# Patient Record
Sex: MALE | Race: ASIAN | NOT HISPANIC OR LATINO
[De-identification: names, ages, dates, MRNs, and addresses within clinical notes are randomized per-mention and may not be internally consistent; named-entity substitution may affect disease eponyms.]

---

## 2017-01-24 ENCOUNTER — APPOINTMENT (OUTPATIENT)
Dept: ORTHOPEDIC SURGERY | Facility: HOSPITAL | Age: 57
End: 2017-01-24

## 2018-10-04 ENCOUNTER — APPOINTMENT (OUTPATIENT)
Dept: ORTHOPEDIC SURGERY | Facility: CLINIC | Age: 58
End: 2018-10-04

## 2019-02-15 ENCOUNTER — APPOINTMENT (OUTPATIENT)
Dept: ORTHOPEDIC SURGERY | Facility: CLINIC | Age: 59
End: 2019-02-15
Payer: COMMERCIAL

## 2019-02-15 VITALS
BODY MASS INDEX: 26.34 KG/M2 | SYSTOLIC BLOOD PRESSURE: 155 MMHG | HEIGHT: 70 IN | WEIGHT: 184 LBS | HEART RATE: 78 BPM | DIASTOLIC BLOOD PRESSURE: 96 MMHG

## 2019-02-15 DIAGNOSIS — R26.81 UNSTEADINESS ON FEET: ICD-10-CM

## 2019-02-15 DIAGNOSIS — M25.561 PAIN IN RIGHT KNEE: ICD-10-CM

## 2019-02-15 DIAGNOSIS — M47.817 SPONDYLOSIS W/OUT MYELOPATHY OR RADICULOPATHY, LUMBOSACRAL REGION: ICD-10-CM

## 2019-02-15 DIAGNOSIS — M25.562 PAIN IN RIGHT KNEE: ICD-10-CM

## 2019-02-15 DIAGNOSIS — M47.812 SPONDYLOSIS W/OUT MYELOPATHY OR RADICULOPATHY, CERVICAL REGION: ICD-10-CM

## 2019-02-15 PROCEDURE — 99214 OFFICE O/P EST MOD 30 MIN: CPT

## 2019-02-15 PROCEDURE — 73562 X-RAY EXAM OF KNEE 3: CPT | Mod: 50

## 2019-02-15 PROCEDURE — 72040 X-RAY EXAM NECK SPINE 2-3 VW: CPT

## 2019-02-15 PROCEDURE — 72100 X-RAY EXAM L-S SPINE 2/3 VWS: CPT

## 2019-02-15 PROCEDURE — 72170 X-RAY EXAM OF PELVIS: CPT

## 2019-02-15 NOTE — CONSULT LETTER
[Dear  ___] : Dear  [unfilled], [Consult Letter:] : I had the pleasure of evaluating your patient, [unfilled]. [Please see my note below.] : Please see my note below. [Consult Closing:] : Thank you very much for allowing me to participate in the care of this patient.  If you have any questions, please do not hesitate to contact me. [Sincerely,] : Sincerely, [FreeTextEntry2] : JOHN PAUL KIRBY\par  [FreeTextEntry3] : Diego Griffith MD\par \par ______________________________________________\par [facility]: Hip/Knee Arthroplasty\par [address], [address], [place] [address]\par (t) [phone]\par (f) [phone]\par

## 2019-02-15 NOTE — PHYSICAL EXAM
[Knee] : patellar 2+ and symmetric bilaterally [Ankle] : ankle 2+ and symmetric bilaterally [DP] : dorsalis pedis 2+ and symmetric bilaterally [PT] : posterior tibial 2+ and symmetric bilaterally [de-identified] : On general examination the patient is adequately groomed and nourished. The vital parameters are as recorded. \par There is no lymphedema or diffuse swelling, no varicose veins, no skin warmth/erythema/scars/swelling, no ulcers and no palpable lymph nodes or masses in both lower extremities. Bilateral pedal pulses are well palpable.\par Upper Extremity:\par Both right and left upper extremities are unremarkable in terms of skin rash, lesions, pigmentation, redness, tenderness, swelling, joint instability, abnormal deformity or crepitus. The overall range of motion, sensation, motor tone and strength testing are normal.\par \par The patient ambulates with an unstable gait. There is generalized weakness of bilateral lower extremities. \par Knee Exam: \par The gait and station is normal\par Both knees have a valgus alignment of 5 degrees with no flexion contracture or deformity. Both knees demonstrate no scars and the skin has no warmth, erythema, swelling or tenderness. \par Both knees have a normal range of motion of 0 degrees to 130 degrees flexion. There is mild medial joint line tenderness, right more painful than left. \par Georgi's test is negative. Shade test is negative. \par Lachman's test, Anterior/Posterior Drawer test and Pivot Shift Tests are negative. There is no mediolateral laxity and no anteroposterior instability. \par Patella compression test is negative and patellofemoral tracking is normal with no lateral subluxation, apprehension or instability. \par Both knees quadriceps and hamstrings power is normal.\par \par Neurology:\par The patient is alert and oriented in person, place and time. The mood is calm and affect is normal.\par Testing for coordination including Rhomberg's Test and Finger-Nose Test, sensation, motor tone and power are all abnormal. There is generalized weakness bilateral LE, and the patient ambulates with an unstable gait. DTR intact. Vascular intact B/l\par Spine Exam:\par There is mild curvature of the spine with loss of normal lumbar lordosis. The skin is devoid of erythema, scars, pigmentation or rashes. There is mild lumbar spasm and tenderness especially at L4-L5 or L5-S1. \par The range of motion of the lumbar spine is limited by pain and spasm. Forward flexion is 80% normal, extension catch positive, lateral flexion and rotation 80% normal. There is no lumbar spine imbalance or instability detected.\par Bilateral passive SLR is right 40 degrees, left 40 degrees in supine and sitting positions. Lasegue's Test is negative.\par \par  [de-identified] : The following radiographs were ordered and read by me during this patients visit. I reviewed each radiograph in detail with the patient and discussed the findings as highlighted below. \par AP, lateral and skyline views of the bilateral knees are within normal limits. \par AP view of the pelvis are within normal limits\par AP and lateral views of the lumbar spine reveal degenerative end plate changes, DJD most prominent L5-S1. \par AP and lateral views of the cervical spine reveal degenerative changes C3-4 and C4-5. \par

## 2019-02-15 NOTE — DISCUSSION/SUMMARY
[de-identified] : Patient with bilateral knee pain, with significant gait instability. \par At this time, the patient has been advised his symptoms are not likely stemming from pathology involving the knees. The knees are stable and are not the cause of his symptoms. The patient has been recommended further workup with his current neurologist. Dr. Jackson. I have also left a message for the primary care physician to recommended close follow up regarding neurological follow up. He has also been provided with another physician name for second opinion if necessary regarding his neurological conditions.\par Concerning his knees, The patient was informed of the findings and recommended conservative management in the form of a home exercise program, activity modifications, stationary bicycling, swimming and weight loss program. A trial of Glucosamine and Chondroiten Sulphate was recommended. He has been recommended to take Tylenol for pain if needed. \par Follow-up appointment was recommended in 3-6 months or as needed. \par \par \par \par

## 2019-02-15 NOTE — HISTORY OF PRESENT ILLNESS
[Worsening] : worsening [Intermit.] : ~He/She~ states the symptoms seem to be intermittent [de-identified] : Mr. TASH FARIA is a 58 year old male presenting with bilateral knee pain, right worse than left for many years, now worsening over the last week. He notes a recent fall last week, and states it was due to his knees buckling. He states he fell to the ground, and was not able to get back up without someone helping him. He has been using a cane for ambulation since this fall. He states he was evaluated by a neurologist, who found some instability and generalized muscle weakness, and was sent for multiple imaging studies. He has been advised by the neurologist that he may have had a TIA in the past. He has not been back to the neurologist since the imaging studies. \par He continues to have knee pain at this time, and has been treated for many years conservatively. He notes the pain is localized to the medial aspect of the knees, right more painful.  The pain is waking him at night, due to direct pressure on the knees when he is laying on his side.   He localizes the pain to the medial anterior aspect of the bilateral knees. he describes the pain as sharp, worsened by direct pressure, and walking. The pain is intermittent. \par He has been treated conservatively with physical therapy, a recent cortisone injection 1/22/2019 to the right knee with minimal relief. He returns for knee evaluation. \par He admits to a recent A1C of 6.5, DM controlled.

## 2019-03-04 ENCOUNTER — INPATIENT (INPATIENT)
Facility: HOSPITAL | Age: 59
LOS: 5 days | Discharge: ANOTHER HOSPITAL | End: 2019-03-10
Attending: NEUROLOGICAL SURGERY | Admitting: NEUROLOGICAL SURGERY
Payer: COMMERCIAL

## 2019-03-04 VITALS
HEART RATE: 84 BPM | RESPIRATION RATE: 18 BRPM | TEMPERATURE: 98 F | DIASTOLIC BLOOD PRESSURE: 77 MMHG | SYSTOLIC BLOOD PRESSURE: 145 MMHG | OXYGEN SATURATION: 97 %

## 2019-03-04 DIAGNOSIS — E78.5 HYPERLIPIDEMIA, UNSPECIFIED: ICD-10-CM

## 2019-03-04 DIAGNOSIS — I10 ESSENTIAL (PRIMARY) HYPERTENSION: ICD-10-CM

## 2019-03-04 DIAGNOSIS — G95.0 SYRINGOMYELIA AND SYRINGOBULBIA: ICD-10-CM

## 2019-03-04 DIAGNOSIS — E11.9 TYPE 2 DIABETES MELLITUS WITHOUT COMPLICATIONS: ICD-10-CM

## 2019-03-04 LAB
ALBUMIN SERPL ELPH-MCNC: 4.3 G/DL — SIGNIFICANT CHANGE UP (ref 3.3–5)
ALP SERPL-CCNC: 71 U/L — SIGNIFICANT CHANGE UP (ref 40–120)
ALT FLD-CCNC: 26 U/L — SIGNIFICANT CHANGE UP (ref 4–41)
ANION GAP SERPL CALC-SCNC: 13 MMO/L — SIGNIFICANT CHANGE UP (ref 7–14)
AST SERPL-CCNC: 24 U/L — SIGNIFICANT CHANGE UP (ref 4–40)
BASOPHILS # BLD AUTO: 0.05 K/UL — SIGNIFICANT CHANGE UP (ref 0–0.2)
BASOPHILS NFR BLD AUTO: 0.5 % — SIGNIFICANT CHANGE UP (ref 0–2)
BILIRUB SERPL-MCNC: 0.4 MG/DL — SIGNIFICANT CHANGE UP (ref 0.2–1.2)
BUN SERPL-MCNC: 12 MG/DL — SIGNIFICANT CHANGE UP (ref 7–23)
CALCIUM SERPL-MCNC: 9.7 MG/DL — SIGNIFICANT CHANGE UP (ref 8.4–10.5)
CHLORIDE SERPL-SCNC: 99 MMOL/L — SIGNIFICANT CHANGE UP (ref 98–107)
CO2 SERPL-SCNC: 25 MMOL/L — SIGNIFICANT CHANGE UP (ref 22–31)
CREAT SERPL-MCNC: 0.8 MG/DL — SIGNIFICANT CHANGE UP (ref 0.5–1.3)
EOSINOPHIL # BLD AUTO: 0.16 K/UL — SIGNIFICANT CHANGE UP (ref 0–0.5)
EOSINOPHIL NFR BLD AUTO: 1.7 % — SIGNIFICANT CHANGE UP (ref 0–6)
GLUCOSE SERPL-MCNC: 163 MG/DL — HIGH (ref 70–99)
HCT VFR BLD CALC: 40.8 % — SIGNIFICANT CHANGE UP (ref 39–50)
HGB BLD-MCNC: 12.6 G/DL — LOW (ref 13–17)
IMM GRANULOCYTES NFR BLD AUTO: 0.3 % — SIGNIFICANT CHANGE UP (ref 0–1.5)
LYMPHOCYTES # BLD AUTO: 3.28 K/UL — SIGNIFICANT CHANGE UP (ref 1–3.3)
LYMPHOCYTES # BLD AUTO: 35.5 % — SIGNIFICANT CHANGE UP (ref 13–44)
MCHC RBC-ENTMCNC: 26.4 PG — LOW (ref 27–34)
MCHC RBC-ENTMCNC: 30.9 % — LOW (ref 32–36)
MCV RBC AUTO: 85.4 FL — SIGNIFICANT CHANGE UP (ref 80–100)
MONOCYTES # BLD AUTO: 0.83 K/UL — SIGNIFICANT CHANGE UP (ref 0–0.9)
MONOCYTES NFR BLD AUTO: 9 % — SIGNIFICANT CHANGE UP (ref 2–14)
NEUTROPHILS # BLD AUTO: 4.9 K/UL — SIGNIFICANT CHANGE UP (ref 1.8–7.4)
NEUTROPHILS NFR BLD AUTO: 53 % — SIGNIFICANT CHANGE UP (ref 43–77)
NRBC # FLD: 0 K/UL — LOW (ref 25–125)
PLATELET # BLD AUTO: 283 K/UL — SIGNIFICANT CHANGE UP (ref 150–400)
PMV BLD: 10 FL — SIGNIFICANT CHANGE UP (ref 7–13)
POTASSIUM SERPL-MCNC: 3.8 MMOL/L — SIGNIFICANT CHANGE UP (ref 3.5–5.3)
POTASSIUM SERPL-SCNC: 3.8 MMOL/L — SIGNIFICANT CHANGE UP (ref 3.5–5.3)
PROT SERPL-MCNC: 7.7 G/DL — SIGNIFICANT CHANGE UP (ref 6–8.3)
RBC # BLD: 4.78 M/UL — SIGNIFICANT CHANGE UP (ref 4.2–5.8)
RBC # FLD: 15.3 % — HIGH (ref 10.3–14.5)
SODIUM SERPL-SCNC: 137 MMOL/L — SIGNIFICANT CHANGE UP (ref 135–145)
WBC # BLD: 9.25 K/UL — SIGNIFICANT CHANGE UP (ref 3.8–10.5)
WBC # FLD AUTO: 9.25 K/UL — SIGNIFICANT CHANGE UP (ref 3.8–10.5)

## 2019-03-04 PROCEDURE — 93970 EXTREMITY STUDY: CPT | Mod: 26

## 2019-03-04 PROCEDURE — 71045 X-RAY EXAM CHEST 1 VIEW: CPT | Mod: 26

## 2019-03-04 RX ORDER — DOCUSATE SODIUM 100 MG
100 CAPSULE ORAL THREE TIMES A DAY
Qty: 0 | Refills: 0 | Status: DISCONTINUED | OUTPATIENT
Start: 2019-03-04 | End: 2019-03-10

## 2019-03-04 RX ORDER — DEXTROSE 50 % IN WATER 50 %
15 SYRINGE (ML) INTRAVENOUS ONCE
Qty: 0 | Refills: 0 | Status: DISCONTINUED | OUTPATIENT
Start: 2019-03-04 | End: 2019-03-10

## 2019-03-04 RX ORDER — SODIUM CHLORIDE 9 MG/ML
3 INJECTION INTRAMUSCULAR; INTRAVENOUS; SUBCUTANEOUS EVERY 8 HOURS
Qty: 0 | Refills: 0 | Status: DISCONTINUED | OUTPATIENT
Start: 2019-03-04 | End: 2019-03-10

## 2019-03-04 RX ORDER — DEXTROSE 50 % IN WATER 50 %
25 SYRINGE (ML) INTRAVENOUS ONCE
Qty: 0 | Refills: 0 | Status: DISCONTINUED | OUTPATIENT
Start: 2019-03-04 | End: 2019-03-10

## 2019-03-04 RX ORDER — SENNA PLUS 8.6 MG/1
2 TABLET ORAL AT BEDTIME
Qty: 0 | Refills: 0 | Status: DISCONTINUED | OUTPATIENT
Start: 2019-03-04 | End: 2019-03-10

## 2019-03-04 RX ORDER — INSULIN LISPRO 100/ML
VIAL (ML) SUBCUTANEOUS AT BEDTIME
Qty: 0 | Refills: 0 | Status: DISCONTINUED | OUTPATIENT
Start: 2019-03-04 | End: 2019-03-10

## 2019-03-04 RX ORDER — DEXTROSE 50 % IN WATER 50 %
12.5 SYRINGE (ML) INTRAVENOUS ONCE
Qty: 0 | Refills: 0 | Status: DISCONTINUED | OUTPATIENT
Start: 2019-03-04 | End: 2019-03-10

## 2019-03-04 RX ORDER — SODIUM CHLORIDE 9 MG/ML
1000 INJECTION, SOLUTION INTRAVENOUS
Qty: 0 | Refills: 0 | Status: DISCONTINUED | OUTPATIENT
Start: 2019-03-04 | End: 2019-03-10

## 2019-03-04 RX ORDER — INSULIN LISPRO 100/ML
VIAL (ML) SUBCUTANEOUS
Qty: 0 | Refills: 0 | Status: DISCONTINUED | OUTPATIENT
Start: 2019-03-04 | End: 2019-03-10

## 2019-03-04 RX ORDER — ACETAMINOPHEN 500 MG
650 TABLET ORAL EVERY 6 HOURS
Qty: 0 | Refills: 0 | Status: DISCONTINUED | OUTPATIENT
Start: 2019-03-04 | End: 2019-03-10

## 2019-03-04 RX ORDER — GLUCAGON INJECTION, SOLUTION 0.5 MG/.1ML
1 INJECTION, SOLUTION SUBCUTANEOUS ONCE
Qty: 0 | Refills: 0 | Status: DISCONTINUED | OUTPATIENT
Start: 2019-03-04 | End: 2019-03-10

## 2019-03-04 RX ADMIN — SENNA PLUS 2 TABLET(S): 8.6 TABLET ORAL at 23:46

## 2019-03-04 RX ADMIN — Medication 100 MILLIGRAM(S): at 23:46

## 2019-03-04 RX ADMIN — SODIUM CHLORIDE 3 MILLILITER(S): 9 INJECTION INTRAMUSCULAR; INTRAVENOUS; SUBCUTANEOUS at 23:46

## 2019-03-04 NOTE — H&P ADULT - NSHPPHYSICALEXAM_GEN_ALL_CORE
WDWN male in NAD  Vital Signs Last 24 Hrs  T(C): 36.8 (04 Mar 2019 19:38), Max: 36.8 (04 Mar 2019 16:44)  T(F): 98.3 (04 Mar 2019 19:38), Max: 98.3 (04 Mar 2019 16:44)  HR: 71 (04 Mar 2019 19:38) (71 - 84)  BP: 163/104 (04 Mar 2019 19:38) (145/77 - 163/104)  BP(mean): --  RR: 18 (04 Mar 2019 19:38) (18 - 18)  SpO2: 100% (04 Mar 2019 19:38) (97% - 100%)    AAO X 3  PERRLA, EOMI  CN 2-12 grossly intact  BROWN, bilateral UE and Right LE 5/5  Left LE PF/DF 5/5, Knee flexion 4-/5, knee extension, Hip flexion/extension 3/5  Sensation patchy areas of decreased sensation distal to mid thigh bilaterally    VTE score: 2

## 2019-03-04 NOTE — ED PROVIDER NOTE - PHYSICAL EXAMINATION
PHYSICAL EXAM:  GENERAL: NAD, well-groomed, well-developed  HEAD:  Atraumatic, Normocephalic  EYES: EOMI, PERRLA, conjunctiva and sclera clear  ENMT: No tonsillar erythema, exudates, or enlargement; Moist mucous membranes  NECK: Supple, No JVD  HEART: Regular rate and rhythm; No murmurs, rubs, or gallops  RESPIRATORY: CTA B/L, No W/R/R  ABDOMEN: Soft, Nontender, Nondistended  NEURO: A&Ox3, 5/5 strength BUE, 3+/5 strength LLE, 4/5 strength RLE, no saddle anesthesias   EXTREMITIES:  2+ Peripheral Pulses, no edema, sensory diminished to L dorsal foot  SKIN: warm, dry, normal color, no rash or abnormal lesions PHYSICAL EXAM:  GENERAL: NAD, well-groomed, well-developed  HEAD:  Atraumatic, Normocephalic  EYES: EOMI, PERRLA, conjunctiva and sclera clear  ENMT: No tonsillar erythema, exudates, or enlargement; Moist mucous membranes  NECK: Supple, No JVD  HEART: Regular rate and rhythm; No murmurs, rubs, or gallops  RESPIRATORY: CTA B/L, No W/R/R, no resp distress  ABDOMEN: Soft, Nontender, Nondistended  NEURO: A&Ox3, 5/5 strength BUE, 3+/5 strength LLE, 4/5 strength RLE, no saddle anesthesias, normal mentation, speech, no truncal weakness  EXTREMITIES:  2+ Peripheral Pulses, no edema, sensory diminished to L dorsal foot  SKIN: warm, dry, normal color, no rash or abnormal lesions

## 2019-03-04 NOTE — ED ADULT TRIAGE NOTE - CHIEF COMPLAINT QUOTE
PT C/O pain and weakness to bilateral lower extremities x 1 month worsening x 1week. PT states has been unable to ambulate: had outpatient MRI on 2/26/19 that showed possible mass on Lumbar region of spine. as well as cord compression PT denies any bowel incontinence, does endorse urinary incontinence x 1 day. PMH: DM, HTN, HLD.

## 2019-03-04 NOTE — H&P ADULT - NSHPREVIEWOFSYSTEMS_GEN_ALL_CORE
14 point ROS conducted  General: difficulty ambulating unassisted   Neuro: decreased sensation in both legs, left leg weakness   Musculoskeletal: Low back pain

## 2019-03-04 NOTE — H&P ADULT - PROBLEM SELECTOR PLAN 1
Admit   MRI of neural axis with and without contrast with CISS sequence  Preop for correction  VTE prophylaxis

## 2019-03-04 NOTE — H&P ADULT - HISTORY OF PRESENT ILLNESS
59 YO male developed lower back pain following a fall approximately 1 month ago. 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 Patient c/o lower back pain, decreased sensation in both legs, left leg weakness and difficulty ambulating unassisted due to weakness. Denied bowel or bladder dysfunction

## 2019-03-04 NOTE — ED ADULT NURSE NOTE - OBJECTIVE STATEMENT
58M c/o pain and weakness to bilateral lower extremities x 1 month worsening x 1week. Admits to two falls in the past month. Pt states has been unable to ambulate: had outpatient MRI on 2/26/19 that showed possible mass on Lumbar region of spine. as well as cord compression. Pt also received MRI today. Pt told to be admitted to neuro surgery.  PT denies any bowel incontinence, but does endorse urinary incontinence x 1 day. PMH: DM, HTN, HLD.  weakness  +b/l lower extremity weakness, decreased sensation to lateral aspect of lower extremities.

## 2019-03-04 NOTE — ED PROVIDER NOTE - ATTENDING CONTRIBUTION TO CARE
Landen: 59 yo male with a h/o DM, HTN, and HLD sent in for admission to neurosurgery for a new diagnosis of spinal mass. Pt endorses approx 1 month of progressively worsening LE weakness. No h/o trauma. + associated left foot paresthesias. No fevers or chills. Pt denies any current pain. + urinary incontinence. No saddle anesthesia. Exam: GENERAL: well appearing, NAD, HEENT: MMM, PERRLA, CARDIO: +S1/S2, no murmurs, rubs or gallops, LUNGS: CTA B/L, no wheezing, rales or rhonchi, ABD: soft, nontender, BSx4 quadrants, no guarding or rigidity. EXT: No LE edema or calf TTP, 2+ distal pulses x 4 extremities. NEURO: AxOx3, CNII-XII intact, no dysmetria or disdiadocholinesia. Negative Romberg's. SKIN: no rashes or lesions,well perfused Landen: 57 yo male with a h/o DM, HTN, and HLD sent in for admission to neurosurgery for a new diagnosis of spinal mass. Pt endorses approx 1 month of progressively worsening LE weakness. No h/o trauma. + associated left foot paresthesias. No fevers or chills. Pt denies any current pain. + urinary incontinence. No saddle anesthesia. Exam: GENERAL: well appearing, NAD, HEENT: MMM, PERRLA, CARDIO: +S1/S2, no murmurs, rubs or gallops, LUNGS: CTA B/L, no wheezing, rales or rhonchi, ABD: soft, nontender, BSx4 quadrants, no guarding or rigidity. MSK: no midline TTP EXT: S1 weak b/l worse on left, 3+/5 strength in LLE, 4/5 on right, no saddle anesthesia, + sensory deficits on left lateral foot. NEURO: AxOx3, SKIN: no rashes or lesions, well perfused A/P- 57 yo male with new spinal mass with neuro deficits. will obtain labs, and admit to neurosurgery.

## 2019-03-04 NOTE — ED PROVIDER NOTE - PROGRESS NOTE DETAILS
[place] PGY1- discussion with nsg, admit to severo, no need for decadron as no evidence for cord edema

## 2019-03-04 NOTE — ED ADULT NURSE NOTE - NSIMPLEMENTINTERV_GEN_ALL_ED
Implemented All Fall Risk Interventions:  [place] to call system. Call bell, personal items and telephone within reach. Instruct patient to call for assistance. Room bathroom lighting operational. Non-slip footwear when patient is off stretcher. Physically safe environment: no spills, clutter or unnecessary equipment. Stretcher in lowest position, wheels locked, appropriate side rails in place. Provide visual cue, wrist band, yellow gown, etc. Monitor gait and stability. Monitor for mental status changes and reorient to person, place, and time. Review medications for side effects contributing to fall risk. Reinforce activity limits and safety measures with patient and family.

## 2019-03-05 LAB
APTT BLD: 28.5 SEC — SIGNIFICANT CHANGE UP (ref 27.5–36.3)
BLD GP AB SCN SERPL QL: NEGATIVE — SIGNIFICANT CHANGE UP
GLUCOSE BLDC GLUCOMTR-MCNC: 120 MG/DL — HIGH (ref 70–99)
GLUCOSE BLDC GLUCOMTR-MCNC: 131 MG/DL — HIGH (ref 70–99)
GLUCOSE BLDC GLUCOMTR-MCNC: 144 MG/DL — HIGH (ref 70–99)
GLUCOSE BLDC GLUCOMTR-MCNC: 147 MG/DL — HIGH (ref 70–99)
INR BLD: 1.17 — SIGNIFICANT CHANGE UP (ref 0.88–1.17)
PROTHROM AB SERPL-ACNC: 13 SEC — SIGNIFICANT CHANGE UP (ref 9.8–13.1)
RH IG SCN BLD-IMP: NEGATIVE — SIGNIFICANT CHANGE UP

## 2019-03-05 PROCEDURE — 72141 MRI NECK SPINE W/O DYE: CPT | Mod: 26

## 2019-03-05 PROCEDURE — 70551 MRI BRAIN STEM W/O DYE: CPT | Mod: 26

## 2019-03-05 PROCEDURE — 93010 ELECTROCARDIOGRAM REPORT: CPT

## 2019-03-05 PROCEDURE — 72146 MRI CHEST SPINE W/O DYE: CPT | Mod: 26

## 2019-03-05 PROCEDURE — 72148 MRI LUMBAR SPINE W/O DYE: CPT | Mod: 26

## 2019-03-05 RX ORDER — DIAZEPAM 5 MG
5 TABLET ORAL ONCE
Qty: 0 | Refills: 0 | Status: DISCONTINUED | OUTPATIENT
Start: 2019-03-05 | End: 2019-03-06

## 2019-03-05 RX ORDER — LOSARTAN POTASSIUM 100 MG/1
100 TABLET, FILM COATED ORAL DAILY
Qty: 0 | Refills: 0 | Status: DISCONTINUED | OUTPATIENT
Start: 2019-03-05 | End: 2019-03-10

## 2019-03-05 RX ORDER — AMLODIPINE BESYLATE 2.5 MG/1
5 TABLET ORAL DAILY
Qty: 0 | Refills: 0 | Status: DISCONTINUED | OUTPATIENT
Start: 2019-03-05 | End: 2019-03-10

## 2019-03-05 RX ORDER — AMLODIPINE BESYLATE 2.5 MG/1
5 TABLET ORAL DAILY
Qty: 0 | Refills: 0 | Status: DISCONTINUED | OUTPATIENT
Start: 2019-03-05 | End: 2019-03-05

## 2019-03-05 RX ORDER — ATORVASTATIN CALCIUM 80 MG/1
20 TABLET, FILM COATED ORAL AT BEDTIME
Qty: 0 | Refills: 0 | Status: DISCONTINUED | OUTPATIENT
Start: 2019-03-05 | End: 2019-03-10

## 2019-03-05 RX ORDER — OXYCODONE HYDROCHLORIDE 5 MG/1
10 TABLET ORAL EVERY 6 HOURS
Qty: 0 | Refills: 0 | Status: DISCONTINUED | OUTPATIENT
Start: 2019-03-05 | End: 2019-03-10

## 2019-03-05 RX ORDER — LANOLIN ALCOHOL/MO/W.PET/CERES
3 CREAM (GRAM) TOPICAL AT BEDTIME
Qty: 0 | Refills: 0 | Status: DISCONTINUED | OUTPATIENT
Start: 2019-03-05 | End: 2019-03-10

## 2019-03-05 RX ORDER — GABAPENTIN 400 MG/1
600 CAPSULE ORAL THREE TIMES A DAY
Qty: 0 | Refills: 0 | Status: DISCONTINUED | OUTPATIENT
Start: 2019-03-05 | End: 2019-03-10

## 2019-03-05 RX ORDER — LOSARTAN POTASSIUM 100 MG/1
100 TABLET, FILM COATED ORAL DAILY
Qty: 0 | Refills: 0 | Status: DISCONTINUED | OUTPATIENT
Start: 2019-03-05 | End: 2019-03-05

## 2019-03-05 RX ADMIN — OXYCODONE HYDROCHLORIDE 10 MILLIGRAM(S): 5 TABLET ORAL at 12:40

## 2019-03-05 RX ADMIN — AMLODIPINE BESYLATE 5 MILLIGRAM(S): 2.5 TABLET ORAL at 11:24

## 2019-03-05 RX ADMIN — Medication 650 MILLIGRAM(S): at 20:12

## 2019-03-05 RX ADMIN — ATORVASTATIN CALCIUM 20 MILLIGRAM(S): 80 TABLET, FILM COATED ORAL at 22:23

## 2019-03-05 RX ADMIN — SODIUM CHLORIDE 3 MILLILITER(S): 9 INJECTION INTRAMUSCULAR; INTRAVENOUS; SUBCUTANEOUS at 07:47

## 2019-03-05 RX ADMIN — Medication 650 MILLIGRAM(S): at 04:18

## 2019-03-05 RX ADMIN — OXYCODONE HYDROCHLORIDE 10 MILLIGRAM(S): 5 TABLET ORAL at 11:24

## 2019-03-05 RX ADMIN — Medication 100 MILLIGRAM(S): at 22:23

## 2019-03-05 RX ADMIN — Medication 100 MILLIGRAM(S): at 07:47

## 2019-03-05 RX ADMIN — Medication 650 MILLIGRAM(S): at 04:48

## 2019-03-05 RX ADMIN — Medication 650 MILLIGRAM(S): at 20:40

## 2019-03-05 RX ADMIN — GABAPENTIN 600 MILLIGRAM(S): 400 CAPSULE ORAL at 22:23

## 2019-03-05 RX ADMIN — Medication 3 MILLIGRAM(S): at 22:25

## 2019-03-05 RX ADMIN — Medication 100 MILLIGRAM(S): at 13:04

## 2019-03-05 RX ADMIN — SODIUM CHLORIDE 3 MILLILITER(S): 9 INJECTION INTRAMUSCULAR; INTRAVENOUS; SUBCUTANEOUS at 13:04

## 2019-03-05 RX ADMIN — LOSARTAN POTASSIUM 100 MILLIGRAM(S): 100 TABLET, FILM COATED ORAL at 22:23

## 2019-03-05 RX ADMIN — SODIUM CHLORIDE 3 MILLILITER(S): 9 INJECTION INTRAMUSCULAR; INTRAVENOUS; SUBCUTANEOUS at 22:27

## 2019-03-05 RX ADMIN — GABAPENTIN 600 MILLIGRAM(S): 400 CAPSULE ORAL at 13:06

## 2019-03-05 RX ADMIN — SENNA PLUS 2 TABLET(S): 8.6 TABLET ORAL at 22:23

## 2019-03-05 NOTE — CONSULT NOTE ADULT - ASSESSMENT
HTN  add losartan    DM  Monitor finger stick. Insulin coverage. Diabetic education and Diabetic diet. Consider nutrition consultation.    HLD  on statin    PREOP  Based on current ACC/AHA guidelines, patient history and physical exam, the patient is considered to have low risk  please obtain EKG

## 2019-03-05 NOTE — CONSULT NOTE ADULT - SUBJECTIVE AND OBJECTIVE BOX
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       PAST MEDICAL & SURGICAL HISTORY:  HLD (hyperlipidemia)  Essential hypertension  DM (diabetes mellitus)          MEDICATIONS:  amLODIPine   Tablet 5 milliGRAM(s) Oral daily        acetaminophen   Tablet .. 650 milliGRAM(s) Oral every 6 hours PRN  diazepam    Tablet 5 milliGRAM(s) Oral once  gabapentin 600 milliGRAM(s) Oral three times a day  oxyCODONE    IR 10 milliGRAM(s) Oral every 6 hours PRN    docusate sodium 100 milliGRAM(s) Oral three times a day  senna 2 Tablet(s) Oral at bedtime    atorvastatin 20 milliGRAM(s) Oral at bedtime  dextrose 40% Gel 15 Gram(s) Oral once PRN  dextrose 50% Injectable 12.5 Gram(s) IV Push once  dextrose 50% Injectable 25 Gram(s) IV Push once  dextrose 50% Injectable 25 Gram(s) IV Push once  glucagon  Injectable 1 milliGRAM(s) IntraMuscular once PRN  insulin lispro (HumaLOG) corrective regimen sliding scale   SubCutaneous three times a day before meals  insulin lispro (HumaLOG) corrective regimen sliding scale   SubCutaneous at bedtime    dextrose 5%. 1000 milliLiter(s) IV Continuous <Continuous>  sodium chloride 0.9% lock flush 3 milliLiter(s) IV Push every 8 hours      FAMILY HISTORY:      Non-contributory    SOCIAL HISTORY:    No tobacco, drugs or etoh    Allergies    No Known Allergies    Intolerances    	    REVIEW OF SYSTEMS:  as above  The rest of the 14 points ROS reviewed and except above they are unremarkable.        PHYSICAL EXAM:  T(C): 36.7 (03-05-19 @ 13:07), Max: 36.8 (03-04-19 @ 19:38)  HR: 88 (03-05-19 @ 14:25) (64 - 93)  BP: 155/88 (03-05-19 @ 14:25) (142/86 - 173/102)  RR: 17 (03-05-19 @ 13:07) (17 - 18)  SpO2: 100% (03-05-19 @ 13:07) (99% - 100%)  Wt(kg): --  I&O's Summary    05 Mar 2019 07:01  -  05 Mar 2019 16:51  --------------------------------------------------------  IN: 0 mL / OUT: 500 mL / NET: -500 mL        JVP: Normal  Neck: supple  Lung: clear   CV: S1 S2 , Murmur:  Abd: soft  Ext: No edema  neuro: Awake / alert  Psych: flat affect  Skin: normal      LABS/DATA:    TELEMETRY: 	    ECG:  	   	  CARDIAC MARKERS:                                      12.6   9.25  )-----------( 283      ( 04 Mar 2019 19:54 )             40.8     03-04    137  |  99  |  12  ----------------------------<  163<H>  3.8   |  25  |  0.80    Ca    9.7      04 Mar 2019 19:54    TPro  7.7  /  Alb  4.3  /  TBili  0.4  /  DBili  x   /  AST  24  /  ALT  26  /  AlkPhos  71  03-04    proBNP:   Lipid Profile:   HgA1c:   TSH:

## 2019-03-06 LAB
GLUCOSE BLDC GLUCOMTR-MCNC: 118 MG/DL — HIGH (ref 70–99)
GLUCOSE BLDC GLUCOMTR-MCNC: 143 MG/DL — HIGH (ref 70–99)
GLUCOSE BLDC GLUCOMTR-MCNC: 145 MG/DL — HIGH (ref 70–99)
GLUCOSE BLDC GLUCOMTR-MCNC: 146 MG/DL — HIGH (ref 70–99)

## 2019-03-06 PROCEDURE — 71260 CT THORAX DX C+: CPT | Mod: 26

## 2019-03-06 PROCEDURE — 74177 CT ABD & PELVIS W/CONTRAST: CPT | Mod: 26

## 2019-03-06 RX ADMIN — Medication 100 MILLIGRAM(S): at 22:21

## 2019-03-06 RX ADMIN — Medication 650 MILLIGRAM(S): at 22:21

## 2019-03-06 RX ADMIN — GABAPENTIN 600 MILLIGRAM(S): 400 CAPSULE ORAL at 05:10

## 2019-03-06 RX ADMIN — Medication 5 MILLIGRAM(S): at 15:07

## 2019-03-06 RX ADMIN — Medication 3 MILLIGRAM(S): at 22:20

## 2019-03-06 RX ADMIN — SENNA PLUS 2 TABLET(S): 8.6 TABLET ORAL at 22:21

## 2019-03-06 RX ADMIN — ATORVASTATIN CALCIUM 20 MILLIGRAM(S): 80 TABLET, FILM COATED ORAL at 22:20

## 2019-03-06 RX ADMIN — Medication 100 MILLIGRAM(S): at 05:09

## 2019-03-06 RX ADMIN — OXYCODONE HYDROCHLORIDE 10 MILLIGRAM(S): 5 TABLET ORAL at 05:40

## 2019-03-06 RX ADMIN — OXYCODONE HYDROCHLORIDE 10 MILLIGRAM(S): 5 TABLET ORAL at 05:09

## 2019-03-06 RX ADMIN — SODIUM CHLORIDE 3 MILLILITER(S): 9 INJECTION INTRAMUSCULAR; INTRAVENOUS; SUBCUTANEOUS at 22:22

## 2019-03-06 RX ADMIN — GABAPENTIN 600 MILLIGRAM(S): 400 CAPSULE ORAL at 22:20

## 2019-03-06 RX ADMIN — GABAPENTIN 600 MILLIGRAM(S): 400 CAPSULE ORAL at 13:17

## 2019-03-06 RX ADMIN — Medication 100 MILLIGRAM(S): at 13:17

## 2019-03-06 RX ADMIN — Medication 650 MILLIGRAM(S): at 22:51

## 2019-03-06 RX ADMIN — SODIUM CHLORIDE 3 MILLILITER(S): 9 INJECTION INTRAMUSCULAR; INTRAVENOUS; SUBCUTANEOUS at 13:17

## 2019-03-06 RX ADMIN — LOSARTAN POTASSIUM 100 MILLIGRAM(S): 100 TABLET, FILM COATED ORAL at 06:52

## 2019-03-06 RX ADMIN — AMLODIPINE BESYLATE 5 MILLIGRAM(S): 2.5 TABLET ORAL at 05:10

## 2019-03-06 RX ADMIN — SODIUM CHLORIDE 3 MILLILITER(S): 9 INJECTION INTRAMUSCULAR; INTRAVENOUS; SUBCUTANEOUS at 05:37

## 2019-03-07 ENCOUNTER — RESULT REVIEW (OUTPATIENT)
Age: 59
End: 2019-03-07

## 2019-03-07 LAB
ANION GAP SERPL CALC-SCNC: 11 MMO/L — SIGNIFICANT CHANGE UP (ref 7–14)
APPEARANCE UR: CLEAR — SIGNIFICANT CHANGE UP
APTT BLD: 29.4 SEC — SIGNIFICANT CHANGE UP (ref 27.5–36.3)
BILIRUB UR-MCNC: NEGATIVE — SIGNIFICANT CHANGE UP
BLD GP AB SCN SERPL QL: NEGATIVE — SIGNIFICANT CHANGE UP
BLOOD UR QL VISUAL: NEGATIVE — SIGNIFICANT CHANGE UP
BUN SERPL-MCNC: 17 MG/DL — SIGNIFICANT CHANGE UP (ref 7–23)
CALCIUM SERPL-MCNC: 9.7 MG/DL — SIGNIFICANT CHANGE UP (ref 8.4–10.5)
CHLORIDE SERPL-SCNC: 104 MMOL/L — SIGNIFICANT CHANGE UP (ref 98–107)
CLARITY CSF: CLEAR — SIGNIFICANT CHANGE UP
CO2 SERPL-SCNC: 26 MMOL/L — SIGNIFICANT CHANGE UP (ref 22–31)
COLOR CSF: COLORLESS — SIGNIFICANT CHANGE UP
COLOR SPEC: SIGNIFICANT CHANGE UP
CREAT SERPL-MCNC: 0.86 MG/DL — SIGNIFICANT CHANGE UP (ref 0.5–1.3)
CSF PCR RESULT: SIGNIFICANT CHANGE UP
GLUCOSE BLDC GLUCOMTR-MCNC: 131 MG/DL — HIGH (ref 70–99)
GLUCOSE BLDC GLUCOMTR-MCNC: 138 MG/DL — HIGH (ref 70–99)
GLUCOSE BLDC GLUCOMTR-MCNC: 155 MG/DL — HIGH (ref 70–99)
GLUCOSE CSF-MCNC: 88 MG/DL — HIGH (ref 40–70)
GLUCOSE SERPL-MCNC: 151 MG/DL — HIGH (ref 70–99)
GLUCOSE UR-MCNC: NEGATIVE — SIGNIFICANT CHANGE UP
GRAM STN CSF: SIGNIFICANT CHANGE UP
HCT VFR BLD CALC: 43.6 % — SIGNIFICANT CHANGE UP (ref 39–50)
HGB BLD-MCNC: 13.7 G/DL — SIGNIFICANT CHANGE UP (ref 13–17)
INR BLD: 1.18 — HIGH (ref 0.88–1.17)
KETONES UR-MCNC: NEGATIVE — SIGNIFICANT CHANGE UP
LEUKOCYTE ESTERASE UR-ACNC: NEGATIVE — SIGNIFICANT CHANGE UP
LYMPHOCYTES # CSF: 71 % — SIGNIFICANT CHANGE UP
MCHC RBC-ENTMCNC: 26.8 PG — LOW (ref 27–34)
MCHC RBC-ENTMCNC: 31.4 % — LOW (ref 32–36)
MCV RBC AUTO: 85.3 FL — SIGNIFICANT CHANGE UP (ref 80–100)
MONOCYTES # CSF: 28 % — SIGNIFICANT CHANGE UP
NEUTS SEG NFR CSF MANUAL: 1 % — SIGNIFICANT CHANGE UP
NITRITE UR-MCNC: NEGATIVE — SIGNIFICANT CHANGE UP
NRBC # FLD: 0 K/UL — LOW (ref 25–125)
NRBC NFR CSF: 6 CELL/UL — HIGH (ref 0–5)
PH UR: 6 — SIGNIFICANT CHANGE UP (ref 5–8)
PLATELET # BLD AUTO: 268 K/UL — SIGNIFICANT CHANGE UP (ref 150–400)
PMV BLD: 10.2 FL — SIGNIFICANT CHANGE UP (ref 7–13)
POTASSIUM SERPL-MCNC: 4.6 MMOL/L — SIGNIFICANT CHANGE UP (ref 3.5–5.3)
POTASSIUM SERPL-SCNC: 4.6 MMOL/L — SIGNIFICANT CHANGE UP (ref 3.5–5.3)
PROT CSF-MCNC: 51.9 MG/DL — HIGH (ref 15–40)
PROT UR-MCNC: NEGATIVE — SIGNIFICANT CHANGE UP
PROTHROM AB SERPL-ACNC: 13.2 SEC — HIGH (ref 9.8–13.1)
RBC # BLD: 5.11 M/UL — SIGNIFICANT CHANGE UP (ref 4.2–5.8)
RBC # CSF: 38 CELL/UL — HIGH (ref 0–0)
RBC # FLD: 15.7 % — HIGH (ref 10.3–14.5)
RBC CASTS # UR COMP ASSIST: SIGNIFICANT CHANGE UP (ref 0–?)
RH IG SCN BLD-IMP: NEGATIVE — SIGNIFICANT CHANGE UP
SODIUM SERPL-SCNC: 141 MMOL/L — SIGNIFICANT CHANGE UP (ref 135–145)
SP GR SPEC: 1.02 — SIGNIFICANT CHANGE UP (ref 1–1.04)
SPECIMEN SOURCE: SIGNIFICANT CHANGE UP
TOTAL CELLS COUNTED, SPINAL FLUID: 100 CELLS — SIGNIFICANT CHANGE UP
UROBILINOGEN FLD QL: NORMAL — SIGNIFICANT CHANGE UP
WBC # BLD: 8.26 K/UL — SIGNIFICANT CHANGE UP (ref 3.8–10.5)
WBC # FLD AUTO: 8.26 K/UL — SIGNIFICANT CHANGE UP (ref 3.8–10.5)
WBC UR QL: SIGNIFICANT CHANGE UP (ref 0–?)
XANTHOCHROMIA: SIGNIFICANT CHANGE UP

## 2019-03-07 PROCEDURE — 72147 MRI CHEST SPINE W/DYE: CPT | Mod: 26

## 2019-03-07 PROCEDURE — 72149 MRI LUMBAR SPINE W/DYE: CPT | Mod: 26

## 2019-03-07 PROCEDURE — 88108 CYTOPATH CONCENTRATE TECH: CPT | Mod: 26

## 2019-03-07 PROCEDURE — 62270 DX LMBR SPI PNXR: CPT

## 2019-03-07 PROCEDURE — 77003 FLUOROGUIDE FOR SPINE INJECT: CPT | Mod: 26,GC

## 2019-03-07 RX ORDER — SODIUM CHLORIDE 9 MG/ML
1000 INJECTION INTRAMUSCULAR; INTRAVENOUS; SUBCUTANEOUS
Qty: 0 | Refills: 0 | Status: DISCONTINUED | OUTPATIENT
Start: 2019-03-07 | End: 2019-03-07

## 2019-03-07 RX ORDER — POLYETHYLENE GLYCOL 3350 17 G/17G
17 POWDER, FOR SOLUTION ORAL ONCE
Qty: 0 | Refills: 0 | Status: COMPLETED | OUTPATIENT
Start: 2019-03-07 | End: 2019-03-07

## 2019-03-07 RX ADMIN — Medication 100 MILLIGRAM(S): at 05:14

## 2019-03-07 RX ADMIN — OXYCODONE HYDROCHLORIDE 10 MILLIGRAM(S): 5 TABLET ORAL at 05:44

## 2019-03-07 RX ADMIN — GABAPENTIN 600 MILLIGRAM(S): 400 CAPSULE ORAL at 05:14

## 2019-03-07 RX ADMIN — AMLODIPINE BESYLATE 5 MILLIGRAM(S): 2.5 TABLET ORAL at 05:14

## 2019-03-07 RX ADMIN — SODIUM CHLORIDE 3 MILLILITER(S): 9 INJECTION INTRAMUSCULAR; INTRAVENOUS; SUBCUTANEOUS at 12:57

## 2019-03-07 RX ADMIN — Medication 1: at 17:23

## 2019-03-07 RX ADMIN — SODIUM CHLORIDE 3 MILLILITER(S): 9 INJECTION INTRAMUSCULAR; INTRAVENOUS; SUBCUTANEOUS at 22:11

## 2019-03-07 RX ADMIN — OXYCODONE HYDROCHLORIDE 10 MILLIGRAM(S): 5 TABLET ORAL at 05:14

## 2019-03-07 RX ADMIN — POLYETHYLENE GLYCOL 3350 17 GRAM(S): 17 POWDER, FOR SOLUTION ORAL at 12:37

## 2019-03-07 RX ADMIN — Medication 100 MILLIGRAM(S): at 17:23

## 2019-03-07 RX ADMIN — GABAPENTIN 600 MILLIGRAM(S): 400 CAPSULE ORAL at 22:11

## 2019-03-07 RX ADMIN — Medication 100 MILLIGRAM(S): at 22:11

## 2019-03-07 RX ADMIN — ATORVASTATIN CALCIUM 20 MILLIGRAM(S): 80 TABLET, FILM COATED ORAL at 22:11

## 2019-03-07 RX ADMIN — Medication 650 MILLIGRAM(S): at 19:44

## 2019-03-07 RX ADMIN — Medication 650 MILLIGRAM(S): at 20:01

## 2019-03-07 RX ADMIN — LOSARTAN POTASSIUM 100 MILLIGRAM(S): 100 TABLET, FILM COATED ORAL at 05:14

## 2019-03-07 RX ADMIN — GABAPENTIN 600 MILLIGRAM(S): 400 CAPSULE ORAL at 17:22

## 2019-03-07 RX ADMIN — SENNA PLUS 2 TABLET(S): 8.6 TABLET ORAL at 22:11

## 2019-03-07 RX ADMIN — Medication 0: at 09:53

## 2019-03-07 RX ADMIN — SODIUM CHLORIDE 3 MILLILITER(S): 9 INJECTION INTRAMUSCULAR; INTRAVENOUS; SUBCUTANEOUS at 05:15

## 2019-03-07 NOTE — CHART NOTE - NSCHARTNOTEFT_GEN_A_CORE
Interventional Radiology  Pre-Procedure Note    This is a 58y Male scheduled for lumbar puncture with neuro-radiology    HPI:  57 YO male developed lower back pain following a fall approximately 1 month ago. 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 Patient c/o lower back pain, decreased sensation in both legs, left leg weakness and difficulty ambulating unassisted due to weakness. Denied bowel or bladder dysfunction (04 Mar 2019 20:59)    PAST MEDICAL & SURGICAL HISTORY:  HLD (hyperlipidemia)  Essential hypertension  DM (diabetes mellitus)    Allergies: No Known Allergies    Current Medications: acetaminophen   Tablet .. 650 milliGRAM(s) Oral every 6 hours PRN  amLODIPine   Tablet 5 milliGRAM(s) Oral daily  atorvastatin 20 milliGRAM(s) Oral at bedtime  dextrose 40% Gel 15 Gram(s) Oral once PRN  dextrose 5%. 1000 milliLiter(s) IV Continuous <Continuous>  dextrose 50% Injectable 12.5 Gram(s) IV Push once  dextrose 50% Injectable 25 Gram(s) IV Push once  dextrose 50% Injectable 25 Gram(s) IV Push once  docusate sodium 100 milliGRAM(s) Oral three times a day  gabapentin 600 milliGRAM(s) Oral three times a day  glucagon  Injectable 1 milliGRAM(s) IntraMuscular once PRN  insulin lispro (HumaLOG) corrective regimen sliding scale   SubCutaneous three times a day before meals  insulin lispro (HumaLOG) corrective regimen sliding scale   SubCutaneous at bedtime  losartan 100 milliGRAM(s) Oral daily  melatonin 3 milliGRAM(s) Oral at bedtime PRN  oxyCODONE    IR 10 milliGRAM(s) Oral every 6 hours PRN  senna 2 Tablet(s) Oral at bedtime  sodium chloride 0.9% lock flush 3 milliLiter(s) IV Push every 8 hours  sodium chloride 0.9%. 1000 milliLiter(s) IV Continuous <Continuous>      Labs:                         13.7   8.26  )-----------( 268      ( 07 Mar 2019 06:45 )             43.6       03-07    141  |  104  |  17  ----------------------------<  151<H>  4.6   |  26  |  0.86    Ca    9.7      07 Mar 2019 06:45            Assessment/Plan:   This is a 58y  Male  presents with LLE extremity weakness  Plan is for Lumbar Puncture  Procedure/ risks/ benefits/ goals/ alternatives were explained. All questions answered. Informed content obtained from patient. Consent placed in chart.

## 2019-03-07 NOTE — PROGRESS NOTE ADULT - ATTENDING COMMENTS
films with contrast appears to be a diffuse lesion. av fistula , sarcoid , lymphoma all raised as possibilities in differential. after review with dr chiang nrad. will get lp and spinal mra. hold off on biopsy for now pending results.

## 2019-03-08 LAB
CRYPTOC AG CSF-ACNC: NEGATIVE — SIGNIFICANT CHANGE UP
CULTURE - ACID FAST SMEAR CONCENTRATED: SIGNIFICANT CHANGE UP
GLUCOSE BLDC GLUCOMTR-MCNC: 139 MG/DL — HIGH (ref 70–99)
GLUCOSE BLDC GLUCOMTR-MCNC: 162 MG/DL — HIGH (ref 70–99)
GLUCOSE BLDC GLUCOMTR-MCNC: 166 MG/DL — HIGH (ref 70–99)
GLUCOSE BLDC GLUCOMTR-MCNC: 173 MG/DL — HIGH (ref 70–99)
NON-GYNECOLOGICAL CYTOLOGY STUDY: SIGNIFICANT CHANGE UP
SPECIMEN SOURCE: SIGNIFICANT CHANGE UP

## 2019-03-08 PROCEDURE — 72159 MR ANGIO SPINE W/O&W/DYE: CPT | Mod: 26

## 2019-03-08 RX ORDER — DIAZEPAM 5 MG
5 TABLET ORAL ONCE
Qty: 0 | Refills: 0 | Status: DISCONTINUED | OUTPATIENT
Start: 2019-03-08 | End: 2019-03-08

## 2019-03-08 RX ADMIN — SENNA PLUS 2 TABLET(S): 8.6 TABLET ORAL at 22:58

## 2019-03-08 RX ADMIN — GABAPENTIN 600 MILLIGRAM(S): 400 CAPSULE ORAL at 22:57

## 2019-03-08 RX ADMIN — OXYCODONE HYDROCHLORIDE 10 MILLIGRAM(S): 5 TABLET ORAL at 17:12

## 2019-03-08 RX ADMIN — AMLODIPINE BESYLATE 5 MILLIGRAM(S): 2.5 TABLET ORAL at 05:25

## 2019-03-08 RX ADMIN — SODIUM CHLORIDE 3 MILLILITER(S): 9 INJECTION INTRAMUSCULAR; INTRAVENOUS; SUBCUTANEOUS at 22:58

## 2019-03-08 RX ADMIN — GABAPENTIN 600 MILLIGRAM(S): 400 CAPSULE ORAL at 05:25

## 2019-03-08 RX ADMIN — Medication 1: at 17:10

## 2019-03-08 RX ADMIN — LOSARTAN POTASSIUM 100 MILLIGRAM(S): 100 TABLET, FILM COATED ORAL at 05:25

## 2019-03-08 RX ADMIN — Medication 1: at 08:14

## 2019-03-08 RX ADMIN — Medication 3 MILLIGRAM(S): at 00:07

## 2019-03-08 RX ADMIN — SODIUM CHLORIDE 3 MILLILITER(S): 9 INJECTION INTRAMUSCULAR; INTRAVENOUS; SUBCUTANEOUS at 05:26

## 2019-03-08 RX ADMIN — Medication 100 MILLIGRAM(S): at 22:57

## 2019-03-08 RX ADMIN — ATORVASTATIN CALCIUM 20 MILLIGRAM(S): 80 TABLET, FILM COATED ORAL at 22:57

## 2019-03-08 RX ADMIN — OXYCODONE HYDROCHLORIDE 10 MILLIGRAM(S): 5 TABLET ORAL at 18:00

## 2019-03-08 RX ADMIN — Medication 5 MILLIGRAM(S): at 18:40

## 2019-03-08 RX ADMIN — SODIUM CHLORIDE 3 MILLILITER(S): 9 INJECTION INTRAMUSCULAR; INTRAVENOUS; SUBCUTANEOUS at 14:09

## 2019-03-08 RX ADMIN — Medication 100 MILLIGRAM(S): at 14:10

## 2019-03-08 RX ADMIN — GABAPENTIN 600 MILLIGRAM(S): 400 CAPSULE ORAL at 14:10

## 2019-03-08 RX ADMIN — Medication 1: at 12:18

## 2019-03-08 RX ADMIN — Medication 100 MILLIGRAM(S): at 05:25

## 2019-03-08 NOTE — PROGRESS NOTE ADULT - ATTENDING COMMENTS
complicated case. csf unrevealing so far, await spinal mra but favor tumor vs inflammatory. if no furtheer info to delineate will proceed with open biopsy on monday. rb and pc of surgery explained and accepted ibnlt inf bleeding anesth pt neuro deficit, failure to improve worsen or diagnose resid rec lesion and death. they are are the biopsy will not treat the lesion.

## 2019-03-09 LAB
GLUCOSE BLDC GLUCOMTR-MCNC: 112 MG/DL — HIGH (ref 70–99)
GLUCOSE BLDC GLUCOMTR-MCNC: 119 MG/DL — HIGH (ref 70–99)
GLUCOSE BLDC GLUCOMTR-MCNC: 128 MG/DL — HIGH (ref 70–99)
GLUCOSE BLDC GLUCOMTR-MCNC: 141 MG/DL — HIGH (ref 70–99)
VDRL CSF-TITR: NEGATIVE — SIGNIFICANT CHANGE UP

## 2019-03-09 RX ADMIN — Medication 650 MILLIGRAM(S): at 23:10

## 2019-03-09 RX ADMIN — SENNA PLUS 2 TABLET(S): 8.6 TABLET ORAL at 21:30

## 2019-03-09 RX ADMIN — Medication 100 MILLIGRAM(S): at 21:30

## 2019-03-09 RX ADMIN — GABAPENTIN 600 MILLIGRAM(S): 400 CAPSULE ORAL at 21:30

## 2019-03-09 RX ADMIN — AMLODIPINE BESYLATE 5 MILLIGRAM(S): 2.5 TABLET ORAL at 05:21

## 2019-03-09 RX ADMIN — Medication 3 MILLIGRAM(S): at 23:24

## 2019-03-09 RX ADMIN — LOSARTAN POTASSIUM 100 MILLIGRAM(S): 100 TABLET, FILM COATED ORAL at 05:21

## 2019-03-09 RX ADMIN — SODIUM CHLORIDE 3 MILLILITER(S): 9 INJECTION INTRAMUSCULAR; INTRAVENOUS; SUBCUTANEOUS at 21:32

## 2019-03-09 RX ADMIN — GABAPENTIN 600 MILLIGRAM(S): 400 CAPSULE ORAL at 05:21

## 2019-03-09 RX ADMIN — Medication 100 MILLIGRAM(S): at 13:46

## 2019-03-09 RX ADMIN — GABAPENTIN 600 MILLIGRAM(S): 400 CAPSULE ORAL at 13:46

## 2019-03-09 RX ADMIN — SODIUM CHLORIDE 3 MILLILITER(S): 9 INJECTION INTRAMUSCULAR; INTRAVENOUS; SUBCUTANEOUS at 05:21

## 2019-03-09 RX ADMIN — Medication 3 MILLIGRAM(S): at 01:14

## 2019-03-09 RX ADMIN — Medication 100 MILLIGRAM(S): at 05:21

## 2019-03-09 RX ADMIN — ATORVASTATIN CALCIUM 20 MILLIGRAM(S): 80 TABLET, FILM COATED ORAL at 21:30

## 2019-03-09 RX ADMIN — Medication 650 MILLIGRAM(S): at 01:44

## 2019-03-09 RX ADMIN — SODIUM CHLORIDE 3 MILLILITER(S): 9 INJECTION INTRAMUSCULAR; INTRAVENOUS; SUBCUTANEOUS at 13:38

## 2019-03-09 RX ADMIN — Medication 650 MILLIGRAM(S): at 01:14

## 2019-03-09 RX ADMIN — Medication 650 MILLIGRAM(S): at 22:39

## 2019-03-10 ENCOUNTER — TRANSCRIPTION ENCOUNTER (OUTPATIENT)
Age: 59
End: 2019-03-10

## 2019-03-10 ENCOUNTER — INPATIENT (INPATIENT)
Facility: HOSPITAL | Age: 59
LOS: 15 days | Discharge: NURSING, REHAB OR LONG-TERM CARE | DRG: 271 | End: 2019-03-26
Attending: NEUROLOGICAL SURGERY | Admitting: NEUROLOGICAL SURGERY
Payer: COMMERCIAL

## 2019-03-10 VITALS
DIASTOLIC BLOOD PRESSURE: 88 MMHG | TEMPERATURE: 98 F | RESPIRATION RATE: 17 BRPM | SYSTOLIC BLOOD PRESSURE: 147 MMHG | OXYGEN SATURATION: 98 % | HEART RATE: 64 BPM

## 2019-03-10 VITALS
HEART RATE: 63 BPM | TEMPERATURE: 99 F | DIASTOLIC BLOOD PRESSURE: 85 MMHG | RESPIRATION RATE: 20 BRPM | OXYGEN SATURATION: 100 % | SYSTOLIC BLOOD PRESSURE: 135 MMHG

## 2019-03-10 DIAGNOSIS — S34.139A UNSPECIFIED INJURY TO SACRAL SPINAL CORD, INITIAL ENCOUNTER: ICD-10-CM

## 2019-03-10 PROBLEM — E11.9 TYPE 2 DIABETES MELLITUS WITHOUT COMPLICATIONS: Chronic | Status: ACTIVE | Noted: 2019-03-04

## 2019-03-10 PROBLEM — I10 ESSENTIAL (PRIMARY) HYPERTENSION: Chronic | Status: ACTIVE | Noted: 2019-03-04

## 2019-03-10 PROBLEM — E78.5 HYPERLIPIDEMIA, UNSPECIFIED: Chronic | Status: ACTIVE | Noted: 2019-03-04

## 2019-03-10 LAB
ALBUMIN SERPL ELPH-MCNC: 4.3 G/DL — SIGNIFICANT CHANGE UP (ref 3.3–5)
ALP SERPL-CCNC: 74 U/L — SIGNIFICANT CHANGE UP (ref 40–120)
ALT FLD-CCNC: 36 U/L — SIGNIFICANT CHANGE UP (ref 10–45)
ANION GAP SERPL CALC-SCNC: 12 MMOL/L — SIGNIFICANT CHANGE UP (ref 5–17)
ANION GAP SERPL CALC-SCNC: 14 MMO/L — SIGNIFICANT CHANGE UP (ref 7–14)
APTT BLD: 29.2 SEC — SIGNIFICANT CHANGE UP (ref 27.5–36.3)
APTT BLD: 30.3 SEC — SIGNIFICANT CHANGE UP (ref 27.5–36.3)
AST SERPL-CCNC: 28 U/L — SIGNIFICANT CHANGE UP (ref 10–40)
BILIRUB SERPL-MCNC: 0.3 MG/DL — SIGNIFICANT CHANGE UP (ref 0.2–1.2)
BLD GP AB SCN SERPL QL: NEGATIVE — SIGNIFICANT CHANGE UP
BLD GP AB SCN SERPL QL: NEGATIVE — SIGNIFICANT CHANGE UP
BUN SERPL-MCNC: 16 MG/DL — SIGNIFICANT CHANGE UP (ref 7–23)
BUN SERPL-MCNC: 17 MG/DL — SIGNIFICANT CHANGE UP (ref 7–23)
CALCIUM SERPL-MCNC: 10 MG/DL — SIGNIFICANT CHANGE UP (ref 8.4–10.5)
CALCIUM SERPL-MCNC: 10 MG/DL — SIGNIFICANT CHANGE UP (ref 8.4–10.5)
CHLORIDE SERPL-SCNC: 100 MMOL/L — SIGNIFICANT CHANGE UP (ref 98–107)
CHLORIDE SERPL-SCNC: 99 MMOL/L — SIGNIFICANT CHANGE UP (ref 96–108)
CHOLEST SERPL-MCNC: 123 MG/DL — SIGNIFICANT CHANGE UP (ref 10–199)
CO2 SERPL-SCNC: 23 MMOL/L — SIGNIFICANT CHANGE UP (ref 22–31)
CO2 SERPL-SCNC: 26 MMOL/L — SIGNIFICANT CHANGE UP (ref 22–31)
CREAT SERPL-MCNC: 0.73 MG/DL — SIGNIFICANT CHANGE UP (ref 0.5–1.3)
CREAT SERPL-MCNC: 0.81 MG/DL — SIGNIFICANT CHANGE UP (ref 0.5–1.3)
GLUCOSE BLDC GLUCOMTR-MCNC: 131 MG/DL — HIGH (ref 70–99)
GLUCOSE BLDC GLUCOMTR-MCNC: 142 MG/DL — HIGH (ref 70–99)
GLUCOSE SERPL-MCNC: 231 MG/DL — HIGH (ref 70–99)
GLUCOSE SERPL-MCNC: 239 MG/DL — HIGH (ref 70–99)
HBA1C BLD-MCNC: 6.9 % — HIGH (ref 4–5.6)
HCT VFR BLD CALC: 39.3 % — SIGNIFICANT CHANGE UP (ref 39–50)
HCT VFR BLD CALC: 40.4 % — SIGNIFICANT CHANGE UP (ref 39–50)
HDLC SERPL-MCNC: 40 MG/DL — SIGNIFICANT CHANGE UP
HGB BLD-MCNC: 12.9 G/DL — LOW (ref 13–17)
HGB BLD-MCNC: 13.3 G/DL — SIGNIFICANT CHANGE UP (ref 13–17)
INR BLD: 1.28 — HIGH (ref 0.88–1.17)
LIPID PNL WITH DIRECT LDL SERPL: 67 MG/DL — SIGNIFICANT CHANGE UP
MCHC RBC-ENTMCNC: 26.7 PG — LOW (ref 27–34)
MCHC RBC-ENTMCNC: 28.1 PG — SIGNIFICANT CHANGE UP (ref 27–34)
MCHC RBC-ENTMCNC: 31.9 % — LOW (ref 32–36)
MCHC RBC-ENTMCNC: 33.8 GM/DL — SIGNIFICANT CHANGE UP (ref 32–36)
MCV RBC AUTO: 83.2 FL — SIGNIFICANT CHANGE UP (ref 80–100)
MCV RBC AUTO: 83.6 FL — SIGNIFICANT CHANGE UP (ref 80–100)
NRBC # FLD: 0 K/UL — LOW (ref 25–125)
PLATELET # BLD AUTO: 224 K/UL — SIGNIFICANT CHANGE UP (ref 150–400)
PLATELET # BLD AUTO: 246 K/UL — SIGNIFICANT CHANGE UP (ref 150–400)
PMV BLD: 10.3 FL — SIGNIFICANT CHANGE UP (ref 7–13)
POTASSIUM SERPL-MCNC: 3.7 MMOL/L — SIGNIFICANT CHANGE UP (ref 3.5–5.3)
POTASSIUM SERPL-MCNC: 3.9 MMOL/L — SIGNIFICANT CHANGE UP (ref 3.5–5.3)
POTASSIUM SERPL-SCNC: 3.7 MMOL/L — SIGNIFICANT CHANGE UP (ref 3.5–5.3)
POTASSIUM SERPL-SCNC: 3.9 MMOL/L — SIGNIFICANT CHANGE UP (ref 3.5–5.3)
PROT SERPL-MCNC: 8 G/DL — SIGNIFICANT CHANGE UP (ref 6–8.3)
PROTHROM AB SERPL-ACNC: 14.3 SEC — HIGH (ref 9.8–13.1)
RBC # BLD: 4.73 M/UL — SIGNIFICANT CHANGE UP (ref 4.2–5.8)
RBC # BLD: 4.83 M/UL — SIGNIFICANT CHANGE UP (ref 4.2–5.8)
RBC # FLD: 14.4 % — SIGNIFICANT CHANGE UP (ref 10.3–14.5)
RBC # FLD: 15.3 % — HIGH (ref 10.3–14.5)
RH IG SCN BLD-IMP: NEGATIVE — SIGNIFICANT CHANGE UP
SODIUM SERPL-SCNC: 137 MMOL/L — SIGNIFICANT CHANGE UP (ref 135–145)
SODIUM SERPL-SCNC: 137 MMOL/L — SIGNIFICANT CHANGE UP (ref 135–145)
TOTAL CHOLESTEROL/HDL RATIO MEASUREMENT: 3.1 RATIO — LOW (ref 3.4–9.6)
TRIGL SERPL-MCNC: 79 MG/DL — SIGNIFICANT CHANGE UP (ref 10–149)
TSH SERPL-MCNC: 1.24 UIU/ML — SIGNIFICANT CHANGE UP (ref 0.27–4.2)
WBC # BLD: 7.75 K/UL — SIGNIFICANT CHANGE UP (ref 3.8–10.5)
WBC # BLD: 8.8 K/UL — SIGNIFICANT CHANGE UP (ref 3.8–10.5)
WBC # FLD AUTO: 7.75 K/UL — SIGNIFICANT CHANGE UP (ref 3.8–10.5)
WBC # FLD AUTO: 8.8 K/UL — SIGNIFICANT CHANGE UP (ref 3.8–10.5)

## 2019-03-10 RX ORDER — INSULIN LISPRO 100/ML
VIAL (ML) SUBCUTANEOUS
Qty: 0 | Refills: 0 | Status: DISCONTINUED | OUTPATIENT
Start: 2019-03-10 | End: 2019-03-10

## 2019-03-10 RX ORDER — GABAPENTIN 400 MG/1
600 CAPSULE ORAL THREE TIMES A DAY
Qty: 0 | Refills: 0 | Status: DISCONTINUED | OUTPATIENT
Start: 2019-03-10 | End: 2019-03-18

## 2019-03-10 RX ORDER — SENNA PLUS 8.6 MG/1
2 TABLET ORAL
Qty: 0 | Refills: 0 | COMMUNITY
Start: 2019-03-10

## 2019-03-10 RX ORDER — DEXTROSE 50 % IN WATER 50 %
12.5 SYRINGE (ML) INTRAVENOUS ONCE
Qty: 0 | Refills: 0 | Status: DISCONTINUED | OUTPATIENT
Start: 2019-03-10 | End: 2019-03-18

## 2019-03-10 RX ORDER — ATORVASTATIN CALCIUM 80 MG/1
20 TABLET, FILM COATED ORAL AT BEDTIME
Qty: 0 | Refills: 0 | Status: DISCONTINUED | OUTPATIENT
Start: 2019-03-10 | End: 2019-03-18

## 2019-03-10 RX ORDER — ACETAMINOPHEN 500 MG
650 TABLET ORAL EVERY 6 HOURS
Qty: 0 | Refills: 0 | Status: DISCONTINUED | OUTPATIENT
Start: 2019-03-10 | End: 2019-03-18

## 2019-03-10 RX ORDER — ROSUVASTATIN CALCIUM 5 MG/1
1 TABLET ORAL
Qty: 0 | Refills: 0 | COMMUNITY

## 2019-03-10 RX ORDER — ACETAMINOPHEN 500 MG
2 TABLET ORAL
Qty: 0 | Refills: 0 | COMMUNITY
Start: 2019-03-10

## 2019-03-10 RX ORDER — SENNA PLUS 8.6 MG/1
2 TABLET ORAL AT BEDTIME
Qty: 0 | Refills: 0 | Status: DISCONTINUED | OUTPATIENT
Start: 2019-03-10 | End: 2019-03-18

## 2019-03-10 RX ORDER — OXYCODONE HYDROCHLORIDE 5 MG/1
1 TABLET ORAL
Qty: 0 | Refills: 0 | COMMUNITY
Start: 2019-03-10

## 2019-03-10 RX ORDER — OXYCODONE HYDROCHLORIDE 5 MG/1
10 TABLET ORAL EVERY 6 HOURS
Qty: 0 | Refills: 0 | Status: DISCONTINUED | OUTPATIENT
Start: 2019-03-10 | End: 2019-03-10

## 2019-03-10 RX ORDER — OXYCODONE HYDROCHLORIDE 5 MG/1
10 TABLET ORAL EVERY 4 HOURS
Qty: 0 | Refills: 0 | Status: DISCONTINUED | OUTPATIENT
Start: 2019-03-10 | End: 2019-03-16

## 2019-03-10 RX ORDER — HEPARIN SODIUM 5000 [USP'U]/ML
5000 INJECTION INTRAVENOUS; SUBCUTANEOUS EVERY 8 HOURS
Qty: 0 | Refills: 0 | Status: DISCONTINUED | OUTPATIENT
Start: 2019-03-10 | End: 2019-03-10

## 2019-03-10 RX ORDER — DEXTROSE 50 % IN WATER 50 %
25 SYRINGE (ML) INTRAVENOUS ONCE
Qty: 0 | Refills: 0 | Status: DISCONTINUED | OUTPATIENT
Start: 2019-03-10 | End: 2019-03-18

## 2019-03-10 RX ORDER — LOSARTAN POTASSIUM 100 MG/1
100 TABLET, FILM COATED ORAL DAILY
Qty: 0 | Refills: 0 | Status: DISCONTINUED | OUTPATIENT
Start: 2019-03-10 | End: 2019-03-18

## 2019-03-10 RX ORDER — GLUCAGON INJECTION, SOLUTION 0.5 MG/.1ML
1 INJECTION, SOLUTION SUBCUTANEOUS ONCE
Qty: 0 | Refills: 0 | Status: DISCONTINUED | OUTPATIENT
Start: 2019-03-10 | End: 2019-03-18

## 2019-03-10 RX ORDER — ATORVASTATIN CALCIUM 80 MG/1
1 TABLET, FILM COATED ORAL
Qty: 0 | Refills: 0 | COMMUNITY
Start: 2019-03-10

## 2019-03-10 RX ORDER — DOCUSATE SODIUM 100 MG
1 CAPSULE ORAL
Qty: 0 | Refills: 0 | COMMUNITY
Start: 2019-03-10

## 2019-03-10 RX ORDER — DOCUSATE SODIUM 100 MG
100 CAPSULE ORAL THREE TIMES A DAY
Qty: 0 | Refills: 0 | Status: DISCONTINUED | OUTPATIENT
Start: 2019-03-10 | End: 2019-03-18

## 2019-03-10 RX ORDER — DEXTROSE MONOHYDRATE, SODIUM CHLORIDE, AND POTASSIUM CHLORIDE 50; .745; 4.5 G/1000ML; G/1000ML; G/1000ML
1000 INJECTION, SOLUTION INTRAVENOUS
Qty: 0 | Refills: 0 | Status: DISCONTINUED | OUTPATIENT
Start: 2019-03-10 | End: 2019-03-11

## 2019-03-10 RX ORDER — DEXTROSE 50 % IN WATER 50 %
15 SYRINGE (ML) INTRAVENOUS ONCE
Qty: 0 | Refills: 0 | Status: DISCONTINUED | OUTPATIENT
Start: 2019-03-10 | End: 2019-03-18

## 2019-03-10 RX ORDER — LANOLIN ALCOHOL/MO/W.PET/CERES
1 CREAM (GRAM) TOPICAL
Qty: 0 | Refills: 0 | COMMUNITY
Start: 2019-03-10

## 2019-03-10 RX ORDER — HEPARIN SODIUM 5000 [USP'U]/ML
5000 INJECTION INTRAVENOUS; SUBCUTANEOUS
Qty: 0 | Refills: 0 | COMMUNITY
Start: 2019-03-10

## 2019-03-10 RX ORDER — AMLODIPINE BESYLATE 2.5 MG/1
5 TABLET ORAL DAILY
Qty: 0 | Refills: 0 | Status: DISCONTINUED | OUTPATIENT
Start: 2019-03-10 | End: 2019-03-18

## 2019-03-10 RX ORDER — INSULIN LISPRO 100/ML
VIAL (ML) SUBCUTANEOUS
Qty: 0 | Refills: 0 | Status: DISCONTINUED | OUTPATIENT
Start: 2019-03-10 | End: 2019-03-18

## 2019-03-10 RX ORDER — OXYCODONE HYDROCHLORIDE 5 MG/1
5 TABLET ORAL EVERY 4 HOURS
Qty: 0 | Refills: 0 | Status: DISCONTINUED | OUTPATIENT
Start: 2019-03-10 | End: 2019-03-16

## 2019-03-10 RX ORDER — SODIUM CHLORIDE 9 MG/ML
1000 INJECTION INTRAMUSCULAR; INTRAVENOUS; SUBCUTANEOUS
Qty: 0 | Refills: 0 | Status: DISCONTINUED | OUTPATIENT
Start: 2019-03-10 | End: 2019-03-10

## 2019-03-10 RX ORDER — SODIUM CHLORIDE 9 MG/ML
1000 INJECTION, SOLUTION INTRAVENOUS
Qty: 0 | Refills: 0 | Status: DISCONTINUED | OUTPATIENT
Start: 2019-03-10 | End: 2019-03-18

## 2019-03-10 RX ORDER — INSULIN LISPRO 100/ML
VIAL (ML) SUBCUTANEOUS AT BEDTIME
Qty: 0 | Refills: 0 | Status: DISCONTINUED | OUTPATIENT
Start: 2019-03-10 | End: 2019-03-18

## 2019-03-10 RX ORDER — LOSARTAN POTASSIUM 100 MG/1
1 TABLET, FILM COATED ORAL
Qty: 0 | Refills: 0 | COMMUNITY
Start: 2019-03-10

## 2019-03-10 RX ORDER — LANOLIN ALCOHOL/MO/W.PET/CERES
3 CREAM (GRAM) TOPICAL AT BEDTIME
Qty: 0 | Refills: 0 | Status: DISCONTINUED | OUTPATIENT
Start: 2019-03-10 | End: 2019-03-18

## 2019-03-10 RX ADMIN — GABAPENTIN 600 MILLIGRAM(S): 400 CAPSULE ORAL at 21:10

## 2019-03-10 RX ADMIN — Medication 100 MILLIGRAM(S): at 05:24

## 2019-03-10 RX ADMIN — ATORVASTATIN CALCIUM 20 MILLIGRAM(S): 80 TABLET, FILM COATED ORAL at 21:10

## 2019-03-10 RX ADMIN — Medication 3 MILLIGRAM(S): at 23:50

## 2019-03-10 RX ADMIN — SODIUM CHLORIDE 3 MILLILITER(S): 9 INJECTION INTRAMUSCULAR; INTRAVENOUS; SUBCUTANEOUS at 05:24

## 2019-03-10 RX ADMIN — AMLODIPINE BESYLATE 5 MILLIGRAM(S): 2.5 TABLET ORAL at 05:24

## 2019-03-10 RX ADMIN — OXYCODONE HYDROCHLORIDE 10 MILLIGRAM(S): 5 TABLET ORAL at 19:39

## 2019-03-10 RX ADMIN — SODIUM CHLORIDE 3 MILLILITER(S): 9 INJECTION INTRAMUSCULAR; INTRAVENOUS; SUBCUTANEOUS at 15:31

## 2019-03-10 RX ADMIN — LOSARTAN POTASSIUM 100 MILLIGRAM(S): 100 TABLET, FILM COATED ORAL at 05:24

## 2019-03-10 RX ADMIN — GABAPENTIN 600 MILLIGRAM(S): 400 CAPSULE ORAL at 05:23

## 2019-03-10 RX ADMIN — Medication 100 MILLIGRAM(S): at 21:10

## 2019-03-10 RX ADMIN — OXYCODONE HYDROCHLORIDE 10 MILLIGRAM(S): 5 TABLET ORAL at 20:09

## 2019-03-10 RX ADMIN — OXYCODONE HYDROCHLORIDE 10 MILLIGRAM(S): 5 TABLET ORAL at 23:58

## 2019-03-10 RX ADMIN — SENNA PLUS 2 TABLET(S): 8.6 TABLET ORAL at 21:10

## 2019-03-10 RX ADMIN — DEXTROSE MONOHYDRATE, SODIUM CHLORIDE, AND POTASSIUM CHLORIDE 75 MILLILITER(S): 50; .745; 4.5 INJECTION, SOLUTION INTRAVENOUS at 23:59

## 2019-03-10 NOTE — DISCHARGE NOTE PROVIDER - CARE PROVIDER_API CALL
Teddy Willis (MD)  Neurological Surgery; Pediatric Neurological Surgery  [address], [address]  [place], [place] [id number]  Phone: [phone]  Fax: [phone]  Follow Up Time:

## 2019-03-10 NOTE — PROGRESS NOTE ADULT - PROVIDER SPECIALTY LIST ADULT
Cardiology
Neurosurgery
Cardiology
Neurosurgery

## 2019-03-10 NOTE — H&P ADULT - NSHPPHYSICALEXAM_GEN_ALL_CORE
AAO X 3  BROWN Left HE/HF 3/5, Left KE/KF 4/5, strength otherwise 5/5  Sensation patchy areas of decreased sensation distal to mid thigh bilaterally

## 2019-03-10 NOTE — DISCHARGE NOTE PROVIDER - NSDCCPCAREPLAN_GEN_ALL_CORE_FT
PRINCIPAL DISCHARGE DIAGNOSIS  Problem: Syringomyelia  Assessment and Plan of Treatment: transfer to [facility] for spinal angiogarm

## 2019-03-10 NOTE — CHART NOTE - NSCHARTNOTEFT_GEN_A_CORE
CAPRINI SCORE [CLOT] Score on Admission for     AGE RELATED RISK FACTORS                                                       MOBILITY RELATED FACTORS  [ x] Age 41-60 years                                            (1 Point)                  [ ] Bed rest                                                        (1 Point)  [ ] Age: 61-74 years                                           (2 Points)                 [ ] Plaster cast                                                   (2 Points)  [ ] Age= 75 years                                              (3 Points)                 [ ] Bed bound for more than 72 hours                 (2 Points)    DISEASE RELATED RISK FACTORS                                               GENDER SPECIFIC FACTORS  [ ] Edema in the lower extremities                       (1 Point)                  [ ] Pregnancy                                                     (1 Point)  [ ] Varicose veins                                               (1 Point)                  [ ] Post-partum < 6 weeks                                   (1 Point)             [ ] BMI > 25 Kg/m2                                            (1 Point)                  [ ] Hormonal therapy  or oral contraception          (1 Point)                 [ ] Sepsis (in the previous month)                        (1 Point)                  [ ] History of pregnancy complications                 (1 point)  [ ] Pneumonia or serious lung disease                                               [ ] Unexplained or recurrent                     (1 Point)           (in the previous month)                               (1 Point)  [ ] Abnormal pulmonary function test                     (1 Point)                 SURGERY RELATED RISK FACTORS (include planned surgeries)  [ ] Acute myocardial infarction                              (1 Point)                 [ ]  Section                                             (1 Point)  [ ] Congestive heart failure (in the previous month)  (1 Point)         [ ] Minor surgery                                                  (1 Point)   [ ] Inflammatory bowel disease                             (1 Point)                 [ ] Arthroscopic surgery                                        (2 Points)  [ ] Central venous access                                      (2 Points)                [ ] General surgery lasting more than 45 minutes   (2 Points)       [x ] Stroke (in the previous month)                          (5 Points)               [ ] Elective arthroplasty                                         (5 Points)            [ ] current or past malignancy                              (2 Points)                                                                                                       HEMATOLOGY RELATED FACTORS                                                 TRAUMA RELATED RISK FACTORS  [ ] Prior episodes of VTE                                     (3 Points)                [ ] Fracture of the hip, pelvis, or leg                       (5 Points)  [ ] Positive family history for VTE                         (3 Points)                 [ ] Acute spinal cord injury (in the previous month)  (5 Points)  [ ] Prothrombin 11800 A                                     (3 Points)                 [ ] Paralysis  (less than 1 month)                             (5 Points)  [ ] Factor V Leiden                                             (3 Points)                  [ ] Multiple Trauma within 1 month                        (5 Points)  [ ] Lupus anticoagulants                                     (3 Points)                                                           [ ] Anticardiolipin antibodies                               (3 Points)                                                       [ ] High homocysteine in the blood                      (3 Points)                                             [ ] Other congenital or acquired thrombophilia      (3 Points)                                                [ ] Heparin induced thrombocytopenia                  (3 Points)                                          Total Score [      6    ]    Risk:  Very low 0   Low 1 to 2   Moderate 3 to 4   High =5       VTE Prophylasix Recommednations:  [ x] mechanical pneumatic compression devices                                      [ ] contraindicated: _____________________  [ x] chemo prophylasix                                                                                   [ ] contraindicated _____________________    **** HIGH LIKELIHOOD DVT PRESENT ON ADMISSION  [ ] (please order LE dopplers within 24 hours of admission)

## 2019-03-10 NOTE — DISCHARGE NOTE PROVIDER - HOSPITAL COURSE
57 YO male developed lower back pain following a fall approximately 1 month ago.  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 Patient was admitted on 3/4/19 and c/o lower back pain, decreased sensation in both legs, mild b/l leg pain, left leg weakness and difficulty ambulating unassisted due to weakness. Denied bowel or bladder dysfunction.  MRI of Thoracic and lumbar spine with dar showed Abnormal enhancement involves the mid-lower thoracic spinal, and also involves the nerve roots of the cauda equina. A spinal cord tumor with drop metastases, lymphoma, an inflammatory process (such as sarcoidosis), or an atypical infection (such as tuberculosis) are in the differential diagnosis. Some prominent vasculature is noted also raising the possibility of a dural AV fistula. Consider correlation with lumbar puncture and spine MRA if clinically warranted.The urinary bladder is distended, partially visualized suggesting neurogenic bladder given the spinal cord and cauda equina involvement.  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 Questionable arterial feeders at the level of L1, as discussed.Further evaluation with a conventional spinal angiography examination is recommended.The possibility of a spinal cord tumor with drop metastases, lymphoma, or inflammatory process such as neurosarcoidosis or infection such as TB cannot be completely excluded. Correlate with results of CSF testing from the prior lumbar puncture procedure already performed on 3/7/2019.  Lumbar puncture was done on 3/7/19 and cytology was sent which came back as increased number of poorly preserved lymphocytes present.  WBC count  of 6, and negative cultures, CT C/A/P was negative for lesions  .  Patient is stable for discharge to [facility] for spinal angiogram.

## 2019-03-10 NOTE — PROGRESS NOTE ADULT - SUBJECTIVE AND OBJECTIVE BOX
NO overnight events      PE: AA&0 x 3, CN 2-12 grossly intact, speach clear, follows commands  Neurological: Diminished sensory in areas with no obvious dermatomal pattern    Motor exam: [  ]          [ x] Upper extremity              Deltoid   Bicep   Tricep     Strength                                               R         5/5          5/5        5/5             5/5                                               L          5/5         5/5         5/5            5/5           [ x] Lower extremeity           HF/HE    KF/KE         EHL          DF/PF                                               R        5/5           5/5          5/5            5/5                                                     L         3/5          4/5           5/5            5/5             Straight Leg Raise:  RLE :                           LLE:  Incision site:  Ambulation: Walking independently [ ] With Cane [ ] With Walker [ ]  Bedbound [ ]                           12.6   9.25  )-----------( 283      ( 04 Mar 2019 19:54 )             40.8     03-04    137  |  99  |  12  ----------------------------<  163<H>  3.8   |  25  |  0.80    Ca    9.7      04 Mar 2019 19:54    TPro  7.7  /  Alb  4.3  /  TBili  0.4  /  DBili  x   /  AST  24  /  ALT  26  /  AlkPhos  71  03-04    PT/INR - ( 05 Mar 2019 02:05 )   PT: 13.0 SEC;   INR: 1.17          PTT - ( 05 Mar 2019 02:05 )  PTT:28.5 SEC    Vital Signs Last 24 Hrs  T(C): 36.8 (06 Mar 2019 01:06), Max: 36.9 (05 Mar 2019 17:50)  T(F): 98.3 (06 Mar 2019 01:06), Max: 98.5 (05 Mar 2019 17:50)  HR: 100 (06 Mar 2019 01:06) (64 - 100)  BP: 139/60 (06 Mar 2019 01:06) (139/60 - 173/97)  BP(mean): --  RR: 18 (06 Mar 2019 01:06) (16 - 18)  SpO2: 97% (06 Mar 2019 01:06) (97% - 100%)  Daily     Daily     Medications: acetaminophen   Tablet .. 650 milliGRAM(s) Oral every 6 hours PRN  amLODIPine   Tablet 5 milliGRAM(s) Oral daily  atorvastatin 20 milliGRAM(s) Oral at bedtime  dextrose 40% Gel 15 Gram(s) Oral once PRN  dextrose 5%. 1000 milliLiter(s) IV Continuous <Continuous>  dextrose 50% Injectable 12.5 Gram(s) IV Push once  dextrose 50% Injectable 25 Gram(s) IV Push once  dextrose 50% Injectable 25 Gram(s) IV Push once  diazepam    Tablet 5 milliGRAM(s) Oral once  docusate sodium 100 milliGRAM(s) Oral three times a day  gabapentin 600 milliGRAM(s) Oral three times a day  glucagon  Injectable 1 milliGRAM(s) IntraMuscular once PRN  insulin lispro (HumaLOG) corrective regimen sliding scale   SubCutaneous three times a day before meals  insulin lispro (HumaLOG) corrective regimen sliding scale   SubCutaneous at bedtime  losartan 100 milliGRAM(s) Oral daily  melatonin 3 milliGRAM(s) Oral at bedtime PRN  oxyCODONE    IR 10 milliGRAM(s) Oral every 6 hours PRN  senna 2 Tablet(s) Oral at bedtime  sodium chloride 0.9% lock flush 3 milliLiter(s) IV Push every 8 hours        Imaging Studies:
No issues overnight  MRA completed  Vital Signs Last 24 Hrs  T(C): 36.8 (09 Mar 2019 21:28), Max: 36.9 (09 Mar 2019 17:34)  T(F): 98.3 (09 Mar 2019 21:28), Max: 98.4 (09 Mar 2019 17:34)  HR: 79 (09 Mar 2019 21:28) (68 - 92)  BP: 138/82 (09 Mar 2019 21:28) (136/78 - 157/93)  BP(mean): --  RR: 20 (09 Mar 2019 21:28) (18 - 20)  SpO2: 100% (09 Mar 2019 21:28) (97% - 100%)    AAO X 3  BROWN Left HE/HF 3/5, Left KE/KF 4/5, strength otherwise 5/5  Sensation patchy areas of decreased sensation distal to mid thigh bilaterally    MEDICATIONS  (STANDING):  amLODIPine   Tablet 5 milliGRAM(s) Oral daily  atorvastatin 20 milliGRAM(s) Oral at bedtime  dextrose 5%. 1000 milliLiter(s) (50 mL/Hr) IV Continuous <Continuous>  dextrose 50% Injectable 12.5 Gram(s) IV Push once  dextrose 50% Injectable 25 Gram(s) IV Push once  dextrose 50% Injectable 25 Gram(s) IV Push once  docusate sodium 100 milliGRAM(s) Oral three times a day  gabapentin 600 milliGRAM(s) Oral three times a day  insulin lispro (HumaLOG) corrective regimen sliding scale   SubCutaneous three times a day before meals  insulin lispro (HumaLOG) corrective regimen sliding scale   SubCutaneous at bedtime  losartan 100 milliGRAM(s) Oral daily  senna 2 Tablet(s) Oral at bedtime  sodium chloride 0.9% lock flush 3 milliLiter(s) IV Push every 8 hours    MEDICATIONS  (PRN):  acetaminophen   Tablet .. 650 milliGRAM(s) Oral every 6 hours PRN Temp greater or equal to 38C (100.4F), Mild Pain (1 - 3)  dextrose 40% Gel 15 Gram(s) Oral once PRN Blood Glucose LESS THAN 70 milliGRAM(s)/deciliter  glucagon  Injectable 1 milliGRAM(s) IntraMuscular once PRN Glucose LESS THAN 70 milligrams/deciliter  melatonin 3 milliGRAM(s) Oral at bedtime PRN Sleep  oxyCODONE    IR 10 milliGRAM(s) Oral every 6 hours PRN Moderate Pain (4 - 6)
No issues overnight  MRA completed  Vital Signs Last 24 Hrs  T(C): 36.9 (09 Mar 2019 00:38), Max: 37 (08 Mar 2019 02:23)  T(F): 98.4 (09 Mar 2019 00:38), Max: 98.6 (08 Mar 2019 02:23)  HR: 75 (09 Mar 2019 00:38) (65 - 89)  BP: 146/85 (09 Mar 2019 00:38) (117/72 - 151/78)  BP(mean): --  RR: 18 (09 Mar 2019 00:38) (18 - 19)  SpO2: 100% (09 Mar 2019 00:38) (99% - 100%)    AAO X 3  BROWN Left HE/HF 3/5, Left KE/KF 4/5, strength otherwise 5/5  Sensation patchy areas of decreased sensation distal to mid thigh bilaterally    MEDICATIONS  (STANDING):  amLODIPine   Tablet 5 milliGRAM(s) Oral daily  atorvastatin 20 milliGRAM(s) Oral at bedtime  dextrose 5%. 1000 milliLiter(s) (50 mL/Hr) IV Continuous <Continuous>  dextrose 50% Injectable 12.5 Gram(s) IV Push once  dextrose 50% Injectable 25 Gram(s) IV Push once  dextrose 50% Injectable 25 Gram(s) IV Push once  docusate sodium 100 milliGRAM(s) Oral three times a day  gabapentin 600 milliGRAM(s) Oral three times a day  insulin lispro (HumaLOG) corrective regimen sliding scale   SubCutaneous three times a day before meals  insulin lispro (HumaLOG) corrective regimen sliding scale   SubCutaneous at bedtime  losartan 100 milliGRAM(s) Oral daily  senna 2 Tablet(s) Oral at bedtime  sodium chloride 0.9% lock flush 3 milliLiter(s) IV Push every 8 hours    MEDICATIONS  (PRN):  acetaminophen   Tablet .. 650 milliGRAM(s) Oral every 6 hours PRN Temp greater or equal to 38C (100.4F), Mild Pain (1 - 3)  dextrose 40% Gel 15 Gram(s) Oral once PRN Blood Glucose LESS THAN 70 milliGRAM(s)/deciliter  glucagon  Injectable 1 milliGRAM(s) IntraMuscular once PRN Glucose LESS THAN 70 milligrams/deciliter  melatonin 3 milliGRAM(s) Oral at bedtime PRN Sleep  oxyCODONE    IR 10 milliGRAM(s) Oral every 6 hours PRN Moderate Pain (4 - 6)
No issues overnight  Vital Signs Last 24 Hrs  T(C): 36.2 (07 Mar 2019 01:27), Max: 36.9 (06 Mar 2019 17:39)  T(F): 97.2 (07 Mar 2019 01:27), Max: 98.4 (06 Mar 2019 17:39)  HR: 98 (07 Mar 2019 01:27) (68 - 98)  BP: 137/69 (07 Mar 2019 01:27) (114/72 - 153/87)  BP(mean): --  RR: 16 (07 Mar 2019 01:27) (16 - 18)  SpO2: 100% (07 Mar 2019 01:27) (97% - 100%)    AAO X 3  BROWN Left HE/HF 3/5, Left KE/KF 4/5, strength otherwise 5/5  Sensation patchy areas of decreased sensation distal to mid thigh bilaterally    MEDICATIONS  (STANDING):  amLODIPine   Tablet 5 milliGRAM(s) Oral daily  atorvastatin 20 milliGRAM(s) Oral at bedtime  dextrose 5%. 1000 milliLiter(s) (50 mL/Hr) IV Continuous <Continuous>  dextrose 50% Injectable 12.5 Gram(s) IV Push once  dextrose 50% Injectable 25 Gram(s) IV Push once  dextrose 50% Injectable 25 Gram(s) IV Push once  docusate sodium 100 milliGRAM(s) Oral three times a day  gabapentin 600 milliGRAM(s) Oral three times a day  insulin lispro (HumaLOG) corrective regimen sliding scale   SubCutaneous three times a day before meals  insulin lispro (HumaLOG) corrective regimen sliding scale   SubCutaneous at bedtime  losartan 100 milliGRAM(s) Oral daily  senna 2 Tablet(s) Oral at bedtime  sodium chloride 0.9% lock flush 3 milliLiter(s) IV Push every 8 hours    MEDICATIONS  (PRN):  acetaminophen   Tablet .. 650 milliGRAM(s) Oral every 6 hours PRN Temp greater or equal to 38C (100.4F), Mild Pain (1 - 3)  dextrose 40% Gel 15 Gram(s) Oral once PRN Blood Glucose LESS THAN 70 milliGRAM(s)/deciliter  glucagon  Injectable 1 milliGRAM(s) IntraMuscular once PRN Glucose LESS THAN 70 milligrams/deciliter  melatonin 3 milliGRAM(s) Oral at bedtime PRN Sleep  oxyCODONE    IR 10 milliGRAM(s) Oral every 6 hours PRN Moderate Pain (4 - 6)    < from: CT Abdomen and Pelvis w/ IV Cont (03.06.19 @ 14:52) >  CT of the Chest, Abdomen and Pelvis was performed with intravenous   contrast.   Intravenous contrast: 90 ml Omnipaque 350. 10 ml discarded.  Oral contrast: None.  Sagittal and coronal reformats were performed.    FINDINGS:    CHEST:     LUNGS AND LARGE AIRWAYS: Patent central airways. No pulmonary nodules.  PLEURA: No pleural effusion.  VESSELS: Within normal limits.  HEART: Heart size is normal. No pericardial effusion.  MEDIASTINUM AND ANDRIY: No lymphadenopathy.  CHEST WALLAND LOWER NECK: Within normal limits.    ABDOMEN AND PELVIS:    LIVER: Within normal limits.  BILE DUCTS: Normal caliber.  GALLBLADDER: Within normal limits.  SPLEEN: Within normal limits.  PANCREAS: Within normal limits.  ADRENALS: Within normal limits.  KIDNEYS/URETERS: Within normal limits.    BLADDER: Within normal limits.  REPRODUCTIVE ORGANS: Prostate within normal limits.    BOWEL: No bowel obstruction. Appendix is not visualized.  PERITONEUM: No ascites.  VESSELS:  Within normal limits.  RETROPERITONEUM: No lymphadenopathy.    ABDOMINAL WALL: Within normal limits.  BONES: Within normal limits.    IMPRESSION:   No evidence of suspicious mass or lymphadenopathy in the chest, abdomen,   or pelvis.    < end of copied text >    < from: MR Cervical Spine No Cont (03.05.19 @ 10:27) >  CERVICAL SPINE:  VERTEBRAL BODIES:  Normal.    ALIGNMENT:  No subluxations.    INTERVERTEBRAL DISCS:  At C4-C5 level there is a small central disc   protrusion without significant cord impingement.  At the C56 level there is a disc bulge/ridge more prominent toward the   right without significant spinal cord impingement. There is right   foraminal narrowing.    NEURAL FORAMINA:  Otherwise patent    SPINAL CORD: Normal.    SPINAL CANAL:  No other intradural or extradural defects are seen.    MISCELLANEOUS:  None.      THORACIC SPINE:  VERTEBRAL BODIES:  Normal.    ALIGNMENT:  No subluxations.    INTERVERTEBRAL DISCS:  Normal.    NEURAL FORAMINA:  Normal.    SPINAL CORD: There is a fusiform area of increased T2 signal extending   from the T5-T6 interspace to the conus with associated cord expansion.   The lesion measures 5 mm and axial diameter at the T8 level.    SPINAL CANAL:  No other intradural or extradural defects are seen.    MISCELLANEOUS:  None.      LUMBAR SPINE:  VERTEBRAL BODIES:  Normal.    ALIGNMENT:  No subluxations.    INTERVERTEBRAL DISCS:  At the L4-L5 interspace there is a disc bulge more   prominent toward the right with right foraminal narrowing.  At the L5-S1 level there is a mild disc bulge    NEURAL FORAMINA:  Otherwise patent.    CONUS MEDULLARIS: See above. Terminates at the upper L2 level.    SPINAL CANAL:  No other intradural or extradural defects are seen.    MISCELLANEOUS:  None.      IMPRESSION:     Expansile T2 hyperintense focus within the thoracic spinal cord extending   into the conus. While this may represent a syrinx, contrast-enhanced MR   of the thoracic and lumbar spine is advised for further evaluation and to   exclude the possibility of a neoplasm. Comparison with prior studies is   also advised.    Mild cervical spondylosis with small C4-C5 central disc protrusion and   right C5-C6 foraminal narrowing.    Mild lumbar spondylosis at the L4-L5 and L5-S1 levels.    < end of copied text >
Subjective: Patient seen and examined. No new events except as noted.     SUBJECTIVE/ROS:  .n o      MEDICATIONS:  MEDICATIONS  (STANDING):  amLODIPine   Tablet 5 milliGRAM(s) Oral daily  atorvastatin 20 milliGRAM(s) Oral at bedtime  dextrose 5%. 1000 milliLiter(s) (50 mL/Hr) IV Continuous <Continuous>  dextrose 50% Injectable 12.5 Gram(s) IV Push once  dextrose 50% Injectable 25 Gram(s) IV Push once  dextrose 50% Injectable 25 Gram(s) IV Push once  docusate sodium 100 milliGRAM(s) Oral three times a day  gabapentin 600 milliGRAM(s) Oral three times a day  insulin lispro (HumaLOG) corrective regimen sliding scale   SubCutaneous three times a day before meals  insulin lispro (HumaLOG) corrective regimen sliding scale   SubCutaneous at bedtime  losartan 100 milliGRAM(s) Oral daily  senna 2 Tablet(s) Oral at bedtime  sodium chloride 0.9% lock flush 3 milliLiter(s) IV Push every 8 hours      PHYSICAL EXAM:  T(C): 36.4 (03-08-19 @ 14:08), Max: 37 (03-08-19 @ 02:23)  HR: 86 (03-08-19 @ 14:08) (65 - 86)  BP: 150/91 (03-08-19 @ 14:08) (117/72 - 151/78)  RR: 18 (03-08-19 @ 11:07) (18 - 19)  SpO2: 100% (03-08-19 @ 14:08) (98% - 100%)  Wt(kg): --  I&O's Summary    07 Mar 2019 07:01  -  08 Mar 2019 07:00  --------------------------------------------------------  IN: 0 mL / OUT: 1100 mL / NET: -1100 mL            JVP: Normal  Neck: supple  Lung: clear   CV: S1 S2 , Murmur:  Abd: soft  Ext: No edema  neuro: Awake / alert  Psych: flat affect  Skin: normal``    LABS/DATA:    CARDIAC MARKERS:                                13.7   8.26  )-----------( 268      ( 07 Mar 2019 06:45 )             43.6     03-07    141  |  104  |  17  ----------------------------<  151<H>  4.6   |  26  |  0.86    Ca    9.7      07 Mar 2019 06:45      proBNP:   Lipid Profile:   HgA1c:   TSH:     TELE:  EKG:
Subjective: Patient seen and examined. No new events except as noted.     SUBJECTIVE/ROS:  No chest pain, dyspnea, palpitation, or dizziness.       MEDICATIONS:  MEDICATIONS  (STANDING):  amLODIPine   Tablet 5 milliGRAM(s) Oral daily  atorvastatin 20 milliGRAM(s) Oral at bedtime  dextrose 5%. 1000 milliLiter(s) (50 mL/Hr) IV Continuous <Continuous>  dextrose 50% Injectable 12.5 Gram(s) IV Push once  dextrose 50% Injectable 25 Gram(s) IV Push once  dextrose 50% Injectable 25 Gram(s) IV Push once  docusate sodium 100 milliGRAM(s) Oral three times a day  gabapentin 600 milliGRAM(s) Oral three times a day  heparin  Injectable 5000 Unit(s) SubCutaneous every 8 hours  insulin lispro (HumaLOG) corrective regimen sliding scale   SubCutaneous three times a day before meals  insulin lispro (HumaLOG) corrective regimen sliding scale   SubCutaneous at bedtime  losartan 100 milliGRAM(s) Oral daily  senna 2 Tablet(s) Oral at bedtime  sodium chloride 0.9% lock flush 3 milliLiter(s) IV Push every 8 hours  sodium chloride 0.9%. 1000 milliLiter(s) (60 mL/Hr) IV Continuous <Continuous>      PHYSICAL EXAM:  T(C): 36.9 (03-10-19 @ 17:10), Max: 37 (03-10-19 @ 16:31)  HR: 64 (03-10-19 @ 17:10) (63 - 93)  BP: 147/88 (03-10-19 @ 17:10) (135/79 - 152/92)  RR: 17 (03-10-19 @ 17:10) (17 - 20)  SpO2: 98% (03-10-19 @ 17:10) (97% - 100%)  Wt(kg): --  I&O's Summary    09 Mar 2019 06:01  -  10 Mar 2019 07:00  --------------------------------------------------------  IN: 0 mL / OUT: 1775 mL / NET: -1775 mL    10 Mar 2019 07:01  -  10 Mar 2019 18:53  --------------------------------------------------------  IN: 0 mL / OUT: 300 mL / NET: -300 mL            JVP: Normal  Neck: supple  Lung: clear   CV: S1 S2 , Murmur:  Abd: soft  Ext: No edema  neuro: Awake / alert  Psych: flat affect  Skin: normal``    LABS/DATA:    CARDIAC MARKERS:                                12.9   7.75  )-----------( 246      ( 10 Mar 2019 09:35 )             40.4     03-10    137  |  100  |  16  ----------------------------<  231<H>  3.7   |  23  |  0.73    Ca    10.0      10 Mar 2019 09:35      proBNP:   Lipid Profile:   HgA1c:   TSH:     TELE:  EKG:
Subjective: Patient seen and examined. No new events except as noted.     SUBJECTIVE/ROS:  No chest pain, dyspnea, palpitation, or dizziness.       MEDICATIONS:  MEDICATIONS  (STANDING):  amLODIPine   Tablet 5 milliGRAM(s) Oral daily  atorvastatin 20 milliGRAM(s) Oral at bedtime  dextrose 5%. 1000 milliLiter(s) (50 mL/Hr) IV Continuous <Continuous>  dextrose 50% Injectable 12.5 Gram(s) IV Push once  dextrose 50% Injectable 25 Gram(s) IV Push once  dextrose 50% Injectable 25 Gram(s) IV Push once  docusate sodium 100 milliGRAM(s) Oral three times a day  gabapentin 600 milliGRAM(s) Oral three times a day  insulin lispro (HumaLOG) corrective regimen sliding scale   SubCutaneous three times a day before meals  insulin lispro (HumaLOG) corrective regimen sliding scale   SubCutaneous at bedtime  losartan 100 milliGRAM(s) Oral daily  senna 2 Tablet(s) Oral at bedtime  sodium chloride 0.9% lock flush 3 milliLiter(s) IV Push every 8 hours      PHYSICAL EXAM:  T(C): 36.7 (03-06-19 @ 13:01), Max: 36.9 (03-05-19 @ 17:50)  HR: 75 (03-06-19 @ 13:01) (68 - 100)  BP: 114/72 (03-06-19 @ 13:01) (114/72 - 153/97)  RR: 17 (03-06-19 @ 13:01) (16 - 18)  SpO2: 100% (03-06-19 @ 13:01) (97% - 100%)  Wt(kg): --  I&O's Summary    05 Mar 2019 07:01  -  06 Mar 2019 07:00  --------------------------------------------------------  IN: 0 mL / OUT: 1900 mL / NET: -1900 mL            JVP: Normal  Neck: supple  Lung: clear   CV: S1 S2 , Murmur:  Abd: soft  Ext: No edema  neuro: Awake / alert  Psych: flat affect  Skin: normal``    LABS/DATA:    CARDIAC MARKERS:                                12.6   9.25  )-----------( 283      ( 04 Mar 2019 19:54 )             40.8     03-04    137  |  99  |  12  ----------------------------<  163<H>  3.8   |  25  |  0.80    Ca    9.7      04 Mar 2019 19:54    TPro  7.7  /  Alb  4.3  /  TBili  0.4  /  DBili  x   /  AST  24  /  ALT  26  /  AlkPhos  71  03-04    proBNP:   Lipid Profile:   HgA1c:   TSH:     TELE:  EKG:
Subjective: Patient seen and examined. No new events except as noted.     SUBJECTIVE/ROS:  pt is at IR    MEDICATIONS:  MEDICATIONS  (STANDING):  amLODIPine   Tablet 5 milliGRAM(s) Oral daily  atorvastatin 20 milliGRAM(s) Oral at bedtime  dextrose 5%. 1000 milliLiter(s) (50 mL/Hr) IV Continuous <Continuous>  dextrose 50% Injectable 12.5 Gram(s) IV Push once  dextrose 50% Injectable 25 Gram(s) IV Push once  dextrose 50% Injectable 25 Gram(s) IV Push once  docusate sodium 100 milliGRAM(s) Oral three times a day  gabapentin 600 milliGRAM(s) Oral three times a day  insulin lispro (HumaLOG) corrective regimen sliding scale   SubCutaneous three times a day before meals  insulin lispro (HumaLOG) corrective regimen sliding scale   SubCutaneous at bedtime  losartan 100 milliGRAM(s) Oral daily  senna 2 Tablet(s) Oral at bedtime  sodium chloride 0.9% lock flush 3 milliLiter(s) IV Push every 8 hours  sodium chloride 0.9%. 1000 milliLiter(s) (75 mL/Hr) IV Continuous <Continuous>      PHYSICAL EXAM:  T(C): 37.1 (03-07-19 @ 10:29), Max: 37.1 (03-07-19 @ 10:29)  HR: 71 (03-07-19 @ 10:29) (68 - 98)  BP: 127/75 (03-07-19 @ 10:29) (117/66 - 137/69)  RR: 17 (03-07-19 @ 10:29) (16 - 18)  SpO2: 100% (03-07-19 @ 10:29) (98% - 100%)  Wt(kg): --  I&O's Summary    06 Mar 2019 07:01  -  07 Mar 2019 07:00  --------------------------------------------------------  IN: 0 mL / OUT: 0 mL / NET: 0 mL    07 Mar 2019 07:01  -  07 Mar 2019 16:18  --------------------------------------------------------  IN: 0 mL / OUT: 400 mL / NET: -400 mL              LABS/DATA:    CARDIAC MARKERS:                                13.7   8.26  )-----------( 268      ( 07 Mar 2019 06:45 )             43.6     03-07    141  |  104  |  17  ----------------------------<  151<H>  4.6   |  26  |  0.86    Ca    9.7      07 Mar 2019 06:45      proBNP:   Lipid Profile:   HgA1c:   TSH:     TELE:  EKG:
Subjective: Patient seen and examined. No new events except as noted.     SUBJECTIVE/ROS:    No chest pain, dyspnea, palpitation, or dizziness.     MEDICATIONS:  MEDICATIONS  (STANDING):  amLODIPine   Tablet 5 milliGRAM(s) Oral daily  atorvastatin 20 milliGRAM(s) Oral at bedtime  dextrose 5%. 1000 milliLiter(s) (50 mL/Hr) IV Continuous <Continuous>  dextrose 50% Injectable 12.5 Gram(s) IV Push once  dextrose 50% Injectable 25 Gram(s) IV Push once  dextrose 50% Injectable 25 Gram(s) IV Push once  docusate sodium 100 milliGRAM(s) Oral three times a day  gabapentin 600 milliGRAM(s) Oral three times a day  insulin lispro (HumaLOG) corrective regimen sliding scale   SubCutaneous three times a day before meals  insulin lispro (HumaLOG) corrective regimen sliding scale   SubCutaneous at bedtime  losartan 100 milliGRAM(s) Oral daily  senna 2 Tablet(s) Oral at bedtime  sodium chloride 0.9% lock flush 3 milliLiter(s) IV Push every 8 hours      PHYSICAL EXAM:  T(C): 36.9 (03-09-19 @ 17:34), Max: 36.9 (03-09-19 @ 00:38)  HR: 92 (03-09-19 @ 17:34) (68 - 92)  BP: 157/93 (03-09-19 @ 17:34) (136/78 - 157/93)  RR: 20 (03-09-19 @ 17:34) (18 - 20)  SpO2: 100% (03-09-19 @ 17:34) (97% - 100%)  Wt(kg): --  I&O's Summary    08 Mar 2019 07:01  -  09 Mar 2019 07:00  --------------------------------------------------------  IN: 0 mL / OUT: 2025 mL / NET: -2025 mL    09 Mar 2019 06:01  -  09 Mar 2019 18:19  --------------------------------------------------------  IN: 0 mL / OUT: 1250 mL / NET: -1250 mL            JVP: Normal  Neck: supple  Lung: clear   CV: S1 S2 , Murmur:  Abd: soft  Ext: No edema  neuro: Awake / alert  Psych: flat affect  Skin: normal``    LABS/DATA:    CARDIAC MARKERS:                  proBNP:   Lipid Profile:   HgA1c:   TSH:     TELE:  EKG:
No issues overnight  Vital Signs Last 24 Hrs  T(C): 36.4 (07 Mar 2019 21:18), Max: 37.1 (07 Mar 2019 10:29)  T(F): 97.5 (07 Mar 2019 21:18), Max: 98.8 (07 Mar 2019 10:29)  HR: 73 (07 Mar 2019 21:18) (68 - 98)  BP: 147/76 (07 Mar 2019 21:18) (127/75 - 147/76)  BP(mean): --  RR: 19 (07 Mar 2019 21:18) (16 - 19)  SpO2: 100% (07 Mar 2019 21:18) (98% - 100%)    AAO X 3  BROWN Left HE/HF 3/5, Left KE/KF 4/5, strength otherwise 5/5  Sensation patchy areas of decreased sensation distal to mid thigh bilaterally    MEDICATIONS  (STANDING):  amLODIPine   Tablet 5 milliGRAM(s) Oral daily  atorvastatin 20 milliGRAM(s) Oral at bedtime  dextrose 5%. 1000 milliLiter(s) (50 mL/Hr) IV Continuous <Continuous>  dextrose 50% Injectable 12.5 Gram(s) IV Push once  dextrose 50% Injectable 25 Gram(s) IV Push once  dextrose 50% Injectable 25 Gram(s) IV Push once  docusate sodium 100 milliGRAM(s) Oral three times a day  gabapentin 600 milliGRAM(s) Oral three times a day  insulin lispro (HumaLOG) corrective regimen sliding scale   SubCutaneous three times a day before meals  insulin lispro (HumaLOG) corrective regimen sliding scale   SubCutaneous at bedtime  losartan 100 milliGRAM(s) Oral daily  senna 2 Tablet(s) Oral at bedtime  sodium chloride 0.9% lock flush 3 milliLiter(s) IV Push every 8 hours    MEDICATIONS  (PRN):  acetaminophen   Tablet .. 650 milliGRAM(s) Oral every 6 hours PRN Temp greater or equal to 38C (100.4F), Mild Pain (1 - 3)  dextrose 40% Gel 15 Gram(s) Oral once PRN Blood Glucose LESS THAN 70 milliGRAM(s)/deciliter  glucagon  Injectable 1 milliGRAM(s) IntraMuscular once PRN Glucose LESS THAN 70 milligrams/deciliter  melatonin 3 milliGRAM(s) Oral at bedtime PRN Sleep  oxyCODONE    IR 10 milliGRAM(s) Oral every 6 hours PRN Moderate Pain (4 - 6)    Culture - CSF with Gram Stain . (03.07.19 @ 18:29)    Gram Stain Spinal Fluid:   NOS^No Organisms Seen  WBC^White Blood Cells  QNTY CELLS IN GRAM STAIN: NO CELLS SEEN    Specimen Source: CEREBRAL SPINAL FLUID    Protein, CSF: 51.9 mg/dL (03.07.19 @ 16:15)    Glucose, CSF: 88 mg/dL (03.07.19 @ 16:15)    Cerebrospinal Fluid Cell Count-1 (03.07.19 @ 16:15)    Total Nucleated Cell Count, CSF: 6 cell/uL    RBC Count - Spinal Fluid: 38: Red Cell count correlates with the number and proportion of  cells on cytospin preparation. cell/uL    CSF Clarity: CLEAR    CSF Color: COLORLESS    Xanthochromia: ABSENT    < from: MR Thoracic Spine w/ IV Cont (03.07.19 @ 14:07) >  Description: A postcontrast MRI of the thoracic and lumbar spine was   performed.    Comparison is made to the noncontrast spine MRI from 03/05/2019.    Sagittal and axial T1 postcontrast weighted series were performed.    7.5 cc intravenous Gadavist gadolinium contrast administered, 0 cc   contrast was discarded.    Edema and swelling is again noted involving the mid-lower thoracic spinal   cord, extending to the conus. Thickening of the nerve roots of the cauda   equina is present.    Patchy and nodular enhancement is noted involving the mid-lower thoracic   spinal cord. Abnormal enhancement also involves the thickened nerve roots   of the cauda equina. Linear enhancing vessels are noted on the surface of   the cord.    No abnormal enhancement is noted involving the vertebral bodies.    The urinary bladder is distended, partially visualized suggesting   neurogenic bladder given the spinal cord and cauda equina involvement.    IMPRESSION:    Abnormal enhancement involves the mid-lower thoracic spinal, and also   involves the nerve roots of the cauda equina. A spinal cord tumor with   drop metastases, lymphoma, an inflammatory process (such as sarcoidosis),   or an atypical infection (such as tuberculosis) are in the differential   diagnosis. Some prominent vasculature is noted also raising the   possibility of a dural AV fistula. Consider correlation with lumbar   puncture and spine MRA if clinically warranted.    The urinary bladder is distended, partially visualized suggesting   neurogenic bladder given the spinal cord and cauda equina involvement.    < end of copied text >

## 2019-03-10 NOTE — PROGRESS NOTE ADULT - REASON FOR ADMISSION
Thoracic syringohydromyelia

## 2019-03-10 NOTE — PROGRESS NOTE ADULT - PROBLEM SELECTOR PLAN 1
Follow up MRA  OR on hold pending further imaging
Follow up MRA  Preop likely for Monday
MRA showing possible dAVF  Tentative plan for angiogram next week
Preop for tomorrow  Follow up contrast MRI  preop labs in AM

## 2019-03-10 NOTE — H&P ADULT - ASSESSMENT
58M here with progressive LE weakness / numbness found to have possible spinal AVM  - cards clearance documented at [facility]  - preop fo angio in AM  - npo aftermidnight

## 2019-03-10 NOTE — PROGRESS NOTE ADULT - ASSESSMENT
HTN  labile  cont current meds    DM  Monitor finger stick. Insulin coverage. Diabetic education and Diabetic diet. Consider nutrition consultation.    HLD  on statin    PREOP  Based on current ACC/AHA guidelines, patient history and physical exam, the patient is considered to have low risk  EKG is normal  no objection to proceed to OR as planned
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
58 year old Male found to have expansile thoracolumbar lesion obtained after patient developed urinary incontinence and Weakness in b/l LE (L greater than right)      1. Follow up report of CT chest abdomen and pelvis  2. Patient to complete contrast portion of thoracic and lumbar spine (Valium ordered as premedication)  3. Neuro checks q 4 hours  4. Preop for OR friday
59 YO male with thoracic syringohydromyelia
HTN  add losartan    DM  Monitor finger stick. Insulin coverage. Diabetic education and Diabetic diet. Consider nutrition consultation.    HLD  on statin    PREOP  Based on current ACC/AHA guidelines, patient history and physical exam, the patient is considered to have low risk  EKG is normal  no objection to proceed to OR as planned
HTN  add losartan    DM  Monitor finger stick. Insulin coverage. Diabetic education and Diabetic diet. Consider nutrition consultation.    HLD  on statin    PREOP  Based on current ACC/AHA guidelines, patient history and physical exam, the patient is considered to have low risk  EKG is normal  no objection to proceed to OR as planned
HTN  labile  cont current meds    DM  Monitor finger stick. Insulin coverage. Diabetic education and Diabetic diet. Consider nutrition consultation.    HLD  on statin    PREOP  Based on current ACC/AHA guidelines, patient history and physical exam, the patient is considered to have low risk  EKG is normal  no objection to proceed to OR as planned
HTN  labile  cont current meds    DM  Monitor finger stick. Insulin coverage. Diabetic education and Diabetic diet. Consider nutrition consultation.    HLD  on statin    PREOP  Based on current ACC/AHA guidelines, patient history and physical exam, the patient is considered to have low risk  EKG is normal  no objection to proceed to OR as planned
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

## 2019-03-10 NOTE — DISCHARGE NOTE NURSING/CASE MANAGEMENT/SOCIAL WORK - NSDCDPATPORTLINK_GEN_ALL_CORE
You can access the [facility] Patient Portal, offered by [facility], by registering with the following website: [email or web address]

## 2019-03-10 NOTE — H&P ADULT - HISTORY OF PRESENT ILLNESS
59 YO male developed lower back pain following a fall approximately 1 month ago. 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 Patient c/o lower back pain, decreased sensation in both legs, left leg weakness and difficulty ambulating unassisted due to weakness. Denied bowel or bladder dysfunction    Tx to [facility] for spinal Angio

## 2019-03-11 ENCOUNTER — APPOINTMENT (OUTPATIENT)
Dept: NEUROSURGERY | Facility: HOSPITAL | Age: 59
End: 2019-03-11
Payer: COMMERCIAL

## 2019-03-11 LAB
ANION GAP SERPL CALC-SCNC: 19 MMOL/L — HIGH (ref 5–17)
BUN SERPL-MCNC: 11 MG/DL — SIGNIFICANT CHANGE UP (ref 7–23)
CALCIUM SERPL-MCNC: 10 MG/DL — SIGNIFICANT CHANGE UP (ref 8.4–10.5)
CHLORIDE SERPL-SCNC: 102 MMOL/L — SIGNIFICANT CHANGE UP (ref 96–108)
CO2 SERPL-SCNC: 19 MMOL/L — LOW (ref 22–31)
CREAT SERPL-MCNC: 0.65 MG/DL — SIGNIFICANT CHANGE UP (ref 0.5–1.3)
GLUCOSE BLDC GLUCOMTR-MCNC: 117 MG/DL — HIGH (ref 70–99)
GLUCOSE SERPL-MCNC: 157 MG/DL — HIGH (ref 70–99)
HBA1C BLD-MCNC: 6.9 % — HIGH (ref 4–5.6)
HCT VFR BLD CALC: 38.6 % — LOW (ref 39–50)
HCV AB S/CO SERPL IA: 0.13 S/CO — SIGNIFICANT CHANGE UP (ref 0–0.79)
HCV AB SERPL-IMP: SIGNIFICANT CHANGE UP
HGB BLD-MCNC: 13.1 G/DL — SIGNIFICANT CHANGE UP (ref 13–17)
MCHC RBC-ENTMCNC: 28.1 PG — SIGNIFICANT CHANGE UP (ref 27–34)
MCHC RBC-ENTMCNC: 34 GM/DL — SIGNIFICANT CHANGE UP (ref 32–36)
MCV RBC AUTO: 82.6 FL — SIGNIFICANT CHANGE UP (ref 80–100)
PLATELET # BLD AUTO: 215 K/UL — SIGNIFICANT CHANGE UP (ref 150–400)
POTASSIUM SERPL-MCNC: 3.9 MMOL/L — SIGNIFICANT CHANGE UP (ref 3.5–5.3)
POTASSIUM SERPL-SCNC: 3.9 MMOL/L — SIGNIFICANT CHANGE UP (ref 3.5–5.3)
RBC # BLD: 4.67 M/UL — SIGNIFICANT CHANGE UP (ref 4.2–5.8)
RBC # FLD: 14.6 % — HIGH (ref 10.3–14.5)
SODIUM SERPL-SCNC: 140 MMOL/L — SIGNIFICANT CHANGE UP (ref 135–145)
WBC # BLD: 10.5 K/UL — SIGNIFICANT CHANGE UP (ref 3.8–10.5)
WBC # FLD AUTO: 10.5 K/UL — SIGNIFICANT CHANGE UP (ref 3.8–10.5)

## 2019-03-11 PROCEDURE — 75705 ARTERY X-RAYS SPINE: CPT | Mod: 26,59

## 2019-03-11 PROCEDURE — 93970 EXTREMITY STUDY: CPT | Mod: 26

## 2019-03-11 PROCEDURE — 36245 INS CATH ABD/L-EXT ART 1ST: CPT | Mod: 59,LT

## 2019-03-11 PROCEDURE — 99233 SBSQ HOSP IP/OBS HIGH 50: CPT

## 2019-03-11 PROCEDURE — 36215 PLACE CATHETER IN ARTERY: CPT

## 2019-03-11 PROCEDURE — 71275 CT ANGIOGRAPHY CHEST: CPT | Mod: 26

## 2019-03-11 RX ORDER — DEXTROSE MONOHYDRATE, SODIUM CHLORIDE, AND POTASSIUM CHLORIDE 50; .745; 4.5 G/1000ML; G/1000ML; G/1000ML
1000 INJECTION, SOLUTION INTRAVENOUS
Qty: 0 | Refills: 0 | Status: DISCONTINUED | OUTPATIENT
Start: 2019-03-11 | End: 2019-03-12

## 2019-03-11 RX ORDER — SODIUM CHLORIDE 9 MG/ML
1000 INJECTION INTRAMUSCULAR; INTRAVENOUS; SUBCUTANEOUS
Qty: 0 | Refills: 0 | Status: DISCONTINUED | OUTPATIENT
Start: 2019-03-11 | End: 2019-03-11

## 2019-03-11 RX ADMIN — GABAPENTIN 600 MILLIGRAM(S): 400 CAPSULE ORAL at 21:10

## 2019-03-11 RX ADMIN — DEXTROSE MONOHYDRATE, SODIUM CHLORIDE, AND POTASSIUM CHLORIDE 120 MILLILITER(S): 50; .745; 4.5 INJECTION, SOLUTION INTRAVENOUS at 19:00

## 2019-03-11 RX ADMIN — AMLODIPINE BESYLATE 5 MILLIGRAM(S): 2.5 TABLET ORAL at 05:47

## 2019-03-11 RX ADMIN — Medication 100 MILLIGRAM(S): at 21:05

## 2019-03-11 RX ADMIN — LOSARTAN POTASSIUM 100 MILLIGRAM(S): 100 TABLET, FILM COATED ORAL at 05:46

## 2019-03-11 RX ADMIN — SENNA PLUS 2 TABLET(S): 8.6 TABLET ORAL at 21:05

## 2019-03-11 RX ADMIN — GABAPENTIN 600 MILLIGRAM(S): 400 CAPSULE ORAL at 05:46

## 2019-03-11 RX ADMIN — GABAPENTIN 600 MILLIGRAM(S): 400 CAPSULE ORAL at 14:09

## 2019-03-11 RX ADMIN — OXYCODONE HYDROCHLORIDE 10 MILLIGRAM(S): 5 TABLET ORAL at 00:28

## 2019-03-11 RX ADMIN — Medication 100 MILLIGRAM(S): at 05:46

## 2019-03-11 RX ADMIN — ATORVASTATIN CALCIUM 20 MILLIGRAM(S): 80 TABLET, FILM COATED ORAL at 21:05

## 2019-03-11 NOTE — PROGRESS NOTE ADULT - ASSESSMENT
HPI:  57 YO male developed lower back pain following a fall approximately 1 month ago. 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 Patient c/o lower back pain, decreased sensation in both legs, left leg weakness and difficulty ambulating unassisted due to weakness. Denied bowel or bladder dysfunction    Tx to [facility] for spinal Angio (10 Mar 2019 17:35)    PAST MEDICAL & SURGICAL HISTORY:  HLD (hyperlipidemia)  Essential hypertension  DM (diabetes mellitus)        PLAN:  Neuro: neuro checks q 4, vitals q  continue pain medication with tylenol and oxy ir, continue gabapentin for neuropathy  spinal angiogram today  Respiratory: incentive spirometry  CV: keep sbp 100-140 history of hypertension continue losartan and norvasc , history of hyperlipidemia continue lipitor   Endocrine: euyglycemia  Heme/Onc:   stable           DVT ppx: chemoprophlaxis on hold possible avf or avm of the spine, scds, high risk for dvt dopplers pending  Renal: ns +k@ 73 while npo  ID: afebrile  GI:  tolerating diet   PT/OT: post procedure  Will discuss with Dr. Kory Mackey # [id number]

## 2019-03-11 NOTE — CHART NOTE - NSCHARTNOTEFT_GEN_A_CORE
Interventional Neuro- Radiology   Procedure Note PA-C    Procedure: Selective Spinal Angiography   Pre- Procedure Diagnosis: spinal AVF   Post- Procedure Diagnosis:    : Dr Burton Horn  Fellow:     Dr Karlie Hogue  Resident:  Physician Assistant: Shayla Mahoney PA-C    Nurse:                   Kvng Schmidt RN  Radiologic Tech:   Molina Osborne LRT  Anesthesiologist:   Dr Kendrick Chang   Sheath:                  5 Djiboutian short sheath     I/Os: EBL less than 10cc  IV fluids:     cc  Urine output   Contrast Omnipaque 240      cc             Vitals: BP         HR      Spo2       Preliminary Report:  Using a 5 [place]ib[place]ian short sheath to the right groin under general anesthesia via left vertebral artery,  left internal carotid artery, left external carotid artery, right vertebral artery, right common carotid artery, right external carotid artery a selective cerebral angiography was performed and  demonstrated                                     Official note to follow  Patient tolerated procedure well, hemodynamically stable, no change in neurological status compared to baseline. Results discussed with neurosurgery, patient and patient's family. Right groin sheath was removed, manual compression held to hemostasis for 21 minutes, no active bleeding, no hematoma, quick clot and safeguard balloon dressing applied at Interventional Neuro- Radiology   Procedure Note PA-C    Procedure: Selective Spinal Angiography   Pre- Procedure Diagnosis: spinal AVF   Post- Procedure Diagnosis: right L2 lesion     : Dr Burton Horn  Fellow:     Dr Karlie Hogue  Resident: Dr Karin Barron   Physician Assistant: Shayla Mahoney PA-C    Nurse:                   Kvng Schmidt RN  Radiologic Tech:   Molina Osborne LRT       Uvaldo tee LRT  Anesthesiologist:   Dr Kendrick Chang   Sheath:                  5 Korean short sheath     I/Os: EBL less than 10cc  IV fluids:     cc  Urine output   Contrast Omnipaque 240      cc             Vitals: BP  109/65       HR 71     Spo2  100%    Preliminary Report:  Using a 5 Korean short sheath to the right groin under general anesthesia via T10-La selective spinal angiography was performed and  demonstrated a right L2 lesion.                                    Official note to follow  Patient tolerated procedure well, hemodynamically stable, no change in neurological status compared to baseline. Results discussed with neurosurgery, patient and patient's family. Right groin sheath was removed, manual compression held to hemostasis for 21 minutes, no active bleeding, no hematoma, quick clot and safeguard balloon dressing applied at Interventional Neuro- Radiology   Procedure Note PA-C    Procedure: Selective Spinal Angiography   Pre- Procedure Diagnosis: spinal AVF   Post- Procedure Diagnosis: right L2 lesion     : Dr Burton Horn  Fellow:     Dr Karlie Hogue  Resident: Dr Karin Barron   Physician Assistant: Shayla Mahoney PA-C    Nurse:                   Kvng Schmidt RN  Radiologic Tech:   Molina Osborne LRT       Uvaldo Ramirez LRT  Anesthesiologist:   Dr Kendrick Chang   Sheath:                  5 Botswanan short sheath     I/Os: EBL less than 10cc  IV fluids:     cc  Urine output   Contrast Omnipaque 240      cc             Vitals: BP  109/65       HR 71     Spo2  100%    Preliminary Report:  Using a 5 Botswanan short sheath to the right groin under general anesthesia via T8-La selective spinal angiography was performed and  demonstrated a right L2 lesion.                                    Official note to follow  Patient tolerated procedure well, hemodynamically stable, no change in neurological status compared to baseline. Results discussed with neurosurgery, patient and patient's family. Right groin sheath was removed, manual compression held to hemostasis for 21 minutes, no active bleeding, no hematoma, quick clot and safeguard balloon dressing applied at Interventional Neuro- Radiology   Procedure Note PA-C    Procedure: Selective Spinal Angiography   Pre- Procedure Diagnosis: spinal AVF   Post- Procedure Diagnosis: right L2 lesion     : Dr Burton Horn  Fellow:     Dr Karlie Hogue  Resident: Dr Karin Barron   Physician Assistant: Shayla Mahoney PA-C    Nurse:                   Kvng Schmidt RN  Radiologic Tech:   Molina Osborne LRT       Uvaldo Ramirez LRT  Anesthesiologist:   Dr Kendrick Chang   Sheath:                  5 Burmese short sheath   ACT                         145    I/Os: EBL less than 10cc  IV fluids:     cc  Urine output    Contrast Omnipaque 240      cc             Vitals: BP  109/65       HR 71     Spo2  100%    Preliminary Report:  Using a 5 Burmese short sheath to the right groin under general anesthesia via T4-L selective spinal angiography was performed and demonstrated a right L2 lesion.                                    Official note to follow  Patient tolerated procedure well, hemodynamically stable, no change in neurological status compared to baseline. Results discussed with neurosurgery, patient and patient's family. Right groin sheath was removed, manual compression held to hemostasis for 21 minutes, no active bleeding, no hematoma, quick clot and safeguard balloon dressing applied at Interventional Neuro- Radiology   Procedure Note PA-C    Procedure: Selective Spinal Angiography   Pre- Procedure Diagnosis: spinal AVF   Post- Procedure Diagnosis: right L2 lesion     : Dr Burton Horn  Fellow:     Dr Karlie Hogue  Resident: Dr Karin Barron   Physician Assistant: Shayla Mahoney PA-C    Nurse:                   Kvng Schmidt RN  Radiologic Tech:   Molina Osborne LRT       Uvaldo Ramirez LRT  Anesthesiologist:   Dr Kendrick Chang   Sheath:                 5 Citizen of [place] short sheath   ACT                      145    I/Os: EBL less than 10cc  IV fluids:     cc  Urine output    Contrast Omnipaque 240      cc             Vitals: BP  109/65       HR 71     Spo2  100%    Preliminary Report:  Using a 5 Citizen of [place] short sheath to the right groin under general anesthesia via T4-L selective spinal angiography was performed and demonstrated a right L2 lesion.                                    Official note to follow  Patient tolerated procedure well, hemodynamically stable, no change in neurological status compared to baseline. Results discussed with neurosurgery, patient and patient's family. Right groin sheath was removed, manual compression held to hemostasis for 21 minutes, no active bleeding, no hematoma, quick clot and safeguard balloon dressing applied at Interventional Neuro- Radiology   Procedure Note PA-C    Procedure: Selective Spinal Angiography   Pre- Procedure Diagnosis: spinal AVF   Post- Procedure Diagnosis: right L2 lesion     : Dr Burton Horn  Fellow:     Dr Karlie Hogue  Resident: Dr Karin Barron   Physician Assistant: Shayla Mahoney PA-C    Nurse:                   Kvng Grewal RN  Radiologic Tech:   Molina Osborne LRT       Uvaldo Ramirez LRT  Anesthesiologist:   Dr Kendrick Chang   Sheath:                 5 Trinidadian short sheath   ACT                      145    I/Os: EBL less than 10cc  IV fluids:     cc  Urine output    Contrast Omnipaque 240                Vitals: BP  109/65       HR 71     Spo2  100%    Preliminary Report:  Using a 5 Trinidadian short sheath to the right groin under general anesthesia via T2-Lumbar 4 selective spinal angiography was performed and demonstrated a right L2 lesion.                                    Official note to follow  Patient tolerated procedure well, hemodynamically stable, no change in neurological status compared to baseline. Results discussed with neurosurgery, patient and patient's family. Right groin sheath was removed, manual compression held to hemostasis for 21 minutes, no active bleeding, no hematoma, quick clot and safeguard balloon dressing applied at Interventional Neuro- Radiology   Procedure Note PA-C    Procedure: Selective Spinal Angiography   Pre- Procedure Diagnosis: spinal AVF   Post- Procedure Diagnosis: right L2 lesion     : Dr Burton Horn  Fellow:     Dr Karlie Hogue  Resident: Dr Karin Barron   Physician Assistant: Shayla Mahoney PA-C    Nurse:                   Kvng Grewal RN  Radiologic Tech:   Molina Osborne LRT       Uvaldo Ramirez LRT  Anesthesiologist:   Dr Kendrick Davis   Sheath:                 5 [place] short sheath   ACT                      145    I/Os: EBL less than 10cc  IV fluids:     cc  Urine output    Contrast Omnipaque 240                Vitals: BP  109/65       HR 71     Spo2  100%    Preliminary Report:  Using a 5 [place] short sheath to the right groin under general anesthesia via T2-Lumbar 4 selective spinal angiography was performed and demonstrated a right L2 lesion.                                    Official note to follow  Patient tolerated procedure well, hemodynamically stable, no change in neurological status compared to baseline. Results discussed with neurosurgery, patient and patient's family. Right groin sheath was removed, manual compression held to hemostasis for 21 minutes, no active bleeding, no hematoma, quick clot and safeguard balloon dressing applied at Interventional Neuro- Radiology   Procedure Note PA-C    Procedure: Selective Spinal Angiography   Pre- Procedure Diagnosis: spinal AVF   Post- Procedure Diagnosis: right L2 lesion     : Dr Burton Horn  Fellow:     Dr Karlie Hogue  Resident: Dr Karin Barron   Physician Assistant: Shayla Mahoney PA-C    Nurse:                   Kvng Grewal RN  Radiologic Tech:   Molina Osborne LRT       Uvaldo Ramirez LRT  Anesthesiologist:   Dr Kendrick Davis   Sheath:                 5 Turkish short sheath   ACT                      145    I/Os: EBL less than 10cc  IV fluids:     cc  Urine output    Contrast Omnipaque 240                Vitals: BP  109/65       HR 71     Spo2  100%    Preliminary Report:  Using a 5 Turkish short sheath to the right groin under general anesthesia via T2-Lumbar 4 selective spinal angiography was performed and demonstrated a right L2 lesion.                                    Official note to follow  Patient tolerated procedure well, hemodynamically stable, no change in neurological status compared to baseline. Results discussed with neurosurgery, patient and patient's family. Right groin sheath was removed, manual compression held to hemostasis for 21 minutes, no active bleeding, no hematoma, quick clot and safeguard balloon dressing applied at Interventional Neuro- Radiology   Procedure Note PA-C    Procedure: Selective Spinal Angiography   Pre- Procedure Diagnosis: spinal AVF   Post- Procedure Diagnosis: left T5 lesion     : Dr Burton Horn  Fellow:     Dr Karlie Hogue  Resident: Dr Karin Barron   Physician Assistant: Shayla Mahoney PA-C    Nurse:                   Kvng Grewal RN  Radiologic Tech:   Molina Osborne LRT       Uvaldo Ramirez LRT  Anesthesiologist:   Dr Kendrick Davis   Sheath:                 5 Lithuanian short sheath   ACT                      145    I/Os: EBL less than 10cc  IV fluids:500cc Urine output 1750cc  Contrast Omnipaque 240 224cc               Vitals: BP  109/65       HR 71     Spo2  100%    Preliminary Report:  Using a 5 Lithuanian short sheath to the right groin under general anesthesia via T2-Lumbar 4 selective spinal angiography was performed and demonstrated a left T5 lesion. Official note to follow  Patient tolerated procedure well, hemodynamically stable, no change in neurological status compared to baseline. Results discussed with neurosurgery, patient and patient's family. Right groin sheath was removed, manual compression held to hemostasis for 21 minutes, no active bleeding, no hematoma, quick clot and safeguard balloon dressing applied at 1745. Interventional Neuro- Radiology   Procedure Note PA-C    Procedure: Selective Spinal Angiography   Pre- Procedure Diagnosis: spinal AVF   Post- Procedure Diagnosis: left T5 lesion     : Dr Burton Horn  Fellow:     Dr Karlie Hogue  Resident: Dr Karin Barron   Physician Assistant: Shayla Mahoney PA-C    Nurse:                   Kvng Grewal RN  Radiologic Tech:   Molina Osborne LRT       Uvaldo Ramirez LRT  Anesthesiologist:   Dr Kendrick Davis   Sheath:                 5 [place] short sheath   ACT                      145    I/Os: EBL less than 10cc  IV fluids:500cc Urine output 1750cc  Contrast Omnipaque 240  575cc               Vitals: BP  109/65       HR 71     Spo2  100%    Preliminary Report:  Using a 5 [place] short sheath to the right groin under general anesthesia via T2-Lumbar 4 selective spinal angiography was performed and demonstrated a left T5 lesion. Official note to follow  Patient tolerated procedure well, hemodynamically stable, no change in neurological status compared to baseline. Results discussed with neurosurgery, patient and patient's family. Right groin sheath was removed, manual compression held to hemostasis for 21 minutes, no active bleeding, no hematoma, quick clot and safeguard balloon dressing applied at 1745.

## 2019-03-11 NOTE — PATIENT PROFILE ADULT - NSASFALLWHENOCCURRED_GEN_A_NUR
1. Have you been to the ER, urgent care clinic since your last visit? Hospitalized since your last visit? No    2. Have you seen or consulted any other health care providers outside of the [facility] since your last visit? Include any pap smears or colon screening.  No
HISTORY OF PRESENT ILLNESS  Omar Romberg is a 78 y.o. male. HPI  C/o right shoulder pain, started 2 months ago after using , no trauma, no falls, no weakness, the pain is worse at night, his wife stated that he constantly working in the garage  Review of Systems   Musculoskeletal: Negative for falls. Skin: Negative for rash. Neurological: Negative for dizziness and focal weakness. Physical Exam   Musculoskeletal:        Right shoulder: He exhibits tenderness (posterior over scapula, ). He exhibits normal range of motion, no bony tenderness, no swelling and no deformity. Vitals reviewed. ASSESSMENT and PLAN  Ariadna Pena was seen today for arm pain. Diagnoses and all orders for this visit:    Right shoulder pain, unspecified chronicity, most likely muscle strain  -     meloxicam (MOBIC) 15 mg tablet; Take 1 Tab by mouth daily. -     tiZANidine (ZANAFLEX) 4 mg tablet; Take 1 Tab by mouth three (3) times daily as needed.  For muscle spasms  Heat, massage, rest  rtc 2 weeks if no better
last six months

## 2019-03-11 NOTE — CHART NOTE - NSCHARTNOTEFT_GEN_A_CORE
Interventional Neuro Radiology  Pre-Procedure Note PA-C    This is a 58 year old right male developed lower back pain following a fall approximately 1 month ago. 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 Patient c/o lower back pain, decreased sensation in both legs, left leg weakness and difficulty ambulating unassisted due to weakness. Denied bowel or bladder dysfunction. Patient was transported     Allergies: No Known Allergies  PMHX: hyperlipidemia, hypertension, diabetes mellitus  PSHX:  Social History:   FAMILY HISTORY:    Current Medications: acetaminophen  Tablet 650 milliGRAM(s) Oral every 6 hours PRN  amLODIPine   Tablet 5 milliGRAM(s) Oral daily  atorvastatin 20 milliGRAM(s) Oral at bedtime  dextrose 40% Gel 15 Gram(s) Oral once PRN  dextrose 5%. 1000 milliLiter(s) IV Continuous <Continuous>  dextrose 50% Injectable 12.5 Gram(s) IV Push once  dextrose 50% Injectable 25 Gram(s) IV Push once  dextrose 50% Injectable 25 Gram(s) IV Push once  docusate sodium 100 milliGRAM(s) Oral three times a day  gabapentin 600 milliGRAM(s) Oral three times a day  glucagon  Injectable 1 milliGRAM(s) IntraMuscular once PRN  insulin lispro (HumaLOG) corrective regimen sliding scale   SubCutaneous three times a day before meals  insulin lispro (HumaLOG) corrective regimen sliding scale   SubCutaneous at bedtime  losartan 100 milliGRAM(s) Oral daily  melatonin 3 milliGRAM(s) Oral at bedtime PRN  oxyCODONE    IR 5 milliGRAM(s) Oral every 4 hours PRN  oxyCODONE    IR 10 milliGRAM(s) Oral every 4 hours PRN  senna 2 Tablet(s) Oral at bedtime  sodium chloride 0.9% with potassium chloride 20 mEq/L 1000 milliLiter(s)       CBC                    13.3   8.8   )-----------( 224      ( 10 Mar 2019 20:59 )             39.3       BMP    137  |  99  |  17  ----------------------------<  239<H>  3.9   |  26  |  0.81    Blood Bank: A negative           Assessment/Plan:   This is a 58 year old right hand dominant male   Procedure, goals, risks, benefits and alternatives were discussed with patient and patient's family. All questions were answered. Risks include but are not limited to stroke, vessel injury, hemorrhage, and or right groin hematoma. Patient demonstrates understanding of all risks involved with this procedure and wishes to continue.  Appropriate content was obtained from patient and consent is in the patient's chart. Interventional Neuro Radiology  Pre-Procedure Note PA-C    This is a 58 year old right male developed lower back pain following a fall approximately 1 month ago. 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 Patient c/o lower back pain, decreased sensation in both legs, left leg weakness and difficulty ambulating unassisted due to weakness. Denied bowel or bladder dysfunction. Patient was transported     Allergies: No Known Allergies  PMHX: hyperlipidemia, hypertension, diabetes mellitus  PSHX:  Social History: non-smoker   FAMILY HISTORY: non-contributory     Current Medications: acetaminophen  Tablet 650 milliGRAM(s) Oral every 6 hours PRN  amLODIPine   Tablet 5 milliGRAM(s) Oral daily  atorvastatin 20 milliGRAM(s) Oral at bedtime  dextrose 40% Gel 15 Gram(s) Oral once PRN  dextrose 5%. 1000 milliLiter(s) IV Continuous   dextrose 50% Injectable 12.5 Gram(s) IV Push once  dextrose 50% Injectable 25 Gram(s) IV Push once  dextrose 50% Injectable 25 Gram(s) IV Push once  docusate sodium 100 milliGRAM(s) Oral three times a day  gabapentin 600 milliGRAM(s) Oral three times a day  glucagon  Injectable 1 milliGRAM(s) IntraMuscular once PRN  insulin lispro (HumaLOG) corrective regimen sliding scale   SubCutaneous three times a day before meals  insulin lispro (HumaLOG) corrective regimen sliding scale   SubCutaneous at bedtime  losartan 100 milliGRAM(s) Oral daily  melatonin 3 milliGRAM(s) Oral at bedtime PRN  oxyCODONE    IR 5 milliGRAM(s) Oral every 4 hours PRN  oxyCODONE    IR 10 milliGRAM(s) Oral every 4 hours PRN  senna 2 Tablet(s) Oral at bedtime  sodium chloride 0.9% with potassium chloride 20 mEq/L 1000 milliLiter(s)     CBC                    13.3   8.8   )-----------( 224      ( 10 Mar 2019 20:59 )             39.3       BMP    137  |  99  |  17  ----------------------------<  239<H>  3.9   |  26  |  0.81    Blood Bank: A negative       Assessment/Plan:   This is a 58 year old right hand dominant male   Procedure, goals, risks, benefits and alternatives were discussed with patient and patient's family. All questions were answered. Risks include but are not limited to stroke, vessel injury, hemorrhage, and or right groin hematoma. Patient demonstrates understanding of all risks involved with this procedure and wishes to continue. Appropriate content was obtained from patient and consent is in the patient's chart. Interventional Neuro Radiology  Pre-Procedure Note PA-C    This is a 58 year old right male developed lower back pain following a fall approximately 1 month ago. 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 Patient complains of lower back pain, decreased sensation in both legs, left leg weakness and difficulty ambulating unassisted due to weakness. Denied bowel or bladder dysfunction. Patient was transported to Neuro IR for a selective spinal angiography.     Upon exam patient is A + O X 4, speech is fluent, recent and remote memory intact, follows commands, moves all extremities, bilateral upper motor 5/5, left hip extension/ flexion 3/5, left knee extension and flexion 4/5, right lower extremity 5/5 dorsalis pedis +1 bilaterally     Allergies: No Known Allergies  PMHX: hyperlipidemia, hypertension, diabetes mellitus  PSHX: unknown   Social History: non-smoker   FAMILY HISTORY: non-contributory     Current Medications: acetaminophen  Tablet 650 milliGRAM(s) Oral every 6 hours PRN  amLODIPine   Tablet 5 milliGRAM(s) Oral daily  atorvastatin 20 milliGRAM(s) Oral at bedtime  dextrose 40% Gel 15 Gram(s) Oral once PRN  dextrose 5%. 1000 milliLiter(s) IV Continuous   dextrose 50% Injectable 12.5 Gram(s) IV Push once  dextrose 50% Injectable 25 Gram(s) IV Push once  dextrose 50% Injectable 25 Gram(s) IV Push once  docusate sodium 100 milliGRAM(s) Oral three times a day  gabapentin 600 milliGRAM(s) Oral three times a day  glucagon  Injectable 1 milliGRAM(s) IntraMuscular once PRN  insulin lispro (HumaLOG) corrective regimen sliding scale   SubCutaneous three times a day before meals  insulin lispro (HumaLOG) corrective regimen sliding scale   SubCutaneous at bedtime  losartan 100 milliGRAM(s) Oral daily  melatonin 3 milliGRAM(s) Oral at bedtime PRN  oxyCODONE    IR 5 milliGRAM(s) Oral every 4 hours PRN  oxyCODONE    IR 10 milliGRAM(s) Oral every 4 hours PRN  senna 2 Tablet(s) Oral at bedtime  sodium chloride 0.9% with potassium chloride 20 mEq/L 1000 milliLiter(s)     CBC                    13.3   8.8   )-----------( 224      ( 10 Mar 2019 20:59 )             39.3       BMP    137  |  99  |  17  ----------------------------<  239<H>  3.9   |  26  |  0.81    Blood Bank: A negative available     Assessment/Plan:   This is a 58 year old right hand dominant male with a possible thoracic lesion who is transported to Neuro IR for a selective spinal angiography.   Procedure, goals, risks, benefits and alternatives were discussed with patient and patient's family. All questions were answered. Risks include but are not limited to stroke, vessel injury, hemorrhage, and or right groin hematoma. Patient demonstrates understanding of all risks involved with this procedure and wishes to continue. Appropriate content was obtained from patient and consent is in the patient's chart. Interventional Neuro Radiology  Pre-Procedure Note PA-C    This is a 58 year old right male developed lower back pain following a fall approximately 1 month ago. 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 Patient complains of lower back pain, decreased sensation in both legs, left leg weakness and difficulty ambulating unassisted due to weakness. Denied bowel or bladder dysfunction. Patient was transported to Neuro IR for a selective spinal angiography.     Upon exam patient is A + O X 4, speech is fluent, recent and remote memory intact, follows commands, moves all extremities, bilateral upper motor 5/5, left hip extension/ flexion 3/5, left knee extension and flexion 4/5, right lower extremity 5/5 dorsalis pedis +1 bilaterally     Allergies: No Known Allergies  PMHX: hyperlipidemia, hypertension, diabetes mellitus  PSHX: deviated septum repair, colonoscopy   Social History: non-smoker   FAMILY HISTORY: non-contributory     Current Medications: acetaminophen  Tablet 650 milliGRAM(s) Oral every 6 hours PRN  amLODIPine   Tablet 5 milliGRAM(s) Oral daily  atorvastatin 20 milliGRAM(s) Oral at bedtime  dextrose 40% Gel 15 Gram(s) Oral once PRN  dextrose 5%. 1000 milliLiter(s) IV Continuous   dextrose 50% Injectable 12.5 Gram(s) IV Push once  dextrose 50% Injectable 25 Gram(s) IV Push once  dextrose 50% Injectable 25 Gram(s) IV Push once  docusate sodium 100 milliGRAM(s) Oral three times a day  gabapentin 600 milliGRAM(s) Oral three times a day  glucagon  Injectable 1 milliGRAM(s) IntraMuscular once PRN  insulin lispro (HumaLOG) corrective regimen sliding scale   SubCutaneous three times a day before meals  insulin lispro (HumaLOG) corrective regimen sliding scale   SubCutaneous at bedtime  losartan 100 milliGRAM(s) Oral daily  melatonin 3 milliGRAM(s) Oral at bedtime PRN  oxyCODONE    IR 5 milliGRAM(s) Oral every 4 hours PRN  oxyCODONE    IR 10 milliGRAM(s) Oral every 4 hours PRN  senna 2 Tablet(s) Oral at bedtime  sodium chloride 0.9% with potassium chloride 20 mEq/L 1000 milliLiter(s)     CBC                    13.3   8.8   )-----------( 224      ( 10 Mar 2019 20:59 )             39.3       BMP    137  |  99  |  17  ----------------------------<  239<H>  3.9   |  26  |  0.81    Blood Bank: A negative available     Assessment/Plan:   This is a 58 year old right hand dominant male with a possible thoracic lesion who is transported to Neuro IR for a selective spinal angiography.   Procedure, goals, risks, benefits and alternatives were discussed with patient and patient's family. All questions were answered. Risks include but are not limited to stroke, vessel injury, hemorrhage, and or right groin hematoma. Patient demonstrates understanding of all risks involved with this procedure and wishes to continue. Appropriate content was obtained from patient and consent is in the patient's chart.

## 2019-03-11 NOTE — PROGRESS NOTE ADULT - SUBJECTIVE AND OBJECTIVE BOX
SUBJECTIVE:     OVERNIGHT EVENTS: admitted to [facility]     Vital Signs Last 24 Hrs  T(C): 36.9 (11 Mar 2019 08:12), Max: 37 (10 Mar 2019 16:31)  T(F): 98.4 (11 Mar 2019 08:12), Max: 98.6 (10 Mar 2019 16:31)  HR: 63 (11 Mar 2019 08:12) (63 - 83)  BP: 161/84 (11 Mar 2019 08:12) (135/85 - 161/89)  BP(mean): --  RR: 18 (11 Mar 2019 08:12) (16 - 20)  SpO2: 98% (11 Mar 2019 08:12) (96% - 100%)    PHYSICAL EXAM:    Constitutional: No Acute Distress     Neurological: AOx3, Following Commands, Moving all Extremities le 4/5 with right stronger than left                                                  Sensation: [x] intact to light touch  [] decreased:     Pulmonary: Clear to Auscultation, No rales, No rhonchi, No wheezes     Cardiovascular: S1, S2, Regular rate and rhythm     Gastrointestinal: Soft, Non-tender, Non-distended     Extremities: No calf tenderness     LABS:                        13.3   8.8   )-----------( 224      ( 10 Mar 2019 20:59 )             39.3    03-10    137  |  99  |  17  ----------------------------<  239<H>  3.9   |  26  |  0.81    Ca    10.0      10 Mar 2019 20:56    TPro  8.0  /  Alb  4.3  /  TBili  0.3  /  DBili  x   /  AST  28  /  ALT  36  /  AlkPhos  74  03-10  PT/INR - ( 10 Mar 2019 09:35 )   PT: 14.3 SEC;   INR: 1.28          PTT - ( 10 Mar 2019 20:48 )  PTT:29.2 secHemoglobin A1C, Whole Blood: 6.9 % (03-11-19 @ 08:32)  Hemoglobin A1C, Whole Blood: 6.9 % (03-10-19 @ 22:39)      03-10 @ 07:01  -  03-11 @ 07:00  --------------------------------------------------------  IN: 870 mL / OUT: 100 mL / NET: 770 mL      MEDICATIONS:  Anticoagulation:     Antibiotics:    Endo:  atorvastatin 20 milliGRAM(s) Oral at bedtime  dextrose 40% Gel 15 Gram(s) Oral once PRN  dextrose 50% Injectable 12.5 Gram(s) IV Push once  dextrose 50% Injectable 25 Gram(s) IV Push once  dextrose 50% Injectable 25 Gram(s) IV Push once  glucagon  Injectable 1 milliGRAM(s) IntraMuscular once PRN  insulin lispro (HumaLOG) corrective regimen sliding scale   SubCutaneous three times a day before meals  insulin lispro (HumaLOG) corrective regimen sliding scale   SubCutaneous at bedtime    Neuro:  acetaminophen   Tablet .. 650 milliGRAM(s) Oral every 6 hours PRN Temp greater or equal to 38.5C (101.3F), Mild Pain (1 - 3)  gabapentin 600 milliGRAM(s) Oral three times a day  melatonin 3 milliGRAM(s) Oral at bedtime PRN Sleep  oxyCODONE    IR 5 milliGRAM(s) Oral every 4 hours PRN Moderate Pain (4 - 6)  oxyCODONE    IR 10 milliGRAM(s) Oral every 4 hours PRN Severe Pain (7 - 10)    Cardiac:  amLODIPine   Tablet 5 milliGRAM(s) Oral daily  losartan 100 milliGRAM(s) Oral daily    Pulm:    GI/:  docusate sodium 100 milliGRAM(s) Oral three times a day  senna 2 Tablet(s) Oral at bedtime    Other:   dextrose 5%. 1000 milliLiter(s) IV Continuous <Continuous>  sodium chloride 0.9% with potassium chloride 20 mEq/L 1000 milliLiter(s) IV Continuous <Continuous>    DIET: npo

## 2019-03-11 NOTE — PROGRESS NOTE ADULT - SUBJECTIVE AND OBJECTIVE BOX
Patient seen and examined at bedside.    T(C): 36.8 (03-11-19 @ 15:17), Max: 36.9 (03-11-19 @ 08:12)  HR: 57 (03-11-19 @ 15:17) (57 - 83)  BP: 138/86 (03-11-19 @ 15:17) (138/86 - 161/89)  RR: 18 (03-11-19 @ 15:17) (16 - 18)  SpO2: 100% (03-11-19 @ 15:17) (96% - 100%)  Wt(kg): --    Exam:    AOx3, FC, PERRL, EOMI, V1-3 intact, no facial, tongue midline, shrug BL  5/5 throughout except RLE cannot be assessed at this time due to recent catheterization  no drift  SILT  No clonus or babinski

## 2019-03-12 LAB
ANION GAP SERPL CALC-SCNC: 12 MMOL/L — SIGNIFICANT CHANGE UP (ref 5–17)
BUN SERPL-MCNC: 14 MG/DL — SIGNIFICANT CHANGE UP (ref 7–23)
CALCIUM SERPL-MCNC: 9.2 MG/DL — SIGNIFICANT CHANGE UP (ref 8.4–10.5)
CHLORIDE SERPL-SCNC: 104 MMOL/L — SIGNIFICANT CHANGE UP (ref 96–108)
CO2 SERPL-SCNC: 23 MMOL/L — SIGNIFICANT CHANGE UP (ref 22–31)
CREAT SERPL-MCNC: 0.79 MG/DL — SIGNIFICANT CHANGE UP (ref 0.5–1.3)
GLUCOSE BLDC GLUCOMTR-MCNC: 125 MG/DL — HIGH (ref 70–99)
GLUCOSE BLDC GLUCOMTR-MCNC: 144 MG/DL — HIGH (ref 70–99)
GLUCOSE BLDC GLUCOMTR-MCNC: 195 MG/DL — HIGH (ref 70–99)
GLUCOSE BLDC GLUCOMTR-MCNC: 233 MG/DL — HIGH (ref 70–99)
GLUCOSE SERPL-MCNC: 156 MG/DL — HIGH (ref 70–99)
HCT VFR BLD CALC: 35.9 % — LOW (ref 39–50)
HGB BLD-MCNC: 12.3 G/DL — LOW (ref 13–17)
MCHC RBC-ENTMCNC: 28.5 PG — SIGNIFICANT CHANGE UP (ref 27–34)
MCHC RBC-ENTMCNC: 34.3 GM/DL — SIGNIFICANT CHANGE UP (ref 32–36)
MCV RBC AUTO: 83.2 FL — SIGNIFICANT CHANGE UP (ref 80–100)
PLATELET # BLD AUTO: 204 K/UL — SIGNIFICANT CHANGE UP (ref 150–400)
POTASSIUM SERPL-MCNC: 3.9 MMOL/L — SIGNIFICANT CHANGE UP (ref 3.5–5.3)
POTASSIUM SERPL-SCNC: 3.9 MMOL/L — SIGNIFICANT CHANGE UP (ref 3.5–5.3)
RBC # BLD: 4.32 M/UL — SIGNIFICANT CHANGE UP (ref 4.2–5.8)
RBC # FLD: 14.4 % — SIGNIFICANT CHANGE UP (ref 10.3–14.5)
SODIUM SERPL-SCNC: 139 MMOL/L — SIGNIFICANT CHANGE UP (ref 135–145)
SPECIMEN SOURCE: SIGNIFICANT CHANGE UP
WBC # BLD: 9.5 K/UL — SIGNIFICANT CHANGE UP (ref 3.8–10.5)
WBC # FLD AUTO: 9.5 K/UL — SIGNIFICANT CHANGE UP (ref 3.8–10.5)

## 2019-03-12 PROCEDURE — 99232 SBSQ HOSP IP/OBS MODERATE 35: CPT

## 2019-03-12 RX ORDER — DEXTROSE MONOHYDRATE, SODIUM CHLORIDE, AND POTASSIUM CHLORIDE 50; .745; 4.5 G/1000ML; G/1000ML; G/1000ML
1000 INJECTION, SOLUTION INTRAVENOUS
Qty: 0 | Refills: 0 | Status: DISCONTINUED | OUTPATIENT
Start: 2019-03-12 | End: 2019-03-16

## 2019-03-12 RX ORDER — HYDRALAZINE HCL 50 MG
10 TABLET ORAL ONCE
Qty: 0 | Refills: 0 | Status: COMPLETED | OUTPATIENT
Start: 2019-03-12 | End: 2019-03-12

## 2019-03-12 RX ADMIN — Medication 100 MILLIGRAM(S): at 13:05

## 2019-03-12 RX ADMIN — Medication 10 MILLIGRAM(S): at 23:59

## 2019-03-12 RX ADMIN — GABAPENTIN 600 MILLIGRAM(S): 400 CAPSULE ORAL at 13:05

## 2019-03-12 RX ADMIN — OXYCODONE HYDROCHLORIDE 5 MILLIGRAM(S): 5 TABLET ORAL at 22:17

## 2019-03-12 RX ADMIN — LOSARTAN POTASSIUM 100 MILLIGRAM(S): 100 TABLET, FILM COATED ORAL at 05:25

## 2019-03-12 RX ADMIN — OXYCODONE HYDROCHLORIDE 5 MILLIGRAM(S): 5 TABLET ORAL at 21:47

## 2019-03-12 RX ADMIN — AMLODIPINE BESYLATE 5 MILLIGRAM(S): 2.5 TABLET ORAL at 05:25

## 2019-03-12 RX ADMIN — GABAPENTIN 600 MILLIGRAM(S): 400 CAPSULE ORAL at 05:25

## 2019-03-12 RX ADMIN — ATORVASTATIN CALCIUM 20 MILLIGRAM(S): 80 TABLET, FILM COATED ORAL at 21:47

## 2019-03-12 RX ADMIN — Medication 100 MILLIGRAM(S): at 21:47

## 2019-03-12 RX ADMIN — GABAPENTIN 600 MILLIGRAM(S): 400 CAPSULE ORAL at 21:47

## 2019-03-12 RX ADMIN — Medication 1: at 13:05

## 2019-03-12 NOTE — PROGRESS NOTE ADULT - ASSESSMENT
HPI: 59 YO male developed lower back pain following a fall approximately 1 month ago. 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 Patient c/o lower back pain, decreased sensation in both legs, left leg weakness and difficulty ambulating unassisted due to weakness. 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 Horn & Dr. Willis  -Continue Neurontin 600 TID for neuropathy  -Continue Oxy IR PRN for pain control  -Continue Losartan & Norvasc for hx of HTN  -Continue Lipitor for hx of HLD  -Continue colace, senna for bowel regimen  -Continue HISS for history of T2DM  -Encouraged mobilization, OOB to chair   -Encouraged incentive spirometer  -DVT prophylaxis: bilateral venodynes; no chemoprophylaxis for untreated AVF and possible pre op status  -Dispo: TBD  -Will discuss with Dr. Horn    [facility] # [id number]

## 2019-03-12 NOTE — PROGRESS NOTE ADULT - SUBJECTIVE AND OBJECTIVE BOX
SUBJECTIVE: Patient seen and examined with wife at bedside. No new complaints today. Denies chest pain, shortness of breath, nausea/vomiting.     Vital Signs Last 24 Hrs  T(C): 36.4 (03-12-19 @ 12:35), Max: 36.9 (03-11-19 @ 21:00)  T(F): 97.5 (03-12-19 @ 12:35), Max: 98.5 (03-12-19 @ 04:55)  HR: 69 (03-12-19 @ 12:35) (57 - 110)  BP: 116/66 (03-12-19 @ 12:35) (106/66 - 165/88)  BP(mean): 117 (03-11-19 @ 22:00) (100 - 123)  RR: 18 (03-12-19 @ 12:35) (14 - 18)  SpO2: 100% (03-12-19 @ 12:35) (96% - 100%)    PHYSICAL EXAM:    Constitutional: No Acute Distress, sitting up comfortably in chair    Neurological: Awake, alert, oriented to person, place and time, speech clear and fluent, face equal, tongue midline, briskly following commands, no drift, moves all extremities with 5/5 strength, sensation intact to light touch throughout, pupils 4mm and reactive bilaterally, extraocular movements intact, no nystagmus    Incision: Rt groin site C/D/I     Pulmonary: Clear to Auscultation, No rales, No rhonchi, No wheezes     Cardiovascular: S1, S2, Regular rate and rhythm     Gastrointestinal: Softly distended, Non-tender, +bowel sounds x 4    Extremities: No calf tenderness bilaterally, no cyanosis, clubbing or edema          LABS:                          12.3   9.5   )-----------( 204      ( 12 Mar 2019 10:29 )             35.9    03-12    139  |  104  |  14  ----------------------------<  156<H>  3.9   |  23  |  0.79    Ca    9.2      12 Mar 2019 10:29    TPro  8.0  /  Alb  4.3  /  TBili  0.3  /  DBili  x   /  AST  28  /  ALT  36  /  AlkPhos  74  03-10  PTT - ( 10 Mar 2019 20:48 )  PTT:29.2 sec    03-11 @ 07:01  -  03-12 @ 07:00  --------------------------------------------------------  IN: 1920 mL / OUT: 1525 mL / NET: 395 mL    03-12 @ 07:01  - 03-12 @ 13:00  --------------------------------------------------------  IN: 480 mL / OUT: 501 mL / NET: -21 mL        IMAGING:     < from: [facility] Duplex Lower Ext Vein Scan, Indu (03.11.19 @ 10:05) >  EXAM:  DUPLEX SCAN EXT VEINS LOWER BI                            PROCEDURE DATE:  03/11/2019            INTERPRETATION:  CLINICAL INFORMATION: Lower extremity pain and swelling,   rule out DVT    COMPARISON: Prior examination, dated 3/4/2019, showed no DVT.    TECHNIQUE: Duplex sonography of the BILATERAL LOWER extremities with   color and spectral Doppler, with and without compression.      FINDINGS:    There is normal compressibility of the bilateral common femoral, femoral   and popliteal veins. No calf vein thrombosis is detected.    Doppler examination shows normal spontaneous and phasic flow.    IMPRESSION:     No evidence of bilateral lower extremity deep venous thrombosis.    < end of copied text >      MEDICATIONS:  Antibiotics:    Neuro:  acetaminophen   Tablet .. 650 milliGRAM(s) Oral every 6 hours PRN Temp greater or equal to 38.5C (101.3F), Mild Pain (1 - 3)  gabapentin 600 milliGRAM(s) Oral three times a day  melatonin 3 milliGRAM(s) Oral at bedtime PRN Sleep  oxyCODONE    IR 5 milliGRAM(s) Oral every 4 hours PRN Moderate Pain (4 - 6)  oxyCODONE    IR 10 milliGRAM(s) Oral every 4 hours PRN Severe Pain (7 - 10)    Cardiac:  amLODIPine   Tablet 5 milliGRAM(s) Oral daily  losartan 100 milliGRAM(s) Oral daily    Pulm:    GI/:  docusate sodium 100 milliGRAM(s) Oral three times a day  senna 2 Tablet(s) Oral at bedtime    Other:   atorvastatin 20 milliGRAM(s) Oral at bedtime  dextrose 40% Gel 15 Gram(s) Oral once PRN Blood Glucose LESS THAN 70 milliGRAM(s)/deciliter  dextrose 5%. 1000 milliLiter(s) IV Continuous <Continuous>  dextrose 50% Injectable 12.5 Gram(s) IV Push once  dextrose 50% Injectable 25 Gram(s) IV Push once  dextrose 50% Injectable 25 Gram(s) IV Push once  glucagon  Injectable 1 milliGRAM(s) IntraMuscular once PRN Glucose LESS THAN 70 milligrams/deciliter  insulin lispro (HumaLOG) corrective regimen sliding scale   SubCutaneous three times a day before meals  insulin lispro (HumaLOG) corrective regimen sliding scale   SubCutaneous at bedtime  sodium chloride 0.9% with potassium chloride 20 mEq/L 1000 milliLiter(s) IV Continuous <Continuous>        DIET: regular

## 2019-03-13 LAB
ANION GAP SERPL CALC-SCNC: 11 MMOL/L — SIGNIFICANT CHANGE UP (ref 5–17)
BACTERIA CSF CULT: SIGNIFICANT CHANGE UP
BUN SERPL-MCNC: 11 MG/DL — SIGNIFICANT CHANGE UP (ref 7–23)
CALCIUM SERPL-MCNC: 9.9 MG/DL — SIGNIFICANT CHANGE UP (ref 8.4–10.5)
CHLORIDE SERPL-SCNC: 101 MMOL/L — SIGNIFICANT CHANGE UP (ref 96–108)
CO2 SERPL-SCNC: 25 MMOL/L — SIGNIFICANT CHANGE UP (ref 22–31)
CREAT SERPL-MCNC: 0.74 MG/DL — SIGNIFICANT CHANGE UP (ref 0.5–1.3)
GLUCOSE BLDC GLUCOMTR-MCNC: 136 MG/DL — HIGH (ref 70–99)
GLUCOSE BLDC GLUCOMTR-MCNC: 136 MG/DL — HIGH (ref 70–99)
GLUCOSE BLDC GLUCOMTR-MCNC: 137 MG/DL — HIGH (ref 70–99)
GLUCOSE BLDC GLUCOMTR-MCNC: 170 MG/DL — HIGH (ref 70–99)
GLUCOSE SERPL-MCNC: 154 MG/DL — HIGH (ref 70–99)
HCT VFR BLD CALC: 40.3 % — SIGNIFICANT CHANGE UP (ref 39–50)
HGB BLD-MCNC: 13.3 G/DL — SIGNIFICANT CHANGE UP (ref 13–17)
MCHC RBC-ENTMCNC: 27.6 PG — SIGNIFICANT CHANGE UP (ref 27–34)
MCHC RBC-ENTMCNC: 33 GM/DL — SIGNIFICANT CHANGE UP (ref 32–36)
MCV RBC AUTO: 83.8 FL — SIGNIFICANT CHANGE UP (ref 80–100)
PLATELET # BLD AUTO: 220 K/UL — SIGNIFICANT CHANGE UP (ref 150–400)
POTASSIUM SERPL-MCNC: 4.1 MMOL/L — SIGNIFICANT CHANGE UP (ref 3.5–5.3)
POTASSIUM SERPL-SCNC: 4.1 MMOL/L — SIGNIFICANT CHANGE UP (ref 3.5–5.3)
RBC # BLD: 4.82 M/UL — SIGNIFICANT CHANGE UP (ref 4.2–5.8)
RBC # FLD: 14.7 % — HIGH (ref 10.3–14.5)
SODIUM SERPL-SCNC: 137 MMOL/L — SIGNIFICANT CHANGE UP (ref 135–145)
WBC # BLD: 8.7 K/UL — SIGNIFICANT CHANGE UP (ref 3.8–10.5)
WBC # FLD AUTO: 8.7 K/UL — SIGNIFICANT CHANGE UP (ref 3.8–10.5)

## 2019-03-13 PROCEDURE — 99232 SBSQ HOSP IP/OBS MODERATE 35: CPT

## 2019-03-13 RX ORDER — LANOLIN ALCOHOL/MO/W.PET/CERES
5 CREAM (GRAM) TOPICAL AT BEDTIME
Qty: 0 | Refills: 0 | Status: COMPLETED | OUTPATIENT
Start: 2019-03-13 | End: 2019-03-13

## 2019-03-13 RX ADMIN — DEXTROSE MONOHYDRATE, SODIUM CHLORIDE, AND POTASSIUM CHLORIDE 75 MILLILITER(S): 50; .745; 4.5 INJECTION, SOLUTION INTRAVENOUS at 21:31

## 2019-03-13 RX ADMIN — GABAPENTIN 600 MILLIGRAM(S): 400 CAPSULE ORAL at 12:42

## 2019-03-13 RX ADMIN — Medication 5 MILLIGRAM(S): at 21:53

## 2019-03-13 RX ADMIN — Medication 100 MILLIGRAM(S): at 21:31

## 2019-03-13 RX ADMIN — Medication 100 MILLIGRAM(S): at 12:42

## 2019-03-13 RX ADMIN — Medication 100 MILLIGRAM(S): at 05:06

## 2019-03-13 RX ADMIN — AMLODIPINE BESYLATE 5 MILLIGRAM(S): 2.5 TABLET ORAL at 05:06

## 2019-03-13 RX ADMIN — Medication 650 MILLIGRAM(S): at 20:46

## 2019-03-13 RX ADMIN — OXYCODONE HYDROCHLORIDE 5 MILLIGRAM(S): 5 TABLET ORAL at 05:06

## 2019-03-13 RX ADMIN — OXYCODONE HYDROCHLORIDE 5 MILLIGRAM(S): 5 TABLET ORAL at 05:36

## 2019-03-13 RX ADMIN — Medication 650 MILLIGRAM(S): at 20:16

## 2019-03-13 RX ADMIN — SENNA PLUS 2 TABLET(S): 8.6 TABLET ORAL at 21:31

## 2019-03-13 RX ADMIN — GABAPENTIN 600 MILLIGRAM(S): 400 CAPSULE ORAL at 21:31

## 2019-03-13 RX ADMIN — GABAPENTIN 600 MILLIGRAM(S): 400 CAPSULE ORAL at 05:06

## 2019-03-13 RX ADMIN — ATORVASTATIN CALCIUM 20 MILLIGRAM(S): 80 TABLET, FILM COATED ORAL at 21:31

## 2019-03-13 RX ADMIN — LOSARTAN POTASSIUM 100 MILLIGRAM(S): 100 TABLET, FILM COATED ORAL at 05:06

## 2019-03-13 NOTE — PHYSICAL THERAPY INITIAL EVALUATION ADULT - PRECAUTIONS/LIMITATIONS, REHAB EVAL
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  He was sent from [facility] to [facility] and had angio confirming av dural fistula. 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 Pre op for repeat spinal angiogram on Friday with level marker, possible OR next week for treatment of T5 dural AVF with Dr. Horn & Dr. Willis. 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  He was sent from [facility] to [facility] and had angio confirming av dural fistula. 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 Pre op for repeat spinal angiogram on Friday with level marker, possible OR next week for treatment of T5 dural AVF with Dr. Horn & Dr. Willis./fall precautions

## 2019-03-13 NOTE — PROGRESS NOTE ADULT - SUBJECTIVE AND OBJECTIVE BOX
SUBJECTIVE: Patient seen and examined with wife at bedside. No new complaints today. Denies chest pain, shortness of breath, nausea/vomiting.     Vital Signs Last 24 Hrs  T(C): 37.2 (03-13-19 @ 04:29), Max: 37.2 (03-13-19 @ 04:29)  T(F): 98.9 (03-13-19 @ 04:29), Max: 98.9 (03-13-19 @ 04:29)  HR: 95 (03-13-19 @ 04:29) (69 - 95)  BP: 149/91 (03-13-19 @ 04:29) (116/66 - 162/96)  RR: 18 (03-13-19 @ 04:29) (18 - 18)  SpO2: 100% (03-13-19 @ 04:29) (96% - 100%)    PHYSICAL EXAM:    Constitutional: No Acute Distress, sitting up comfortably in chair    Neurological: Awake, alert, oriented to person, place and time, speech clear and fluent, face equal, tongue midline, briskly following commands, no drift, moves all extremities with 5/5 strength, sensation intact to light touch throughout, pupils 4mm and reactive bilaterally, extraocular movements intact, no nystagmus    Incision: Rt groin site C/D/I     Pulmonary: Clear to Auscultation, No rales, No rhonchi, No wheezes     Cardiovascular: S1, S2, Regular rate and rhythm     Gastrointestinal: Softly distended, Non-tender, +bowel sounds x 4    Extremities: No calf tenderness bilaterally, no cyanosis, clubbing or edema          LABS:                                     13.3   8.7   )-----------( 220      ( 13 Mar 2019 07:06 )             40.3   03-13    137  |  101  |  11  ----------------------------<  154<H>  4.1   |  25  |  0.74    Ca    9.9      13 Mar 2019 07:06            IMAGING:     < from: [facility] Duplex Lower Ext Vein Scan, Indu (03.11.19 @ 10:05) >  EXAM:  DUPLEX SCAN EXT VEINS LOWER BI                            PROCEDURE DATE:  03/11/2019            INTERPRETATION:  CLINICAL INFORMATION: Lower extremity pain and swelling,   rule out DVT    COMPARISON: Prior examination, dated 3/4/2019, showed no DVT.    TECHNIQUE: Duplex sonography of the BILATERAL LOWER extremities with   color and spectral Doppler, with and without compression.      FINDINGS:    There is normal compressibility of the bilateral common femoral, femoral   and popliteal veins. No calf vein thrombosis is detected.    Doppler examination shows normal spontaneous and phasic flow.    IMPRESSION:     No evidence of bilateral lower extremity deep venous thrombosis.    < end of copied text >      MEDICATIONS:  Antibiotics:    Neuro:  acetaminophen   Tablet .. 650 milliGRAM(s) Oral every 6 hours PRN Temp greater or equal to 38.5C (101.3F), Mild Pain (1 - 3)  gabapentin 600 milliGRAM(s) Oral three times a day  melatonin 3 milliGRAM(s) Oral at bedtime PRN Sleep  oxyCODONE    IR 5 milliGRAM(s) Oral every 4 hours PRN Moderate Pain (4 - 6)  oxyCODONE    IR 10 milliGRAM(s) Oral every 4 hours PRN Severe Pain (7 - 10)    Cardiac:  amLODIPine   Tablet 5 milliGRAM(s) Oral daily  losartan 100 milliGRAM(s) Oral daily    Pulm:    GI/:  docusate sodium 100 milliGRAM(s) Oral three times a day  senna 2 Tablet(s) Oral at bedtime    Other:   atorvastatin 20 milliGRAM(s) Oral at bedtime  dextrose 40% Gel 15 Gram(s) Oral once PRN Blood Glucose LESS THAN 70 milliGRAM(s)/deciliter  dextrose 5%. 1000 milliLiter(s) IV Continuous <Continuous>  dextrose 50% Injectable 12.5 Gram(s) IV Push once  dextrose 50% Injectable 25 Gram(s) IV Push once  dextrose 50% Injectable 25 Gram(s) IV Push once  glucagon  Injectable 1 milliGRAM(s) IntraMuscular once PRN Glucose LESS THAN 70 milligrams/deciliter  insulin lispro (HumaLOG) corrective regimen sliding scale   SubCutaneous three times a day before meals  insulin lispro (HumaLOG) corrective regimen sliding scale   SubCutaneous at bedtime  sodium chloride 0.9% with potassium chloride 20 mEq/L 1000 milliLiter(s) IV Continuous <Continuous>        DIET: regular SUBJECTIVE: Patient seen and examined with wife at bedside. No new complaints today. Denies chest pain, shortness of breath, nausea/vomiting.     Vital Signs Last 24 Hrs  T(C): 37.2 (03-13-19 @ 04:29), Max: 37.2 (03-13-19 @ 04:29)  T(F): 98.9 (03-13-19 @ 04:29), Max: 98.9 (03-13-19 @ 04:29)  HR: 95 (03-13-19 @ 04:29) (69 - 95)  BP: 149/91 (03-13-19 @ 04:29) (116/66 - 162/96)  RR: 18 (03-13-19 @ 04:29) (18 - 18)  SpO2: 100% (03-13-19 @ 04:29) (96% - 100%)    PHYSICAL EXAM:    Constitutional: No Acute Distress, sitting up comfortably in chair    Neurological: Awake, alert, oriented to person, place and time, speech clear and fluent, face equal, tongue midline, briskly following commands, no drift, moves all extremities with good strength - LLE 4/5, RLE 4+/5, sensation intact to light touch throughout, pupils 4mm and reactive bilaterally, extraocular movements intact, no nystagmus    Incision: Rt groin site C/D/I     Pulmonary: Clear to Auscultation, No rales, No rhonchi, No wheezes     Cardiovascular: S1, S2, Regular rate and rhythm     Gastrointestinal: Softly distended, Non-tender, +bowel sounds x 4    Extremities: No calf tenderness bilaterally, no cyanosis, clubbing or edema          LABS:                                     13.3   8.7   )-----------( 220      ( 13 Mar 2019 07:06 )             40.3   03-13    137  |  101  |  11  ----------------------------<  154<H>  4.1   |  25  |  0.74    Ca    9.9      13 Mar 2019 07:06            IMAGING:     < from: [facility] Duplex Lower Ext Vein Scan, Bilat (03.11.19 @ 10:05) >  EXAM:  DUPLEX SCAN EXT VEINS LOWER BI                            PROCEDURE DATE:  03/11/2019            INTERPRETATION:  CLINICAL INFORMATION: Lower extremity pain and swelling,   rule out DVT    COMPARISON: Prior examination, dated 3/4/2019, showed no DVT.    TECHNIQUE: Duplex sonography of the BILATERAL LOWER extremities with   color and spectral Doppler, with and without compression.      FINDINGS:    There is normal compressibility of the bilateral common femoral, femoral   and popliteal veins. No calf vein thrombosis is detected.    Doppler examination shows normal spontaneous and phasic flow.    IMPRESSION:     No evidence of bilateral lower extremity deep venous thrombosis.    < end of copied text >      MEDICATIONS:  Antibiotics:    Neuro:  acetaminophen   Tablet .. 650 milliGRAM(s) Oral every 6 hours PRN Temp greater or equal to 38.5C (101.3F), Mild Pain (1 - 3)  gabapentin 600 milliGRAM(s) Oral three times a day  melatonin 3 milliGRAM(s) Oral at bedtime PRN Sleep  oxyCODONE    IR 5 milliGRAM(s) Oral every 4 hours PRN Moderate Pain (4 - 6)  oxyCODONE    IR 10 milliGRAM(s) Oral every 4 hours PRN Severe Pain (7 - 10)    Cardiac:  amLODIPine   Tablet 5 milliGRAM(s) Oral daily  losartan 100 milliGRAM(s) Oral daily    Pulm:    GI/:  docusate sodium 100 milliGRAM(s) Oral three times a day  senna 2 Tablet(s) Oral at bedtime    Other:   atorvastatin 20 milliGRAM(s) Oral at bedtime  dextrose 40% Gel 15 Gram(s) Oral once PRN Blood Glucose LESS THAN 70 milliGRAM(s)/deciliter  dextrose 5%. 1000 milliLiter(s) IV Continuous <Continuous>  dextrose 50% Injectable 12.5 Gram(s) IV Push once  dextrose 50% Injectable 25 Gram(s) IV Push once  dextrose 50% Injectable 25 Gram(s) IV Push once  glucagon  Injectable 1 milliGRAM(s) IntraMuscular once PRN Glucose LESS THAN 70 milligrams/deciliter  insulin lispro (HumaLOG) corrective regimen sliding scale   SubCutaneous three times a day before meals  insulin lispro (HumaLOG) corrective regimen sliding scale   SubCutaneous at bedtime  sodium chloride 0.9% with potassium chloride 20 mEq/L 1000 milliLiter(s) IV Continuous <Continuous>        DIET: regular

## 2019-03-13 NOTE — CONSULT NOTE ADULT - SUBJECTIVE AND OBJECTIVE BOX
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  Since sept 2018, he has had a first numnbness of his toes and progressive weakness in both legs.  Most problematic getting up from chair.  In feb 2019, his knee gave out and he fell.  He was having pain as well, at times burning. He ws seen by Dr. Vivas a neurologist, had MRI brain with nonspecific changes.  Told had stroke and placed on plavix.  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  He was sent from [facility] to [facility] and had angio confirming av dural fistula.     pt says weakness no worse in legs, no new sx in arms, no changes in speech, swallow, vision, bowel or bladder control.     Past Medical History:  HLD (hyperlipidemia) (E78.5)  Essential hypertension (I10)  DM (diabetes mellitus) (E11.9)      Past Surgical History:      Social History:  No toxic habits    Family History:  FAMILY HISTORY:      Allergies:  No Known Allergies      ROS:  Constitutional: Patient offers no complaints of fevers or significant weight loss  Ears, Nose, Mouth and Throat: The patient presents with no abnormalities of the head, ears, eyes, nose or throat  Skin: Patient offers no concerns of new rashes or lesions  Respiratory: The patient presents with no abnormalities of the respiratory tract  Cardiovascular: The patient presents with no cardiac abnormalities  Gastrointestinal: The patient presents with no abnormalities of the GI system  Genitourinary: The patient presents with no dysuria, hematuria or frequent urination  Neurological: See HPI  Endocrine: Patient offers no complaints of excessive thirst, urination, or heat/cold intolerance    Advanced care planning reviewed and noted in the chart.    Medications:  acetaminophen   Tablet .. 650 milliGRAM(s) Oral every 6 hours PRN  amLODIPine   Tablet 5 milliGRAM(s) Oral daily  atorvastatin 20 milliGRAM(s) Oral at bedtime  dextrose 40% Gel 15 Gram(s) Oral once PRN  dextrose 5%. 1000 milliLiter(s) IV Continuous <Continuous>  dextrose 50% Injectable 12.5 Gram(s) IV Push once  dextrose 50% Injectable 25 Gram(s) IV Push once  dextrose 50% Injectable 25 Gram(s) IV Push once  docusate sodium 100 milliGRAM(s) Oral three times a day  gabapentin 600 milliGRAM(s) Oral three times a day  glucagon  Injectable 1 milliGRAM(s) IntraMuscular once PRN  insulin lispro (HumaLOG) corrective regimen sliding scale   SubCutaneous three times a day before meals  insulin lispro (HumaLOG) corrective regimen sliding scale   SubCutaneous at bedtime  losartan 100 milliGRAM(s) Oral daily  melatonin 3 milliGRAM(s) Oral at bedtime PRN  oxyCODONE    IR 5 milliGRAM(s) Oral every 4 hours PRN  oxyCODONE    IR 10 milliGRAM(s) Oral every 4 hours PRN  senna 2 Tablet(s) Oral at bedtime  sodium chloride 0.9% with potassium chloride 20 mEq/L 1000 milliLiter(s) IV Continuous <Continuous>      Labs:  CBC Full  -  ( 13 Mar 2019 07:06 )  WBC Count : 8.7 K/uL  Hemoglobin : 13.3 g/dL  Hematocrit : 40.3 %  Platelet Count - Automated : 220 K/uL  Mean Cell Volume : 83.8 fl  Mean Cell Hemoglobin : 27.6 pg  Mean Cell Hemoglobin Concentration : 33.0 gm/dL  Auto Neutrophil # : x  Auto Lymphocyte # : x  Auto Monocyte # : x  Auto Eosinophil # : x  Auto Basophil # : x  Auto Neutrophil % : x  Auto Lymphocyte % : x  Auto Monocyte % : x  Auto Eosinophil % : x  Auto Basophil % : x    03-13    137  |  101  |  11  ----------------------------<  154<H>  4.1   |  25  |  0.74    Ca    9.9      13 Mar 2019 07:06      CAPILLARY BLOOD GLUCOSE      POCT Blood Glucose.: 233 mg/dL (12 Mar 2019 21:17)  POCT Blood Glucose.: 144 mg/dL (12 Mar 2019 17:40)  POCT Blood Glucose.: 195 mg/dL (12 Mar 2019 12:38)  POCT Blood Glucose.: 125 mg/dL (12 Mar 2019 08:39)          Male    Vitals:  Vital Signs Last 24 Hrs  T(C): 36.9 (13 Mar 2019 08:07), Max: 37.2 (13 Mar 2019 04:29)  T(F): 98.5 (13 Mar 2019 08:07), Max: 98.9 (13 Mar 2019 04:29)  HR: 85 (13 Mar 2019 08:07) (69 - 95)  BP: 129/86 (13 Mar 2019 08:07) (116/66 - 162/96)  BP(mean): --  RR: 18 (13 Mar 2019 08:07) (18 - 18)  SpO2: 98% (13 Mar 2019 08:07) (96% - 100%)    NEUROLOGICAL EXAM:    Mental status: Awake, alert, and in no apparent distress. Oriented to person, place and time. Language function is normal. Recent memory, digit span and concentration were normal.     Cranial Nerves: Pupils were equal, round, reactive to light. Extraocular movements were intact. Visual field were full. Fundoscopic exam was deferred. Facial sensation was intact to light touch. There was no facial asymmetry. The palate was upgoing symmetrically and tongue was midline. Hearing acuity was intact to finger rub AU. Shoulder shrug was full bilaterally    Motor exam: Bulk and tone were normal. Strength was 5/5 in arms, 4/5 in legs in knee ext, hip flexion, but dorsi is 5/5.   Fine finger movements were symmetric and normal. There was no pronator drift    Reflexes: 2+ in the bilateral upper extremities. 2+ in the bilateral lower extremities. Toes were downgoing bilaterally.     Sensation: Intact to light touch, temperature, vibration and proprioception.     Coordination: Finger-nose-finger and heel-to-shin was without dysmetria.     Gait: cannot stand with arms folded in front of him.   < from: MR Angio Spinal Canal w/wo IV Cont (03.08.19 @ 21:57) >    EXAM:  MR ANGIO SPINAL CANAL WAW IC        PROCEDURE DATE:  Mar  8 2019         INTERPRETATION:  Clinical information: Evaluate for spinal AV fistula.    Technique: Contrast-enhanced MR angiography of the spine was performed.   7.5 cc's of IV Gadavist was administered without immediate complication   and 0 cc's was discarded.    Comparison: Prior contrast-enhanced MRI examinations of the thoracic and   lumbar spine dated 3/6/2018. Prior noncontrast MRI examinations of the   cervical, thoracic, and lumbar spine from 3/5/2019.    Findings: There is redemonstration of edema signal, swelling, and patchy   enhancement involving the mid lower thoracic spinal cord extending to the   conus medullaris. There is also thickening of the cauda equina nerve   roots and abnormal enhancement. On T2-weighted imaging, intradural extra   medullary serpiginous vessels are again noted within the thecal sac   surrounding the conus and extending craniad into the thoracic canal.    On the MR angiography portion of the examination, there are questionable   segmental radicular arterial feeders at the level of of L1 draining into   radiculomedullary veins. Serpiginous venous vessels abutting the conus   medullaris and thoracic cord are noted extending into the thoracic canal.    The urinary bladder is again noted to be distended on the  localizer   images.    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 Questionable arterial   feeders at the level of L1, as discussed.    Further evaluation with a conventional spinal angiography examination is   recommended.    The possibility of a spinal cord tumor with drop metastases, lymphoma, or   inflammatory process such as neurosarcoidosis or infection such as TB   cannot be completely excluded. Correlate with results of CSF testing from   the prior lumbar puncture procedure already performed on 3/7/2019.                        GENARO LORA M.D., ATTENDING RADIOLOGIST  This document has been electronically signed. Mar  9 2019  1:59PM                  < end of copied text >  < from: MR Lumbar Spine w/ IV Cont (03.07.19 @ 14:08) >    EXAM:  MR SPINE LUMBAR IC      EXAM:  MR SPINE THORACIC IC        PROCEDURE DATE:  Mar  7 2019         INTERPRETATION:  History: Weakness. Abnormal spinal cord on noncontrast   MRI. Left lower extremity weakness.    Description: A postcontrast MRI of the thoracic and lumbar spine was   performed.    Comparison is made to the noncontrast spine MRI from 03/05/2019.    Sagittal and axial T1 postcontrast weighted series were performed.    7.5 cc intravenous Gadavist gadolinium contrast administered, 0 cc   contrast was discarded.    Edema and swelling is again noted involving the mid-lower thoracic spinal   cord, extending to the conus. Thickening of the nerve roots of the cauda   equina is present.    Patchy and nodular enhancement is noted involving the mid-lower thoracic   spinal cord. Abnormal enhancement also involves the thickened nerve roots   of the cauda equina. Linear enhancing vessels are noted on the surface of   the cord.    No abnormal enhancement is noted involving the vertebral bodies.    The urinary bladder is distended, partially visualized suggesting   neurogenic bladder given the spinal cord and cauda equina involvement.    IMPRESSION:    Abnormal enhancement involves the mid-lower thoracic spinal, and also   involves the nerve roots of the cauda equina. A spinal cord tumor with   drop metastases, lymphoma, an inflammatory process (such as sarcoidosis),   or an atypical infection (such as tuberculosis) are in the differential   diagnosis. Some prominent vasculature is noted also raising the   possibility of a dural AV fistula. Consider correlation with lumbar   puncture and spine MRA if clinically warranted.    The urinary bladder is distended, partially visualized suggesting   neurogenic bladder given the spinal cord and cauda equina involvement.                  HALLE LOTT M.D., ATTENDING RADIOLOGIST  This document has been electronically signed. Mar  7 2019  2:37PM                  < end of copied text >

## 2019-03-13 NOTE — PROGRESS NOTE ADULT - ASSESSMENT
HPI: 57 YO male developed lower back pain following a fall approximately 1 month ago. 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 Patient c/o lower back pain, decreased sensation in both legs, left leg weakness and difficulty ambulating unassisted due to weakness. 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 Horn & Dr. Willis  -Continue Neurontin 600 TID for neuropathy  -Continue Oxy IR PRN for pain control  -Continue Losartan & Norvasc for hx of HTN  -Continue Lipitor for hx of HLD  -Continue colace, senna for bowel regimen  -Continue HISS for history of T2DM  -Encouraged mobilization, OOB to chair   -Encouraged incentive spirometer  -DVT prophylaxis: bilateral venodynes; no chemoprophylaxis for untreated AVF and possible pre op status  -Dispo: TBD  -Will discuss with Dr. Horn    [facility] # [id number] HPI: 59 YO male developed lower back pain following a fall approximately 1 month ago. 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 Patient c/o lower back pain, decreased sensation in both legs, left leg weakness and difficulty ambulating unassisted due to weakness. 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 Horn & Dr. Willis  -Maintain IVF hydration with NS+K@75 CC / HR  -Continue Neurontin 600 TID for neuropathy  -Continue Oxy IR PRN for pain control  -Continue Losartan & Norvasc for hx of HTN  -Continue Lipitor for hx of HLD  -Continue colace, senna for bowel regimen  -Continue HISS for history of T2DM  -Encouraged mobilization, OOB to chair   -Encouraged incentive spirometer  -DVT prophylaxis: bilateral venodynes; no chemoprophylaxis for untreated AVF and possible pre op status  -Dispo: TBD  -Will discuss with Dr. Horn    [facility] # [id number]

## 2019-03-14 LAB
ANION GAP SERPL CALC-SCNC: 13 MMOL/L — SIGNIFICANT CHANGE UP (ref 5–17)
APTT BLD: 30.2 SEC — SIGNIFICANT CHANGE UP (ref 27.5–36.3)
BLD GP AB SCN SERPL QL: NEGATIVE — SIGNIFICANT CHANGE UP
BUN SERPL-MCNC: 13 MG/DL — SIGNIFICANT CHANGE UP (ref 7–23)
CALCIUM SERPL-MCNC: 9.9 MG/DL — SIGNIFICANT CHANGE UP (ref 8.4–10.5)
CHLORIDE SERPL-SCNC: 99 MMOL/L — SIGNIFICANT CHANGE UP (ref 96–108)
CO2 SERPL-SCNC: 25 MMOL/L — SIGNIFICANT CHANGE UP (ref 22–31)
CREAT SERPL-MCNC: 0.72 MG/DL — SIGNIFICANT CHANGE UP (ref 0.5–1.3)
GLUCOSE BLDC GLUCOMTR-MCNC: 121 MG/DL — HIGH (ref 70–99)
GLUCOSE BLDC GLUCOMTR-MCNC: 129 MG/DL — HIGH (ref 70–99)
GLUCOSE BLDC GLUCOMTR-MCNC: 133 MG/DL — HIGH (ref 70–99)
GLUCOSE BLDC GLUCOMTR-MCNC: 157 MG/DL — HIGH (ref 70–99)
GLUCOSE SERPL-MCNC: 182 MG/DL — HIGH (ref 70–99)
HCT VFR BLD CALC: 37.7 % — LOW (ref 39–50)
HGB BLD-MCNC: 12 G/DL — LOW (ref 13–17)
INR BLD: 1.25 RATIO — HIGH (ref 0.88–1.16)
MCHC RBC-ENTMCNC: 26.7 PG — LOW (ref 27–34)
MCHC RBC-ENTMCNC: 31.8 GM/DL — LOW (ref 32–36)
MCV RBC AUTO: 84 FL — SIGNIFICANT CHANGE UP (ref 80–100)
PLATELET # BLD AUTO: 248 K/UL — SIGNIFICANT CHANGE UP (ref 150–400)
POTASSIUM SERPL-MCNC: 3.8 MMOL/L — SIGNIFICANT CHANGE UP (ref 3.5–5.3)
POTASSIUM SERPL-SCNC: 3.8 MMOL/L — SIGNIFICANT CHANGE UP (ref 3.5–5.3)
PROTHROM AB SERPL-ACNC: 14.4 SEC — HIGH (ref 10–13.1)
RBC # BLD: 4.49 M/UL — SIGNIFICANT CHANGE UP (ref 4.2–5.8)
RBC # FLD: 15.4 % — HIGH (ref 10.3–14.5)
RH IG SCN BLD-IMP: NEGATIVE — SIGNIFICANT CHANGE UP
SODIUM SERPL-SCNC: 137 MMOL/L — SIGNIFICANT CHANGE UP (ref 135–145)
WBC # BLD: 7.83 K/UL — SIGNIFICANT CHANGE UP (ref 3.8–10.5)
WBC # FLD AUTO: 7.83 K/UL — SIGNIFICANT CHANGE UP (ref 3.8–10.5)

## 2019-03-14 PROCEDURE — 99232 SBSQ HOSP IP/OBS MODERATE 35: CPT

## 2019-03-14 PROCEDURE — 93306 TTE W/DOPPLER COMPLETE: CPT | Mod: 26

## 2019-03-14 RX ADMIN — AMLODIPINE BESYLATE 5 MILLIGRAM(S): 2.5 TABLET ORAL at 05:19

## 2019-03-14 RX ADMIN — GABAPENTIN 600 MILLIGRAM(S): 400 CAPSULE ORAL at 21:11

## 2019-03-14 RX ADMIN — GABAPENTIN 600 MILLIGRAM(S): 400 CAPSULE ORAL at 13:26

## 2019-03-14 RX ADMIN — Medication 650 MILLIGRAM(S): at 21:10

## 2019-03-14 RX ADMIN — SENNA PLUS 2 TABLET(S): 8.6 TABLET ORAL at 21:10

## 2019-03-14 RX ADMIN — Medication 100 MILLIGRAM(S): at 05:19

## 2019-03-14 RX ADMIN — ATORVASTATIN CALCIUM 20 MILLIGRAM(S): 80 TABLET, FILM COATED ORAL at 21:11

## 2019-03-14 RX ADMIN — OXYCODONE HYDROCHLORIDE 5 MILLIGRAM(S): 5 TABLET ORAL at 03:22

## 2019-03-14 RX ADMIN — LOSARTAN POTASSIUM 100 MILLIGRAM(S): 100 TABLET, FILM COATED ORAL at 05:19

## 2019-03-14 RX ADMIN — Medication 100 MILLIGRAM(S): at 13:26

## 2019-03-14 RX ADMIN — Medication 1: at 17:40

## 2019-03-14 RX ADMIN — Medication 100 MILLIGRAM(S): at 21:11

## 2019-03-14 RX ADMIN — Medication 650 MILLIGRAM(S): at 21:40

## 2019-03-14 RX ADMIN — OXYCODONE HYDROCHLORIDE 5 MILLIGRAM(S): 5 TABLET ORAL at 02:52

## 2019-03-14 RX ADMIN — GABAPENTIN 600 MILLIGRAM(S): 400 CAPSULE ORAL at 05:19

## 2019-03-14 NOTE — PROGRESS NOTE ADULT - SUBJECTIVE AND OBJECTIVE BOX
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  Since sept 2018, he has had a first numnbness of his toes and progressive weakness in both legs.  Most problematic getting up from chair.  In feb 2019, his knee gave out and he fell.  He was having pain as well, at times burning. He ws seen by Dr. Vivas a neurologist, had MRI brain with nonspecific changes.  Told had stroke and placed on plavix.  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  He was sent from [facility] to [facility] and had angio confirming av dural fistula.     pt says weakness no worse in legs, no new sx in arms, no changes in speech, swallow, vision, bowel or bladder control.     no new issues    Past Medical History:  HLD (hyperlipidemia) (E78.5)  Essential hypertension (I10)  DM (diabetes mellitus) (E11.9)      Past Surgical History:      Social History:  No toxic habits    Family History:  FAMILY HISTORY:      Allergies:  No Known Allergies      ROS:  Constitutional: Patient offers no complaints of fevers or significant weight loss  Ears, Nose, Mouth and Throat: The patient presents with no abnormalities of the head, ears, eyes, nose or throat  Skin: Patient offers no concerns of new rashes or lesions  Respiratory: The patient presents with no abnormalities of the respiratory tract  Cardiovascular: The patient presents with no cardiac abnormalities  Gastrointestinal: The patient presents with no abnormalities of the GI system  Genitourinary: The patient presents with no dysuria, hematuria or frequent urination  Neurological: See HPI  Endocrine: Patient offers no complaints of excessive thirst, urination, or heat/cold intolerance    Advanced care planning reviewed and noted in the chart.    Medications:  acetaminophen   Tablet .. 650 milliGRAM(s) Oral every 6 hours PRN  amLODIPine   Tablet 5 milliGRAM(s) Oral daily  atorvastatin 20 milliGRAM(s) Oral at bedtime  dextrose 40% Gel 15 Gram(s) Oral once PRN  dextrose 5%. 1000 milliLiter(s) IV Continuous <Continuous>  dextrose 50% Injectable 12.5 Gram(s) IV Push once  dextrose 50% Injectable 25 Gram(s) IV Push once  dextrose 50% Injectable 25 Gram(s) IV Push once  docusate sodium 100 milliGRAM(s) Oral three times a day  gabapentin 600 milliGRAM(s) Oral three times a day  glucagon  Injectable 1 milliGRAM(s) IntraMuscular once PRN  insulin lispro (HumaLOG) corrective regimen sliding scale   SubCutaneous three times a day before meals  insulin lispro (HumaLOG) corrective regimen sliding scale   SubCutaneous at bedtime  losartan 100 milliGRAM(s) Oral daily  melatonin 3 milliGRAM(s) Oral at bedtime PRN  oxyCODONE    IR 5 milliGRAM(s) Oral every 4 hours PRN  oxyCODONE    IR 10 milliGRAM(s) Oral every 4 hours PRN  senna 2 Tablet(s) Oral at bedtime  sodium chloride 0.9% with potassium chloride 20 mEq/L 1000 milliLiter(s) IV Continuous <Continuous>      Labs:  CBC Full  -  ( 13 Mar 2019 07:06 )  WBC Count : 8.7 K/uL  Hemoglobin : 13.3 g/dL  Hematocrit : 40.3 %  Platelet Count - Automated : 220 K/uL  Mean Cell Volume : 83.8 fl  Mean Cell Hemoglobin : 27.6 pg  Mean Cell Hemoglobin Concentration : 33.0 gm/dL  Auto Neutrophil # : x  Auto Lymphocyte # : x  Auto Monocyte # : x  Auto Eosinophil # : x  Auto Basophil # : x  Auto Neutrophil % : x  Auto Lymphocyte % : x  Auto Monocyte % : x  Auto Eosinophil % : x  Auto Basophil % : x    03-13    137  |  101  |  11  ----------------------------<  154<H>  4.1   |  25  |  0.74    Ca    9.9      13 Mar 2019 07:06      CAPILLARY BLOOD GLUCOSE      POCT Blood Glucose.: 133 mg/dL (14 Mar 2019 08:26)  POCT Blood Glucose.: 170 mg/dL (13 Mar 2019 21:24)  POCT Blood Glucose.: 136 mg/dL (13 Mar 2019 17:16)  POCT Blood Glucose.: 137 mg/dL (13 Mar 2019 12:27)              Vitals:  Vital Signs Last 24 Hrs  T(C): 36.9 (14 Mar 2019 08:06), Max: 36.9 (14 Mar 2019 04:40)  T(F): 98.4 (14 Mar 2019 08:06), Max: 98.5 (14 Mar 2019 04:40)  HR: 68 (14 Mar 2019 08:06) (68 - 91)  BP: 152/73 (14 Mar 2019 08:06) (115/68 - 152/73)  BP(mean): --  RR: 18 (14 Mar 2019 08:06) (18 - 18)  SpO2: 99% (14 Mar 2019 08:06) (97% - 99%)    NEUROLOGICAL EXAM:    Mental status: Awake, alert, and in no apparent distress. Oriented to person, place and time. Language function is normal. Recent memory, digit span and concentration were normal.     Cranial Nerves: Pupils were equal, round, reactive to light. Extraocular movements were intact. Visual field were full. Fundoscopic exam was deferred. Facial sensation was intact to light touch. There was no facial asymmetry. The palate was upgoing symmetrically and tongue was midline. Hearing acuity was intact to finger rub AU. Shoulder shrug was full bilaterally    Motor exam: Bulk and tone were normal. Strength was 5/5 in arms, 4/5 in legs in knee ext, hip flexion on left right is 4+, but dorsi is 5/5.   Fine finger movements were symmetric and normal. There was no pronator drift    Reflexes: 2+ in the bilateral upper extremities. 2+ in the bilateral lower extremities. Toes were downgoing bilaterally.     Sensation: Intact to light touch, temperature, vibration and proprioception.     Coordination: Finger-nose-finger and heel-to-shin was without dysmetria.     Gait: cannot stand with arms folded in front of him.     < from: MR Angio Spinal Canal w/wo IV Cont (03.08.19 @ 21:57) >    EXAM:  MR ANGIO SPINAL CANAL WAW IC        PROCEDURE DATE:  Mar  8 2019         INTERPRETATION:  Clinical information: Evaluate for spinal AV fistula.    Technique: Contrast-enhanced MR angiography of the spine was performed.   7.5 cc's of IV Gadavist was administered without immediate complication   and 0 cc's was discarded.    Comparison: Prior contrast-enhanced MRI examinations of the thoracic and   lumbar spine dated 3/6/2018. Prior noncontrast MRI examinations of the   cervical, thoracic, and lumbar spine from 3/5/2019.    Findings: There is redemonstration of edema signal, swelling, and patchy   enhancement involving the mid lower thoracic spinal cord extending to the   conus medullaris. There is also thickening of the cauda equina nerve   roots and abnormal enhancement. On T2-weighted imaging, intradural extra   medullary serpiginous vessels are again noted within the thecal sac   surrounding the conus and extending craniad into the thoracic canal.    On the MR angiography portion of the examination, there are questionable   segmental radicular arterial feeders at the level of of L1 draining into   radiculomedullary veins. Serpiginous venous vessels abutting the conus   medullaris and thoracic cord are noted extending into the thoracic canal.    The urinary bladder is again noted to be distended on the  localizer   images.    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 Questionable arterial   feeders at the level of L1, as discussed.    Further evaluation with a conventional spinal angiography examination is   recommended.    The possibility of a spinal cord tumor with drop metastases, lymphoma, or   inflammatory process such as neurosarcoidosis or infection such as TB   cannot be completely excluded. Correlate with results of CSF testing from   the prior lumbar puncture procedure already performed on 3/7/2019.                        GENARO LOAR M.D., ATTENDING RADIOLOGIST  This document has been electronically signed. Mar  9 2019  1:59PM                  < end of copied text >  < from: MR Lumbar Spine w/ IV Cont (03.07.19 @ 14:08) >    EXAM:  MR SPINE LUMBAR IC      EXAM:  MR SPINE THORACIC IC        PROCEDURE DATE:  Mar  7 2019         INTERPRETATION:  History: Weakness. Abnormal spinal cord on noncontrast   MRI. Left lower extremity weakness.    Description: A postcontrast MRI of the thoracic and lumbar spine was   performed.    Comparison is made to the noncontrast spine MRI from 03/05/2019.    Sagittal and axial T1 postcontrast weighted series were performed.    7.5 cc intravenous Gadavist gadolinium contrast administered, 0 cc   contrast was discarded.    Edema and swelling is again noted involving the mid-lower thoracic spinal   cord, extending to the conus. Thickening of the nerve roots of the cauda   equina is present.    Patchy and nodular enhancement is noted involving the mid-lower thoracic   spinal cord. Abnormal enhancement also involves the thickened nerve roots   of the cauda equina. Linear enhancing vessels are noted on the surface of   the cord.    No abnormal enhancement is noted involving the vertebral bodies.    The urinary bladder is distended, partially visualized suggesting   neurogenic bladder given the spinal cord and cauda equina involvement.    IMPRESSION:    Abnormal enhancement involves the mid-lower thoracic spinal, and also   involves the nerve roots of the cauda equina. A spinal cord tumor with   drop metastases, lymphoma, an inflammatory process (such as sarcoidosis),   or an atypical infection (such as tuberculosis) are in the differential   diagnosis. Some prominent vasculature is noted also raising the   possibility of a dural AV fistula. Consider correlation with lumbar   puncture and spine MRA if clinically warranted.    The urinary bladder is distended, partially visualized suggesting   neurogenic bladder given the spinal cord and cauda equina involvement.                  HALLE LOTT M.D., ATTENDING RADIOLOGIST  This document has been electronically signed. Mar  7 2019  2:37PM                  < end of copied text >

## 2019-03-14 NOTE — PROGRESS NOTE ADULT - SUBJECTIVE AND OBJECTIVE BOX
SUBJECTIVE: Patient seen and examined with wife at bedside. No new complaints today. Denies chest pain, shortness of breath, nausea/vomiting.     Vital Signs Last 24 Hrs  T(C): 36.9 (03-14-19 @ 08:06), Max: 36.9 (03-14-19 @ 04:40)  T(F): 98.4 (03-14-19 @ 08:06), Max: 98.5 (03-14-19 @ 04:40)  HR: 68 (03-14-19 @ 08:06) (68 - 91)  BP: 152/73 (03-14-19 @ 08:06) (115/68 - 152/73)  RR: 18 (03-14-19 @ 08:06) (18 - 18)  SpO2: 99% (03-14-19 @ 08:06) (97% - 99%)    PHYSICAL EXAM:    Constitutional: No Acute Distress, sitting up comfortably in chair    Neurological: Awake, alert, oriented to person, place and time, speech clear and fluent, face equal, tongue midline, briskly following commands, no drift, moves all extremities with 5/5 strength, sensation intact to light touch throughout, pupils 4mm and reactive bilaterally, extraocular movements intact, no nystagmus    Incision: Rt groin site C/D/I     Pulmonary: Clear to Auscultation, No rales, No rhonchi, No wheezes     Cardiovascular: S1, S2, Regular rate and rhythm     Gastrointestinal: Softly distended, Non-tender, +bowel sounds x 4    Extremities: No calf tenderness bilaterally, no cyanosis, clubbing or edema          LABS:                                     13.3   8.7   )-----------( 220      ( 13 Mar 2019 07:06 )             40.3   03-13    137  |  101  |  11  ----------------------------<  154<H>  4.1   |  25  |  0.74    Ca    9.9      13 Mar 2019 07:06            IMAGING:     < from: VA Duplex Lower Ext Vein Scan, Indu (03.11.19 @ 10:05) >  EXAM:  DUPLEX SCAN EXT VEINS LOWER BI                            PROCEDURE DATE:  03/11/2019            INTERPRETATION:  CLINICAL INFORMATION: Lower extremity pain and swelling,   rule out DVT    COMPARISON: Prior examination, dated 3/4/2019, showed no DVT.    TECHNIQUE: Duplex sonography of the BILATERAL LOWER extremities with   color and spectral Doppler, with and without compression.      FINDINGS:    There is normal compressibility of the bilateral common femoral, femoral   and popliteal veins. No calf vein thrombosis is detected.    Doppler examination shows normal spontaneous and phasic flow.    IMPRESSION:     No evidence of bilateral lower extremity deep venous thrombosis.    < end of copied text >      MEDICATIONS  (STANDING):  amLODIPine   Tablet 5 milliGRAM(s) Oral daily  atorvastatin 20 milliGRAM(s) Oral at bedtime  dextrose 5%. 1000 milliLiter(s) (50 mL/Hr) IV Continuous <Continuous>  dextrose 50% Injectable 12.5 Gram(s) IV Push once  dextrose 50% Injectable 25 Gram(s) IV Push once  dextrose 50% Injectable 25 Gram(s) IV Push once  docusate sodium 100 milliGRAM(s) Oral three times a day  gabapentin 600 milliGRAM(s) Oral three times a day  insulin lispro (HumaLOG) corrective regimen sliding scale   SubCutaneous three times a day before meals  insulin lispro (HumaLOG) corrective regimen sliding scale   SubCutaneous at bedtime  losartan 100 milliGRAM(s) Oral daily  senna 2 Tablet(s) Oral at bedtime  sodium chloride 0.9% with potassium chloride 20 mEq/L 1000 milliLiter(s) (75 mL/Hr) IV Continuous <Continuous>    MEDICATIONS  (PRN):  acetaminophen   Tablet .. 650 milliGRAM(s) Oral every 6 hours PRN Temp greater or equal to 38.5C (101.3F), Mild Pain (1 - 3)  dextrose 40% Gel 15 Gram(s) Oral once PRN Blood Glucose LESS THAN 70 milliGRAM(s)/deciliter  glucagon  Injectable 1 milliGRAM(s) IntraMuscular once PRN Glucose LESS THAN 70 milligrams/deciliter  melatonin 3 milliGRAM(s) Oral at bedtime PRN Sleep  oxyCODONE    IR 5 milliGRAM(s) Oral every 4 hours PRN Moderate Pain (4 - 6)  oxyCODONE    IR 10 milliGRAM(s) Oral every 4 hours PRN Severe Pain (7 - 10)          DIET: regular SUBJECTIVE: Patient seen and examined with wife at bedside. No new complaints today. Denies chest pain, shortness of breath, nausea/vomiting.     Vital Signs Last 24 Hrs  T(C): 36.9 (03-14-19 @ 08:06), Max: 36.9 (03-14-19 @ 04:40)  T(F): 98.4 (03-14-19 @ 08:06), Max: 98.5 (03-14-19 @ 04:40)  HR: 68 (03-14-19 @ 08:06) (68 - 91)  BP: 152/73 (03-14-19 @ 08:06) (115/68 - 152/73)  RR: 18 (03-14-19 @ 08:06) (18 - 18)  SpO2: 99% (03-14-19 @ 08:06) (97% - 99%)    PHYSICAL EXAM:    Constitutional: No Acute Distress, sitting up comfortably in chair    Neurological: Awake, alert, oriented to person, place and time, speech clear and fluent, face equal, tongue midline, briskly following commands, no drift, moves all extremities with good strength - LLE 4/5, RLE 4+/5, sensation intact to light touch throughout, pupils 4mm and reactive bilaterally, extraocular movements intact, no nystagmus    Incision: Rt groin site C/D/I     Pulmonary: Clear to Auscultation, No rales, No rhonchi, No wheezes     Cardiovascular: S1, S2, Regular rate and rhythm     Gastrointestinal: Softly distended, Non-tender, +bowel sounds x 4    Extremities: No calf tenderness bilaterally, no cyanosis, clubbing or edema          LABS:                                     13.3   8.7   )-----------( 220      ( 13 Mar 2019 07:06 )             40.3   03-13    137  |  101  |  11  ----------------------------<  154<H>  4.1   |  25  |  0.74    Ca    9.9      13 Mar 2019 07:06            IMAGING:     < from: VA Duplex Lower Ext Vein Scan, Bilat (03.11.19 @ 10:05) >  EXAM:  DUPLEX SCAN EXT VEINS LOWER BI                            PROCEDURE DATE:  03/11/2019            INTERPRETATION:  CLINICAL INFORMATION: Lower extremity pain and swelling,   rule out DVT    COMPARISON: Prior examination, dated 3/4/2019, showed no DVT.    TECHNIQUE: Duplex sonography of the BILATERAL LOWER extremities with   color and spectral Doppler, with and without compression.      FINDINGS:    There is normal compressibility of the bilateral common femoral, femoral   and popliteal veins. No calf vein thrombosis is detected.    Doppler examination shows normal spontaneous and phasic flow.    IMPRESSION:     No evidence of bilateral lower extremity deep venous thrombosis.    < end of copied text >      MEDICATIONS  (STANDING):  amLODIPine   Tablet 5 milliGRAM(s) Oral daily  atorvastatin 20 milliGRAM(s) Oral at bedtime  dextrose 5%. 1000 milliLiter(s) (50 mL/Hr) IV Continuous <Continuous>  dextrose 50% Injectable 12.5 Gram(s) IV Push once  dextrose 50% Injectable 25 Gram(s) IV Push once  dextrose 50% Injectable 25 Gram(s) IV Push once  docusate sodium 100 milliGRAM(s) Oral three times a day  gabapentin 600 milliGRAM(s) Oral three times a day  insulin lispro (HumaLOG) corrective regimen sliding scale   SubCutaneous three times a day before meals  insulin lispro (HumaLOG) corrective regimen sliding scale   SubCutaneous at bedtime  losartan 100 milliGRAM(s) Oral daily  senna 2 Tablet(s) Oral at bedtime  sodium chloride 0.9% with potassium chloride 20 mEq/L 1000 milliLiter(s) (75 mL/Hr) IV Continuous <Continuous>    MEDICATIONS  (PRN):  acetaminophen   Tablet .. 650 milliGRAM(s) Oral every 6 hours PRN Temp greater or equal to 38.5C (101.3F), Mild Pain (1 - 3)  dextrose 40% Gel 15 Gram(s) Oral once PRN Blood Glucose LESS THAN 70 milliGRAM(s)/deciliter  glucagon  Injectable 1 milliGRAM(s) IntraMuscular once PRN Glucose LESS THAN 70 milligrams/deciliter  melatonin 3 milliGRAM(s) Oral at bedtime PRN Sleep  oxyCODONE    IR 5 milliGRAM(s) Oral every 4 hours PRN Moderate Pain (4 - 6)  oxyCODONE    IR 10 milliGRAM(s) Oral every 4 hours PRN Severe Pain (7 - 10)          DIET: regular

## 2019-03-14 NOTE — PROGRESS NOTE ADULT - ASSESSMENT
HPI: 57 YO male developed lower back pain following a fall approximately 1 month ago. 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 Patient c/o lower back pain, decreased sensation in both legs, left leg weakness and difficulty ambulating unassisted due to weakness. 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 Horn & Dr. Willis  -Maintain IVF hydration with NS+K@75 CC / HR  -Continue Neurontin 600 TID for neuropathy  -Continue Oxy IR PRN for pain control  -Continue Losartan & Norvasc for hx of HTN  -Continue Lipitor for hx of HLD  -Continue colace, senna for bowel regimen  -Continue HISS for history of T2DM  -Encouraged mobilization, OOB to chair   -Encouraged incentive spirometer  -DVT prophylaxis: bilateral venodynes; no chemoprophylaxis for untreated AVF and possible pre op status  -Dispo: TBD  -Will discuss with Dr. Horn    [facility] # [id number]

## 2019-03-14 NOTE — PROGRESS NOTE ADULT - ASSESSMENT
This is a 58y year old Male with the below past medical history who presents with the chief complaint of leg weakness.  Since sept 2018, he has had a first numnbness of his toes and progressive weakness in both legs.  Most problematic getting up from chair.  In feb 2019, his knee gave out and he fell.  He was having pain as well, at times burning. He ws seen by Dr. Vivas a neurologist, had MRI brain with nonspecific changes.  Told had stroke and placed on plavix.  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  He was sent from [facility] to [facility] and had angio confirming av dural fistula.     pt says weakness no worse in legs, no new sx in arms, no changes in speech, swallow, vision, bowel or bladder control.     Extensive workup noted above    plan is to treat for dural avf with dr sauceda and charissa elkinstler  is to have next staged angiogram tommorrow with presumed surgery next week    d/w NS PA, needs medicine consult, with PA permission, have called dr base to see    reviewed with pt in detail

## 2019-03-15 ENCOUNTER — APPOINTMENT (OUTPATIENT)
Dept: NEUROSURGERY | Facility: HOSPITAL | Age: 59
End: 2019-03-15
Payer: COMMERCIAL

## 2019-03-15 LAB
GLUCOSE BLDC GLUCOMTR-MCNC: 113 MG/DL — HIGH (ref 70–99)
GLUCOSE BLDC GLUCOMTR-MCNC: 124 MG/DL — HIGH (ref 70–99)
GLUCOSE BLDC GLUCOMTR-MCNC: 83 MG/DL — SIGNIFICANT CHANGE UP (ref 70–99)
GLUCOSE BLDC GLUCOMTR-MCNC: 91 MG/DL — SIGNIFICANT CHANGE UP (ref 70–99)

## 2019-03-15 PROCEDURE — 36215 PLACE CATHETER IN ARTERY: CPT | Mod: 59

## 2019-03-15 PROCEDURE — 75894 X-RAYS TRANSCATH THERAPY: CPT | Mod: 26

## 2019-03-15 PROCEDURE — 61624 TCAT PERM OCCLS/EMBOLJ CNS: CPT

## 2019-03-15 PROCEDURE — 75898 FOLLOW-UP ANGIOGRAPHY: CPT | Mod: 26

## 2019-03-15 PROCEDURE — 75705 ARTERY X-RAYS SPINE: CPT | Mod: 26,59

## 2019-03-15 RX ORDER — ACETAMINOPHEN 500 MG
1000 TABLET ORAL ONCE
Qty: 0 | Refills: 0 | Status: COMPLETED | OUTPATIENT
Start: 2019-03-15 | End: 2019-03-15

## 2019-03-15 RX ADMIN — GABAPENTIN 600 MILLIGRAM(S): 400 CAPSULE ORAL at 15:09

## 2019-03-15 RX ADMIN — AMLODIPINE BESYLATE 5 MILLIGRAM(S): 2.5 TABLET ORAL at 05:40

## 2019-03-15 RX ADMIN — GABAPENTIN 600 MILLIGRAM(S): 400 CAPSULE ORAL at 22:22

## 2019-03-15 RX ADMIN — GABAPENTIN 600 MILLIGRAM(S): 400 CAPSULE ORAL at 06:21

## 2019-03-15 RX ADMIN — LOSARTAN POTASSIUM 100 MILLIGRAM(S): 100 TABLET, FILM COATED ORAL at 05:40

## 2019-03-15 RX ADMIN — ATORVASTATIN CALCIUM 20 MILLIGRAM(S): 80 TABLET, FILM COATED ORAL at 22:21

## 2019-03-15 RX ADMIN — Medication 1000 MILLIGRAM(S): at 15:30

## 2019-03-15 RX ADMIN — Medication 650 MILLIGRAM(S): at 05:40

## 2019-03-15 RX ADMIN — Medication 650 MILLIGRAM(S): at 06:00

## 2019-03-15 RX ADMIN — OXYCODONE HYDROCHLORIDE 5 MILLIGRAM(S): 5 TABLET ORAL at 22:50

## 2019-03-15 RX ADMIN — Medication 400 MILLIGRAM(S): at 15:09

## 2019-03-15 RX ADMIN — OXYCODONE HYDROCHLORIDE 5 MILLIGRAM(S): 5 TABLET ORAL at 22:20

## 2019-03-15 RX ADMIN — Medication 100 MILLIGRAM(S): at 05:40

## 2019-03-15 NOTE — PROGRESS NOTE ADULT - ASSESSMENT
HPI: 59 YO male developed lower back pain following a fall approximately 1 month ago. 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 Patient c/o lower back pain, decreased sensation in both legs, left leg weakness and difficulty ambulating unassisted due to weakness. Denied bowel or bladder dysfunction. 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 Con't NPO  - Possible OR next week for treatment of T5 dural AVF with Dr. Horn & Dr. Willis  - Con't tylenol, oxy IR 5/10 for pain control   - Con't Neurontin 600 TID for neuropathy  - Con't Losartan & Norvasc for  HTN  - Con't Lipitor for hx of HLD  - Con't GI ppx colace, senna for bowel regimen  - Con't HISS for history of T2DM  - Con't DVT prophylaxis:  venodynes; no chemoprophylaxis angio today  - Dispo: TBD      Will discuss with Dr. Horn  [id number]

## 2019-03-15 NOTE — PROGRESS NOTE ADULT - SUBJECTIVE AND OBJECTIVE BOX
Subjective: Patient seen and examined. No new events except as noted.     SUBJECTIVE/ROS:  No chest pain, dyspnea, palpitation, or dizziness.       MEDICATIONS:  MEDICATIONS  (STANDING):  amLODIPine   Tablet 5 milliGRAM(s) Oral daily  atorvastatin 20 milliGRAM(s) Oral at bedtime  dextrose 5%. 1000 milliLiter(s) (50 mL/Hr) IV Continuous <Continuous>  dextrose 50% Injectable 12.5 Gram(s) IV Push once  dextrose 50% Injectable 25 Gram(s) IV Push once  dextrose 50% Injectable 25 Gram(s) IV Push once  docusate sodium 100 milliGRAM(s) Oral three times a day  gabapentin 600 milliGRAM(s) Oral three times a day  insulin lispro (HumaLOG) corrective regimen sliding scale   SubCutaneous three times a day before meals  insulin lispro (HumaLOG) corrective regimen sliding scale   SubCutaneous at bedtime  losartan 100 milliGRAM(s) Oral daily  senna 2 Tablet(s) Oral at bedtime  sodium chloride 0.9% with potassium chloride 20 mEq/L 1000 milliLiter(s) (75 mL/Hr) IV Continuous <Continuous>      PHYSICAL EXAM:  T(C): 36.7 (03-15-19 @ 16:09), Max: 37.2 (03-14-19 @ 20:14)  HR: 71 (03-15-19 @ 16:09) (69 - 98)  BP: 132/80 (03-15-19 @ 16:09) (118/80 - 163/90)  RR: 18 (03-15-19 @ 16:09) (18 - 19)  SpO2: 94% (03-15-19 @ 16:09) (94% - 100%)  Wt(kg): --  I&O's Summary    14 Mar 2019 07:01  -  15 Mar 2019 07:00  --------------------------------------------------------  IN: 1872 mL / OUT: 1650 mL / NET: 222 mL    15 Mar 2019 07:01  -  15 Mar 2019 16:11  --------------------------------------------------------  IN: 885 mL / OUT: 400 mL / NET: 485 mL            JVP: Normal  Neck: supple  Lung: clear   CV: S1 S2 , Murmur:  Abd: soft  Ext: No edema  neuro: Awake / alert  Psych: flat affect  Skin: normal``    LABS/DATA:    CARDIAC MARKERS:                                12.0   7.83  )-----------( 248      ( 14 Mar 2019 14:49 )             37.7     03-14    137  |  99  |  13  ----------------------------<  182<H>  3.8   |  25  |  0.72    Ca    9.9      14 Mar 2019 10:48      proBNP:   Lipid Profile:   HgA1c:   TSH:     TELE:  EKG:

## 2019-03-15 NOTE — PROGRESS NOTE ADULT - ASSESSMENT
This is a 58y year old Male with the below past medical history who presents with the chief complaint of leg weakness.  Since sept 2018, he has had a first numnbness of his toes and progressive weakness in both legs.  Most problematic getting up from chair.  In feb 2019, his knee gave out and he fell.  He was having pain as well, at times burning. He ws seen by Dr. Vivas a neurologist, had MRI brain with nonspecific changes.  Told had stroke and placed on plavix.  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  He was sent from [facility] to [facility] and had angio confirming av dural fistula.     pt says weakness no worse in legs, no new sx in arms, no changes in speech, swallow, vision, bowel or bladder control.     Extensive workup noted above    plan is to treat for dural avf with dr sauceda and severo mittler  is to have next staged angiogram today with presumed surgery next week    appreciate cards consult    reviewed with pt in detail

## 2019-03-15 NOTE — PROGRESS NOTE ADULT - SUBJECTIVE AND OBJECTIVE BOX
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  Since sept 2018, he has had a first numnbness of his toes and progressive weakness in both legs.  Most problematic getting up from chair.  In feb 2019, his knee gave out and he fell.  He was having pain as well, at times burning. He ws seen by Dr. Vivas a neurologist, had MRI brain with nonspecific changes.  Told had stroke and placed on plavix.  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  He was sent from [facility] to [facility] and had angio confirming av dural fistula.     pt says weakness no worse in legs, no new sx in arms, no changes in speech, swallow, vision, bowel or bladder control.     for angio today    no new issues    Past Medical History:  HLD (hyperlipidemia) (E78.5)  Essential hypertension (I10)  DM (diabetes mellitus) (E11.9)      Past Surgical History:      Social History:  No toxic habits    Family History:  FAMILY HISTORY:      Allergies:  No Known Allergies      ROS:  Constitutional: Patient offers no complaints of fevers or significant weight loss  Ears, Nose, Mouth and Throat: The patient presents with no abnormalities of the head, ears, eyes, nose or throat  Skin: Patient offers no concerns of new rashes or lesions  Respiratory: The patient presents with no abnormalities of the respiratory tract  Cardiovascular: The patient presents with no cardiac abnormalities  Gastrointestinal: The patient presents with no abnormalities of the GI system  Genitourinary: The patient presents with no dysuria, hematuria or frequent urination  Neurological: See HPI  Endocrine: Patient offers no complaints of excessive thirst, urination, or heat/cold intolerance    Advanced care planning reviewed and noted in the chart.      Medications:  acetaminophen   Tablet .. 650 milliGRAM(s) Oral every 6 hours PRN  amLODIPine   Tablet 5 milliGRAM(s) Oral daily  atorvastatin 20 milliGRAM(s) Oral at bedtime  dextrose 40% Gel 15 Gram(s) Oral once PRN  dextrose 5%. 1000 milliLiter(s) IV Continuous <Continuous>  dextrose 50% Injectable 12.5 Gram(s) IV Push once  dextrose 50% Injectable 25 Gram(s) IV Push once  dextrose 50% Injectable 25 Gram(s) IV Push once  docusate sodium 100 milliGRAM(s) Oral three times a day  gabapentin 600 milliGRAM(s) Oral three times a day  glucagon  Injectable 1 milliGRAM(s) IntraMuscular once PRN  insulin lispro (HumaLOG) corrective regimen sliding scale   SubCutaneous three times a day before meals  insulin lispro (HumaLOG) corrective regimen sliding scale   SubCutaneous at bedtime  losartan 100 milliGRAM(s) Oral daily  melatonin 3 milliGRAM(s) Oral at bedtime PRN  oxyCODONE    IR 5 milliGRAM(s) Oral every 4 hours PRN  oxyCODONE    IR 10 milliGRAM(s) Oral every 4 hours PRN  senna 2 Tablet(s) Oral at bedtime  sodium chloride 0.9% with potassium chloride 20 mEq/L 1000 milliLiter(s) IV Continuous <Continuous>      Labs:  CBC Full  -  ( 14 Mar 2019 14:49 )  WBC Count : 7.83 K/uL  Hemoglobin : 12.0 g/dL  Hematocrit : 37.7 %  Platelet Count - Automated : 248 K/uL  Mean Cell Volume : 84.0 fl  Mean Cell Hemoglobin : 26.7 pg  Mean Cell Hemoglobin Concentration : 31.8 gm/dL  Auto Neutrophil # : x  Auto Lymphocyte # : x  Auto Monocyte # : x  Auto Eosinophil # : x  Auto Basophil # : x  Auto Neutrophil % : x  Auto Lymphocyte % : x  Auto Monocyte % : x  Auto Eosinophil % : x  Auto Basophil % : x    03-14    137  |  99  |  13  ----------------------------<  182<H>  3.8   |  25  |  0.72    Ca    9.9      14 Mar 2019 10:48      CAPILLARY BLOOD GLUCOSE      POCT Blood Glucose.: 124 mg/dL (15 Mar 2019 08:55)  POCT Blood Glucose.: 121 mg/dL (14 Mar 2019 22:01)  POCT Blood Glucose.: 157 mg/dL (14 Mar 2019 17:09)  POCT Blood Glucose.: 129 mg/dL (14 Mar 2019 12:27)      PT/INR - ( 14 Mar 2019 15:06 )   PT: 14.4 sec;   INR: 1.25 ratio         PTT - ( 14 Mar 2019 15:06 )  PTT:30.2 sec        Vitals:  Vital Signs Last 24 Hrs  T(C): 36.9 (15 Mar 2019 08:30), Max: 37.2 (14 Mar 2019 20:14)  T(F): 98.5 (15 Mar 2019 08:30), Max: 98.9 (14 Mar 2019 20:14)  HR: 80 (15 Mar 2019 08:30) (69 - 98)  BP: 163/81 (15 Mar 2019 08:30) (117/71 - 163/90)  BP(mean): --  RR: 189 (15 Mar 2019 08:30) (18 - 189)  SpO2: 98% (15 Mar 2019 04:41) (98% - 100%)    NEUROLOGICAL EXAM:    Mental status: Awake, alert, and in no apparent distress. Oriented to person, place and time. Language function is normal. Recent memory, digit span and concentration were normal.     Cranial Nerves: Pupils were equal, round, reactive to light. Extraocular movements were intact. Visual field were full. Fundoscopic exam was deferred. Facial sensation was intact to light touch. There was no facial asymmetry. The palate was upgoing symmetrically and tongue was midline. Hearing acuity was intact to finger rub AU. Shoulder shrug was full bilaterally    Motor exam: Bulk and tone were normal. Strength was 5/5 in arms, 4/5 in legs in knee ext, hip flexion on left right is 4+, but dorsi is 5-/5.   Fine finger movements were symmetric and normal. There was no pronator drift    Reflexes: 2+ in the bilateral upper extremities. 2+ in the bilateral lower extremities. Toes were downgoing bilaterally.     Sensation: Intact to light touch, temperature, vibration and proprioception.     Coordination: Finger-nose-finger and heel-to-shin was without dysmetria.     Gait: cannot stand with arms folded in front of him.     < from: MR Angio Spinal Canal w/wo IV Cont (03.08.19 @ 21:57) >    EXAM:  MR ANGIO SPINAL CANAL WA IC        PROCEDURE DATE:  Mar  8 2019         INTERPRETATION:  Clinical information: Evaluate for spinal AV fistula.    Technique: Contrast-enhanced MR angiography of the spine was performed.   7.5 cc's of IV Gadavist was administered without immediate complication   and 0 cc's was discarded.    Comparison: Prior contrast-enhanced MRI examinations of the thoracic and   lumbar spine dated 3/6/2018. Prior noncontrast MRI examinations of the   cervical, thoracic, and lumbar spine from 3/5/2019.    Findings: There is redemonstration of edema signal, swelling, and patchy   enhancement involving the mid lower thoracic spinal cord extending to the   conus medullaris. There is also thickening of the cauda equina nerve   roots and abnormal enhancement. On T2-weighted imaging, intradural extra   medullary serpiginous vessels are again noted within the thecal sac   surrounding the conus and extending craniad into the thoracic canal.    On the MR angiography portion of the examination, there are questionable   segmental radicular arterial feeders at the level of of L1 draining into   radiculomedullary veins. Serpiginous venous vessels abutting the conus   medullaris and thoracic cord are noted extending into the thoracic canal.    The urinary bladder is again noted to be distended on the  localizer   images.    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 Questionable arterial   feeders at the level of L1, as discussed.    Further evaluation with a conventional spinal angiography examination is   recommended.    The possibility of a spinal cord tumor with drop metastases, lymphoma, or   inflammatory process such as neurosarcoidosis or infection such as TB   cannot be completely excluded. Correlate with results of CSF testing from   the prior lumbar puncture procedure already performed on 3/7/2019.                        GENARO LORA M.D., ATTENDING RADIOLOGIST  This document has been electronically signed. Mar  9 2019  1:59PM                  < end of copied text >  < from: MR Lumbar Spine w/ IV Cont (03.07.19 @ 14:08) >    EXAM:  MR SPINE LUMBAR IC      EXAM:  MR SPINE THORACIC IC        PROCEDURE DATE:  Mar  7 2019         INTERPRETATION:  History: Weakness. Abnormal spinal cord on noncontrast   MRI. Left lower extremity weakness.    Description: A postcontrast MRI of the thoracic and lumbar spine was   performed.    Comparison is made to the noncontrast spine MRI from 03/05/2019.    Sagittal and axial T1 postcontrast weighted series were performed.    7.5 cc intravenous Gadavist gadolinium contrast administered, 0 cc   contrast was discarded.    Edema and swelling is again noted involving the mid-lower thoracic spinal   cord, extending to the conus. Thickening of the nerve roots of the cauda   equina is present.    Patchy and nodular enhancement is noted involving the mid-lower thoracic   spinal cord. Abnormal enhancement also involves the thickened nerve roots   of the cauda equina. Linear enhancing vessels are noted on the surface of   the cord.    No abnormal enhancement is noted involving the vertebral bodies.    The urinary bladder is distended, partially visualized suggesting   neurogenic bladder given the spinal cord and cauda equina involvement.    IMPRESSION:    Abnormal enhancement involves the mid-lower thoracic spinal, and also   involves the nerve roots of the cauda equina. A spinal cord tumor with   drop metastases, lymphoma, an inflammatory process (such as sarcoidosis),   or an atypical infection (such as tuberculosis) are in the differential   diagnosis. Some prominent vasculature is noted also raising the   possibility of a dural AV fistula. Consider correlation with lumbar   puncture and spine MRA if clinically warranted.    The urinary bladder is distended, partially visualized suggesting   neurogenic bladder given the spinal cord and cauda equina involvement.                  HALLE LOTT M.D., ATTENDING RADIOLOGIST  This document has been electronically signed. Mar  7 2019  2:37PM                  < end of copied text >

## 2019-03-15 NOTE — CHART NOTE - NSCHARTNOTEFT_GEN_A_CORE
Interventional Neuro Radiology  Pre-Procedure Note    This is a 57yo male who developed lower back pain following a fall approximately 1 month ago. 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 Patient s/p diagnostic spinal angiogram which revealed T5 dural arteriovenous fistula. Patient [id number]  to neuro IR for selective spinal angiography and possible embolization or level maker placement.     Neurological: Awake, alert, oriented to person, place and time, speech clear and fluent, face equal, tongue midline, briskly following commands, no drift, PERRL. EOMI. strength  b/l upper extremity 5/5. b/l lower extremity 4/5 sensation intact to light touch throughout, pupils 4mm and reactive bilaterally, extraocular movements intact, no nystagmus    PAST MEDICAL & SURGICAL HISTORY:  HLD (hyperlipidemia)  Essential hypertension  DM (diabetes mellitus)    Allergies:   No Known Allergies    Current Medications:   acetaminophen   Tablet .. 650 milliGRAM(s) Oral every 6 hours PRN  amLODIPine   Tablet 5 milliGRAM(s) Oral daily  atorvastatin 20 milliGRAM(s) Oral at bedtime  dextrose 40% Gel 15 Gram(s) Oral once PRN  dextrose 5%. 1000 milliLiter(s) IV Continuous <Continuous>  dextrose 50% Injectable 12.5 Gram(s) IV Push once  dextrose 50% Injectable 25 Gram(s) IV Push once  dextrose 50% Injectable 25 Gram(s) IV Push once  docusate sodium 100 milliGRAM(s) Oral three times a day  gabapentin 600 milliGRAM(s) Oral three times a day  glucagon  Injectable 1 milliGRAM(s) IntraMuscular once PRN  insulin lispro (HumaLOG) corrective regimen sliding scale   SubCutaneous three times a day before meals  insulin lispro (HumaLOG) corrective regimen sliding scale   SubCutaneous at bedtime  losartan 100 milliGRAM(s) Oral daily  melatonin 3 milliGRAM(s) Oral at bedtime PRN  oxyCODONE    IR 5 milliGRAM(s) Oral every 4 hours PRN  oxyCODONE    IR 10 milliGRAM(s) Oral every 4 hours PRN  senna 2 Tablet(s) Oral at bedtime  sodium chloride 0.9% with potassium chloride 20 mEq/L 1000 milliLiter(s) IV Continuous <Continuous>    Labs:                         12.0   7.83  )-----------( 248      ( 14 Mar 2019 14:49 )             37.7       03-14    137  |  99  |  13  ----------------------------<  182<H>  3.8   |  25  |  0.72    Ca    9.9      14 Mar 2019 10:48    TPro  8.0  /  Alb  4.3  /  TBili  0.3  /  DBili  x   /  AST  28  /  ALT  36  /  AlkPhos  74  03-10      Blood Bank: 03-14-19  A  --  Negative      Assessment/Plan:   This is a 57yo male  presents with right T5 dural AV fistula. Patient presents to neuro-IR for selective spinal angiography and possible embolization or possible placement of level maker. Procedure/ risks/ benefits/ goals/ alternatives were explained. Risks include but are not limited to stroke/ vessel injury/ hemorrhage/ groin hematoma. All questions answered. Informed content obtained from patient. Consent placed in chart.     Lynsey Kruse PA-C  [phone] Interventional Neuro Radiology  Pre-Procedure Note    This is a 59yo male who developed lower back pain following a fall approximately 1 month ago. 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 Patient s/p diagnostic spinal angiogram which revealed T5 dural arteriovenous fistula. Patient [id number]  to neuro IR for selective spinal angiography and possible embolization or level maker placement.     Neurological: Awake, alert, oriented to person, place and time, speech clear and fluent, face equal, tongue midline, briskly following commands, no drift, PERRL. EOMI. strength  b/l upper extremity 5/5. b/l lower extremity 4/5 sensation intact to light touch throughout, pupils 4mm and reactive bilaterally, extraocular movements intact, no nystagmus    PAST MEDICAL & SURGICAL HISTORY:  HLD (hyperlipidemia)  Essential hypertension  DM (diabetes mellitus)    Allergies:   No Known Allergies    Current Medications:   acetaminophen   Tablet .. 650 milliGRAM(s) Oral every 6 hours PRN  amLODIPine   Tablet 5 milliGRAM(s) Oral daily  atorvastatin 20 milliGRAM(s) Oral at bedtime  dextrose 40% Gel 15 Gram(s) Oral once PRN  dextrose 5%. 1000 milliLiter(s) IV Continuous <Continuous>  dextrose 50% Injectable 12.5 Gram(s) IV Push once  dextrose 50% Injectable 25 Gram(s) IV Push once  dextrose 50% Injectable 25 Gram(s) IV Push once  docusate sodium 100 milliGRAM(s) Oral three times a day  gabapentin 600 milliGRAM(s) Oral three times a day  glucagon  Injectable 1 milliGRAM(s) IntraMuscular once PRN  insulin lispro (HumaLOG) corrective regimen sliding scale   SubCutaneous three times a day before meals  insulin lispro (HumaLOG) corrective regimen sliding scale   SubCutaneous at bedtime  losartan 100 milliGRAM(s) Oral daily  melatonin 3 milliGRAM(s) Oral at bedtime PRN  oxyCODONE    IR 5 milliGRAM(s) Oral every 4 hours PRN  oxyCODONE    IR 10 milliGRAM(s) Oral every 4 hours PRN  senna 2 Tablet(s) Oral at bedtime  sodium chloride 0.9% with potassium chloride 20 mEq/L 1000 milliLiter(s) IV Continuous <Continuous>    Labs:                         12.0   7.83  )-----------( 248      ( 14 Mar 2019 14:49 )             37.7       03-14    137  |  99  |  13  ----------------------------<  182<H>  3.8   |  25  |  0.72    Ca    9.9      14 Mar 2019 10:48    TPro  8.0  /  Alb  4.3  /  TBili  0.3  /  DBili  x   /  AST  28  /  ALT  36  /  AlkPhos  74  03-10      Blood Bank: 03-14-19  A  --  Negative      Assessment/Plan:   This is a 59yo male  presents with left T5 dural AV fistula. Patient presents to neuro-IR for selective spinal angiography and possible embolization or possible placement of level maker. Procedure/ risks/ benefits/ goals/ alternatives were explained. Risks include but are not limited to stroke/ vessel injury/ hemorrhage/ groin hematoma. All questions answered. Informed content obtained from patient. Consent placed in chart.     Lynsey Kruse PA-C  [phone]

## 2019-03-15 NOTE — PROGRESS NOTE ADULT - ASSESSMENT
HTN  labile  cont current meds    DM  Monitor finger stick. Insulin coverage. Diabetic education and Diabetic diet. Consider nutrition consultation.    HLD  on statin    PREOP  Based on current ACC/AHA guidelines, patient history and physical exam, the patient is considered to have low risk  EKG is normal  no objection to proceed to angio and subsequent surgery

## 2019-03-15 NOTE — CHART NOTE - NSCHARTNOTEFT_GEN_A_CORE
Interventional Neuro- Radiology   Procedure Note      Procedure: Selective Cerebral Angiography   Pre- Procedure Diagnosis: Left T5 AVF  Post- Procedure Diagnosis:    : Dr. Kory MD  Fellow: Dr. Mykel MD  Resident: Dr. Anderson MD  Physician Assistant: Lynsey Kruse PA-C    RN: Kanika    Anesthesia: Dr. Valerio MD  (general anesthesia)    Sheath: 5 Fr short    I/Os:  Fluids:  Malik:  Contrast:  Estimated Blood Loss: <10cc    Preliminary Report:  Under general anesthesia, using a 5Fr short sheath to the right groin examination of left T5 via selective spinal angiography demonstrates . ( Official note to follow).    Patient tolerated procedure well, vital signs stable, hemodynamically stable, no change in neurological status compared to baseline. Results discussed with neurosurgery/ patient and their family. Groin sheath d/c'ed, manual compression held to hemostasis, no active bleeding, no hematoma,  quick clot and safeguard balloon dressing applied at _____h. Patient transferred to PACU for further care/ monitoring.     Lynsey Kruse PA-C  [phone] Interventional Neuro- Radiology   Procedure Note      Procedure: Selective Cerebral Angiography and coil level maker  Pre- Procedure Diagnosis: Left T5 AVF  Post- Procedure Diagnosis: Left T5 AVF s/p     : Dr. Kory MD  Fellow: Dr. Mykel MD  Resident: Dr. Anderson MD  Physician Assistant: Lynsey Kruse PA-C    RN: Kanika    Anesthesia: Dr. Valerio MD  (general anesthesia)    Sheath: 5 Fr short    I/Os:  Fluids: 800cc   Malik:  175cc   Contrast: 200cc   Estimated Blood Loss: <10cc    Preliminary Report:  Under general anesthesia, using a 5Fr short sheath to the right groin examination of bilateral T4, left T5, b/l T6, b/l T7, left T8 via selective spinal angiography demonstrates spinal AVM, s/p  of left T5. ( Official note to follow).    Patient tolerated procedure well, vital signs stable, hemodynamically stable, no change in neurological status compared to baseline. Results discussed with neurosurgery/ patient and their family. Groin sheath d/c'ed, manual compression held to hemostasis, no active bleeding, no hematoma,  quick clot and safeguard balloon dressing applied at 20:30h. Patient transferred to PACU for further care/ monitoring.     Lynsey Kruse PA-C  [phone]

## 2019-03-15 NOTE — CONSULT NOTE ADULT - SUBJECTIVE AND OBJECTIVE BOX
57 YO male w/  lower back pain following a fall approximately 1 month ago.   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   Patient c/o lower back pain, decreased sensation in both legs, left leg weakness and difficulty ambulating unassisted due to weakness.   Denied bowel or bladder dysfunction. Transfer to [facility] from [facility] for w/u and potential sx      INTERVAL HPI/OVERNIGHT EVENTS:    Medications:MEDICATIONS  (STANDING):  amLODIPine   Tablet 5 milliGRAM(s) Oral daily  atorvastatin 20 milliGRAM(s) Oral at bedtime  dextrose 5%. 1000 milliLiter(s) (50 mL/Hr) IV Continuous <Continuous>  dextrose 50% Injectable 12.5 Gram(s) IV Push once  dextrose 50% Injectable 25 Gram(s) IV Push once  dextrose 50% Injectable 25 Gram(s) IV Push once  docusate sodium 100 milliGRAM(s) Oral three times a day  gabapentin 600 milliGRAM(s) Oral three times a day  insulin lispro (HumaLOG) corrective regimen sliding scale   SubCutaneous three times a day before meals  insulin lispro (HumaLOG) corrective regimen sliding scale   SubCutaneous at bedtime  losartan 100 milliGRAM(s) Oral daily  senna 2 Tablet(s) Oral at bedtime  sodium chloride 0.9% with potassium chloride 20 mEq/L 1000 milliLiter(s) (75 mL/Hr) IV Continuous <Continuous>    MEDICATIONS  (PRN):  acetaminophen   Tablet .. 650 milliGRAM(s) Oral every 6 hours PRN Temp greater or equal to 38.5C (101.3F), Mild Pain (1 - 3)  dextrose 40% Gel 15 Gram(s) Oral once PRN Blood Glucose LESS THAN 70 milliGRAM(s)/deciliter  glucagon  Injectable 1 milliGRAM(s) IntraMuscular once PRN Glucose LESS THAN 70 milligrams/deciliter  melatonin 3 milliGRAM(s) Oral at bedtime PRN Sleep  oxyCODONE    IR 5 milliGRAM(s) Oral every 4 hours PRN Moderate Pain (4 - 6)  oxyCODONE    IR 10 milliGRAM(s) Oral every 4 hours PRN Severe Pain (7 - 10)      Allergies: Allergies    No Known Allergies    Intolerances          FAMILY HISTORY:        PAST MEDICAL & SURGICAL HISTORY:  HLD (hyperlipidemia)  Essential hypertension  DM (diabetes mellitus)      REVIEW OF SYSTEMS:  CONSTITUTIONAL: No fever, weight loss, or fatigue  EYES: No eye pain, visual disturbances, or discharge  ENMT:  No difficulty hearing, tinnitus, vertigo; No sinus or throat pain  NECK: No pain or stiffness  BREASTS: No pain, masses, or nipple discharge  RESPIRATORY: No cough, wheezing, chills or hemoptysis; No shortness of breath  CARDIOVASCULAR: No chest pain, palpitations, dizziness, or leg swelling  GASTROINTESTINAL: No abdominal or epigastric pain. No nausea, vomiting, or hematemesis; No diarrhea or constipation. No melena or hematochezia.  GENITOURINARY: No dysuria, frequency, hematuria, or incontinence  NEUROLOGICAL: No headaches, memory loss, loss of strength, numbness, or tremors  SKIN: No itching, burning, rashes, or lesions   LYMPH NODES: No enlarged glands  ENDOCRINE: No heat or cold intolerance; No hair loss  MUSCULOSKELETAL:as above   PSYCHIATRIC: No depression, anxiety, mood swings, or difficulty sleeping  HEME/LYMPH: No easy bruising, or bleeding gums  ALLERY AND IMMUNOLOGIC: No hives or eczema    T(C): 36.8 (03-15-19 @ 12:26), Max: 37.2 (03-14-19 @ 20:14)  HR: 75 (03-15-19 @ 12:26) (69 - 98)  BP: 152/93 (03-15-19 @ 12:26) (117/71 - 163/90)  RR: 18 (03-15-19 @ 12:26) (18 - 19)  SpO2: 96% (03-15-19 @ 12:26) (96% - 100%)  Wt(kg): --Vital Signs Last 24 Hrs  T(C): 36.8 (15 Mar 2019 12:26), Max: 37.2 (14 Mar 2019 20:14)  T(F): 98.3 (15 Mar 2019 12:26), Max: 98.9 (14 Mar 2019 20:14)  HR: 75 (15 Mar 2019 12:26) (69 - 98)  BP: 152/93 (15 Mar 2019 12:26) (117/71 - 163/90)  BP(mean): --  RR: 18 (15 Mar 2019 12:26) (18 - 19)  SpO2: 96% (15 Mar 2019 12:26) (96% - 100%)  I&O's Summary    14 Mar 2019 07:01  -  15 Mar 2019 07:00  --------------------------------------------------------  IN: 1872 mL / OUT: 1650 mL / NET: 222 mL    15 Mar 2019 07:01  -  15 Mar 2019 12:45  --------------------------------------------------------  IN: 585 mL / OUT: 0 mL / NET: 585 mL        PHYSICAL EXAM:  GENERAL: NAD, well-groomed, well-developed  HEAD:  Atraumatic, Normocephalic  EYES: EOMI, PERRLA, conjunctiva and sclera clear  ENMT: No tonsillar erythema, exudates, or enlargement; Moist mucous membranes, Good dentition, No lesions  NECK: Supple, No JVD, Normal thyroid  NERVOUS SYSTEM:  Alert & Oriented X3, Good concentration; Motor Strength 5/5 B/L upper and lower extremities; DTRs 2+ intact and symmetric  CHEST/LUNG: Clear to percussion bilaterally; No rales, rhonchi, wheezing, or rubs  HEART: Regular rate and rhythm; No murmurs, rubs, or gallops  ABDOMEN: Soft, Nontender, Nondistended; Bowel sounds present  EXTREMITIES:  2+ Peripheral Pulses, No clubbing, cyanosis, or edema  LYMPH: No lymphadenopathy noted  SKIN: No rashes or lesions    Consultant(s) Notes Reviewed:  [x ] YES  [ ] NO  Care Discussed with Consultants/Other Providerscpk [ x] YES  [ ] NO    LABS:                    CBC Full  -  ( 14 Mar 2019 14:49 )  WBC Count : 7.83 K/uL  Hemoglobin : 12.0 g/dL  Hematocrit : 37.7 %  Platelet Count - Automated : 248 K/uL  Mean Cell Volume : 84.0 fl  Mean Cell Hemoglobin : 26.7 pg  Mean Cell Hemoglobin Concentration : 31.8 gm/dL  Auto Neutrophil # : x  Auto Lymphocyte # : x  Auto Monocyte # : x  Auto Eosinophil # : x  Auto Basophil # : x  Auto Neutrophil % : x  Auto Lymphocyte % : x  Auto Monocyte % : x  Auto Eosinophil % : x  Auto Basophil % : x      03-14    137  |  99  |  13  ----------------------------<  182<H>  3.8   |  25  |  0.72    Ca    9.9      14 Mar 2019 10:48            PT/INR - ( 14 Mar 2019 15:06 )   PT: 14.4 sec;   INR: 1.25 ratio         PTT - ( 14 Mar 2019 15:06 )  PTT:30.2 sec  RADIOLOGY & ADDITIONAL TESTS:    Imaging Personally Reviewed:  [ ] YES  [ ] NO

## 2019-03-15 NOTE — PROGRESS NOTE ADULT - SUBJECTIVE AND OBJECTIVE BOX
SUBJECTIVE: Seen and examined at bedside. Pre-op for repeat spinal angiogram today. This am c/o leg pain improved with Tylenol, Denies headache, chest pain, shortness of breath, nausea/vomiting, urinary or bowel incontinence.       Vital Signs Last 24 Hrs  T(C): 36.9 (03-15-19 @ 08:30), Max: 37.2 (03-14-19 @ 20:14)  T(F): 98.5 (03-15-19 @ 08:30), Max: 98.9 (03-14-19 @ 20:14)  HR: 82 (03-15-19 @ 09:43) (69 - 98)  BP: 118/80 (03-15-19 @ 09:43) (117/71 - 163/90)  BP(mean): --  RR: 189 (03-15-19 @ 08:30) (18 - 189)  SpO2: 98% (03-15-19 @ 04:41) (98% - 100%)    PHYSICAL EXAM:    Constitutional: No Acute Distress. Sitting in a chair     Neurological: Awake, alert, oriented to person, place and time, speech clear and fluent, face equal, tongue midline, briskly following commands, no drift, PERRL. EOMI. strength  b/l upper extremity 5/5. b/l lower extremity 4/5 sensation intact to light touch throughout, pupils 4mm and reactive bilaterally, extraocular movements intact, no nystagmus    Incision: Rt groin site C/D/I     Pulmonary: Clear to Auscultation, No rales, No rhonchi, No wheezes     Cardiovascular: S1, S2, Regular rate and rhythm     Gastrointestinal: Softly distended, Non-tender, +bowel sounds x 4    Extremities: No calf tenderness bilaterally, no cyanosis, clubbing or edema      LABS:                          12.0   7.83  )-----------( 248      ( 14 Mar 2019 14:49 )             37.7    03-14    137  |  99  |  13  ----------------------------<  182<H>  3.8   |  25  |  0.72    Ca    9.9      14 Mar 2019 10:48    PT/INR - ( 14 Mar 2019 15:06 )   PT: 14.4 sec;   INR: 1.25 ratio         PTT - ( 14 Mar 2019 15:06 )  PTT:30.2 sec    03-14 @ 07:01  -  03-15 @ 07:00  --------------------------------------------------------  IN: 1872 mL / OUT: 1650 mL / NET: 222 mL    03-15 @ 07:01  -  03-15 @ 10:05  --------------------------------------------------------  IN: 225 mL / OUT: 0 mL / NET: 225 mL        IMAGING:   MR Angio Spinal Canal w/wo IV Cont (03.08.19 @ 21:57) IMPRESSION: 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 Questionable arterial   feeders at the level of L1, as discussed. Further evaluation with a conventional spinal angiography examination is  recommended. The possibility of a spinal cord tumor with drop metastases, lymphoma, or   inflammatory process such as neurosarcoidosis or infection such as TB  cannot be completely excluded. Correlate with results of CSF testing from  the prior lumbar puncture procedure already performed on 3/7/2019.      MEDICATIONS:  Antibiotics:    Neuro:  acetaminophen   Tablet .. 650 milliGRAM(s) Oral every 6 hours PRN Temp greater or equal to 38.5C (101.3F), Mild Pain (1 - 3)  gabapentin 600 milliGRAM(s) Oral three times a day  melatonin 3 milliGRAM(s) Oral at bedtime PRN Sleep  oxyCODONE    IR 5 milliGRAM(s) Oral every 4 hours PRN Moderate Pain (4 - 6)  oxyCODONE    IR 10 milliGRAM(s) Oral every 4 hours PRN Severe Pain (7 - 10)    Cardiac:  amLODIPine   Tablet 5 milliGRAM(s) Oral daily  losartan 100 milliGRAM(s) Oral daily    Pulm:    GI/:  docusate sodium 100 milliGRAM(s) Oral three times a day  senna 2 Tablet(s) Oral at bedtime    Other:   atorvastatin 20 milliGRAM(s) Oral at bedtime  dextrose 40% Gel 15 Gram(s) Oral once PRN Blood Glucose LESS THAN 70 milliGRAM(s)/deciliter  dextrose 5%. 1000 milliLiter(s) IV Continuous <Continuous>  dextrose 50% Injectable 12.5 Gram(s) IV Push once  dextrose 50% Injectable 25 Gram(s) IV Push once  dextrose 50% Injectable 25 Gram(s) IV Push once  glucagon  Injectable 1 milliGRAM(s) IntraMuscular once PRN Glucose LESS THAN 70 milligrams/deciliter  insulin lispro (HumaLOG) corrective regimen sliding scale   SubCutaneous three times a day before meals  insulin lispro (HumaLOG) corrective regimen sliding scale   SubCutaneous at bedtime  sodium chloride 0.9% with potassium chloride 20 mEq/L 1000 milliLiter(s) IV Continuous <Continuous>        DIET: NPO for OR SUBJECTIVE: Seen and examined at bedside. Pre-op for repeat spinal angiogram today. This am c/o leg pain improved with Tylenol. Cardiology following rec's appreciated no objection to proceed to OR as planned   Denies headache, chest pain, shortness of breath, nausea/vomiting, urinary or bowel incontinence.       Vital Signs Last 24 Hrs  T(C): 36.9 (03-15-19 @ 08:30), Max: 37.2 (03-14-19 @ 20:14)  T(F): 98.5 (03-15-19 @ 08:30), Max: 98.9 (03-14-19 @ 20:14)  HR: 82 (03-15-19 @ 09:43) (69 - 98)  BP: 118/80 (03-15-19 @ 09:43) (117/71 - 163/90)  BP(mean): --  RR: 189 (03-15-19 @ 08:30) (18 - 189)  SpO2: 98% (03-15-19 @ 04:41) (98% - 100%)    PHYSICAL EXAM:    Constitutional: No Acute Distress. Sitting in a chair     Neurological: Awake, alert, oriented to person, place and time, speech clear and fluent, face equal, tongue midline, briskly following commands, no drift, PERRL. EOMI. strength  b/l upper extremity 5/5. b/l lower extremity 4/5 sensation intact to light touch throughout, pupils 4mm and reactive bilaterally, extraocular movements intact, no nystagmus    Incision: Rt groin site C/D/I     Pulmonary: Clear to Auscultation, No rales, No rhonchi, No wheezes     Cardiovascular: S1, S2, Regular rate and rhythm     Gastrointestinal: Softly distended, Non-tender, +bowel sounds x 4    Extremities: No calf tenderness bilaterally, no cyanosis, clubbing or edema      LABS:                          12.0   7.83  )-----------( 248      ( 14 Mar 2019 14:49 )             37.7    03-14    137  |  99  |  13  ----------------------------<  182<H>  3.8   |  25  |  0.72    Ca    9.9      14 Mar 2019 10:48    PT/INR - ( 14 Mar 2019 15:06 )   PT: 14.4 sec;   INR: 1.25 ratio         PTT - ( 14 Mar 2019 15:06 )  PTT:30.2 sec    03-14 @ 07:01  -  03-15 @ 07:00  --------------------------------------------------------  IN: 1872 mL / OUT: 1650 mL / NET: 222 mL    03-15 @ 07:01  -  03-15 @ 10:05  --------------------------------------------------------  IN: 225 mL / OUT: 0 mL / NET: 225 mL        IMAGING:     TTE (3/14) Mitral Valve: Mitral annular calcification, otherwise normal mitral valve. Minimal mitral regurgitation. Aortic Valve/Aorta: Aortic valve not well visualized;  probably normal. Peak transaortic valve gradient equals 8 mm Hg. No aortic valve regurgitation seen. Peak left ventricular outflow tract gradient equals 6 mm Hg.  Aortic Root: 3.3 cm. Left Atrium: Normal left atrium.  LA volume index = 18  cc/m2. Left Ventricle: Endocardium not well visualized; grossly normal left ventricular systolic function. Normal left  ventricular internal dimensions and wall thicknesses. Normal diastolic function Right Heart: Normal right atrium. The right ventricle is not well visualized; grossly normal right ventricular  systolic function. Tricuspid valve not well visualized, probably normal. Mild tricuspid regurgitation. Pulmonic  valve not well visualized, probably normal. Pericardium/Pleura: Normal pericardium with no pericardial  effusion. Hemodynamic: Estimated right atrial pressure is 8 mm Hg. Estimated right ventricular systolic pressure equals 37 mm Hg, assuming right atrial pressure equals 8 mm Hg,  consistent with borderline pulmonary hypertension. Color Doppler demonstrates no evidence of a patent foramen ovale.  ------------------------------------------------------------------------  Conclusions:  1. Mitral annular calcification, otherwise normal mitral  valve. Minimal mitral regurgitation.  2. Aortic valve not well visualized; probably normal. No  aortic valve regurgitation seen.  3. Endocardium not well visualized; grossly normal left  ventricular systolic function.  4. The right ventricle is not well visualized; grossly  normal right ventricular systolic function.  5. Normal pericardium with no pericardial effusion.  *** No previous Echo exam.  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 Questionable arterial   feeders at the level of L1, as discussed. Further evaluation with a conventional spinal angiography examination is  recommended. The possibility of a spinal cord tumor with drop metastases, lymphoma, or   inflammatory process such as neurosarcoidosis or infection such as TB  cannot be completely excluded. Correlate with results of CSF testing from  the prior lumbar puncture procedure already performed on 3/7/2019.      MEDICATIONS:  Antibiotics:    Neuro:  acetaminophen   Tablet .. 650 milliGRAM(s) Oral every 6 hours PRN Temp greater or equal to 38.5C (101.3F), Mild Pain (1 - 3)  gabapentin 600 milliGRAM(s) Oral three times a day  melatonin 3 milliGRAM(s) Oral at bedtime PRN Sleep  oxyCODONE    IR 5 milliGRAM(s) Oral every 4 hours PRN Moderate Pain (4 - 6)  oxyCODONE    IR 10 milliGRAM(s) Oral every 4 hours PRN Severe Pain (7 - 10)    Cardiac:  amLODIPine   Tablet 5 milliGRAM(s) Oral daily  losartan 100 milliGRAM(s) Oral daily    Pulm:    GI/:  docusate sodium 100 milliGRAM(s) Oral three times a day  senna 2 Tablet(s) Oral at bedtime    Other:   atorvastatin 20 milliGRAM(s) Oral at bedtime  dextrose 40% Gel 15 Gram(s) Oral once PRN Blood Glucose LESS THAN 70 milliGRAM(s)/deciliter  dextrose 5%. 1000 milliLiter(s) IV Continuous <Continuous>  dextrose 50% Injectable 12.5 Gram(s) IV Push once  dextrose 50% Injectable 25 Gram(s) IV Push once  dextrose 50% Injectable 25 Gram(s) IV Push once  glucagon  Injectable 1 milliGRAM(s) IntraMuscular once PRN Glucose LESS THAN 70 milligrams/deciliter  insulin lispro (HumaLOG) corrective regimen sliding scale   SubCutaneous three times a day before meals  insulin lispro (HumaLOG) corrective regimen sliding scale   SubCutaneous at bedtime  sodium chloride 0.9% with potassium chloride 20 mEq/L 1000 milliLiter(s) IV Continuous <Continuous>        DIET: NPO for OR

## 2019-03-16 LAB
ANION GAP SERPL CALC-SCNC: 13 MMOL/L — SIGNIFICANT CHANGE UP (ref 5–17)
BUN SERPL-MCNC: 16 MG/DL — SIGNIFICANT CHANGE UP (ref 7–23)
CALCIUM SERPL-MCNC: 9 MG/DL — SIGNIFICANT CHANGE UP (ref 8.4–10.5)
CHLORIDE SERPL-SCNC: 105 MMOL/L — SIGNIFICANT CHANGE UP (ref 96–108)
CO2 SERPL-SCNC: 22 MMOL/L — SIGNIFICANT CHANGE UP (ref 22–31)
CREAT SERPL-MCNC: 0.75 MG/DL — SIGNIFICANT CHANGE UP (ref 0.5–1.3)
GLUCOSE BLDC GLUCOMTR-MCNC: 106 MG/DL — HIGH (ref 70–99)
GLUCOSE BLDC GLUCOMTR-MCNC: 117 MG/DL — HIGH (ref 70–99)
GLUCOSE BLDC GLUCOMTR-MCNC: 131 MG/DL — HIGH (ref 70–99)
GLUCOSE BLDC GLUCOMTR-MCNC: 225 MG/DL — HIGH (ref 70–99)
GLUCOSE SERPL-MCNC: 142 MG/DL — HIGH (ref 70–99)
POTASSIUM SERPL-MCNC: 4.2 MMOL/L — SIGNIFICANT CHANGE UP (ref 3.5–5.3)
POTASSIUM SERPL-SCNC: 4.2 MMOL/L — SIGNIFICANT CHANGE UP (ref 3.5–5.3)
SODIUM SERPL-SCNC: 140 MMOL/L — SIGNIFICANT CHANGE UP (ref 135–145)

## 2019-03-16 PROCEDURE — 99232 SBSQ HOSP IP/OBS MODERATE 35: CPT

## 2019-03-16 RX ORDER — OXYCODONE HYDROCHLORIDE 5 MG/1
5 TABLET ORAL EVERY 4 HOURS
Qty: 0 | Refills: 0 | Status: DISCONTINUED | OUTPATIENT
Start: 2019-03-16 | End: 2019-03-16

## 2019-03-16 RX ORDER — OXYCODONE HYDROCHLORIDE 5 MG/1
10 TABLET ORAL EVERY 4 HOURS
Qty: 0 | Refills: 0 | Status: DISCONTINUED | OUTPATIENT
Start: 2019-03-16 | End: 2019-03-18

## 2019-03-16 RX ORDER — OXYCODONE HYDROCHLORIDE 5 MG/1
5 TABLET ORAL EVERY 4 HOURS
Qty: 0 | Refills: 0 | Status: DISCONTINUED | OUTPATIENT
Start: 2019-03-16 | End: 2019-03-18

## 2019-03-16 RX ORDER — ACETAMINOPHEN 500 MG
1000 TABLET ORAL ONCE
Qty: 0 | Refills: 0 | Status: COMPLETED | OUTPATIENT
Start: 2019-03-16 | End: 2019-03-16

## 2019-03-16 RX ADMIN — GABAPENTIN 600 MILLIGRAM(S): 400 CAPSULE ORAL at 13:10

## 2019-03-16 RX ADMIN — DEXTROSE MONOHYDRATE, SODIUM CHLORIDE, AND POTASSIUM CHLORIDE 75 MILLILITER(S): 50; .745; 4.5 INJECTION, SOLUTION INTRAVENOUS at 05:34

## 2019-03-16 RX ADMIN — Medication 650 MILLIGRAM(S): at 21:39

## 2019-03-16 RX ADMIN — Medication 100 MILLIGRAM(S): at 21:39

## 2019-03-16 RX ADMIN — Medication 100 MILLIGRAM(S): at 05:33

## 2019-03-16 RX ADMIN — Medication 650 MILLIGRAM(S): at 22:10

## 2019-03-16 RX ADMIN — LOSARTAN POTASSIUM 100 MILLIGRAM(S): 100 TABLET, FILM COATED ORAL at 05:33

## 2019-03-16 RX ADMIN — Medication 1000 MILLIGRAM(S): at 00:30

## 2019-03-16 RX ADMIN — GABAPENTIN 600 MILLIGRAM(S): 400 CAPSULE ORAL at 21:39

## 2019-03-16 RX ADMIN — SENNA PLUS 2 TABLET(S): 8.6 TABLET ORAL at 21:39

## 2019-03-16 RX ADMIN — ATORVASTATIN CALCIUM 20 MILLIGRAM(S): 80 TABLET, FILM COATED ORAL at 21:39

## 2019-03-16 RX ADMIN — GABAPENTIN 600 MILLIGRAM(S): 400 CAPSULE ORAL at 05:33

## 2019-03-16 RX ADMIN — AMLODIPINE BESYLATE 5 MILLIGRAM(S): 2.5 TABLET ORAL at 05:33

## 2019-03-16 RX ADMIN — Medication 2: at 13:11

## 2019-03-16 RX ADMIN — Medication 100 MILLIGRAM(S): at 13:10

## 2019-03-16 RX ADMIN — Medication 400 MILLIGRAM(S): at 00:15

## 2019-03-16 NOTE — PROGRESS NOTE ADULT - ASSESSMENT
57 YO male w/  lower back pain following a fall approximately 1 month ago.   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   Patient c/o lower back pain, decreased sensation in both legs, left leg weakness and difficulty ambulating unassisted due to weakness.   Denied bowel or bladder dysfunction. Transfer to [facility] from [facility] for w/u and potential sx for t5 dural avf    dm  controlled  cont current regimen  htn stable  echo wnls  dvt proph  cont current meds  medically optimized for sx   pt

## 2019-03-16 NOTE — PROGRESS NOTE ADULT - SUBJECTIVE AND OBJECTIVE BOX
CHIEF COMPLAINT:Patient is a 58y old  Male who presents with a chief complaint of spinal AVM (15 Mar 2019 16:11)    	        PAST MEDICAL & SURGICAL HISTORY:  HLD (hyperlipidemia)  Essential hypertension  DM (diabetes mellitus)          REVIEW OF SYSTEMS:  CONSTITUTIONAL: No fever, weight loss, or fatigue  EYES: No eye pain, visual disturbances, or discharge  NECK: No pain or stiffness  RESPIRATORY: No cough, wheezing, chills or hemoptysis; No Shortness of Breath  CARDIOVASCULAR: No chest pain, palpitations, passing out, dizziness, or leg swelling  GASTROINTESTINAL: No abdominal or epigastric pain. No nausea, vomiting, or hematemesis; No diarrhea or constipation. No melena or hematochezia.  GENITOURINARY: No dysuria, frequency, hematuria, or incontinence  NEUROLOGICAL: No headaches, memory loss, loss of strength, numbness, or tremors  MUSCULOSKELETAL: No joint pain or swelling; No muscle, back, or extremity pain    Medications:  MEDICATIONS  (STANDING):  amLODIPine   Tablet 5 milliGRAM(s) Oral daily  atorvastatin 20 milliGRAM(s) Oral at bedtime  dextrose 5%. 1000 milliLiter(s) (50 mL/Hr) IV Continuous <Continuous>  dextrose 50% Injectable 12.5 Gram(s) IV Push once  dextrose 50% Injectable 25 Gram(s) IV Push once  dextrose 50% Injectable 25 Gram(s) IV Push once  docusate sodium 100 milliGRAM(s) Oral three times a day  gabapentin 600 milliGRAM(s) Oral three times a day  insulin lispro (HumaLOG) corrective regimen sliding scale   SubCutaneous three times a day before meals  insulin lispro (HumaLOG) corrective regimen sliding scale   SubCutaneous at bedtime  losartan 100 milliGRAM(s) Oral daily  senna 2 Tablet(s) Oral at bedtime    MEDICATIONS  (PRN):  acetaminophen   Tablet .. 650 milliGRAM(s) Oral every 6 hours PRN Temp greater or equal to 38.5C (101.3F), Mild Pain (1 - 3)  dextrose 40% Gel 15 Gram(s) Oral once PRN Blood Glucose LESS THAN 70 milliGRAM(s)/deciliter  glucagon  Injectable 1 milliGRAM(s) IntraMuscular once PRN Glucose LESS THAN 70 milligrams/deciliter  melatonin 3 milliGRAM(s) Oral at bedtime PRN Sleep  oxyCODONE    IR 5 milliGRAM(s) Oral every 4 hours PRN Moderate Pain (4 - 6)  oxyCODONE    IR 10 milliGRAM(s) Oral every 4 hours PRN Severe Pain (7 - 10)    	    PHYSICAL EXAM:  T(C): 36.8 (03-16-19 @ 08:11), Max: 37.2 (03-16-19 @ 05:36)  HR: 78 (03-16-19 @ 08:11) (71 - 101)  BP: 120/69 (03-16-19 @ 08:11) (118/85 - 166/97)  RR: 18 (03-16-19 @ 08:11) (14 - 19)  SpO2: 97% (03-16-19 @ 08:11) (94% - 100%)  Wt(kg): --  I&O's Summary    15 Mar 2019 07:01  -  16 Mar 2019 07:00  --------------------------------------------------------  IN: 2020 mL / OUT: 2635 mL / NET: -615 mL    16 Mar 2019 07:01  -  16 Mar 2019 10:26  --------------------------------------------------------  IN: 0 mL / OUT: 200 mL / NET: -200 mL        Appearance: Normal	  HEENT:   Normal oral mucosa, PERRL, EOMI	  Lymphatic: No lymphadenopathy  Cardiovascular: Normal S1 S2, No JVD, No murmurs, No edema  Respiratory: Lungs clear to auscultation	  Psychiatry: A & O x 3, Mood & affect appropriate  Gastrointestinal:  Soft, Non-tender, + BS	  Skin: No rashes, No ecchymoses, No cyanosis	  Neurologic: Non-focal  Extremities: Normal range of motion, No clubbing, cyanosis or edema  Vascular: Peripheral pulses palpable 2+ bilaterally    TELEMETRY: 	    ECG:  	  RADIOLOGY:  OTHER: 	  	  LABS:	 	    CARDIAC MARKERS:                                12.0   7.83  )-----------( 248      ( 14 Mar 2019 14:49 )             37.7     03-16    140  |  105  |  16  ----------------------------<  142<H>  4.2   |  22  |  0.75    Ca    9.0      16 Mar 2019 09:13      proBNP:   Lipid Profile:   HgA1c:   TSH:

## 2019-03-16 NOTE — PROGRESS NOTE ADULT - SUBJECTIVE AND OBJECTIVE BOX
SUBJECTIVE: Patient seen and examined with wife at bedside. No new complaints today. Denies chest pain, shortness of breath, nausea/vomiting.   awaiting TOV    Vital Signs Last 24 Hrs  T(C): 36.7 (16 Mar 2019 14:24), Max: 37.2 (16 Mar 2019 05:36)  T(F): 98 (16 Mar 2019 14:24), Max: 98.9 (16 Mar 2019 05:36)  HR: 72 (16 Mar 2019 14:24) (71 - 101)  BP: 137/85 (16 Mar 2019 14:24) (118/85 - 166/97)  BP(mean): 108 (16 Mar 2019 00:45) (101 - 126)  RR: 18 (16 Mar 2019 14:24) (14 - 19)  SpO2: 95% (16 Mar 2019 14:24) (94% - 100%)    PHYSICAL EXAM:    Constitutional: No Acute Distress, sitting up comfortably in chair    Neurological: Awake, alert, oriented to person, place and time, speech clear and fluent, face equal, tongue midline, briskly following commands, no drift, moves all extremities with good strength - LLE 4/5, RLE 4+/5, sensation intact to light touch throughout, pupils 4mm and reactive bilaterally, extraocular movements intact, no nystagmus    Incision: Rt groin site C/D/I, no hematoma    Pulmonary: Clear to Auscultation, No rales, No rhonchi, No wheezes     Cardiovascular: S1, S2, Regular rate and rhythm     Gastrointestinal: Softly distended, Non-tender, +bowel sounds x 4    Extremities: No calf tenderness bilaterally, no cyanosis, clubbing or edema; +pulses throughout      LABS:         03-16    140  |  105  |  16  ----------------------------<  142<H>  4.2   |  22  |  0.75    Ca    9.0      16 Mar 2019 09:13      IMAGING:     < from: [facility] Duplex Lower Ext Vein ScanIndu (03.11.19 @ 10:05) >  EXAM:  DUPLEX SCAN EXT VEINS LOWER BI                            PROCEDURE DATE:  03/11/2019            INTERPRETATION:  CLINICAL INFORMATION: Lower extremity pain and swelling,   rule out DVT    COMPARISON: Prior examination, dated 3/4/2019, showed no DVT.    TECHNIQUE: Duplex sonography of the BILATERAL LOWER extremities with   color and spectral Doppler, with and without compression.      FINDINGS:    There is normal compressibility of the bilateral common femoral, femoral   and popliteal veins. No calf vein thrombosis is detected.    Doppler examination shows normal spontaneous and phasic flow.    IMPRESSION:     No evidence of bilateral lower extremity deep venous thrombosis.    < end of copied text >      MEDICATIONS  (STANDING):  amLODIPine   Tablet 5 milliGRAM(s) Oral daily  atorvastatin 20 milliGRAM(s) Oral at bedtime  dextrose 5%. 1000 milliLiter(s) (50 mL/Hr) IV Continuous <Continuous>  dextrose 50% Injectable 12.5 Gram(s) IV Push once  dextrose 50% Injectable 25 Gram(s) IV Push once  dextrose 50% Injectable 25 Gram(s) IV Push once  docusate sodium 100 milliGRAM(s) Oral three times a day  gabapentin 600 milliGRAM(s) Oral three times a day  insulin lispro (HumaLOG) corrective regimen sliding scale   SubCutaneous three times a day before meals  insulin lispro (HumaLOG) corrective regimen sliding scale   SubCutaneous at bedtime  losartan 100 milliGRAM(s) Oral daily  senna 2 Tablet(s) Oral at bedtime    MEDICATIONS  (PRN):  acetaminophen   Tablet .. 650 milliGRAM(s) Oral every 6 hours PRN Temp greater or equal to 38.5C (101.3F), Mild Pain (1 - 3)  dextrose 40% Gel 15 Gram(s) Oral once PRN Blood Glucose LESS THAN 70 milliGRAM(s)/deciliter  glucagon  Injectable 1 milliGRAM(s) IntraMuscular once PRN Glucose LESS THAN 70 milligrams/deciliter  melatonin 3 milliGRAM(s) Oral at bedtime PRN Sleep  oxyCODONE    IR 5 milliGRAM(s) Oral every 4 hours PRN Moderate Pain (4 - 6)  oxyCODONE    IR 10 milliGRAM(s) Oral every 4 hours PRN Severe Pain (7 - 10)

## 2019-03-16 NOTE — PROGRESS NOTE ADULT - ASSESSMENT
HTN  labile  cont current meds    DM  Monitor finger stick. Insulin coverage. Diabetic education and Diabetic diet. Consider nutrition consultation.    HLD  on statin    PREOP  Based on current ACC/AHA guidelines, patient history and physical exam, the patient is considered to have low risk  EKG is normal  no objection to proceed to surgery

## 2019-03-16 NOTE — PROGRESS NOTE ADULT - SUBJECTIVE AND OBJECTIVE BOX
Subjective: Patient seen and examined. No new events except as noted.     SUBJECTIVE/ROS:  No chest pain, dyspnea, palpitation, or dizziness.       MEDICATIONS:  MEDICATIONS  (STANDING):  amLODIPine   Tablet 5 milliGRAM(s) Oral daily  atorvastatin 20 milliGRAM(s) Oral at bedtime  dextrose 5%. 1000 milliLiter(s) (50 mL/Hr) IV Continuous <Continuous>  dextrose 50% Injectable 12.5 Gram(s) IV Push once  dextrose 50% Injectable 25 Gram(s) IV Push once  dextrose 50% Injectable 25 Gram(s) IV Push once  docusate sodium 100 milliGRAM(s) Oral three times a day  gabapentin 600 milliGRAM(s) Oral three times a day  insulin lispro (HumaLOG) corrective regimen sliding scale   SubCutaneous three times a day before meals  insulin lispro (HumaLOG) corrective regimen sliding scale   SubCutaneous at bedtime  losartan 100 milliGRAM(s) Oral daily  senna 2 Tablet(s) Oral at bedtime      PHYSICAL EXAM:  T(C): 36.8 (03-16-19 @ 08:11), Max: 37.2 (03-16-19 @ 05:36)  HR: 78 (03-16-19 @ 08:11) (71 - 101)  BP: 120/69 (03-16-19 @ 08:11) (118/85 - 166/97)  RR: 18 (03-16-19 @ 08:11) (14 - 19)  SpO2: 97% (03-16-19 @ 08:11) (94% - 100%)  Wt(kg): --  I&O's Summary    15 Mar 2019 07:01  -  16 Mar 2019 07:00  --------------------------------------------------------  IN: 2020 mL / OUT: 2635 mL / NET: -615 mL    16 Mar 2019 07:01  -  16 Mar 2019 13:27  --------------------------------------------------------  IN: 0 mL / OUT: 300 mL / NET: -300 mL      Height (cm): 177.8 (03-15 @ 18:24)  Weight (kg): 80.7 (03-15 @ 18:24)  BMI (kg/m2): 25.5 (03-15 @ 18:24)  BSA (m2): 1.99 (03-15 @ 18:24)      JVP: Normal  Neck: supple  Lung: clear   CV: S1 S2 , Murmur:  Abd: soft  Ext: No edema  neuro: Awake / alert  Psych: flat affect  Skin: normal``    LABS/DATA:    CARDIAC MARKERS:                                12.0   7.83  )-----------( 248      ( 14 Mar 2019 14:49 )             37.7     03-16    140  |  105  |  16  ----------------------------<  142<H>  4.2   |  22  |  0.75    Ca    9.0      16 Mar 2019 09:13      proBNP:   Lipid Profile:   HgA1c:   TSH:     TELE:  EKG:

## 2019-03-16 NOTE — PROGRESS NOTE ADULT - ASSESSMENT
HPI: 59 YO male developed lower back pain following a fall approximately 1 month ago. 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 Patient c/o lower back pain, decreased sensation in both legs, left leg weakness and difficulty ambulating unassisted due to weakness. 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 Horn & Dr. Willis  -Continue Neurontin 600 TID for neuropathy  -Continue Oxy IR PRN for pain control  -Continue Losartan & Norvasc for hx of HTN  -Continue Lipitor for hx of HLD  -Continue colace, senna for bowel regimen  -Continue HISS for history of T2DM  -Encouraged mobilization, OOB to chair   -Encouraged incentive spirometer  -DVT prophylaxis: bilateral venodynes; no chemoprophylaxis for untreated AVF and possible pre op status  -Dispo: TBD post op with PT eval.  - Dr Kramer following and optimizing patient for OR  preop labs ordered for morning  wife updated at bedside    -Will discuss with Dr. Horn  [facility] # [id number]

## 2019-03-17 ENCOUNTER — TRANSCRIPTION ENCOUNTER (OUTPATIENT)
Age: 59
End: 2019-03-17

## 2019-03-17 LAB
ALBUMIN SERPL ELPH-MCNC: 3.7 G/DL — SIGNIFICANT CHANGE UP (ref 3.3–5)
ALP SERPL-CCNC: 66 U/L — SIGNIFICANT CHANGE UP (ref 40–120)
ALT FLD-CCNC: 20 U/L — SIGNIFICANT CHANGE UP (ref 10–45)
ANION GAP SERPL CALC-SCNC: 13 MMOL/L — SIGNIFICANT CHANGE UP (ref 5–17)
APTT BLD: 29.9 SEC — SIGNIFICANT CHANGE UP (ref 27.5–36.3)
AST SERPL-CCNC: 17 U/L — SIGNIFICANT CHANGE UP (ref 10–40)
BASOPHILS # BLD AUTO: 0.06 K/UL — SIGNIFICANT CHANGE UP (ref 0–0.2)
BASOPHILS NFR BLD AUTO: 1 % — SIGNIFICANT CHANGE UP (ref 0–2)
BILIRUB SERPL-MCNC: 0.4 MG/DL — SIGNIFICANT CHANGE UP (ref 0.2–1.2)
BLD GP AB SCN SERPL QL: NEGATIVE — SIGNIFICANT CHANGE UP
BUN SERPL-MCNC: 12 MG/DL — SIGNIFICANT CHANGE UP (ref 7–23)
CALCIUM SERPL-MCNC: 9.3 MG/DL — SIGNIFICANT CHANGE UP (ref 8.4–10.5)
CHLORIDE SERPL-SCNC: 104 MMOL/L — SIGNIFICANT CHANGE UP (ref 96–108)
CO2 SERPL-SCNC: 22 MMOL/L — SIGNIFICANT CHANGE UP (ref 22–31)
CREAT SERPL-MCNC: 0.7 MG/DL — SIGNIFICANT CHANGE UP (ref 0.5–1.3)
EOSINOPHIL # BLD AUTO: 0.2 K/UL — SIGNIFICANT CHANGE UP (ref 0–0.5)
EOSINOPHIL NFR BLD AUTO: 3.2 % — SIGNIFICANT CHANGE UP (ref 0–6)
GLUCOSE BLDC GLUCOMTR-MCNC: 119 MG/DL — HIGH (ref 70–99)
GLUCOSE BLDC GLUCOMTR-MCNC: 129 MG/DL — HIGH (ref 70–99)
GLUCOSE BLDC GLUCOMTR-MCNC: 140 MG/DL — HIGH (ref 70–99)
GLUCOSE BLDC GLUCOMTR-MCNC: 184 MG/DL — HIGH (ref 70–99)
GLUCOSE SERPL-MCNC: 140 MG/DL — HIGH (ref 70–99)
HCT VFR BLD CALC: 36.4 % — LOW (ref 39–50)
HGB BLD-MCNC: 11.7 G/DL — LOW (ref 13–17)
IMM GRANULOCYTES NFR BLD AUTO: 0.3 % — SIGNIFICANT CHANGE UP (ref 0–1.5)
INR BLD: 1.24 RATIO — HIGH (ref 0.88–1.16)
LYMPHOCYTES # BLD AUTO: 1.38 K/UL — SIGNIFICANT CHANGE UP (ref 1–3.3)
LYMPHOCYTES # BLD AUTO: 22.2 % — SIGNIFICANT CHANGE UP (ref 13–44)
MCHC RBC-ENTMCNC: 26.6 PG — LOW (ref 27–34)
MCHC RBC-ENTMCNC: 32.1 GM/DL — SIGNIFICANT CHANGE UP (ref 32–36)
MCV RBC AUTO: 82.7 FL — SIGNIFICANT CHANGE UP (ref 80–100)
MONOCYTES # BLD AUTO: 0.67 K/UL — SIGNIFICANT CHANGE UP (ref 0–0.9)
MONOCYTES NFR BLD AUTO: 10.8 % — SIGNIFICANT CHANGE UP (ref 2–14)
NEUTROPHILS # BLD AUTO: 3.89 K/UL — SIGNIFICANT CHANGE UP (ref 1.8–7.4)
NEUTROPHILS NFR BLD AUTO: 62.5 % — SIGNIFICANT CHANGE UP (ref 43–77)
PLATELET # BLD AUTO: 241 K/UL — SIGNIFICANT CHANGE UP (ref 150–400)
POTASSIUM SERPL-MCNC: 3.7 MMOL/L — SIGNIFICANT CHANGE UP (ref 3.5–5.3)
POTASSIUM SERPL-SCNC: 3.7 MMOL/L — SIGNIFICANT CHANGE UP (ref 3.5–5.3)
PROT SERPL-MCNC: 6.9 G/DL — SIGNIFICANT CHANGE UP (ref 6–8.3)
PROTHROM AB SERPL-ACNC: 14.3 SEC — HIGH (ref 10–13.1)
RBC # BLD: 4.4 M/UL — SIGNIFICANT CHANGE UP (ref 4.2–5.8)
RBC # FLD: 15.2 % — HIGH (ref 10.3–14.5)
RH IG SCN BLD-IMP: NEGATIVE — SIGNIFICANT CHANGE UP
SODIUM SERPL-SCNC: 139 MMOL/L — SIGNIFICANT CHANGE UP (ref 135–145)
WBC # BLD: 6.22 K/UL — SIGNIFICANT CHANGE UP (ref 3.8–10.5)
WBC # FLD AUTO: 6.22 K/UL — SIGNIFICANT CHANGE UP (ref 3.8–10.5)

## 2019-03-17 PROCEDURE — 99232 SBSQ HOSP IP/OBS MODERATE 35: CPT

## 2019-03-17 RX ORDER — HYDRALAZINE HCL 50 MG
10 TABLET ORAL ONCE
Qty: 0 | Refills: 0 | Status: COMPLETED | OUTPATIENT
Start: 2019-03-17 | End: 2019-03-18

## 2019-03-17 RX ORDER — SODIUM CHLORIDE 9 MG/ML
1000 INJECTION INTRAMUSCULAR; INTRAVENOUS; SUBCUTANEOUS
Qty: 0 | Refills: 0 | Status: DISCONTINUED | OUTPATIENT
Start: 2019-03-17 | End: 2019-03-18

## 2019-03-17 RX ADMIN — GABAPENTIN 600 MILLIGRAM(S): 400 CAPSULE ORAL at 21:58

## 2019-03-17 RX ADMIN — Medication 1: at 13:13

## 2019-03-17 RX ADMIN — SENNA PLUS 2 TABLET(S): 8.6 TABLET ORAL at 21:58

## 2019-03-17 RX ADMIN — Medication 100 MILLIGRAM(S): at 13:13

## 2019-03-17 RX ADMIN — ATORVASTATIN CALCIUM 20 MILLIGRAM(S): 80 TABLET, FILM COATED ORAL at 21:58

## 2019-03-17 RX ADMIN — Medication 650 MILLIGRAM(S): at 21:10

## 2019-03-17 RX ADMIN — Medication 650 MILLIGRAM(S): at 20:40

## 2019-03-17 RX ADMIN — Medication 650 MILLIGRAM(S): at 06:18

## 2019-03-17 RX ADMIN — GABAPENTIN 600 MILLIGRAM(S): 400 CAPSULE ORAL at 13:13

## 2019-03-17 RX ADMIN — Medication 100 MILLIGRAM(S): at 06:18

## 2019-03-17 RX ADMIN — GABAPENTIN 600 MILLIGRAM(S): 400 CAPSULE ORAL at 06:18

## 2019-03-17 RX ADMIN — Medication 3 MILLIGRAM(S): at 00:15

## 2019-03-17 RX ADMIN — Medication 650 MILLIGRAM(S): at 07:20

## 2019-03-17 RX ADMIN — Medication 100 MILLIGRAM(S): at 21:58

## 2019-03-17 RX ADMIN — AMLODIPINE BESYLATE 5 MILLIGRAM(S): 2.5 TABLET ORAL at 06:21

## 2019-03-17 RX ADMIN — LOSARTAN POTASSIUM 100 MILLIGRAM(S): 100 TABLET, FILM COATED ORAL at 06:18

## 2019-03-17 NOTE — PROGRESS NOTE ADULT - ASSESSMENT
HPI: 59 YO male developed lower back pain following a fall approximately 1 month ago. 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 Patient c/o lower back pain, decreased sensation in both legs, left leg weakness and difficulty ambulating unassisted due to weakness. Denied bowel or bladder dysfunction    Tx to [facility] from 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 Horn & Dr. Willis- NPO/IVF @ [facility]  -Continue Neurontin 600 TID for neuropathy  -Continue Oxy IR PRN for pain control  -Continue Losartan & Norvasc for hx of HTN  -Continue Lipitor for hx of HLD  -Continue colace, senna for bowel regimen  -Continue HISS for history of T2DM  -Encouraged mobilization, OOB to chair   -Encouraged incentive spirometer  -DVT prophylaxis: bilateral venodynes; no chemoprophylaxis for untreated AVF/pre op status  -Dispo: TBD post op with PT eval.  - Dr Kramer following and optimized patient for OR  wife updated at bedside    -Will discuss with Dr. Horn  [facility] # [id number]

## 2019-03-17 NOTE — PROGRESS NOTE ADULT - ASSESSMENT
HTN  stable    DM  Monitor finger stick. Insulin coverage.     HLD  on statin    PREOP  Based on current ACC/AHA guidelines, patient history and physical exam, the patient is considered to have low risk  EKG is normal  no objection to proceed to surgery

## 2019-03-17 NOTE — PROGRESS NOTE ADULT - ASSESSMENT
59 YO male w/  lower back pain following a fall approximately 1 month ago.   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   Patient c/o lower back pain, decreased sensation in both legs, left leg weakness and difficulty ambulating unassisted due to weakness.   Denied bowel or bladder dysfunction. Transfer to [facility] from [facility] for w/u and potential sx for t5 dural avf    dm  controlled  cont current regimen  htn stable  echo wnls  dvt proph  cont current meds  medically optimized for sx   pt

## 2019-03-17 NOTE — PROGRESS NOTE ADULT - SUBJECTIVE AND OBJECTIVE BOX
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 No nausea, vomiting, or hematemesis; No diarrhea or constipation. No melena or hematochezia.  GENITOURINARY: No dysuria, frequency, hematuria, or incontinence  NEUROLOGICAL: No headaches, memory loss, loss of strength, numbness, or tremors  SKIN: No itching, burning, rashes, or lesions   LYMPH Nodes: No enlarged glands  ENDOCRINE: No heat or cold intolerance; No hair loss  MUSCULOSKELETAL: occ pain     Medications:  MEDICATIONS  (STANDING):  amLODIPine   Tablet 5 milliGRAM(s) Oral daily  atorvastatin 20 milliGRAM(s) Oral at bedtime  dextrose 5%. 1000 milliLiter(s) (50 mL/Hr) IV Continuous <Continuous>  dextrose 50% Injectable 12.5 Gram(s) IV Push once  dextrose 50% Injectable 25 Gram(s) IV Push once  dextrose 50% Injectable 25 Gram(s) IV Push once  docusate sodium 100 milliGRAM(s) Oral three times a day  gabapentin 600 milliGRAM(s) Oral three times a day  insulin lispro (HumaLOG) corrective regimen sliding scale   SubCutaneous three times a day before meals  insulin lispro (HumaLOG) corrective regimen sliding scale   SubCutaneous at bedtime  losartan 100 milliGRAM(s) Oral daily  senna 2 Tablet(s) Oral at bedtime  sodium chloride 0.9%. 1000 milliLiter(s) (50 mL/Hr) IV Continuous <Continuous>    MEDICATIONS  (PRN):  acetaminophen   Tablet .. 650 milliGRAM(s) Oral every 6 hours PRN Temp greater or equal to 38.5C (101.3F), Mild Pain (1 - 3)  dextrose 40% Gel 15 Gram(s) Oral once PRN Blood Glucose LESS THAN 70 milliGRAM(s)/deciliter  glucagon  Injectable 1 milliGRAM(s) IntraMuscular once PRN Glucose LESS THAN 70 milligrams/deciliter  melatonin 3 milliGRAM(s) Oral at bedtime PRN Sleep  oxyCODONE    IR 5 milliGRAM(s) Oral every 4 hours PRN Moderate Pain (4 - 6)  oxyCODONE    IR 10 milliGRAM(s) Oral every 4 hours PRN Severe Pain (7 - 10)    	    PHYSICAL EXAM:  T(C): 37.1 (03-17-19 @ 08:16), Max: 37.1 (03-16-19 @ 21:21)  HR: 59 (03-17-19 @ 08:16) (59 - 83)  BP: 154/84 (03-17-19 @ 08:16) (124/70 - 164/87)  RR: 48 (03-17-19 @ 08:16) (17 - 48)  SpO2: 97% (03-17-19 @ 08:16) (95% - 99%)  Wt(kg): --  I&O's Summary    16 Mar 2019 07:01  -  17 Mar 2019 07:00  --------------------------------------------------------  IN: 150 mL / OUT: 950 mL / NET: -800 mL        Appearance: Normal	  HEENT:   Normal oral mucosa, PERRL, EOMI	  Lymphatic: No lymphadenopathy  Cardiovascular: Normal S1 S2, No JVD, No murmurs, No edema  Respiratory: Lungs clear to auscultation	  Psychiatry: A & O x 3, Mood & affect appropriate  Gastrointestinal:  Soft, Non-tender, + BS	  Skin: No rashes, No ecchymoses, No cyanosis	  Neurologic: Non-focal  Extremities: Normal range of motion, No clubbing, cyanosis or edema  Vascular: Peripheral pulses palpable 2+ bilaterally    TELEMETRY: 	    ECG:  	  RADIOLOGY:  OTHER: 	  	  LABS:	 	    CARDIAC MARKERS:                                11.7   6.22  )-----------( 241      ( 17 Mar 2019 08:55 )             36.4     03-17    139  |  104  |  12  ----------------------------<  140<H>  3.7   |  22  |  0.70    Ca    9.3      17 Mar 2019 07:04    TPro  6.9  /  Alb  3.7  /  TBili  0.4  /  DBili  x   /  AST  17  /  ALT  20  /  AlkPhos  66  03-17    proBNP:   Lipid Profile:   HgA1c:   TSH:

## 2019-03-17 NOTE — PROGRESS NOTE ADULT - SUBJECTIVE AND OBJECTIVE BOX
Subjective: Patient seen and examined. No new events except as noted.     SUBJECTIVE/ROS:  No chest pain, dyspnea, palpitation, or dizziness.       MEDICATIONS:  MEDICATIONS  (STANDING):  amLODIPine   Tablet 5 milliGRAM(s) Oral daily  atorvastatin 20 milliGRAM(s) Oral at bedtime  dextrose 5%. 1000 milliLiter(s) (50 mL/Hr) IV Continuous <Continuous>  dextrose 50% Injectable 12.5 Gram(s) IV Push once  dextrose 50% Injectable 25 Gram(s) IV Push once  dextrose 50% Injectable 25 Gram(s) IV Push once  docusate sodium 100 milliGRAM(s) Oral three times a day  gabapentin 600 milliGRAM(s) Oral three times a day  insulin lispro (HumaLOG) corrective regimen sliding scale   SubCutaneous three times a day before meals  insulin lispro (HumaLOG) corrective regimen sliding scale   SubCutaneous at bedtime  losartan 100 milliGRAM(s) Oral daily  senna 2 Tablet(s) Oral at bedtime  sodium chloride 0.9%. 1000 milliLiter(s) (50 mL/Hr) IV Continuous <Continuous>      PHYSICAL EXAM:  T(C): 36.7 (03-17-19 @ 13:27), Max: 37.1 (03-16-19 @ 21:21)  HR: 65 (03-17-19 @ 13:27) (59 - 83)  BP: 145/80 (03-17-19 @ 13:27) (124/70 - 164/87)  RR: 65 (03-17-19 @ 13:27) (17 - 65)  SpO2: 97% (03-17-19 @ 13:27) (95% - 99%)  Wt(kg): --  I&O's Summary    16 Mar 2019 07:01  -  17 Mar 2019 07:00  --------------------------------------------------------  IN: 150 mL / OUT: 950 mL / NET: -800 mL            JVP: Normal  Neck: supple  Lung: clear   CV: S1 S2 , Murmur:  Abd: soft  Ext: No edema  neuro: Awake / alert  Psych: flat affect  Skin: normal``    LABS/DATA:    CARDIAC MARKERS:                                11.7   6.22  )-----------( 241      ( 17 Mar 2019 08:55 )             36.4     03-17    139  |  104  |  12  ----------------------------<  140<H>  3.7   |  22  |  0.70    Ca    9.3      17 Mar 2019 07:04    TPro  6.9  /  Alb  3.7  /  TBili  0.4  /  DBili  x   /  AST  17  /  ALT  20  /  AlkPhos  66  03-17    proBNP:   Lipid Profile:   HgA1c:   TSH:     TELE:  EKG:

## 2019-03-18 LAB
ANION GAP SERPL CALC-SCNC: 14 MMOL/L — SIGNIFICANT CHANGE UP (ref 5–17)
ANION GAP SERPL CALC-SCNC: 15 MMOL/L — SIGNIFICANT CHANGE UP (ref 5–17)
BASOPHILS # BLD AUTO: 0 K/UL — SIGNIFICANT CHANGE UP (ref 0–0.2)
BASOPHILS NFR BLD AUTO: 0.1 % — SIGNIFICANT CHANGE UP (ref 0–2)
BUN SERPL-MCNC: 14 MG/DL — SIGNIFICANT CHANGE UP (ref 7–23)
BUN SERPL-MCNC: 16 MG/DL — SIGNIFICANT CHANGE UP (ref 7–23)
CALCIUM SERPL-MCNC: 9.3 MG/DL — SIGNIFICANT CHANGE UP (ref 8.4–10.5)
CALCIUM SERPL-MCNC: 9.5 MG/DL — SIGNIFICANT CHANGE UP (ref 8.4–10.5)
CHLORIDE SERPL-SCNC: 103 MMOL/L — SIGNIFICANT CHANGE UP (ref 96–108)
CHLORIDE SERPL-SCNC: 99 MMOL/L — SIGNIFICANT CHANGE UP (ref 96–108)
CO2 SERPL-SCNC: 21 MMOL/L — LOW (ref 22–31)
CO2 SERPL-SCNC: 22 MMOL/L — SIGNIFICANT CHANGE UP (ref 22–31)
CREAT SERPL-MCNC: 0.69 MG/DL — SIGNIFICANT CHANGE UP (ref 0.5–1.3)
CREAT SERPL-MCNC: 0.79 MG/DL — SIGNIFICANT CHANGE UP (ref 0.5–1.3)
EOSINOPHIL # BLD AUTO: 0 K/UL — SIGNIFICANT CHANGE UP (ref 0–0.5)
EOSINOPHIL NFR BLD AUTO: 0.5 % — SIGNIFICANT CHANGE UP (ref 0–6)
GAS PNL BLDA: SIGNIFICANT CHANGE UP
GAS PNL BLDA: SIGNIFICANT CHANGE UP
GLUCOSE BLDC GLUCOMTR-MCNC: 151 MG/DL — HIGH (ref 70–99)
GLUCOSE SERPL-MCNC: 172 MG/DL — HIGH (ref 70–99)
GLUCOSE SERPL-MCNC: 180 MG/DL — HIGH (ref 70–99)
HCT VFR BLD CALC: 32.1 % — LOW (ref 39–50)
HCT VFR BLD CALC: 33.2 % — LOW (ref 39–50)
HGB BLD-MCNC: 10.7 G/DL — LOW (ref 13–17)
HGB BLD-MCNC: 11.3 G/DL — LOW (ref 13–17)
LYMPHOCYTES # BLD AUTO: 0.4 K/UL — LOW (ref 1–3.3)
LYMPHOCYTES # BLD AUTO: 4.8 % — LOW (ref 13–44)
MAGNESIUM SERPL-MCNC: 1.8 MG/DL — SIGNIFICANT CHANGE UP (ref 1.6–2.6)
MCHC RBC-ENTMCNC: 28 PG — SIGNIFICANT CHANGE UP (ref 27–34)
MCHC RBC-ENTMCNC: 28.5 PG — SIGNIFICANT CHANGE UP (ref 27–34)
MCHC RBC-ENTMCNC: 33.2 GM/DL — SIGNIFICANT CHANGE UP (ref 32–36)
MCHC RBC-ENTMCNC: 33.8 GM/DL — SIGNIFICANT CHANGE UP (ref 32–36)
MCV RBC AUTO: 84.1 FL — SIGNIFICANT CHANGE UP (ref 80–100)
MCV RBC AUTO: 84.4 FL — SIGNIFICANT CHANGE UP (ref 80–100)
MONOCYTES # BLD AUTO: 0.2 K/UL — SIGNIFICANT CHANGE UP (ref 0–0.9)
MONOCYTES NFR BLD AUTO: 1.8 % — LOW (ref 2–14)
NEUTROPHILS # BLD AUTO: 8.1 K/UL — HIGH (ref 1.8–7.4)
NEUTROPHILS NFR BLD AUTO: 92.8 % — HIGH (ref 43–77)
PHOSPHATE SERPL-MCNC: 4.9 MG/DL — HIGH (ref 2.5–4.5)
PLATELET # BLD AUTO: 209 K/UL — SIGNIFICANT CHANGE UP (ref 150–400)
PLATELET # BLD AUTO: 223 K/UL — SIGNIFICANT CHANGE UP (ref 150–400)
POTASSIUM SERPL-MCNC: 3.7 MMOL/L — SIGNIFICANT CHANGE UP (ref 3.5–5.3)
POTASSIUM SERPL-MCNC: 3.9 MMOL/L — SIGNIFICANT CHANGE UP (ref 3.5–5.3)
POTASSIUM SERPL-SCNC: 3.7 MMOL/L — SIGNIFICANT CHANGE UP (ref 3.5–5.3)
POTASSIUM SERPL-SCNC: 3.9 MMOL/L — SIGNIFICANT CHANGE UP (ref 3.5–5.3)
RBC # BLD: 3.8 M/UL — LOW (ref 4.2–5.8)
RBC # BLD: 3.95 M/UL — LOW (ref 4.2–5.8)
RBC # FLD: 14.2 % — SIGNIFICANT CHANGE UP (ref 10.3–14.5)
RBC # FLD: 14.3 % — SIGNIFICANT CHANGE UP (ref 10.3–14.5)
SODIUM SERPL-SCNC: 135 MMOL/L — SIGNIFICANT CHANGE UP (ref 135–145)
SODIUM SERPL-SCNC: 139 MMOL/L — SIGNIFICANT CHANGE UP (ref 135–145)
WBC # BLD: 11.8 K/UL — HIGH (ref 3.8–10.5)
WBC # BLD: 8.7 K/UL — SIGNIFICANT CHANGE UP (ref 3.8–10.5)
WBC # FLD AUTO: 11.8 K/UL — HIGH (ref 3.8–10.5)
WBC # FLD AUTO: 8.7 K/UL — SIGNIFICANT CHANGE UP (ref 3.8–10.5)

## 2019-03-18 PROCEDURE — 99291 CRITICAL CARE FIRST HOUR: CPT

## 2019-03-18 RX ORDER — SODIUM CHLORIDE 9 MG/ML
1000 INJECTION, SOLUTION INTRAVENOUS
Qty: 0 | Refills: 0 | Status: DISCONTINUED | OUTPATIENT
Start: 2019-03-18 | End: 2019-03-26

## 2019-03-18 RX ORDER — ATORVASTATIN CALCIUM 80 MG/1
20 TABLET, FILM COATED ORAL AT BEDTIME
Qty: 0 | Refills: 0 | Status: DISCONTINUED | OUTPATIENT
Start: 2019-03-18 | End: 2019-03-26

## 2019-03-18 RX ORDER — INSULIN LISPRO 100/ML
VIAL (ML) SUBCUTANEOUS
Qty: 0 | Refills: 0 | Status: DISCONTINUED | OUTPATIENT
Start: 2019-03-18 | End: 2019-03-26

## 2019-03-18 RX ORDER — CEFAZOLIN SODIUM 1 G
2000 VIAL (EA) INJECTION EVERY 8 HOURS
Qty: 0 | Refills: 0 | Status: COMPLETED | OUTPATIENT
Start: 2019-03-18 | End: 2019-03-19

## 2019-03-18 RX ORDER — DEXTROSE 50 % IN WATER 50 %
15 SYRINGE (ML) INTRAVENOUS ONCE
Qty: 0 | Refills: 0 | Status: DISCONTINUED | OUTPATIENT
Start: 2019-03-18 | End: 2019-03-26

## 2019-03-18 RX ORDER — GABAPENTIN 400 MG/1
600 CAPSULE ORAL THREE TIMES A DAY
Qty: 0 | Refills: 0 | Status: DISCONTINUED | OUTPATIENT
Start: 2019-03-18 | End: 2019-03-26

## 2019-03-18 RX ORDER — HYDROMORPHONE HYDROCHLORIDE 2 MG/ML
1 INJECTION INTRAMUSCULAR; INTRAVENOUS; SUBCUTANEOUS EVERY 4 HOURS
Qty: 0 | Refills: 0 | Status: DISCONTINUED | OUTPATIENT
Start: 2019-03-18 | End: 2019-03-18

## 2019-03-18 RX ORDER — AMLODIPINE BESYLATE 2.5 MG/1
5 TABLET ORAL DAILY
Qty: 0 | Refills: 0 | Status: DISCONTINUED | OUTPATIENT
Start: 2019-03-18 | End: 2019-03-21

## 2019-03-18 RX ORDER — SENNA PLUS 8.6 MG/1
2 TABLET ORAL AT BEDTIME
Qty: 0 | Refills: 0 | Status: DISCONTINUED | OUTPATIENT
Start: 2019-03-18 | End: 2019-03-18

## 2019-03-18 RX ORDER — DEXTROSE 50 % IN WATER 50 %
25 SYRINGE (ML) INTRAVENOUS ONCE
Qty: 0 | Refills: 0 | Status: DISCONTINUED | OUTPATIENT
Start: 2019-03-18 | End: 2019-03-26

## 2019-03-18 RX ORDER — ACETAMINOPHEN 500 MG
650 TABLET ORAL EVERY 6 HOURS
Qty: 0 | Refills: 0 | Status: DISCONTINUED | OUTPATIENT
Start: 2019-03-18 | End: 2019-03-26

## 2019-03-18 RX ORDER — DIAZEPAM 5 MG
5 TABLET ORAL EVERY 8 HOURS
Qty: 0 | Refills: 0 | Status: DISCONTINUED | OUTPATIENT
Start: 2019-03-18 | End: 2019-03-20

## 2019-03-18 RX ORDER — OXYCODONE HYDROCHLORIDE 5 MG/1
5 TABLET ORAL EVERY 4 HOURS
Qty: 0 | Refills: 0 | Status: DISCONTINUED | OUTPATIENT
Start: 2019-03-18 | End: 2019-03-18

## 2019-03-18 RX ORDER — SENNA PLUS 8.6 MG/1
2 TABLET ORAL AT BEDTIME
Qty: 0 | Refills: 0 | Status: DISCONTINUED | OUTPATIENT
Start: 2019-03-18 | End: 2019-03-26

## 2019-03-18 RX ORDER — HYDROMORPHONE HYDROCHLORIDE 2 MG/ML
0.5 INJECTION INTRAMUSCULAR; INTRAVENOUS; SUBCUTANEOUS
Qty: 0 | Refills: 0 | Status: DISCONTINUED | OUTPATIENT
Start: 2019-03-18 | End: 2019-03-19

## 2019-03-18 RX ORDER — DOCUSATE SODIUM 100 MG
100 CAPSULE ORAL THREE TIMES A DAY
Qty: 0 | Refills: 0 | Status: DISCONTINUED | OUTPATIENT
Start: 2019-03-18 | End: 2019-03-18

## 2019-03-18 RX ORDER — GLUCAGON INJECTION, SOLUTION 0.5 MG/.1ML
1 INJECTION, SOLUTION SUBCUTANEOUS ONCE
Qty: 0 | Refills: 0 | Status: DISCONTINUED | OUTPATIENT
Start: 2019-03-18 | End: 2019-03-26

## 2019-03-18 RX ORDER — SODIUM CHLORIDE 9 MG/ML
1000 INJECTION INTRAMUSCULAR; INTRAVENOUS; SUBCUTANEOUS
Qty: 0 | Refills: 0 | Status: DISCONTINUED | OUTPATIENT
Start: 2019-03-18 | End: 2019-03-20

## 2019-03-18 RX ORDER — DEXTROSE 50 % IN WATER 50 %
12.5 SYRINGE (ML) INTRAVENOUS ONCE
Qty: 0 | Refills: 0 | Status: DISCONTINUED | OUTPATIENT
Start: 2019-03-18 | End: 2019-03-26

## 2019-03-18 RX ORDER — LOSARTAN POTASSIUM 100 MG/1
100 TABLET, FILM COATED ORAL DAILY
Qty: 0 | Refills: 0 | Status: DISCONTINUED | OUTPATIENT
Start: 2019-03-18 | End: 2019-03-26

## 2019-03-18 RX ORDER — LABETALOL HCL 100 MG
5 TABLET ORAL ONCE
Qty: 0 | Refills: 0 | Status: COMPLETED | OUTPATIENT
Start: 2019-03-18 | End: 2019-03-18

## 2019-03-18 RX ORDER — CALCIUM CARBONATE 500(1250)
1 TABLET ORAL EVERY 4 HOURS
Qty: 0 | Refills: 0 | Status: DISCONTINUED | OUTPATIENT
Start: 2019-03-18 | End: 2019-03-26

## 2019-03-18 RX ORDER — LANOLIN ALCOHOL/MO/W.PET/CERES
3 CREAM (GRAM) TOPICAL AT BEDTIME
Qty: 0 | Refills: 0 | Status: DISCONTINUED | OUTPATIENT
Start: 2019-03-18 | End: 2019-03-26

## 2019-03-18 RX ORDER — FAMOTIDINE 10 MG/ML
20 INJECTION INTRAVENOUS ONCE
Qty: 0 | Refills: 0 | Status: COMPLETED | OUTPATIENT
Start: 2019-03-18 | End: 2019-03-18

## 2019-03-18 RX ORDER — OXYCODONE HYDROCHLORIDE 5 MG/1
10 TABLET ORAL EVERY 4 HOURS
Qty: 0 | Refills: 0 | Status: DISCONTINUED | OUTPATIENT
Start: 2019-03-18 | End: 2019-03-18

## 2019-03-18 RX ORDER — HYDROMORPHONE HYDROCHLORIDE 2 MG/ML
1 INJECTION INTRAMUSCULAR; INTRAVENOUS; SUBCUTANEOUS
Qty: 0 | Refills: 0 | Status: DISCONTINUED | OUTPATIENT
Start: 2019-03-18 | End: 2019-03-19

## 2019-03-18 RX ORDER — ENOXAPARIN SODIUM 100 MG/ML
40 INJECTION SUBCUTANEOUS EVERY 24 HOURS
Qty: 0 | Refills: 0 | Status: DISCONTINUED | OUTPATIENT
Start: 2019-03-19 | End: 2019-03-26

## 2019-03-18 RX ORDER — DOCUSATE SODIUM 100 MG
100 CAPSULE ORAL THREE TIMES A DAY
Qty: 0 | Refills: 0 | Status: DISCONTINUED | OUTPATIENT
Start: 2019-03-18 | End: 2019-03-26

## 2019-03-18 RX ADMIN — Medication 1 TABLET(S): at 20:00

## 2019-03-18 RX ADMIN — AMLODIPINE BESYLATE 5 MILLIGRAM(S): 2.5 TABLET ORAL at 05:18

## 2019-03-18 RX ADMIN — SODIUM CHLORIDE 50 MILLILITER(S): 9 INJECTION INTRAMUSCULAR; INTRAVENOUS; SUBCUTANEOUS at 00:42

## 2019-03-18 RX ADMIN — HYDROMORPHONE HYDROCHLORIDE 0.5 MILLIGRAM(S): 2 INJECTION INTRAMUSCULAR; INTRAVENOUS; SUBCUTANEOUS at 15:45

## 2019-03-18 RX ADMIN — Medication 100 MILLIGRAM(S): at 20:06

## 2019-03-18 RX ADMIN — FAMOTIDINE 20 MILLIGRAM(S): 10 INJECTION INTRAVENOUS at 21:46

## 2019-03-18 RX ADMIN — OXYCODONE HYDROCHLORIDE 5 MILLIGRAM(S): 5 TABLET ORAL at 02:45

## 2019-03-18 RX ADMIN — LOSARTAN POTASSIUM 100 MILLIGRAM(S): 100 TABLET, FILM COATED ORAL at 05:18

## 2019-03-18 RX ADMIN — Medication 1: at 15:59

## 2019-03-18 RX ADMIN — Medication 3 MILLIGRAM(S): at 00:41

## 2019-03-18 RX ADMIN — HYDROMORPHONE HYDROCHLORIDE 0.5 MILLIGRAM(S): 2 INJECTION INTRAMUSCULAR; INTRAVENOUS; SUBCUTANEOUS at 14:40

## 2019-03-18 RX ADMIN — HYDROMORPHONE HYDROCHLORIDE 0.5 MILLIGRAM(S): 2 INJECTION INTRAMUSCULAR; INTRAVENOUS; SUBCUTANEOUS at 16:45

## 2019-03-18 RX ADMIN — GABAPENTIN 600 MILLIGRAM(S): 400 CAPSULE ORAL at 05:18

## 2019-03-18 RX ADMIN — OXYCODONE HYDROCHLORIDE 5 MILLIGRAM(S): 5 TABLET ORAL at 01:46

## 2019-03-18 RX ADMIN — SODIUM CHLORIDE 50 MILLILITER(S): 9 INJECTION INTRAMUSCULAR; INTRAVENOUS; SUBCUTANEOUS at 14:48

## 2019-03-18 RX ADMIN — SENNA PLUS 2 TABLET(S): 8.6 TABLET ORAL at 21:13

## 2019-03-18 RX ADMIN — HYDROMORPHONE HYDROCHLORIDE 0.5 MILLIGRAM(S): 2 INJECTION INTRAMUSCULAR; INTRAVENOUS; SUBCUTANEOUS at 15:30

## 2019-03-18 RX ADMIN — Medication 5 MILLIGRAM(S): at 17:13

## 2019-03-18 RX ADMIN — Medication 100 MILLIGRAM(S): at 05:18

## 2019-03-18 RX ADMIN — Medication 10 MILLIGRAM(S): at 00:42

## 2019-03-18 RX ADMIN — Medication 1 TABLET(S): at 19:01

## 2019-03-18 RX ADMIN — Medication 100 MILLIGRAM(S): at 21:13

## 2019-03-18 RX ADMIN — GABAPENTIN 600 MILLIGRAM(S): 400 CAPSULE ORAL at 21:13

## 2019-03-18 RX ADMIN — HYDROMORPHONE HYDROCHLORIDE 0.5 MILLIGRAM(S): 2 INJECTION INTRAMUSCULAR; INTRAVENOUS; SUBCUTANEOUS at 17:00

## 2019-03-18 RX ADMIN — HYDROMORPHONE HYDROCHLORIDE 0.5 MILLIGRAM(S): 2 INJECTION INTRAMUSCULAR; INTRAVENOUS; SUBCUTANEOUS at 14:25

## 2019-03-18 RX ADMIN — ATORVASTATIN CALCIUM 20 MILLIGRAM(S): 80 TABLET, FILM COATED ORAL at 21:13

## 2019-03-18 NOTE — PROGRESS NOTE ADULT - ASSESSMENT
HTN  stable    DM  Monitor finger stick. Insulin coverage.     HLD  on statin    PREOP  Based on current ACC/AHA guidelines, patient history and physical exam, the patient is considered to have low risk  EKG is normal  no objection to proceed to surgery today

## 2019-03-18 NOTE — PROGRESS NOTE ADULT - SUBJECTIVE AND OBJECTIVE BOX
Subjective: Patient seen and examined. No new events except as noted.     SUBJECTIVE/ROS:  No chest pain, dyspnea, palpitation, or dizziness.       MEDICATIONS:  MEDICATIONS  (STANDING):      PHYSICAL EXAM:  T(C): 36.8 (03-18-19 @ 05:12), Max: 37.1 (03-17-19 @ 08:16)  HR: 67 (03-18-19 @ 05:12) (59 - 78)  BP: 134/82 (03-18-19 @ 05:12) (134/82 - 169/93)  RR: 18 (03-18-19 @ 05:12) (18 - 65)  SpO2: 97% (03-18-19 @ 05:12) (97% - 98%)  Wt(kg): --  I&O's Summary    17 Mar 2019 07:01  -  18 Mar 2019 07:00  --------------------------------------------------------  IN: 0 mL / OUT: 1000 mL / NET: -1000 mL            JVP: Normal  Neck: supple  Lung: clear   CV: S1 S2 , Murmur:  Abd: soft  Ext: No edema  neuro: Awake / alert  Psych: flat affect  Skin: normal``    LABS/DATA:    CARDIAC MARKERS:                                11.7   6.22  )-----------( 241      ( 17 Mar 2019 08:55 )             36.4     03-17    139  |  104  |  12  ----------------------------<  140<H>  3.7   |  22  |  0.70    Ca    9.3      17 Mar 2019 07:04    TPro  6.9  /  Alb  3.7  /  TBili  0.4  /  DBili  x   /  AST  17  /  ALT  20  /  AlkPhos  66  03-17    proBNP:   Lipid Profile:   HgA1c:   TSH:     TELE:  EKG:

## 2019-03-18 NOTE — PRE-ANESTHESIA EVALUATION ADULT - NSANTHOSAYNRD_GEN_A_CORE
No. GEM screening performed.  STOP BANG Legend: 0-2 = LOW Risk; 3-4 = INTERMEDIATE Risk; 5-8 = HIGH Risk

## 2019-03-18 NOTE — PROGRESS NOTE ADULT - ASSESSMENT
Summary: 59 YO male who presented w/ lower back pain, decreased sensation in both legs, left leg weakness and difficulty ambulating and was 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   s/p T4 - T8 laminoplasty for obliteration of dural AVM (3/18/19).      NEURO:  q1h neuro checks  MAP > 85 for 24 hours, per NSGY  Keep flat for 24 hours, per NSGY  Repeat angio in a couple of days per NSGY  Pain control w/ Dilaudid prn  Also, on Gabapentin 600 TID, and Diazepam prn    PULM:  sat > 92%  Incentive spirometry, if able    CARDS:  MAP > 85 for 24 hours  Continued on amlodipine 5 daily  Continued on Atorvastatin 20 qHS    GASTRO:  advance as tolerated  Bowel regimen  ---> Stress ulcer prophylaxis:  Not indicated    RENAL:  On NS @ 50 ml/hr    HEME:  monitor H/H    ---> DVT prophylaxis: SCDs, Lovenox 40 sc daily    ENDO:  Goal Euglycemia  FS and SSI    ID:  Monitor for fevers    Code status:  Full code  Disposition:  ICU    This patient was at high risk of neurologic deterioration and/or death due to: hemorrhage, neurogenic shock.     Time spent:  45 minutes Summary: 59 YO male who presented w/ lower back pain, decreased sensation in both legs, left leg weakness and difficulty ambulating and was 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   s/p T4 - T8 laminoplasty for obliteration of dural AVM (3/18/19).      NEURO:  q1h neuro checks  MAP > 85 for 24 hours, per NSGY  Keep flat for 24 hours, per NSGY  Repeat angio in a couple of days per NSGY  Surgical drains per NSGY  Pain control w/ Dilaudid prn  Also, on Gabapentin 600 TID, and Diazepam prn    PULM:  sat > 92%  Incentive spirometry, if able    CARDS:  MAP > 85 for 24 hours  Continued on amlodipine 5 daily  Continued on Atorvastatin 20 qHS    GASTRO:  advance as tolerated  Bowel regimen  ---> Stress ulcer prophylaxis:  Not indicated    RENAL:  On NS @ 50 ml/hr    HEME:  monitor H/H    ---> DVT prophylaxis: SCDs, Lovenox 40 sc daily    ENDO:  Goal Euglycemia  FS and SSI    ID:  Monitor for fevers    Code status:  Full code  Disposition:  ICU    This patient was at high risk of neurologic deterioration and/or death due to: hemorrhage, neurogenic shock.     Time spent:  45 minutes Summary: 59 YO male who presented w/ lower back pain, decreased sensation in both legs, left leg weakness and difficulty ambulating and was 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   s/p T4 - T8 laminoplasty for obliteration of dural AVM (3/18/19).      NEURO:  q1h neuro checks  MAP > 85 for 24 hours, per NSGY  Keep flat for 24 hours, per NSGY  Repeat angio in a couple of days per NSGY  Surgical drains per NSGY  Pain control w/ Dilaudid prn  Also, on Gabapentin 600 TID, and Diazepam prn    PULM:  sat > 92%  Incentive spirometry, if able    CARDS:  MAP > 85 for 24 hours  Continued on amlodipine 5 daily  Continued on Atorvastatin 20 qHS    GASTRO:  advance as tolerated  Bowel regimen  ---> Stress ulcer prophylaxis:  Not indicated    RENAL:  On NS @ 50 ml/hr    HEME:  monitor H/H    ---> DVT prophylaxis: SCDs, Lovenox 40 sc daily    ENDO:  Goal Euglycemia  FS and SSI    ID:  Monitor for fevers    Code status:  Full code  Disposition:  ICU    This patient was at high risk of neurologic deterioration and/or death due to: post operative hemorrhage/stroke, cord compression    Time spent:  45 minutes

## 2019-03-18 NOTE — PRE-ANESTHESIA EVALUATION ADULT - MALLAMPATI CLASS
Class III - visualization of the soft palate and the base of the uvula
Class II - visualization of the soft palate, fauces, and uvula
Class II - visualization of the soft palate, fauces, and uvula

## 2019-03-18 NOTE — PROGRESS NOTE ADULT - SUBJECTIVE AND OBJECTIVE BOX
CHIEF COMPLAINT:Patient is a 58y old  Male who presents with a chief complaint of spinal AVM (18 Mar 2019 07:28)    	seen earlier         PAST MEDICAL & SURGICAL HISTORY:  HLD (hyperlipidemia)  Essential hypertension  DM (diabetes mellitus)          REVIEW OF SYSTEMS:  CONSTITUTIONAL: No fever, weight loss, or fatigue  EYES: No eye pain, visual disturbances, or discharge  NECK: No pain or stiffness  RESPIRATORY: No cough, wheezing, chills or hemoptysis; No Shortness of Breath  CARDIOVASCULAR: No chest pain, palpitations, passing out, dizziness, or leg swelling  GASTROINTESTINAL: No abdominal or epigastric pain. No nausea, vomiting, or hematemesis; No diarrhea or constipation. No melena or hematochezia.  GENITOURINARY: No dysuria, frequency, hematuria, or incontinence  NEUROLOGICAL: No headaches, memory loss, loss of strength, numbness, or tremors  SKIN: No itching, burning, rashes, or lesions   LYMPH Nodes: No enlarged glands  ENDOCRINE: No heat or cold intolerance; No hair loss  MUSCULOSKELETAL: No joint pain or swelling; No muscle, back, or extremity pain    Medications:  MEDICATIONS  (STANDING):    MEDICATIONS  (PRN):    	    PHYSICAL EXAM:  T(C): 36.8 (03-18-19 @ 05:12), Max: 36.9 (03-17-19 @ 23:45)  HR: 67 (03-18-19 @ 05:12) (65 - 78)  BP: 134/82 (03-18-19 @ 05:12) (134/82 - 169/93)  RR: 18 (03-18-19 @ 05:12) (18 - 65)  SpO2: 97% (03-18-19 @ 05:12) (97% - 98%)  Wt(kg): --  I&O's Summary    17 Mar 2019 07:01  -  18 Mar 2019 07:00  --------------------------------------------------------  IN: 0 mL / OUT: 1000 mL / NET: -1000 mL        Appearance: Normal	  HEENT:   Normal oral mucosa, PERRL, EOMI	  Lymphatic: No lymphadenopathy  Cardiovascular: Normal S1 S2, No JVD, No murmurs, No edema  Respiratory: Lungs clear to auscultation	  Psychiatry: A & O x 3, Mood & affect appropriate  Gastrointestinal:  Soft, Non-tender, + BS	  Skin: No rashes, No ecchymoses, No cyanosis	  Neurologic: Non-focal  Extremities: Normal range of motion, No clubbing, cyanosis or edema  Vascular: Peripheral pulses palpable 2+ bilaterally    TELEMETRY: 	    ECG:  	  RADIOLOGY:  OTHER: 	  	  LABS:	 	    CARDIAC MARKERS:                                11.7   6.22  )-----------( 241      ( 17 Mar 2019 08:55 )             36.4     03-17    139  |  104  |  12  ----------------------------<  140<H>  3.7   |  22  |  0.70    Ca    9.3      17 Mar 2019 07:04    TPro  6.9  /  Alb  3.7  /  TBili  0.4  /  DBili  x   /  AST  17  /  ALT  20  /  AlkPhos  66  03-17    proBNP:   Lipid Profile:   HgA1c:   TSH:

## 2019-03-18 NOTE — PROGRESS NOTE ADULT - ASSESSMENT
57 YO male w/  lower back pain following a fall approximately 1 month ago.   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   Patient c/o lower back pain, decreased sensation in both legs, left leg weakness and difficulty ambulating unassisted due to weakness.   Denied bowel or bladder dysfunction. Transfer to [facility] from [facility] for w/u and potential sx for t5 dural avf    dm  controlled  cont current regimen  htn stable  echo wnls  dvt proph  cont current meds  medically optimized for sx today   pt

## 2019-03-18 NOTE — PROGRESS NOTE ADULT - SUBJECTIVE AND OBJECTIVE BOX
SUMMARY: 57 YO male developed lower back pain following a fall approximately 1 month ago. Patient c/o lower back pain, decreased sensation in both legs, left leg weakness and difficulty ambulating unassisted due to weakness. Denied bowel or bladder dysfunction. 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 s/p T4 - T8 laminoplasty for repair of dural AVM.      On admission, the patient was:  GCS:     VITALS:  T(C): , Max: 36.9 (03-17-19 @ 23:45)  HR:  (67 - 97)  BP:  (124/67 - 169/93)  ABP:  (127/65 - 157/78)  RR:  (16 - 18)  SpO2:  (96% - 99%)  Wt(kg): --      03-17-19 @ 07:01  -  03-18-19 @ 07:00  --------------------------------------------------------  IN: 0 mL / OUT: 1000 mL / NET: -1000 mL    03-18-19 @ 07:01  -  03-18-19 @ 15:07  --------------------------------------------------------  IN: 50 mL / OUT: 45 mL / NET: 5 mL      LABS:  Na: 139 (03-18 @ 14:07), 139 (03-17 @ 07:04), 140 (03-16 @ 09:13)  K: 3.9 (03-18 @ 14:07), 3.7 (03-17 @ 07:04), 4.2 (03-16 @ 09:13)  Cl: 103 (03-18 @ 14:07), 104 (03-17 @ 07:04), 105 (03-16 @ 09:13)  CO2: 22 (03-18 @ 14:07), 22 (03-17 @ 07:04), 22 (03-16 @ 09:13)  BUN: 16 (03-18 @ 14:07), 12 (03-17 @ 07:04), 16 (03-16 @ 09:13)  Cr: 0.79 (03-18 @ 14:07), 0.70 (03-17 @ 07:04), 0.75 (03-16 @ 09:13)  Glu: 180(03-18 @ 14:07), 140(03-17 @ 07:04), 142(03-16 @ 09:13)    Hgb: 10.7 (03-18 @ 14:07), 11.7 (03-17 @ 08:55)  Hct: 32.1 (03-18 @ 14:07), 36.4 (03-17 @ 08:55)  WBC: 8.7 (03-18 @ 14:07), 6.22 (03-17 @ 08:55)  Plt: 209 (03-18 @ 14:07), 241 (03-17 @ 08:55)      IMAGING:   Recent imaging studies were reviewed.    MEDICATIONS:  acetaminophen   Tablet .. 650 milliGRAM(s) Oral every 6 hours PRN  amLODIPine   Tablet 5 milliGRAM(s) Oral daily  atorvastatin 20 milliGRAM(s) Oral at bedtime  ceFAZolin   IVPB 2000 milliGRAM(s) IV Intermittent every 8 hours  dextrose 40% Gel 15 Gram(s) Oral once PRN  dextrose 5%. 1000 milliLiter(s) IV Continuous <Continuous>  dextrose 50% Injectable 12.5 Gram(s) IV Push once  dextrose 50% Injectable 25 Gram(s) IV Push once  dextrose 50% Injectable 25 Gram(s) IV Push once  diazepam    Tablet 5 milliGRAM(s) Oral every 8 hours PRN  docusate sodium 100 milliGRAM(s) Oral three times a day  gabapentin 600 milliGRAM(s) Oral three times a day  glucagon  Injectable 1 milliGRAM(s) IntraMuscular once PRN  HYDROmorphone  Injectable 0.5 milliGRAM(s) IV Push every 10 minutes PRN  HYDROmorphone  Injectable 1 milliGRAM(s) IV Push every 10 minutes PRN  HYDROmorphone  Injectable 1 milliGRAM(s) IV Push every 4 hours PRN  insulin lispro (HumaLOG) corrective regimen sliding scale   SubCutaneous three times a day before meals  losartan 100 milliGRAM(s) Oral daily  melatonin 3 milliGRAM(s) Oral at bedtime PRN  oxyCODONE    IR 5 milliGRAM(s) Oral every 4 hours PRN  oxyCODONE    IR 10 milliGRAM(s) Oral every 4 hours PRN  senna 2 Tablet(s) Oral at bedtime  sodium chloride 0.9%. 1000 milliLiter(s) IV Continuous <Continuous>    EXAMINATION:  General:  calm  HEENT:  MMM  Neuro:   Cards:  RRR  Respiratory:  no respiratory distress  Adomen:  soft  Extremities:  no edema  Skin:  warm/dry SUMMARY: 59 YO male developed lower back pain following a fall approximately 1 month ago. Patient c/o lower back pain, decreased sensation in both legs, left leg weakness and difficulty ambulating unassisted due to weakness. Denied bowel or bladder dysfunction. 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 s/p T4 - T8 laminoplasty for repair of dural AVM.      On admission, the patient was:  GCS: 15    VITALS:  T(C): , Max: 36.9 (03-17-19 @ 23:45)  HR:  (67 - 97)  BP:  (124/67 - 169/93)  ABP:  (127/65 - 157/78)  RR:  (16 - 18)  SpO2:  (96% - 99%)  Wt(kg): --      03-17-19 @ 07:01  -  03-18-19 @ 07:00  --------------------------------------------------------  IN: 0 mL / OUT: 1000 mL / NET: -1000 mL    03-18-19 @ 07:01  -  03-18-19 @ 15:07  --------------------------------------------------------  IN: 50 mL / OUT: 45 mL / NET: 5 mL      LABS:  Na: 139 (03-18 @ 14:07), 139 (03-17 @ 07:04), 140 (03-16 @ 09:13)  K: 3.9 (03-18 @ 14:07), 3.7 (03-17 @ 07:04), 4.2 (03-16 @ 09:13)  Cl: 103 (03-18 @ 14:07), 104 (03-17 @ 07:04), 105 (03-16 @ 09:13)  CO2: 22 (03-18 @ 14:07), 22 (03-17 @ 07:04), 22 (03-16 @ 09:13)  BUN: 16 (03-18 @ 14:07), 12 (03-17 @ 07:04), 16 (03-16 @ 09:13)  Cr: 0.79 (03-18 @ 14:07), 0.70 (03-17 @ 07:04), 0.75 (03-16 @ 09:13)  Glu: 180(03-18 @ 14:07), 140(03-17 @ 07:04), 142(03-16 @ 09:13)    Hgb: 10.7 (03-18 @ 14:07), 11.7 (03-17 @ 08:55)  Hct: 32.1 (03-18 @ 14:07), 36.4 (03-17 @ 08:55)  WBC: 8.7 (03-18 @ 14:07), 6.22 (03-17 @ 08:55)  Plt: 209 (03-18 @ 14:07), 241 (03-17 @ 08:55)      IMAGING:   Recent imaging studies were reviewed.    MEDICATIONS:  acetaminophen   Tablet .. 650 milliGRAM(s) Oral every 6 hours PRN  amLODIPine   Tablet 5 milliGRAM(s) Oral daily  atorvastatin 20 milliGRAM(s) Oral at bedtime  ceFAZolin   IVPB 2000 milliGRAM(s) IV Intermittent every 8 hours  dextrose 40% Gel 15 Gram(s) Oral once PRN  dextrose 5%. 1000 milliLiter(s) IV Continuous <Continuous>  dextrose 50% Injectable 12.5 Gram(s) IV Push once  dextrose 50% Injectable 25 Gram(s) IV Push once  dextrose 50% Injectable 25 Gram(s) IV Push once  diazepam    Tablet 5 milliGRAM(s) Oral every 8 hours PRN  docusate sodium 100 milliGRAM(s) Oral three times a day  gabapentin 600 milliGRAM(s) Oral three times a day  glucagon  Injectable 1 milliGRAM(s) IntraMuscular once PRN  HYDROmorphone  Injectable 0.5 milliGRAM(s) IV Push every 10 minutes PRN  HYDROmorphone  Injectable 1 milliGRAM(s) IV Push every 10 minutes PRN  HYDROmorphone  Injectable 1 milliGRAM(s) IV Push every 4 hours PRN  insulin lispro (HumaLOG) corrective regimen sliding scale   SubCutaneous three times a day before meals  losartan 100 milliGRAM(s) Oral daily  melatonin 3 milliGRAM(s) Oral at bedtime PRN  oxyCODONE    IR 5 milliGRAM(s) Oral every 4 hours PRN  oxyCODONE    IR 10 milliGRAM(s) Oral every 4 hours PRN  senna 2 Tablet(s) Oral at bedtime  sodium chloride 0.9%. 1000 milliLiter(s) IV Continuous <Continuous>    EXAMINATION:  General:  calm  HEENT:  MMM. PERRL. EOMI.   Neuro: CN 2-12 grossly normal, except for decreased sensation in L V2, V3 - chronic per pt. b/l UE 5/5. b/l LE 4/5 distally (symmetric b/l). Reports decreased sensation in b/l LE (compared to UE) - chronic, per pt.   Cards:  RRR  Respiratory:  CTAB. Limited exam. Anterior auscultation only.   Abdomen:  soft  Extremities:  no edema  Skin:  warm/dry SUMMARY: 59 YO male developed lower back pain following a fall approximately 1 month ago. Patient c/o lower back pain, decreased sensation in both legs, left leg weakness and difficulty ambulating unassisted due to weakness. Denied bowel or bladder dysfunction. 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 s/p T4 - T8 laminoplasty for repair of dural AVM.      On admission, the patient was:  GCS: 15    VITALS:  T(C): , Max: 36.9 (03-17-19 @ 23:45)  HR:  (67 - 97)  BP:  (124/67 - 169/93)  ABP:  (127/65 - 157/78)  RR:  (16 - 18)  SpO2:  (96% - 99%)  Wt(kg): --      03-17-19 @ 07:01  -  03-18-19 @ 07:00  --------------------------------------------------------  IN: 0 mL / OUT: 1000 mL / NET: -1000 mL    03-18-19 @ 07:01  -  03-18-19 @ 15:07  --------------------------------------------------------  IN: 50 mL / OUT: 45 mL / NET: 5 mL      LABS:  Na: 139 (03-18 @ 14:07), 139 (03-17 @ 07:04), 140 (03-16 @ 09:13)  K: 3.9 (03-18 @ 14:07), 3.7 (03-17 @ 07:04), 4.2 (03-16 @ 09:13)  Cl: 103 (03-18 @ 14:07), 104 (03-17 @ 07:04), 105 (03-16 @ 09:13)  CO2: 22 (03-18 @ 14:07), 22 (03-17 @ 07:04), 22 (03-16 @ 09:13)  BUN: 16 (03-18 @ 14:07), 12 (03-17 @ 07:04), 16 (03-16 @ 09:13)  Cr: 0.79 (03-18 @ 14:07), 0.70 (03-17 @ 07:04), 0.75 (03-16 @ 09:13)  Glu: 180(03-18 @ 14:07), 140(03-17 @ 07:04), 142(03-16 @ 09:13)    Hgb: 10.7 (03-18 @ 14:07), 11.7 (03-17 @ 08:55)  Hct: 32.1 (03-18 @ 14:07), 36.4 (03-17 @ 08:55)  WBC: 8.7 (03-18 @ 14:07), 6.22 (03-17 @ 08:55)  Plt: 209 (03-18 @ 14:07), 241 (03-17 @ 08:55)      IMAGING:   Recent imaging studies were reviewed.    MEDICATIONS:  acetaminophen   Tablet .. 650 milliGRAM(s) Oral every 6 hours PRN  amLODIPine   Tablet 5 milliGRAM(s) Oral daily  atorvastatin 20 milliGRAM(s) Oral at bedtime  ceFAZolin   IVPB 2000 milliGRAM(s) IV Intermittent every 8 hours  dextrose 40% Gel 15 Gram(s) Oral once PRN  dextrose 5%. 1000 milliLiter(s) IV Continuous <Continuous>  dextrose 50% Injectable 12.5 Gram(s) IV Push once  dextrose 50% Injectable 25 Gram(s) IV Push once  dextrose 50% Injectable 25 Gram(s) IV Push once  diazepam    Tablet 5 milliGRAM(s) Oral every 8 hours PRN  docusate sodium 100 milliGRAM(s) Oral three times a day  gabapentin 600 milliGRAM(s) Oral three times a day  glucagon  Injectable 1 milliGRAM(s) IntraMuscular once PRN  HYDROmorphone  Injectable 0.5 milliGRAM(s) IV Push every 10 minutes PRN  HYDROmorphone  Injectable 1 milliGRAM(s) IV Push every 10 minutes PRN  HYDROmorphone  Injectable 1 milliGRAM(s) IV Push every 4 hours PRN  insulin lispro (HumaLOG) corrective regimen sliding scale   SubCutaneous three times a day before meals  losartan 100 milliGRAM(s) Oral daily  melatonin 3 milliGRAM(s) Oral at bedtime PRN  oxyCODONE    IR 5 milliGRAM(s) Oral every 4 hours PRN  oxyCODONE    IR 10 milliGRAM(s) Oral every 4 hours PRN  senna 2 Tablet(s) Oral at bedtime  sodium chloride 0.9%. 1000 milliLiter(s) IV Continuous <Continuous>    EXAMINATION:  General:  calm  HEENT:  MMM. PERRL. EOMI.   Neuro: CN 2-12 grossly normal, except for decreased sensation in L V2, V3 - chronic per pt. b/l UE 5/5. b/l LE 4/5 distally (symmetric b/l). Reports decreased sensation in L LE (compared to RLE) - chronic, per pt.   Cards:  RRR  Respiratory:  CTAB. Limited exam. Anterior auscultation only.   Abdomen:  soft  Extremities:  no edema  Skin:  warm/dry

## 2019-03-19 ENCOUNTER — APPOINTMENT (OUTPATIENT)
Dept: NEUROSURGERY | Facility: HOSPITAL | Age: 59
End: 2019-03-19
Payer: COMMERCIAL

## 2019-03-19 LAB
ANION GAP SERPL CALC-SCNC: 13 MMOL/L — SIGNIFICANT CHANGE UP (ref 5–17)
BASOPHILS # BLD AUTO: 0 K/UL — SIGNIFICANT CHANGE UP (ref 0–0.2)
BASOPHILS NFR BLD AUTO: 0 % — SIGNIFICANT CHANGE UP (ref 0–2)
BUN SERPL-MCNC: 14 MG/DL — SIGNIFICANT CHANGE UP (ref 7–23)
CALCIUM SERPL-MCNC: 9.3 MG/DL — SIGNIFICANT CHANGE UP (ref 8.4–10.5)
CHLORIDE SERPL-SCNC: 98 MMOL/L — SIGNIFICANT CHANGE UP (ref 96–108)
CO2 SERPL-SCNC: 23 MMOL/L — SIGNIFICANT CHANGE UP (ref 22–31)
CREAT SERPL-MCNC: 0.65 MG/DL — SIGNIFICANT CHANGE UP (ref 0.5–1.3)
EOSINOPHIL # BLD AUTO: 0 K/UL — SIGNIFICANT CHANGE UP (ref 0–0.5)
EOSINOPHIL NFR BLD AUTO: 0.1 % — SIGNIFICANT CHANGE UP (ref 0–6)
GLUCOSE BLDC GLUCOMTR-MCNC: 139 MG/DL — HIGH (ref 70–99)
GLUCOSE BLDC GLUCOMTR-MCNC: 141 MG/DL — HIGH (ref 70–99)
GLUCOSE BLDC GLUCOMTR-MCNC: 170 MG/DL — HIGH (ref 70–99)
GLUCOSE SERPL-MCNC: 156 MG/DL — HIGH (ref 70–99)
HCT VFR BLD CALC: 32.6 % — LOW (ref 39–50)
HGB BLD-MCNC: 11 G/DL — LOW (ref 13–17)
LYMPHOCYTES # BLD AUTO: 0.9 K/UL — LOW (ref 1–3.3)
LYMPHOCYTES # BLD AUTO: 6.8 % — LOW (ref 13–44)
MCHC RBC-ENTMCNC: 28 PG — SIGNIFICANT CHANGE UP (ref 27–34)
MCHC RBC-ENTMCNC: 33.8 GM/DL — SIGNIFICANT CHANGE UP (ref 32–36)
MCV RBC AUTO: 82.9 FL — SIGNIFICANT CHANGE UP (ref 80–100)
MONOCYTES # BLD AUTO: 1.2 K/UL — HIGH (ref 0–0.9)
MONOCYTES NFR BLD AUTO: 9.2 % — SIGNIFICANT CHANGE UP (ref 2–14)
NEUTROPHILS # BLD AUTO: 10.6 K/UL — HIGH (ref 1.8–7.4)
NEUTROPHILS NFR BLD AUTO: 83.8 % — HIGH (ref 43–77)
PLATELET # BLD AUTO: 230 K/UL — SIGNIFICANT CHANGE UP (ref 150–400)
POTASSIUM SERPL-MCNC: 3.7 MMOL/L — SIGNIFICANT CHANGE UP (ref 3.5–5.3)
POTASSIUM SERPL-SCNC: 3.7 MMOL/L — SIGNIFICANT CHANGE UP (ref 3.5–5.3)
RBC # BLD: 3.94 M/UL — LOW (ref 4.2–5.8)
RBC # FLD: 13.8 % — SIGNIFICANT CHANGE UP (ref 10.3–14.5)
SODIUM SERPL-SCNC: 134 MMOL/L — LOW (ref 135–145)
WBC # BLD: 12.7 K/UL — HIGH (ref 3.8–10.5)
WBC # FLD AUTO: 12.7 K/UL — HIGH (ref 3.8–10.5)

## 2019-03-19 PROCEDURE — 99232 SBSQ HOSP IP/OBS MODERATE 35: CPT

## 2019-03-19 PROCEDURE — 75705 ARTERY X-RAYS SPINE: CPT | Mod: 26,59

## 2019-03-19 PROCEDURE — 36215 PLACE CATHETER IN ARTERY: CPT | Mod: 59

## 2019-03-19 PROCEDURE — 93010 ELECTROCARDIOGRAM REPORT: CPT

## 2019-03-19 RX ORDER — PANTOPRAZOLE SODIUM 20 MG/1
40 TABLET, DELAYED RELEASE ORAL ONCE
Qty: 0 | Refills: 0 | Status: COMPLETED | OUTPATIENT
Start: 2019-03-19 | End: 2019-03-19

## 2019-03-19 RX ORDER — LABETALOL HCL 100 MG
5 TABLET ORAL ONCE
Qty: 0 | Refills: 0 | Status: COMPLETED | OUTPATIENT
Start: 2019-03-19 | End: 2019-03-19

## 2019-03-19 RX ORDER — LABETALOL HCL 100 MG
10 TABLET ORAL ONCE
Qty: 0 | Refills: 0 | Status: COMPLETED | OUTPATIENT
Start: 2019-03-19 | End: 2019-03-19

## 2019-03-19 RX ORDER — ACETAMINOPHEN 500 MG
1000 TABLET ORAL ONCE
Qty: 0 | Refills: 0 | Status: COMPLETED | OUTPATIENT
Start: 2019-03-19 | End: 2019-03-19

## 2019-03-19 RX ADMIN — PANTOPRAZOLE SODIUM 40 MILLIGRAM(S): 20 TABLET, DELAYED RELEASE ORAL at 07:50

## 2019-03-19 RX ADMIN — Medication 5 MILLIGRAM(S): at 15:00

## 2019-03-19 RX ADMIN — Medication 1000 MILLIGRAM(S): at 08:09

## 2019-03-19 RX ADMIN — Medication 100 MILLIGRAM(S): at 21:32

## 2019-03-19 RX ADMIN — Medication 100 MILLIGRAM(S): at 05:04

## 2019-03-19 RX ADMIN — HYDROMORPHONE HYDROCHLORIDE 1 MILLIGRAM(S): 2 INJECTION INTRAMUSCULAR; INTRAVENOUS; SUBCUTANEOUS at 01:52

## 2019-03-19 RX ADMIN — GABAPENTIN 600 MILLIGRAM(S): 400 CAPSULE ORAL at 05:04

## 2019-03-19 RX ADMIN — Medication 1: at 07:16

## 2019-03-19 RX ADMIN — Medication 100 MILLIGRAM(S): at 05:14

## 2019-03-19 RX ADMIN — LOSARTAN POTASSIUM 100 MILLIGRAM(S): 100 TABLET, FILM COATED ORAL at 16:05

## 2019-03-19 RX ADMIN — HYDROMORPHONE HYDROCHLORIDE 1 MILLIGRAM(S): 2 INJECTION INTRAMUSCULAR; INTRAVENOUS; SUBCUTANEOUS at 17:30

## 2019-03-19 RX ADMIN — AMLODIPINE BESYLATE 5 MILLIGRAM(S): 2.5 TABLET ORAL at 05:04

## 2019-03-19 RX ADMIN — HYDROMORPHONE HYDROCHLORIDE 0.5 MILLIGRAM(S): 2 INJECTION INTRAMUSCULAR; INTRAVENOUS; SUBCUTANEOUS at 14:42

## 2019-03-19 RX ADMIN — HYDROMORPHONE HYDROCHLORIDE 0.5 MILLIGRAM(S): 2 INJECTION INTRAMUSCULAR; INTRAVENOUS; SUBCUTANEOUS at 15:40

## 2019-03-19 RX ADMIN — HYDROMORPHONE HYDROCHLORIDE 1 MILLIGRAM(S): 2 INJECTION INTRAMUSCULAR; INTRAVENOUS; SUBCUTANEOUS at 02:07

## 2019-03-19 RX ADMIN — ENOXAPARIN SODIUM 40 MILLIGRAM(S): 100 INJECTION SUBCUTANEOUS at 16:26

## 2019-03-19 RX ADMIN — SODIUM CHLORIDE 75 MILLILITER(S): 9 INJECTION INTRAMUSCULAR; INTRAVENOUS; SUBCUTANEOUS at 05:05

## 2019-03-19 RX ADMIN — ATORVASTATIN CALCIUM 20 MILLIGRAM(S): 80 TABLET, FILM COATED ORAL at 21:32

## 2019-03-19 RX ADMIN — Medication 5 MILLIGRAM(S): at 00:04

## 2019-03-19 RX ADMIN — SENNA PLUS 2 TABLET(S): 8.6 TABLET ORAL at 21:32

## 2019-03-19 RX ADMIN — HYDROMORPHONE HYDROCHLORIDE 1 MILLIGRAM(S): 2 INJECTION INTRAMUSCULAR; INTRAVENOUS; SUBCUTANEOUS at 18:06

## 2019-03-19 RX ADMIN — GABAPENTIN 600 MILLIGRAM(S): 400 CAPSULE ORAL at 15:41

## 2019-03-19 RX ADMIN — Medication 100 MILLIGRAM(S): at 15:30

## 2019-03-19 RX ADMIN — Medication 10 MILLIGRAM(S): at 15:30

## 2019-03-19 RX ADMIN — Medication 400 MILLIGRAM(S): at 07:49

## 2019-03-19 NOTE — PROGRESS NOTE ADULT - SUBJECTIVE AND OBJECTIVE BOX
CHIEF COMPLAINT:Patient is a 58y old  Male who presents with a chief complaint of spinal AVM (19 Mar 2019 08:04)    	        PAST MEDICAL & SURGICAL HISTORY:  HLD (hyperlipidemia)  Essential hypertension  DM (diabetes mellitus)          REVIEW OF SYSTEMS:  CONSTITUTIONAL: No fever, weight loss, or fatigue  EYES: No eye pain, visual disturbances, or discharge  NECK: No pain or stiffness  RESPIRATORY: No cough, wheezing, chills or hemoptysis; No Shortness of Breath  CARDIOVASCULAR: No chest pain, palpitations, passing out, dizziness, or leg swelling  GASTROINTESTINAL: c/o abd discomfort/ no flatus .  GENITOURINARY: No dysuria, frequency, hematuria, or incontinence  NEUROLOGICAL: No headaches, memory loss, loss of strength, numbness, or tremors  SKIN: No itching, burning, rashes, or lesions   LYMPH Nodes: No enlarged glands  ENDOCRINE: No heat or cold intolerance; No hair loss  MUSCULOSKELETAL: No joint pain or swelling; No muscle, back, or extremity pain    Medications:  MEDICATIONS  (STANDING):  amLODIPine   Tablet 5 milliGRAM(s) Oral daily  atorvastatin 20 milliGRAM(s) Oral at bedtime  ceFAZolin   IVPB 2000 milliGRAM(s) IV Intermittent every 8 hours  dextrose 5%. 1000 milliLiter(s) (50 mL/Hr) IV Continuous <Continuous>  dextrose 50% Injectable 12.5 Gram(s) IV Push once  dextrose 50% Injectable 25 Gram(s) IV Push once  dextrose 50% Injectable 25 Gram(s) IV Push once  docusate sodium 100 milliGRAM(s) Oral three times a day  enoxaparin Injectable 40 milliGRAM(s) SubCutaneous every 24 hours  gabapentin 600 milliGRAM(s) Oral three times a day  insulin lispro (HumaLOG) corrective regimen sliding scale   SubCutaneous three times a day before meals  losartan 100 milliGRAM(s) Oral daily  senna 2 Tablet(s) Oral at bedtime  sodium chloride 0.9%. 1000 milliLiter(s) (75 mL/Hr) IV Continuous <Continuous>    MEDICATIONS  (PRN):  acetaminophen   Tablet .. 650 milliGRAM(s) Oral every 6 hours PRN Temp greater or equal to 38.5C (101.3F), Mild Pain (1 - 3)  calcium carbonate    500 mG (Tums) Chewable 1 Tablet(s) Chew every 4 hours PRN Heartburn  dextrose 40% Gel 15 Gram(s) Oral once PRN Blood Glucose LESS THAN 70 milliGRAM(s)/deciliter  diazepam    Tablet 5 milliGRAM(s) Oral every 8 hours PRN muscle spasms  glucagon  Injectable 1 milliGRAM(s) IntraMuscular once PRN Glucose LESS THAN 70 milligrams/deciliter  HYDROmorphone  Injectable 0.5 milliGRAM(s) IV Push every 10 minutes PRN Moderate Pain (4 - 6)  HYDROmorphone  Injectable 1 milliGRAM(s) IV Push every 10 minutes PRN Severe Pain (7 - 10)  melatonin 3 milliGRAM(s) Oral at bedtime PRN Sleep    	    PHYSICAL EXAM:  T(C): 37.1 (03-19-19 @ 09:00), Max: 37.1 (03-19-19 @ 07:00)  HR: 80 (03-19-19 @ 10:00) (79 - 104)  BP: 152/77 (03-19-19 @ 10:00) (115/80 - 172/98)  RR: 16 (03-19-19 @ 10:00) (13 - 20)  SpO2: 98% (03-19-19 @ 10:00) (93% - 100%)  Wt(kg): --  I&O's Summary    18 Mar 2019 07:01  -  19 Mar 2019 07:00  --------------------------------------------------------  IN: 1975 mL / OUT: 1875 mL / NET: 100 mL    19 Mar 2019 07:01  -  19 Mar 2019 11:02  --------------------------------------------------------  IN: 225 mL / OUT: 410 mL / NET: -185 mL        Appearance: Normal	  HEENT:   Normal oral mucosa, PERRL, EOMI	  Lymphatic: No lymphadenopathy  Cardiovascular: Normal S1 S2, No JVD, No murmurs, No edema  Respiratory: Lungs clear to auscultation	  Psychiatry: A & O x 3, Mood & affect appropriate  Gastrointestinal:  Soft,  mildly tender no bs  Skin: No rashes, No ecchymoses, No cyanosis	  Neurologic: Non-focal  Extremities: Normal range of motion, No clubbing, cyanosis or edema  Vascular: Peripheral pulses palpable 2+ bilaterally    TELEMETRY: 	    ECG:  	  RADIOLOGY:  OTHER: 	  	  LABS:	 	    CARDIAC MARKERS:                                11.0   12.7  )-----------( 230      ( 19 Mar 2019 03:05 )             32.6     03-19    134<L>  |  98  |  14  ----------------------------<  156<H>  3.7   |  23  |  0.65    Ca    9.3      19 Mar 2019 03:05  Phos  4.9     03-18  Mg     1.8     03-18      proBNP:   Lipid Profile:   HgA1c:   TSH:

## 2019-03-19 NOTE — CHART NOTE - NSCHARTNOTEFT_GEN_A_CORE
Interventional Neuro Radiology  Pre-Procedure Note PA-C    This is a 58 year old right hand dominant male developed lower back pain following a fall approximately 1 month ago. 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 Patient c/o lower back pain, decreased sensation in both legs, left leg weakness and difficulty ambulating unassisted due to weakness.     Allergies: No Known Allergies  PMHX: hyperlipidemia, hypertension, diabetes mellitus)  PSHX: diagnostic spinal angio and POD #1 spinal fistula ligation   Social History:  non-smoker   FAMILY HISTORY:    Current Medications: acetaminophen Tablet 650 milliGRAM(s) Oral every 6 hours PRN  amLODIPine   Tablet 5 milliGRAM(s) Oral daily  atorvastatin 20 milliGRAM(s) Oral at bedtime  calcium carbonate    500 mG (Tums) Chewable 1 Tablet(s) Chew every 4 hours PRN  ceFAZolin   IVPB 2000 milliGRAM(s) IV Intermittent every 8 hours  dextrose 40% Gel 15 Gram(s) Oral once PRN  dextrose 5%. 1000 milliLiter(s) IV Continuous   dextrose 50% Injectable 12.5 Gram(s) IV Push once  dextrose 50% Injectable 25 Gram(s) IV Push once  dextrose 50% Injectable 25 Gram(s) IV Push once  diazepam    Tablet 5 milliGRAM(s) Oral every 8 hours PRN  docusate sodium 100 milliGRAM(s) Oral three times a day  enoxaparin Injectable 40 milliGRAM(s) SubCutaneous every 24 hours  gabapentin 600 milliGRAM(s) Oral three times a day  glucagon  Injectable 1 milliGRAM(s) IntraMuscular once PRN  HYDROmorphone  Injectable 0.5 milliGRAM(s) IV Push every 10 minutes PRN  HYDROmorphone  Injectable 1 milliGRAM(s) IV Push every 10 minutes PRN  insulin lispro (HumaLOG) corrective regimen sliding scale   SubCutaneous three times a day before meals  losartan 100 milliGRAM(s) Oral daily  melatonin 3 milliGRAM(s) Oral at bedtime PRN  senna 2 Tablet(s) Oral at bedtime  sodium chloride 0.9%. 1000 milliLiter(s) IV Continuous     CBC                        11.0   12.7  )-----------( 230      ( 19 Mar 2019 03:05 )             32.6       BMP    134<L>  |  98  |  14  ----------------------------<  156<H>  3.7   |  23  |  0.65      Blood Bank: A negative       Assessment/Plan:   This is a 58year old right hand dominant male POD#1  Procedure, goals, risks, benefits and alternatives were discussed with patient and patient's family. All questions were answered. Risks include but are not limited to stroke, vessel injury, hemorrhage, and or right groin hematoma. Patient demonstrates understanding of all risks involved with this procedure and wishes to continue.  Appropriate content was obtained from patient and consent is in the patient's chart.

## 2019-03-19 NOTE — PROGRESS NOTE ADULT - SUBJECTIVE AND OBJECTIVE BOX
Subjective: Patient seen and examined. No new events except as noted.     SUBJECTIVE/ROS:  No chest pain, dyspnea, palpitation, or dizziness.   has lower abd pain, constant       MEDICATIONS:  MEDICATIONS  (STANDING):  amLODIPine   Tablet 5 milliGRAM(s) Oral daily  atorvastatin 20 milliGRAM(s) Oral at bedtime  ceFAZolin   IVPB 2000 milliGRAM(s) IV Intermittent every 8 hours  dextrose 5%. 1000 milliLiter(s) (50 mL/Hr) IV Continuous <Continuous>  dextrose 50% Injectable 12.5 Gram(s) IV Push once  dextrose 50% Injectable 25 Gram(s) IV Push once  dextrose 50% Injectable 25 Gram(s) IV Push once  docusate sodium 100 milliGRAM(s) Oral three times a day  enoxaparin Injectable 40 milliGRAM(s) SubCutaneous every 24 hours  gabapentin 600 milliGRAM(s) Oral three times a day  insulin lispro (HumaLOG) corrective regimen sliding scale   SubCutaneous three times a day before meals  losartan 100 milliGRAM(s) Oral daily  senna 2 Tablet(s) Oral at bedtime  sodium chloride 0.9%. 1000 milliLiter(s) (75 mL/Hr) IV Continuous <Continuous>      PHYSICAL EXAM:  T(C): 37.1 (03-19-19 @ 07:00), Max: 37.1 (03-19-19 @ 07:00)  HR: 95 (03-19-19 @ 07:00) (88 - 104)  BP: 158/84 (03-19-19 @ 07:00) (115/80 - 172/98)  RR: 14 (03-19-19 @ 07:00) (13 - 20)  SpO2: 100% (03-19-19 @ 07:00) (93% - 100%)  Wt(kg): --  I&O's Summary    18 Mar 2019 07:01  -  19 Mar 2019 07:00  --------------------------------------------------------  IN: 1975 mL / OUT: 1875 mL / NET: 100 mL    19 Mar 2019 07:01  -  19 Mar 2019 08:04  --------------------------------------------------------  IN: 75 mL / OUT: 60 mL / NET: 15 mL            JVP: Normal  Neck: supple  Lung: clear   CV: S1 S2 , Murmur:  Abd: soft  Ext: No edema  neuro: Awake / alert  Psych: flat affect  Skin: normal``    LABS/DATA:    CARDIAC MARKERS:                                11.0   12.7  )-----------( 230      ( 19 Mar 2019 03:05 )             32.6     03-19    134<L>  |  98  |  14  ----------------------------<  156<H>  3.7   |  23  |  0.65    Ca    9.3      19 Mar 2019 03:05  Phos  4.9     03-18  Mg     1.8     03-18      proBNP:   Lipid Profile:   HgA1c:   TSH:     TELE:  EKG:

## 2019-03-19 NOTE — PROGRESS NOTE ADULT - SUBJECTIVE AND OBJECTIVE BOX
SUMMARY: 59 YO male developed lower back pain following a fall approximately 1 month ago. Patient c/o lower back pain, decreased sensation in both legs, left leg weakness and difficulty ambulating unassisted due to weakness. Denied bowel or bladder dysfunction. 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 s/p T4 - T8 laminoplasty for repair of dural AVM.      On admission, the patient was:  GCS: 15    VITALS:  T(C): , Max: 36.9 (03-17-19 @ 23:45)  HR:  (67 - 97)  BP:  (124/67 - 169/93)  ABP:  (127/65 - 157/78)  RR:  (16 - 18)  SpO2:  (96% - 99%)  Wt(kg): --      03-17-19 @ 07:01  -  03-18-19 @ 07:00  --------------------------------------------------------  IN: 0 mL / OUT: 1000 mL / NET: -1000 mL    03-18-19 @ 07:01  -  03-18-19 @ 15:07  --------------------------------------------------------  IN: 50 mL / OUT: 45 mL / NET: 5 mL      LABS:  Na: 139 (03-18 @ 14:07), 139 (03-17 @ 07:04), 140 (03-16 @ 09:13)  K: 3.9 (03-18 @ 14:07), 3.7 (03-17 @ 07:04), 4.2 (03-16 @ 09:13)  Cl: 103 (03-18 @ 14:07), 104 (03-17 @ 07:04), 105 (03-16 @ 09:13)  CO2: 22 (03-18 @ 14:07), 22 (03-17 @ 07:04), 22 (03-16 @ 09:13)  BUN: 16 (03-18 @ 14:07), 12 (03-17 @ 07:04), 16 (03-16 @ 09:13)  Cr: 0.79 (03-18 @ 14:07), 0.70 (03-17 @ 07:04), 0.75 (03-16 @ 09:13)  Glu: 180(03-18 @ 14:07), 140(03-17 @ 07:04), 142(03-16 @ 09:13)    Hgb: 10.7 (03-18 @ 14:07), 11.7 (03-17 @ 08:55)  Hct: 32.1 (03-18 @ 14:07), 36.4 (03-17 @ 08:55)  WBC: 8.7 (03-18 @ 14:07), 6.22 (03-17 @ 08:55)  Plt: 209 (03-18 @ 14:07), 241 (03-17 @ 08:55)      IMAGING:   Recent imaging studies were reviewed.    MEDICATIONS:  acetaminophen   Tablet .. 650 milliGRAM(s) Oral every 6 hours PRN  amLODIPine   Tablet 5 milliGRAM(s) Oral daily  atorvastatin 20 milliGRAM(s) Oral at bedtime  ceFAZolin   IVPB 2000 milliGRAM(s) IV Intermittent every 8 hours  dextrose 40% Gel 15 Gram(s) Oral once PRN  dextrose 5%. 1000 milliLiter(s) IV Continuous <Continuous>  dextrose 50% Injectable 12.5 Gram(s) IV Push once  dextrose 50% Injectable 25 Gram(s) IV Push once  dextrose 50% Injectable 25 Gram(s) IV Push once  diazepam    Tablet 5 milliGRAM(s) Oral every 8 hours PRN  docusate sodium 100 milliGRAM(s) Oral three times a day  gabapentin 600 milliGRAM(s) Oral three times a day  glucagon  Injectable 1 milliGRAM(s) IntraMuscular once PRN  HYDROmorphone  Injectable 0.5 milliGRAM(s) IV Push every 10 minutes PRN  HYDROmorphone  Injectable 1 milliGRAM(s) IV Push every 10 minutes PRN  HYDROmorphone  Injectable 1 milliGRAM(s) IV Push every 4 hours PRN  insulin lispro (HumaLOG) corrective regimen sliding scale   SubCutaneous three times a day before meals  losartan 100 milliGRAM(s) Oral daily  melatonin 3 milliGRAM(s) Oral at bedtime PRN  oxyCODONE    IR 5 milliGRAM(s) Oral every 4 hours PRN  oxyCODONE    IR 10 milliGRAM(s) Oral every 4 hours PRN  senna 2 Tablet(s) Oral at bedtime  sodium chloride 0.9%. 1000 milliLiter(s) IV Continuous <Continuous>    EXAMINATION:  General:  calm  HEENT:  MMM. PERRL. EOMI.   Neuro: CN 2-12 grossly normal, except for decreased sensation in L V2, V3 - chronic per pt. b/l UE 5/5. b/l LE 4/5 distally (symmetric b/l). Reports decreased sensation in L LE (compared to RLE) - chronic, per pt.   Cards:  RRR  Respiratory:  CTAB. Limited exam. Anterior auscultation only.   Abdomen:  soft  Extremities:  no edema  Skin:  warm/dry SUMMARY: 57 YO male developed lower back pain following a fall approximately 1 month ago. Patient c/o lower back pain, decreased sensation in both legs, left leg weakness and difficulty ambulating unassisted due to weakness. Denied bowel or bladder dysfunction. 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 s/p T4 - T8 laminoplasty for repair of dural AVM.      On admission, the patient was:  GCS: 15    Overnight Events: Underwent angio today.     ROS: Negative unless specified.     VITALS:   T(C): 36.8 (03-19-19 @ 13:58), Max: 37.1 (03-19-19 @ 07:00)  HR: 82 (03-19-19 @ 15:45) (79 - 104)  BP: 138/83 (03-19-19 @ 15:45) (115/80 - 176/96)  RR: 17 (03-19-19 @ 15:15) (14 - 20)  SpO2: 97% (03-19-19 @ 15:45) (93% - 100%)    03-18-19 @ 07:01  -  03-19-19 @ 07:00  --------------------------------------------------------  IN: 1975 mL / OUT: 1875 mL / NET: 100 mL    03-19-19 @ 07:01  -  03-19-19 @ 15:50  --------------------------------------------------------  IN: 450 mL / OUT: 970 mL / NET: -520 mL      LABS:  ABG - ( 18 Mar 2019 12:38 )  pH, Arterial: 7.33  pH, Blood: x     /  pCO2: 49    /  pO2: 150   / HCO3: 25    / Base Excess: -.3   /  SaO2: 99                 11.0   12.7  )-----------( 230      ( 19 Mar 2019 03:05 )             32.6     03-19    134<L>  |  98  |  14  ----------------------------<  156<H>  3.7   |  23  |  0.65    Ca    9.3      19 Mar 2019 03:05  Phos  4.9     03-18  Mg     1.8     03-18        MEDS:  MEDICATIONS  (STANDING):  amLODIPine   Tablet 5 milliGRAM(s) Oral daily  atorvastatin 20 milliGRAM(s) Oral at bedtime  ceFAZolin   IVPB 2000 milliGRAM(s) IV Intermittent every 8 hours  dextrose 5%. 1000 milliLiter(s) (50 mL/Hr) IV Continuous <Continuous>  dextrose 50% Injectable 12.5 Gram(s) IV Push once  dextrose 50% Injectable 25 Gram(s) IV Push once  dextrose 50% Injectable 25 Gram(s) IV Push once  docusate sodium 100 milliGRAM(s) Oral three times a day  enoxaparin Injectable 40 milliGRAM(s) SubCutaneous every 24 hours  gabapentin 600 milliGRAM(s) Oral three times a day  insulin lispro (HumaLOG) corrective regimen sliding scale   SubCutaneous three times a day before meals  losartan 100 milliGRAM(s) Oral daily  senna 2 Tablet(s) Oral at bedtime  sodium chloride 0.9%. 1000 milliLiter(s) (75 mL/Hr) IV Continuous <Continuous>    EXAMINATION:  General:  calm  HEENT:  MMM. PERRL. EOMI.   Neuro: No UE drift. b/l UE 5/5. L LE 4/5 distally. RLE - wiggles toes (unable to lift up 2/2 to safeguard).    Cards:  RRR  Respiratory: No respiratory distress.   Abdomen:  soft  Extremities:  no edema. 2+ DP pulses.   Skin:  warm/dry

## 2019-03-19 NOTE — PROGRESS NOTE ADULT - ASSESSMENT
58M s/p lami for resection of spinal AVF and angio of spinal AvF  - TX To floor  - PT eval  - neurochecks q4hrs

## 2019-03-19 NOTE — CHART NOTE - NSCHARTNOTEFT_GEN_A_CORE
Interventional Neuro- Radiology   Procedure Note PA-C    Procedure: Selective Cerebral Angiography   Pre- Procedure Diagnosis:  Post- Procedure Diagnosis:    : Dr. Burton oHrn  Fellow:     Dr Karlie Hogue  Resident:     Physician Assistant: Shayla Mahoney PA-C    Nurse:                      Kanika Looney RN  Radiologic Tech:  Anesthesiologist:     Dr Chino Luo   Sheath:                   5 Azeri short sheath     I/Os: EBL less than 10cc  IV fluids:     cc     Urine output     cc Contrast Omnipaque 240      cc           Vitals: /88       HR 86   Spo2 96%       Preliminary Report:  Using a 5 Azeri short sheath to the right groin under MAC sedation via  left vertebral artery,  left common carotid artery, left external carotid artery, right vertebral artery, right common carotid artery, right external carotid artery a selective cerebral angiography was performed and  demonstrated ________. ( Official note to follow).  Patient tolerated procedure well, hemodynamically stable, no change in neurological status compared to baseline. Results discussed with patient and patient's  family. Right groin sheath was removed, manual compression held to hemostasis for 20 minutes, no active bleeding, no hematoma, quick clot and safeguard balloon dressing applied at _____h. Interventional Neuro- Radiology   Procedure Note PA-C    Procedure: Selective Cerebral Angiography   Pre- Procedure Diagnosis:  POD#1 ligation of spinal fistula   Post- Procedure Diagnosis: no residual spinal fistula     : Dr. Burton Horn  Fellow:     Dr Karlie Hogue  Resident:  Dr Karin Barron   Physician Assistant: Shayla Mahoney PA-C    Nurse:                      Kanika Looney RN  Radiologic Tech:      Price Corley  LRT  Anesthesiologist:     Dr Chino Cardona  Sheath:                   5 Mauritian short sheath     I/Os: EBL less than 10cc  IV fluids:500cc Urine output 200cc  Contrast Omnipaque 240 80cc          Vitals: /88       HR 86   Spo2 96%       Preliminary Report:  Using a 5 Mauritian short sheath to the right groin under MAC sedation later converted via to general anesthesia selective spinal angiography levels thoracic T4,5,6,7 was performed and demonstrated no residual fistula. Thoracic4,5,6,7 Official note to follow.   Patient tolerated procedure well, hemodynamically stable, no change in neurological status compared to baseline. Results discussed with patient and patient's  family. Right groin sheath was removed, manual compression held to hemostasis for 20 minutes, no active bleeding, no hematoma, quick clot and safeguard balloon dressing applied at 1330 hours.

## 2019-03-19 NOTE — PROGRESS NOTE ADULT - ASSESSMENT
57 YO male w/  lower back pain following a fall approximately 1 month ago.   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   Patient c/o lower back pain, decreased sensation in both legs, left leg weakness and difficulty ambulating unassisted due to weakness.   Denied bowel or bladder dysfunction. Transfer to [facility] from [facility] for w/u and potential sx for t5 dural avf  s/p t4/8 laminoplasty for obliteration of dura avm  ileus   ? sec narcotics  minimize   npo   abd xrays if not resolving     dm  controlled  cont current regimen  htn stable  dvt proph  cont current meds  pt

## 2019-03-19 NOTE — PROGRESS NOTE ADULT - SUBJECTIVE AND OBJECTIVE BOX
Vital Signs Last 24 Hrs  T(C): 36.4 (18 Mar 2019 23:00), Max: 37 (18 Mar 2019 17:00)  T(F): 97.5 (18 Mar 2019 23:00), Max: 98.6 (18 Mar 2019 17:00)  HR: 95 (18 Mar 2019 23:00) (67 - 104)  BP: 154/90 (18 Mar 2019 23:00) (115/80 - 172/98)  BP(mean): 117 (18 Mar 2019 23:00) (107 - 117)  RR: 16 (18 Mar 2019 23:00) (13 - 20)  SpO2: 96% (18 Mar 2019 23:00) (96% - 100%)    EXAM  AOx3, FC, UE 4/5, LE 5/5 (effort dependent)

## 2019-03-19 NOTE — PROGRESS NOTE ADULT - ASSESSMENT
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   s/p T4 - T8 laminoplasty for obliteration of dural AVM (3/18/19).      NEURO:  q1h neuro checks  s/p angio today  No further MAP goals  Surgical drains per NSGY  Pain control w/ Dilaudid prn  Also, on Gabapentin 600 TID, and Diazepam prn    PULM:  sat > 92%  Incentive spirometry, if able    CARDS:  MAP > 85 for 24 hours  Continued on amlodipine 5 daily  Continued on Atorvastatin 20 qHS    GASTRO:  advance as tolerated  Bowel regimen  ---> Stress ulcer prophylaxis:  Not indicated    RENAL:  On NS @ 50 ml/hr    HEME:  monitor H/H    ---> DVT prophylaxis: SCDs, Lovenox 40 sc daily    ENDO:  Goal Euglycemia  FS and SSI    ID:  Monitor for fevers    Code status:  Full code  Disposition:  ICU    This patient was at high risk of neurologic deterioration and/or death due to: post operative hemorrhage/stroke, cord compression    Time spent:  45 minutes Summary: 59 YO male who presented w/ lower back pain, decreased sensation in both legs, left leg weakness and difficulty ambulating and was 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   s/p T4 - T8 laminoplasty for obliteration of dural AVM (3/18/19).      NEURO:  q1h neuro checks  s/p angio today  No further MAP goals  Surgical drains per NSGY  Pain control w/ Dilaudid prn  Also, on Gabapentin 600 TID, and Diazepam prn    PULM:  sat > 92%  Incentive spirometry, if able    CARDS:  SBP < 160  Continued on amlodipine 5 daily  Continued on Atorvastatin 20 qHS    GASTRO:  advance as tolerated  Bowel regimen  ---> Stress ulcer prophylaxis:  Not indicated    RENAL:  On NS @ 50 ml/hr    HEME:  monitor H/H    ---> DVT prophylaxis: SCDs, Lovenox 40 sc daily    ENDO:  Goal Euglycemia  FS and SSI    ID:  Monitor for fevers    Code status:  Full code  Disposition:  ICU    This patient was at high risk of neurologic deterioration and/or death due to: post operative hemorrhage/stroke, cord compression    Time spent:  45 minutes Summary: 59 YO male who presented w/ lower back pain, decreased sensation in both legs, left leg weakness and difficulty ambulating and was 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   s/p T4 - T8 laminoplasty for obliteration of dural AVM (3/18/19).      NEURO:  q1h neuro checks  s/p angio today  No further MAP goals  Surgical drains per NSGY  Pain control w/ Dilaudid prn  Also, on Gabapentin 600 TID, and Diazepam prn    PULM:  sat > 92%  Incentive spirometry, if able    CARDS:  SBP < 160  Continued on amlodipine 5 daily  Continued on Atorvastatin 20 qHS    GASTRO:  advance as tolerated  Bowel regimen  ---> Stress ulcer prophylaxis:  Not indicated    RENAL:  On NS @ 50 ml/hr    HEME:  monitor H/H    ---> DVT prophylaxis: SCDs, Lovenox 40 sc daily    ENDO:  Goal Euglycemia  FS and SSI    ID:  Monitor for fevers    Code status:  Full code  Disposition:  Floor    This patient was at high risk of neurologic deterioration and/or death due to: post operative hemorrhage/stroke, cord compression    Time spent:  45 minutes Summary: 57 YO male who presented w/ lower back pain, decreased sensation in both legs, left leg weakness and difficulty ambulating and was 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   s/p T4 - T8 laminoplasty for obliteration of dural AVM (3/18/19).      NEURO:  q1h neuro checks  s/p angio today  No further MAP goals  Surgical drains per NSGY  Pain control w/ Dilaudid prn  Also, on Gabapentin 600 TID, and Diazepam prn    PULM:  sat > 92%  Incentive spirometry, if able    CARDS:  SBP < 160  Continued on amlodipine 5 daily  Continued on Atorvastatin 20 qHS    GASTRO:  advance as tolerated  Bowel regimen  ---> Stress ulcer prophylaxis:  Not indicated    RENAL:  On NS @ 50 ml/hr    HEME:  monitor H/H    ---> DVT prophylaxis: SCDs, Lovenox 40 sc daily    ENDO:  Goal Euglycemia  FS and SSI    ID:  Monitor for fevers    Code status:  Full code  Disposition:  Floor

## 2019-03-19 NOTE — PACU DISCHARGE NOTE - COMMENTS
Stable for D/C from PACU to floor on room air.
Transfer patient to post surgical floor on room air once meeting PACU criteria and after 4 hr post angiography monitoring period.
NSCU

## 2019-03-19 NOTE — PROGRESS NOTE ADULT - SUBJECTIVE AND OBJECTIVE BOX
Visit Summary: Patient seen and evaluated at bedside    Exam:  T(C): 36.8 (03-19-19 @ 18:00), Max: 37.1 (03-19-19 @ 07:00)  HR: 85 (03-19-19 @ 18:00) (79 - 104)  BP: 156/85 (03-19-19 @ 18:00) (115/80 - 176/96)  RR: 17 (03-19-19 @ 18:00) (14 - 20)  SpO2: 97% (03-19-19 @ 18:00) (93% - 100%)  Wt(kg): --                            11.0   12.7  )-----------( 230      ( 19 Mar 2019 03:05 )             32.6     03-19    134<L>  |  98  |  14  ----------------------------<  156<H>  3.7   |  23  |  0.65    Ca    9.3      19 Mar 2019 03:05  Phos  4.9     03-18  Mg     1.8     03-18

## 2019-03-19 NOTE — PROGRESS NOTE ADULT - ASSESSMENT
HTN  stable    DM  Monitor finger stick. Insulin coverage.     HLD  on statin    lower abd pain   ? due to narcotic S.E.  obtain EKG   consider GI eval

## 2019-03-20 LAB
ANION GAP SERPL CALC-SCNC: 12 MMOL/L — SIGNIFICANT CHANGE UP (ref 5–17)
BASOPHILS # BLD AUTO: 0 K/UL — SIGNIFICANT CHANGE UP (ref 0–0.2)
BASOPHILS NFR BLD AUTO: 0 % — SIGNIFICANT CHANGE UP (ref 0–2)
BUN SERPL-MCNC: 14 MG/DL — SIGNIFICANT CHANGE UP (ref 7–23)
CALCIUM SERPL-MCNC: 9.5 MG/DL — SIGNIFICANT CHANGE UP (ref 8.4–10.5)
CHLORIDE SERPL-SCNC: 97 MMOL/L — SIGNIFICANT CHANGE UP (ref 96–108)
CO2 SERPL-SCNC: 27 MMOL/L — SIGNIFICANT CHANGE UP (ref 22–31)
CREAT SERPL-MCNC: 0.74 MG/DL — SIGNIFICANT CHANGE UP (ref 0.5–1.3)
EOSINOPHIL # BLD AUTO: 0 K/UL — SIGNIFICANT CHANGE UP (ref 0–0.5)
EOSINOPHIL NFR BLD AUTO: 0 % — SIGNIFICANT CHANGE UP (ref 0–6)
GLUCOSE BLDC GLUCOMTR-MCNC: 134 MG/DL — HIGH (ref 70–99)
GLUCOSE BLDC GLUCOMTR-MCNC: 147 MG/DL — HIGH (ref 70–99)
GLUCOSE BLDC GLUCOMTR-MCNC: 154 MG/DL — HIGH (ref 70–99)
GLUCOSE BLDC GLUCOMTR-MCNC: 159 MG/DL — HIGH (ref 70–99)
GLUCOSE SERPL-MCNC: 139 MG/DL — HIGH (ref 70–99)
HCT VFR BLD CALC: 33.4 % — LOW (ref 39–50)
HGB BLD-MCNC: 10.5 G/DL — LOW (ref 13–17)
LYMPHOCYTES # BLD AUTO: 1.42 K/UL — SIGNIFICANT CHANGE UP (ref 1–3.3)
LYMPHOCYTES # BLD AUTO: 10.4 % — LOW (ref 13–44)
MCHC RBC-ENTMCNC: 26.5 PG — LOW (ref 27–34)
MCHC RBC-ENTMCNC: 31.4 GM/DL — LOW (ref 32–36)
MCV RBC AUTO: 84.3 FL — SIGNIFICANT CHANGE UP (ref 80–100)
MONOCYTES # BLD AUTO: 0.82 K/UL — SIGNIFICANT CHANGE UP (ref 0–0.9)
MONOCYTES NFR BLD AUTO: 6 % — SIGNIFICANT CHANGE UP (ref 2–14)
NEUTROPHILS # BLD AUTO: 11.45 K/UL — HIGH (ref 1.8–7.4)
NEUTROPHILS NFR BLD AUTO: 83.6 % — HIGH (ref 43–77)
PLATELET # BLD AUTO: 240 K/UL — SIGNIFICANT CHANGE UP (ref 150–400)
POTASSIUM SERPL-MCNC: 3.6 MMOL/L — SIGNIFICANT CHANGE UP (ref 3.5–5.3)
POTASSIUM SERPL-SCNC: 3.6 MMOL/L — SIGNIFICANT CHANGE UP (ref 3.5–5.3)
RBC # BLD: 3.96 M/UL — LOW (ref 4.2–5.8)
RBC # FLD: 15.4 % — HIGH (ref 10.3–14.5)
SODIUM SERPL-SCNC: 136 MMOL/L — SIGNIFICANT CHANGE UP (ref 135–145)
WBC # BLD: 13.7 K/UL — HIGH (ref 3.8–10.5)
WBC # FLD AUTO: 13.7 K/UL — HIGH (ref 3.8–10.5)

## 2019-03-20 PROCEDURE — 74018 RADEX ABDOMEN 1 VIEW: CPT | Mod: 26

## 2019-03-20 RX ORDER — OXYCODONE HYDROCHLORIDE 5 MG/1
5 TABLET ORAL EVERY 4 HOURS
Qty: 0 | Refills: 0 | Status: DISCONTINUED | OUTPATIENT
Start: 2019-03-20 | End: 2019-03-24

## 2019-03-20 RX ORDER — LABETALOL HCL 100 MG
10 TABLET ORAL ONCE
Qty: 0 | Refills: 0 | Status: COMPLETED | OUTPATIENT
Start: 2019-03-20 | End: 2019-03-20

## 2019-03-20 RX ORDER — DIAZEPAM 5 MG
7.5 TABLET ORAL EVERY 8 HOURS
Qty: 0 | Refills: 0 | Status: DISCONTINUED | OUTPATIENT
Start: 2019-03-20 | End: 2019-03-25

## 2019-03-20 RX ORDER — FAMOTIDINE 10 MG/ML
20 INJECTION INTRAVENOUS DAILY
Qty: 0 | Refills: 0 | Status: DISCONTINUED | OUTPATIENT
Start: 2019-03-20 | End: 2019-03-20

## 2019-03-20 RX ORDER — OXYCODONE HYDROCHLORIDE 5 MG/1
10 TABLET ORAL EVERY 4 HOURS
Qty: 0 | Refills: 0 | Status: DISCONTINUED | OUTPATIENT
Start: 2019-03-20 | End: 2019-03-24

## 2019-03-20 RX ORDER — POTASSIUM CHLORIDE 20 MEQ
20 PACKET (EA) ORAL
Qty: 0 | Refills: 0 | Status: COMPLETED | OUTPATIENT
Start: 2019-03-20 | End: 2019-03-20

## 2019-03-20 RX ORDER — POLYETHYLENE GLYCOL 3350 17 G/17G
17 POWDER, FOR SOLUTION ORAL ONCE
Qty: 0 | Refills: 0 | Status: COMPLETED | OUTPATIENT
Start: 2019-03-20 | End: 2019-03-20

## 2019-03-20 RX ORDER — SODIUM CHLORIDE 9 MG/ML
1000 INJECTION INTRAMUSCULAR; INTRAVENOUS; SUBCUTANEOUS
Qty: 0 | Refills: 0 | Status: DISCONTINUED | OUTPATIENT
Start: 2019-03-20 | End: 2019-03-21

## 2019-03-20 RX ORDER — PANTOPRAZOLE SODIUM 20 MG/1
40 TABLET, DELAYED RELEASE ORAL
Qty: 0 | Refills: 0 | Status: DISCONTINUED | OUTPATIENT
Start: 2019-03-20 | End: 2019-03-26

## 2019-03-20 RX ORDER — MULTIVIT WITH MIN/MFOLATE/K2 340-15/3 G
1 POWDER (GRAM) ORAL ONCE
Qty: 0 | Refills: 0 | Status: COMPLETED | OUTPATIENT
Start: 2019-03-20 | End: 2019-03-21

## 2019-03-20 RX ADMIN — FAMOTIDINE 20 MILLIGRAM(S): 10 INJECTION INTRAVENOUS at 05:13

## 2019-03-20 RX ADMIN — ATORVASTATIN CALCIUM 20 MILLIGRAM(S): 80 TABLET, FILM COATED ORAL at 21:46

## 2019-03-20 RX ADMIN — PANTOPRAZOLE SODIUM 40 MILLIGRAM(S): 20 TABLET, DELAYED RELEASE ORAL at 16:43

## 2019-03-20 RX ADMIN — Medication 10 MILLIGRAM(S): at 15:04

## 2019-03-20 RX ADMIN — Medication 1: at 09:23

## 2019-03-20 RX ADMIN — AMLODIPINE BESYLATE 5 MILLIGRAM(S): 2.5 TABLET ORAL at 05:11

## 2019-03-20 RX ADMIN — OXYCODONE HYDROCHLORIDE 5 MILLIGRAM(S): 5 TABLET ORAL at 12:31

## 2019-03-20 RX ADMIN — ENOXAPARIN SODIUM 40 MILLIGRAM(S): 100 INJECTION SUBCUTANEOUS at 16:43

## 2019-03-20 RX ADMIN — POLYETHYLENE GLYCOL 3350 17 GRAM(S): 17 POWDER, FOR SOLUTION ORAL at 11:44

## 2019-03-20 RX ADMIN — Medication 20 MILLIEQUIVALENT(S): at 17:51

## 2019-03-20 RX ADMIN — Medication 1: at 13:20

## 2019-03-20 RX ADMIN — Medication 10 MILLIGRAM(S): at 13:16

## 2019-03-20 RX ADMIN — Medication 650 MILLIGRAM(S): at 00:07

## 2019-03-20 RX ADMIN — Medication 650 MILLIGRAM(S): at 00:37

## 2019-03-20 RX ADMIN — Medication 100 MILLIGRAM(S): at 05:11

## 2019-03-20 RX ADMIN — GABAPENTIN 600 MILLIGRAM(S): 400 CAPSULE ORAL at 13:22

## 2019-03-20 RX ADMIN — Medication 100 MILLIGRAM(S): at 21:46

## 2019-03-20 RX ADMIN — LOSARTAN POTASSIUM 100 MILLIGRAM(S): 100 TABLET, FILM COATED ORAL at 05:11

## 2019-03-20 RX ADMIN — Medication 100 MILLIGRAM(S): at 13:23

## 2019-03-20 RX ADMIN — Medication 20 MILLIEQUIVALENT(S): at 15:10

## 2019-03-20 RX ADMIN — Medication 10 MILLIGRAM(S): at 21:46

## 2019-03-20 RX ADMIN — Medication 650 MILLIGRAM(S): at 10:20

## 2019-03-20 RX ADMIN — OXYCODONE HYDROCHLORIDE 5 MILLIGRAM(S): 5 TABLET ORAL at 11:42

## 2019-03-20 RX ADMIN — SENNA PLUS 2 TABLET(S): 8.6 TABLET ORAL at 21:46

## 2019-03-20 RX ADMIN — Medication 650 MILLIGRAM(S): at 09:24

## 2019-03-20 RX ADMIN — SODIUM CHLORIDE 50 MILLILITER(S): 9 INJECTION INTRAMUSCULAR; INTRAVENOUS; SUBCUTANEOUS at 18:48

## 2019-03-20 NOTE — PROGRESS NOTE ADULT - SUBJECTIVE AND OBJECTIVE BOX
CHIEF COMPLAINT:Patient is a 58y old  Male who presents with a chief complaint of spinal AVM (20 Mar 2019 10:30)    	        PAST MEDICAL & SURGICAL HISTORY:  HLD (hyperlipidemia)  Essential hypertension  DM (diabetes mellitus)          REVIEW OF SYSTEMS:  CONSTITUTIONAL: Nfeels better overall  EYES: No eye pain, visual disturbances, or discharge  NECK: No pain or stiffness  RESPIRATORY: No cough, wheezing, chills or hemoptysis; No Shortness of Breath  CARDIOVASCULAR: No chest pain, palpitations, passing out, dizziness, or leg swelling  GASTROINTESTINAL: abd discomfort ? no flatus  GENITOURINARY: No dysuria, frequency, hematuria, or incontinence  NEUROLOGICAL: No headaches, memory loss, loss of strength, numbness, or tremors  MUSCULOSKELETAL: No joint pain or swelling; No muscle, back, or extremity pain    Medications:  MEDICATIONS  (STANDING):  amLODIPine   Tablet 5 milliGRAM(s) Oral daily  atorvastatin 20 milliGRAM(s) Oral at bedtime  dextrose 5%. 1000 milliLiter(s) (50 mL/Hr) IV Continuous <Continuous>  dextrose 50% Injectable 12.5 Gram(s) IV Push once  dextrose 50% Injectable 25 Gram(s) IV Push once  dextrose 50% Injectable 25 Gram(s) IV Push once  docusate sodium 100 milliGRAM(s) Oral three times a day  enoxaparin Injectable 40 milliGRAM(s) SubCutaneous every 24 hours  gabapentin 600 milliGRAM(s) Oral three times a day  insulin lispro (HumaLOG) corrective regimen sliding scale   SubCutaneous three times a day before meals  losartan 100 milliGRAM(s) Oral daily  polyethylene glycol 3350 17 Gram(s) Oral once  senna 2 Tablet(s) Oral at bedtime    MEDICATIONS  (PRN):  acetaminophen   Tablet .. 650 milliGRAM(s) Oral every 6 hours PRN Temp greater or equal to 38.5C (101.3F), Mild Pain (1 - 3)  calcium carbonate    500 mG (Tums) Chewable 1 Tablet(s) Chew every 4 hours PRN Heartburn  dextrose 40% Gel 15 Gram(s) Oral once PRN Blood Glucose LESS THAN 70 milliGRAM(s)/deciliter  diazepam    Tablet 5 milliGRAM(s) Oral every 8 hours PRN muscle spasms  famotidine    Tablet 20 milliGRAM(s) Oral daily PRN acid  glucagon  Injectable 1 milliGRAM(s) IntraMuscular once PRN Glucose LESS THAN 70 milligrams/deciliter  melatonin 3 milliGRAM(s) Oral at bedtime PRN Sleep  oxyCODONE    IR 5 milliGRAM(s) Oral every 4 hours PRN Moderate Pain (4 - 6)  oxyCODONE    IR 10 milliGRAM(s) Oral every 4 hours PRN Severe Pain (7 - 10)    	    PHYSICAL EXAM:  T(C): 36.8 (03-20-19 @ 08:10), Max: 37.7 (03-20-19 @ 04:59)  HR: 86 (03-20-19 @ 08:10) (81 - 94)  BP: 165/89 (03-20-19 @ 08:10) (138/83 - 176/96)  RR: 20 (03-20-19 @ 08:10) (16 - 20)  SpO2: 95% (03-20-19 @ 08:10) (95% - 100%)  Wt(kg): --  I&O's Summary    19 Mar 2019 07:01  -  20 Mar 2019 07:00  --------------------------------------------------------  IN: 650 mL / OUT: 2220 mL / NET: -1570 mL    20 Mar 2019 07:01  -  20 Mar 2019 11:05  --------------------------------------------------------  IN: 240 mL / OUT: 700 mL / NET: -460 mL        Appearance: Normal	  HEENT:   Normal oral mucosa, PERRL, EOMI	  Lymphatic: No lymphadenopathy  Cardiovascular: Normal S1 S2, No JVD, No murmurs, No edema  Respiratory: Lungs clear to auscultation	  Psychiatry: A & O x 3, Mood & affect appropriate  Gastrointestinal:  Soft, Non-tender,  Skin: No rashes, No ecchymoses, No cyanosis	  Neurologic: Non-focal  Extremities: Normal range of motion, No clubbing, cyanosis or edema  Vascular: Peripheral pulses palpable 2+ bilaterally    TELEMETRY: 	    ECG:  	  RADIOLOGY:  OTHER: 	  	  LABS:	 	    CARDIAC MARKERS:                                10.5   13.70 )-----------( 240      ( 20 Mar 2019 08:46 )             33.4     03-20    136  |  97  |  14  ----------------------------<  139<H>  3.6   |  27  |  0.74    Ca    9.5      20 Mar 2019 06:18  Phos  4.9     03-18  Mg     1.8     03-18      proBNP:   Lipid Profile:   HgA1c:   TSH:

## 2019-03-20 NOTE — PROGRESS NOTE ADULT - ASSESSMENT
This is a 58y year old Male with the below past medical history who presents with the chief complaint of leg weakness.  Since sept 2018, he has had a first numnbness of his toes and progressive weakness in both legs.  Most problematic getting up from chair.  In feb 2019, his knee gave out and he fell.  He was having pain as well, at times burning. He ws seen by Dr. Vivas a neurologist, had MRI brain with nonspecific changes.  Told had stroke and placed on plavix.  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  He was sent from [facility] to [facility] and had angio confirming av dural fistula.     pt says weakness no worse in legs, no new sx in arms, no changes in speech, swallow, vision, bowel or bladder control.     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 ileus  appreciate medicine consult  pt now bedrest, NS will decide when his activity level can be upgraded so can work with PT    reviewed with pt and RN in detail

## 2019-03-20 NOTE — PHYSICAL THERAPY INITIAL EVALUATION ADULT - PLANNED THERAPY INTERVENTIONS, PT EVAL
balance training/bed mobility training/Stair Training: Goal: Improve to 10 steps I with single rail, step to pattern by 4 weeks./gait training/transfer training
Stair Training: Goal: Improve to 4 steps I with single rail, step to pattern by 4 weeks./bed mobility training/gait training/transfer training

## 2019-03-20 NOTE — PHYSICAL THERAPY INITIAL EVALUATION ADULT - DIAGNOSIS, PT EVAL
Decreased functional mobility d/t impaired motor control, coordination, balance, strength and endurance.
Decreased functional mobility d/t impaired motor control, coordination, balance, strength and endurance.

## 2019-03-20 NOTE — PROGRESS NOTE ADULT - SUBJECTIVE AND OBJECTIVE BOX
HPI:  Patient is a 58 year old male that developed back pain after a previous fall.  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  Recently complained of low back pain with decreased sensation to b/l LE and LLE weakness.  Transferred to [facility] for spinal angiogram and further management.    OVERNIGHT EVENTS:  No acute events overnight.  Patient still with significant incisional pain.  Not controlled on tylenol.  Planned to work with PT today.  Hemovac in place.  Tolerating diet.    Vital Signs Last 24 Hrs  T(C): 36.8 (20 Mar 2019 08:10), Max: 37.7 (20 Mar 2019 04:59)  T(F): 98.3 (20 Mar 2019 08:10), Max: 99.8 (20 Mar 2019 04:59)  HR: 86 (20 Mar 2019 08:10) (81 - 94)  BP: 165/89 (20 Mar 2019 08:10) (138/83 - 176/96)  BP(mean): 118 (19 Mar 2019 17:00) (15 - 129)  RR: 20 (20 Mar 2019 08:10) (16 - 20)  SpO2: 95% (20 Mar 2019 08:10) (95% - 100%)      PHYSICAL EXAM:  Neurological: awake, alert, oriented x3, follows commands, speech clear and fluent, moves all extremities x4 w/ 5/5 strength throughout, sensation present, intact, equal throughout    Cardiovascular: +s1, s2  Respiratory: clear to auscultation b/l  Gastrointestinal: soft, non-distended, non-tender  Genitourinary: +paredes  Extremities: +dp/pt pulses palpable b/l  Incision/Wound: posterior midline vertical incision dressing c/d/i w/ aquacel ag, hemovac x1    TUBES/LINES:  [x] hemovac x1 (120cc serosanguinous per 24 hours)  [x] paredes    DIET:  [x] consistent carbohydrate    LABS:                        10.5   13.70 )-----------( 240      ( 20 Mar 2019 08:46 )             33.4     03-20    136  |  97  |  14  ----------------------------<  139<H>  3.6   |  27  |  0.74    Ca    9.5      20 Mar 2019 06:18  Phos  4.9     03-18  Mg     1.8     03-18          CAPILLARY BLOOD GLUCOSE      POCT Blood Glucose.: 159 mg/dL (20 Mar 2019 09:06)  POCT Blood Glucose.: 141 mg/dL (19 Mar 2019 16:18)  POCT Blood Glucose.: 139 mg/dL (19 Mar 2019 11:00)        Allergies    No Known Allergies        MEDICATIONS:  Antibiotics:  none    Neuro:  acetaminophen   Tablet .. 650 milliGRAM(s) Oral every 6 hours PRN  diazepam    Tablet 5 milliGRAM(s) Oral every 8 hours PRN  gabapentin 600 milliGRAM(s) Oral three times a day  melatonin 3 milliGRAM(s) Oral at bedtime PRN  oxyCODONE    IR 5 milliGRAM(s) Oral every 4 hours PRN  oxyCODONE    IR 10 milliGRAM(s) Oral every 4 hours PRN    Anticoagulation:  enoxaparin Injectable 40 milliGRAM(s) SubCutaneous every 24 hours    OTHER:  amLODIPine   Tablet 5 milliGRAM(s) Oral daily  atorvastatin 20 milliGRAM(s) Oral at bedtime  calcium carbonate    500 mG (Tums) Chewable 1 Tablet(s) Chew every 4 hours PRN  dextrose 40% Gel 15 Gram(s) Oral once PRN  dextrose 50% Injectable 12.5 Gram(s) IV Push once  dextrose 50% Injectable 25 Gram(s) IV Push once  dextrose 50% Injectable 25 Gram(s) IV Push once  docusate sodium 100 milliGRAM(s) Oral three times a day  famotidine    Tablet 20 milliGRAM(s) Oral daily PRN  glucagon  Injectable 1 milliGRAM(s) IntraMuscular once PRN  insulin lispro (HumaLOG) corrective regimen sliding scale   SubCutaneous three times a day before meals  losartan 100 milliGRAM(s) Oral daily  polyethylene glycol 3350 17 Gram(s) Oral once  senna 2 Tablet(s) Oral at bedtime    IVF:  dextrose 5%. 1000 milliLiter(s) IV Continuous <Continuous>    CULTURES:  none    RADIOLOGY & ADDITIONAL TESTS:  none

## 2019-03-20 NOTE — PROGRESS NOTE ADULT - SUBJECTIVE AND OBJECTIVE BOX
Subjective: Patient seen and examined. No new events except as noted.     SUBJECTIVE/ROS:  No chest pain, dyspnea, palpitation, or dizziness.       MEDICATIONS:  MEDICATIONS  (STANDING):  amLODIPine   Tablet 5 milliGRAM(s) Oral daily  atorvastatin 20 milliGRAM(s) Oral at bedtime  dextrose 5%. 1000 milliLiter(s) (50 mL/Hr) IV Continuous <Continuous>  dextrose 50% Injectable 12.5 Gram(s) IV Push once  dextrose 50% Injectable 25 Gram(s) IV Push once  dextrose 50% Injectable 25 Gram(s) IV Push once  docusate sodium 100 milliGRAM(s) Oral three times a day  enoxaparin Injectable 40 milliGRAM(s) SubCutaneous every 24 hours  gabapentin 600 milliGRAM(s) Oral three times a day  insulin lispro (HumaLOG) corrective regimen sliding scale   SubCutaneous three times a day before meals  losartan 100 milliGRAM(s) Oral daily  pantoprazole    Tablet 40 milliGRAM(s) Oral before breakfast  potassium chloride    Tablet ER 20 milliEquivalent(s) Oral every 2 hours  senna 2 Tablet(s) Oral at bedtime      PHYSICAL EXAM:  T(C): 36.9 (03-20-19 @ 15:32), Max: 37.7 (03-20-19 @ 04:59)  HR: 72 (03-20-19 @ 15:32) (72 - 94)  BP: 134/75 (03-20-19 @ 15:32) (134/75 - 171/102)  RR: 18 (03-20-19 @ 15:32) (16 - 20)  SpO2: 96% (03-20-19 @ 15:32) (95% - 97%)  Wt(kg): --  I&O's Summary    19 Mar 2019 07:01  -  20 Mar 2019 07:00  --------------------------------------------------------  IN: 650 mL / OUT: 2220 mL / NET: -1570 mL    20 Mar 2019 07:01  -  20 Mar 2019 16:26  --------------------------------------------------------  IN: 240 mL / OUT: 700 mL / NET: -460 mL            JVP: Normal  Neck: supple  Lung: clear   CV: S1 S2 , Murmur:  Abd: soft  Ext: No edema  neuro: Awake / alert  Psych: flat affect  Skin: normal``    LABS/DATA:    CARDIAC MARKERS:                                10.5   13.70 )-----------( 240      ( 20 Mar 2019 08:46 )             33.4     03-20    136  |  97  |  14  ----------------------------<  139<H>  3.6   |  27  |  0.74    Ca    9.5      20 Mar 2019 06:18  Phos  4.9     03-18  Mg     1.8     03-18      proBNP:   Lipid Profile:   HgA1c:   TSH:     TELE:  EKG:

## 2019-03-20 NOTE — PHYSICAL THERAPY INITIAL EVALUATION ADULT - ADDITIONAL COMMENTS
PTA pt lived in pvt home with wife (works during the day), 3 steps to enter +HR however there is a platform with nothing to hold onto prior to entering front door, no steps inside except to basement which pt does not have to negotiate, wife can drive pt to outpatient. Pt has been using RW recently due to 2 falls when using cane, reports his legs "just gave out on me". Prior to sx, pt I with bed mobility/transfers and CG A to ambulate with RW, PT rec: outpt PT.
PTA pt lived in pvt home with wife (works during the day), 3 steps to enter +HR however there is a platform with nothing to hold onto prior to entering front door, no steps inside except to basement which pt does not have to negotiate, wife can drive pt to outpatient. Pt has been using RW recently due to 2 falls when using cane, reports his legs "just gave out on me".

## 2019-03-20 NOTE — PHYSICAL THERAPY INITIAL EVALUATION ADULT - IMPAIRMENTS CONTRIBUTING IMPAIRED BED MOBILITY, REHAB EVAL
dem mild difficulty/impaired motor control/impaired postural control/decreased strength
impaired motor control/impaired coordination/decreased strength/impaired balance/pain/impaired postural control

## 2019-03-20 NOTE — PHYSICAL THERAPY INITIAL EVALUATION ADULT - PHYSICAL ASSIST/NONPHYSICAL ASSIST: GAIT, REHAB EVAL
verbal cues/1 person assist/nonverbal cues (demo/gestures)
1 person assist/nonverbal cues (demo/gestures)/verbal cues

## 2019-03-20 NOTE — CONSULT NOTE ADULT - ASSESSMENT
HTN  labile  cont current meds    DM  Monitor finger stick. Insulin coverage. Diabetic education and Diabetic diet. Consider nutrition consultation.    HLD  on statin    PREOP  Based on current ACC/AHA guidelines, patient history and physical exam, the patient is considered to have low risk  EKG is normal  no objection to proceed to OR as planned
57 YO male w/  lower back pain following a fall approximately 1 month ago.   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   Patient c/o lower back pain, decreased sensation in both legs, left leg weakness and difficulty ambulating unassisted due to weakness.   Denied bowel or bladder dysfunction. Transfer to [facility] from [facility] for w/u and potential sx for t5 dural avf    dm  controlled  cont current regimen  htn stable  echo wnls  dvt proph  cont current meds  medically optimized for sx   pt
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   s/p T4 - T8 laminoplasty for obliteration of dural AVM (3/18/19).    Abdominal distension ?ileus vs constipation  Dyspepsia with known history of GERD    RECS:  -limit narcotics as feasible  -OOB/ PT  -Dulcolax supp x 1   -Continue Colace and Senna  -Check AXR (portable)  -PO as tolerated  -Zofran prn  -change from Pepcid to PPI daily (takes this at baseline with good relief)    Further care per Neurosurgery  Discussed with pt at bedside, all questions answered    Thank you for the courtesy of this consult.  Mike Hale PA-C    [facility]  [phone]
This is a 58y year old Male with the below past medical history who presents with the chief complaint of leg weakness.  Since sept 2018, he has had a first numnbness of his toes and progressive weakness in both legs.  Most problematic getting up from chair.  In feb 2019, his knee gave out and he fell.  He was having pain as well, at times burning. He ws seen by Dr. Vivas a neurologist, had MRI brain with nonspecific changes.  Told had stroke and placed on plavix.  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  He was sent from [facility] to [facility] and had angio confirming av dural fistula.     pt says weakness no worse in legs, no new sx in arms, no changes in speech, swallow, vision, bowel or bladder control.     Extensive workup noted above    plan is to treat for dural avf with dr sauceda and severo    would have medicine consult as pt is diabetic    reviewed with pt in detail

## 2019-03-20 NOTE — PROGRESS NOTE ADULT - ASSESSMENT
HTN  stable    DM  Monitor finger stick. Insulin coverage.     HLD  on statin    lower abd pain   ileus  GI is following

## 2019-03-20 NOTE — PHYSICAL THERAPY INITIAL EVALUATION ADULT - IMPAIRMENTS FOUND, PT EVAL
aerobic capacity/endurance/posture/sensory integrity/gait, locomotion, and balance/muscle strength
gait, locomotion, and balance/muscle strength/aerobic capacity/endurance/posture

## 2019-03-20 NOTE — PROGRESS NOTE ADULT - ASSESSMENT
59 YO male w/  lower back pain following a fall approximately 1 month ago.   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   Patient c/o lower back pain, decreased sensation in both legs, left leg weakness and difficulty ambulating unassisted due to weakness.   Denied bowel or bladder dysfunction. Transfer to [facility] from [facility] for w/u and potential sx for t5 dural avf  s/p t4/8 laminoplasty for obliteration of dura avm  would try supp and bowel regimen  avoid narcotics   diet as per sx   abd xrays if not resolving     dm  controlled  cont current regimen  htn stable  dvt proph  cont current meds  pt

## 2019-03-20 NOTE — PHYSICAL THERAPY INITIAL EVALUATION ADULT - PRECAUTIONS/LIMITATIONS, REHAB EVAL
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  He was sent from [facility] to [facility] and had angio confirming av dural fistula. 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 Pt underwent repeat angio 3/15; pt now s/p sx as noted above./fall precautions/spinal precautions

## 2019-03-20 NOTE — PHYSICAL THERAPY INITIAL EVALUATION ADULT - PERTINENT HX OF CURRENT PROBLEM, REHAB EVAL
Pt is a is a 58y year old Male with the below past medical history who presents with the chief complaint of leg weakness.  Since sept 2018, he has had a first numnbness of his toes and progressive weakness in both legs.  Most problematic getting up from chair.  In feb 2019, his knee gave out and he fell. He ws seen by Dr. Vivas a neurologist, had MRI brain with nonspecific changes, was told he had stroke and placed on plavix.
Pt is a 58y year old Male with the below past medical history who presents with the chief complaint of leg weakness.  Since sept 2018, he has had a first numnbness of his toes and progressive weakness in both legs.  Most problematic getting up from chair.  In feb 2019, his knee gave out and he fell. He ws seen by Dr. Vivas a neurologist, had MRI brain with nonspecific changes, was told he had stroke and placed on plavix.

## 2019-03-20 NOTE — PHYSICAL THERAPY INITIAL EVALUATION ADULT - IMPAIRMENTS CONTRIBUTING TO GAIT DEVIATIONS, PT EVAL
decreased strength/ataxic/impaired postural control/impaired balance/impaired motor control
impaired motor control/impaired coordination/impaired postural control/ataxic/decreased strength/impaired balance/pain

## 2019-03-20 NOTE — PHYSICAL THERAPY INITIAL EVALUATION ADULT - GAIT TRAINING, PT EVAL
Goal: Improve to 150ft with least restrictive device I by 4 weeks.
Goal: Improve to 150ft with least restrictive device I by 4 weeks.

## 2019-03-20 NOTE — PHYSICAL THERAPY INITIAL EVALUATION ADULT - IMPAIRED TRANSFERS: SIT/STAND, REHAB EVAL
impaired postural control/decreased strength/impaired balance
impaired balance/impaired coordination/pain/impaired postural control/ataxic/impaired motor control/decreased strength

## 2019-03-20 NOTE — PHYSICAL THERAPY INITIAL EVALUATION ADULT - GAIT DEVIATIONS NOTED, PT EVAL
decreased mani/decreased stride length/decreased weight-shifting ability
decreased stride length/decreased step length/decreased mani/decreased weight-shifting ability

## 2019-03-20 NOTE — PHYSICAL THERAPY INITIAL EVALUATION ADULT - CRITERIA FOR SKILLED THERAPEUTIC INTERVENTIONS
anticipated discharge recommendation/impairments found
predicted duration of therapy intervention/functional limitations in following categories/therapy frequency/anticipated discharge recommendation/risk reduction/prevention/rehab potential/impairments found

## 2019-03-20 NOTE — PROGRESS NOTE ADULT - SUBJECTIVE AND OBJECTIVE BOX
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  Since sept 2018, he has had a first numnbness of his toes and progressive weakness in both legs.  Most problematic getting up from chair.  In feb 2019, his knee gave out and he fell.  He was having pain as well, at times burning. He ws seen by Dr. Vivas a neurologist, had MRI brain with nonspecific changes.  Told had stroke and placed on plavix.  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  He was sent from [facility] to [facility] and had angio confirming av dural fistula.     pt says weakness no worse in legs, no new sx in arms, no changes in speech, swallow, vision, bowel or bladder control.     s/p  T4-T8 laminoplasty for spinal dural AVF    Past Medical History:  HLD (hyperlipidemia) (E78.5)  Essential hypertension (I10)  DM (diabetes mellitus) (E11.9)      Past Surgical History:      Social History:  No toxic habits    Family History:  FAMILY HISTORY:      Allergies:  No Known Allergies      ROS:  Constitutional: Patient offers no complaints of fevers or significant weight loss  Ears, Nose, Mouth and Throat: The patient presents with no abnormalities of the head, ears, eyes, nose or throat  Skin: Patient offers no concerns of new rashes or lesions  Respiratory: The patient presents with no abnormalities of the respiratory tract  Cardiovascular: The patient presents with no cardiac abnormalities  Gastrointestinal: The patient presents with no abnormalities of the GI system  Genitourinary: The patient presents with no dysuria, hematuria or frequent urination  Neurological: See HPI  Endocrine: Patient offers no complaints of excessive thirst, urination, or heat/cold intolerance    Advanced care planning reviewed and noted in the chart.        Medications:  acetaminophen   Tablet .. 650 milliGRAM(s) Oral every 6 hours PRN  amLODIPine   Tablet 5 milliGRAM(s) Oral daily  atorvastatin 20 milliGRAM(s) Oral at bedtime  calcium carbonate    500 mG (Tums) Chewable 1 Tablet(s) Chew every 4 hours PRN  dextrose 40% Gel 15 Gram(s) Oral once PRN  dextrose 5%. 1000 milliLiter(s) IV Continuous <Continuous>  dextrose 50% Injectable 12.5 Gram(s) IV Push once  dextrose 50% Injectable 25 Gram(s) IV Push once  dextrose 50% Injectable 25 Gram(s) IV Push once  diazepam    Tablet 5 milliGRAM(s) Oral every 8 hours PRN  docusate sodium 100 milliGRAM(s) Oral three times a day  enoxaparin Injectable 40 milliGRAM(s) SubCutaneous every 24 hours  famotidine    Tablet 20 milliGRAM(s) Oral daily PRN  gabapentin 600 milliGRAM(s) Oral three times a day  glucagon  Injectable 1 milliGRAM(s) IntraMuscular once PRN  insulin lispro (HumaLOG) corrective regimen sliding scale   SubCutaneous three times a day before meals  losartan 100 milliGRAM(s) Oral daily  melatonin 3 milliGRAM(s) Oral at bedtime PRN  senna 2 Tablet(s) Oral at bedtime      Labs:  CBC Full  -  ( 19 Mar 2019 03:05 )  WBC Count : 12.7 K/uL  Hemoglobin : 11.0 g/dL  Hematocrit : 32.6 %  Platelet Count - Automated : 230 K/uL  Mean Cell Volume : 82.9 fl  Mean Cell Hemoglobin : 28.0 pg  Mean Cell Hemoglobin Concentration : 33.8 gm/dL  Auto Neutrophil # : 10.6 K/uL  Auto Lymphocyte # : 0.9 K/uL  Auto Monocyte # : 1.2 K/uL  Auto Eosinophil # : 0.0 K/uL  Auto Basophil # : 0.0 K/uL  Auto Neutrophil % : 83.8 %  Auto Lymphocyte % : 6.8 %  Auto Monocyte % : 9.2 %  Auto Eosinophil % : 0.1 %  Auto Basophil % : 0.0 %    03-20    136  |  97  |  14  ----------------------------<  139<H>  3.6   |  27  |  0.74    Ca    9.5      20 Mar 2019 06:18  Phos  4.9     03-18  Mg     1.8     03-18      CAPILLARY BLOOD GLUCOSE      POCT Blood Glucose.: 141 mg/dL (19 Mar 2019 16:18)  POCT Blood Glucose.: 139 mg/dL (19 Mar 2019 11:00)              Vitals:  Vital Signs Last 24 Hrs  T(C): 36.8 (20 Mar 2019 08:10), Max: 37.7 (20 Mar 2019 04:59)  T(F): 98.3 (20 Mar 2019 08:10), Max: 99.8 (20 Mar 2019 04:59)  HR: 86 (20 Mar 2019 08:10) (79 - 94)  BP: 165/89 (20 Mar 2019 08:10) (138/83 - 176/96)  BP(mean): 118 (19 Mar 2019 17:00) (15 - 129)  RR: 20 (20 Mar 2019 08:10) (16 - 20)  SpO2: 95% (20 Mar 2019 08:10) (95% - 100%)  NEUROLOGICAL EXAM:    Mental status: Awake, alert, and in no apparent distress. Oriented to person, place and time. Language function is normal. Recent memory, digit span and concentration were normal.     Cranial Nerves: Pupils were equal, round, reactive to light. Extraocular movements were intact. Visual field were full. Fundoscopic exam was deferred. Facial sensation was intact to light touch. There was no facial asymmetry. The palate was upgoing symmetrically and tongue was midline. Hearing acuity was intact to finger rub AU. Shoulder shrug was full bilaterally    Motor exam: Bulk and tone were normal. Strength was 5/5 in arms, 4/5 in legs in knee ext, hip flexion on left,  right is 4+ for knee ext/hip flex, but dorsi is 5-/5.   Fine finger movements were symmetric and normal. There was no pronator drift    Reflexes: 2+ in the bilateral upper extremities. 2+ in the bilateral lower extremities. Toes were downgoing bilaterally.     Sensation: Intact to light touch, temperature, vibration and proprioception.     Coordination: Finger-nose-finger and heel-to-shin was without dysmetria.     Gait: now on bedrest    < from: MR Angio Spinal Canal w/wo IV Cont (03.08.19 @ 21:57) >    EXAM:  MR ANGIO SPINAL CANAL WAW IC        PROCEDURE DATE:  Mar  8 2019         INTERPRETATION:  Clinical information: Evaluate for spinal AV fistula.    Technique: Contrast-enhanced MR angiography of the spine was performed.   7.5 cc's of IV Gadavist was administered without immediate complication   and 0 cc's was discarded.    Comparison: Prior contrast-enhanced MRI examinations of the thoracic and   lumbar spine dated 3/6/2018. Prior noncontrast MRI examinations of the   cervical, thoracic, and lumbar spine from 3/5/2019.    Findings: There is redemonstration of edema signal, swelling, and patchy   enhancement involving the mid lower thoracic spinal cord extending to the   conus medullaris. There is also thickening of the cauda equina nerve   roots and abnormal enhancement. On T2-weighted imaging, intradural extra   medullary serpiginous vessels are again noted within the thecal sac   surrounding the conus and extending craniad into the thoracic canal.    On the MR angiography portion of the examination, there are questionable   segmental radicular arterial feeders at the level of of L1 draining into   radiculomedullary veins. Serpiginous venous vessels abutting the conus   medullaris and thoracic cord are noted extending into the thoracic canal.    The urinary bladder is again noted to be distended on the  localizer   images.    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 Questionable arterial   feeders at the level of L1, as discussed.    Further evaluation with a conventional spinal angiography examination is   recommended.    The possibility of a spinal cord tumor with drop metastases, lymphoma, or   inflammatory process such as neurosarcoidosis or infection such as TB   cannot be completely excluded. Correlate with results of CSF testing from   the prior lumbar puncture procedure already performed on 3/7/2019.                        GENARO LORA M.D., ATTENDING RADIOLOGIST  This document has been electronically signed. Mar  9 2019  1:59PM                  < end of copied text >  < from: MR Lumbar Spine w/ IV Cont (03.07.19 @ 14:08) >    EXAM:  MR SPINE LUMBAR IC      EXAM:  MR SPINE THORACIC IC        PROCEDURE DATE:  Mar  7 2019         INTERPRETATION:  History: Weakness. Abnormal spinal cord on noncontrast   MRI. Left lower extremity weakness.    Description: A postcontrast MRI of the thoracic and lumbar spine was   performed.    Comparison is made to the noncontrast spine MRI from 03/05/2019.    Sagittal and axial T1 postcontrast weighted series were performed.    7.5 cc intravenous Gadavist gadolinium contrast administered, 0 cc   contrast was discarded.    Edema and swelling is again noted involving the mid-lower thoracic spinal   cord, extending to the conus. Thickening of the nerve roots of the cauda   equina is present.    Patchy and nodular enhancement is noted involving the mid-lower thoracic   spinal cord. Abnormal enhancement also involves the thickened nerve roots   of the cauda equina. Linear enhancing vessels are noted on the surface of   the cord.    No abnormal enhancement is noted involving the vertebral bodies.    The urinary bladder is distended, partially visualized suggesting   neurogenic bladder given the spinal cord and cauda equina involvement.    IMPRESSION:    Abnormal enhancement involves the mid-lower thoracic spinal, and also   involves the nerve roots of the cauda equina. A spinal cord tumor with   drop metastases, lymphoma, an inflammatory process (such as sarcoidosis),   or an atypical infection (such as tuberculosis) are in the differential   diagnosis. Some prominent vasculature is noted also raising the   possibility of a dural AV fistula. Consider correlation with lumbar   puncture and spine MRA if clinically warranted.    The urinary bladder is distended, partially visualized suggesting   neurogenic bladder given the spinal cord and cauda equina involvement.                  HALLE LOTT M.D., ATTENDING RADIOLOGIST  This document has been electronically signed. Mar  7 2019  2:37PM                  < end of copied text >

## 2019-03-20 NOTE — PROGRESS NOTE ADULT - ASSESSMENT
HPI:  Patient is a 58 year old male that developed back pain after a previous fall.  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  Recently complained of low back pain with decreased sensation to b/l LE and LLE weakness.  Transferred to [facility] for spinal angiogram and further management.    PROCEDURE: s/p T4-T8 laminectomy for obliteration of T5 arteriovenous malformation on 3/18/2019, s/p spinal angiogram showing no residual malformation  POD#2, PAD#1    PLAN:  -oxycodone added for pain control  -continue valium for muscle spasms  -lovenox and SCDs for DVT prophylaxis  -senna, colace, and miralax for bowel regimen  -HISS for glucose control  -continue hemovac, monitor output  -continue home medications  -consistent carbohydrate diet  -out of bed with assistance  -incentive spirometer for lung expansion  -pt eval pending  -ot eval pending  -am labs    Spectra #[id number]              Assessment:  Please Check When Present   []  GCS  E   V  M     Heart Failure: []Acute, [] acute on chronic , []chronic  Heart Failure:  [] Diastolic (HFpEF), [] Systolic (HFrEF), []Combined (HFpEF and HFrEF), [] RHF, [] Pulm HTN, [] Other    [] TREVER, [] ATN, [] AIN, [] other  [] CKD1, [] CKD2, [] CKD 3, [] CKD 4, [] CKD 5, []ESRD    Encephalopathy: [] Metabolic, [] Hepatic, [] toxic, [] Neurological, [] Other    Abnormal Nurtitional Status: [] malnurtition (see nutrition note), [ ]underweight: BMI < 19, [] morbid obesity: BMI >40, [] Cachexia    [] Sepsis  [] hypovolemic shock,[] cardiogenic shock, [] hemorrhagic shock, [] neuogenic shock  [] Acute Respiratory Failure  []Cerebral edema, [] Brain compression/ herniation,   [] Functional quadriplegia  [] Acute blood loss anemia

## 2019-03-20 NOTE — PHYSICAL THERAPY INITIAL EVALUATION ADULT - BALANCE DISTURBANCE, IDENTIFIED IMPAIRMENT CONTRIBUTE, REHAB EVAL
impaired coordination/decreased strength/impaired motor control/impaired postural control
impaired motor control/impaired coordination/impaired postural control/decreased strength

## 2019-03-20 NOTE — CONSULT NOTE ADULT - SUBJECTIVE AND OBJECTIVE BOX
Patient is a 58y old  Male who presented with a chief complaint of spinal AVM (20 Mar 2019 11:05)      HPI:  59 YO male developed lower back pain following a fall approximately 1 month ago. 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 Patient c/o lower back pain, decreased sensation in both legs, left leg weakness and difficulty ambulating unassisted due to weakness. Denied bowel or bladder dysfunction - was having daily BMs (brown) at home, no fecal or urinary incontinence  s/p  T4-T8 laminoplasty for spinal dural AVF  Last BM on day prior to surgery per pt and spouse  +abdominal distension and nausea without emesis  no rectal pain or pressure, no rectal bleeding  no history of prior abdominal surgery, ileus or SBO  +Hx GERD managed on home PPI therapy (daily), no history of PUD +dyspepsia  prior EGD and Colonoscopies negative    +back / incisional pain - controlled  OOB to chair for first time today      PAST MEDICAL & SURGICAL HISTORY:  HLD (hyperlipidemia)  Essential hypertension  DM (diabetes mellitus)      Allergies  No Known Allergies      MEDICATIONS  (STANDING):  amLODIPine   Tablet 5 milliGRAM(s) Oral daily  atorvastatin 20 milliGRAM(s) Oral at bedtime  dextrose 5%. 1000 milliLiter(s) (50 mL/Hr) IV Continuous <Continuous>  dextrose 50% Injectable 12.5 Gram(s) IV Push once  dextrose 50% Injectable 25 Gram(s) IV Push once  dextrose 50% Injectable 25 Gram(s) IV Push once  docusate sodium 100 milliGRAM(s) Oral three times a day  enoxaparin Injectable 40 milliGRAM(s) SubCutaneous every 24 hours  gabapentin 600 milliGRAM(s) Oral three times a day  insulin lispro (HumaLOG) corrective regimen sliding scale   SubCutaneous three times a day before meals  losartan 100 milliGRAM(s) Oral daily  senna 2 Tablet(s) Oral at bedtime    MEDICATIONS  (PRN):  acetaminophen   Tablet .. 650 milliGRAM(s) Oral every 6 hours PRN Temp greater or equal to 38.5C (101.3F), Mild Pain (1 - 3)  calcium carbonate    500 mG (Tums) Chewable 1 Tablet(s) Chew every 4 hours PRN Heartburn  dextrose 40% Gel 15 Gram(s) Oral once PRN Blood Glucose LESS THAN 70 milliGRAM(s)/deciliter  diazepam    Tablet 5 milliGRAM(s) Oral every 8 hours PRN muscle spasms  glucagon  Injectable 1 milliGRAM(s) IntraMuscular once PRN Glucose LESS THAN 70 milligrams/deciliter  melatonin 3 milliGRAM(s) Oral at bedtime PRN Sleep  oxyCODONE    IR 5 milliGRAM(s) Oral every 4 hours PRN Moderate Pain (4 - 6)  oxyCODONE    IR 10 milliGRAM(s) Oral every 4 hours PRN Severe Pain (7 - 10)      Social History:    Marital Status:  ( X  )    (   ) Single    (   )    (  )   Occupation:  finance/banking  Lives with: (  ) alone  (  ) children   ( X ) spouse   (  ) parents  (  ) other    Substance Use (street drugs): ( X ) never used  (  ) other:  Tobacco Usage:  (  X ) never smoked   (   ) former smoker   (   ) current smoker  (     ) pack year  (        ) last cigarette date  Alcohol Usage: no ETOH abuse      Family History   IBD (  ) Yes   ( X ) No  GI Malignancy (  )  Yes    ( X ) No      Advanced Directives: ( X    ) None    (      ) DNR    (     ) DNI    (     ) Health Care Proxy:     Review of Systems:    General:  No wt loss, fevers, chills, night sweats, fatigue,   CV:  No pain, palpitatioins, hypo/hypertension  Resp:  No dyspnea, cough, tachypnea, wheezing  GI:  see HPI,  :  No pain, bleeding, incontinence, nocturia  Muscle:  No pain, weakness  Neuro: see HPI  Psych:  No fatigue, insomnia, mood problems, depression  Endocrine:  No polyuria, polydypsia, cold/heat intolerance  Heme:  No petechiae, ecchymosis, easy bruisability  Skin:  No rash, tattoos, scars, edema      Vital Signs Last 24 Hrs  T(C): 37.3 (20 Mar 2019 12:25), Max: 37.7 (20 Mar 2019 04:59)  T(F): 99.1 (20 Mar 2019 12:25), Max: 99.8 (20 Mar 2019 04:59)  HR: 85 (20 Mar 2019 12:25) (81 - 94)  BP: 171/102 (20 Mar 2019 12:25) (138/83 - 176/96)  BP(mean): 118 (19 Mar 2019 17:00) (15 - 129)  RR: 20 (20 Mar 2019 12:25) (16 - 20)  SpO2: 95% (20 Mar 2019 12:25) (95% - 100%)    PHYSICAL EXAM:    Constitutional: NAD, well-developed pleasant, non toxic appearing. family at bedside (spouse and pt's cousin)  Neck: No LAD, supple  Respiratory: CTA and P  Cardiovascular: S1 and S2, RRR, no M  Gastrointestinal: hypoactive BS, softly distended NT no rebound, guarding or rigidity  Rectal: normal tone, non distended rectal vault, no palpable mass or lesion, Semi-firm brown stool in rectum (palpable at fingertip higher in rectum)  Back +dressing clean and dry +hemovac  Extremities: No peripheral edema, neg clubbing, cyanosis  Vascular: 2+ peripheral pulses  Neurological: A/O x 3, no focal deficits  good muscle bulk, moves all extremities  Psychiatric: Normal mood, normal affect  Skin: No rashes        LABS:                        10.5   13.70 )-----------( 240      ( 20 Mar 2019 08:46 )             33.4     03-20    136  |  97  |  14  ----------------------------<  139<H>  3.6   |  27  |  0.74    Ca    9.5      20 Mar 2019 06:18  Phos  4.9     03-18  Mg     1.8     03-18      LIVER FUNCTIONS - ( 17 Mar 2019 07:04 )  Alb: 3.7 g/dL / Pro: 6.9 g/dL / ALK PHOS: 66 U/L / ALT: 20 U/L / AST: 17 U/L / GGT: x             RADIOLOGY & ADDITIONAL TESTS:

## 2019-03-20 NOTE — PHYSICAL THERAPY INITIAL EVALUATION ADULT - TRANSFER TRAINING, PT EVAL
Goal: Improve to I with least restrictive device by 4 weeks.
Goal: Improve to I with least restrictive device by 4 weeks.

## 2019-03-21 LAB
GLUCOSE BLDC GLUCOMTR-MCNC: 161 MG/DL — HIGH (ref 70–99)
GLUCOSE BLDC GLUCOMTR-MCNC: 166 MG/DL — HIGH (ref 70–99)
GLUCOSE BLDC GLUCOMTR-MCNC: 170 MG/DL — HIGH (ref 70–99)
GLUCOSE BLDC GLUCOMTR-MCNC: 183 MG/DL — HIGH (ref 70–99)

## 2019-03-21 PROCEDURE — 99253 IP/OBS CNSLTJ NEW/EST LOW 45: CPT | Mod: GC

## 2019-03-21 PROCEDURE — 99233 SBSQ HOSP IP/OBS HIGH 50: CPT

## 2019-03-21 RX ORDER — POLYETHYLENE GLYCOL 3350 17 G/17G
17 POWDER, FOR SOLUTION ORAL DAILY
Qty: 0 | Refills: 0 | Status: DISCONTINUED | OUTPATIENT
Start: 2019-03-21 | End: 2019-03-22

## 2019-03-21 RX ORDER — LABETALOL HCL 100 MG
10 TABLET ORAL ONCE
Qty: 0 | Refills: 0 | Status: COMPLETED | OUTPATIENT
Start: 2019-03-21 | End: 2019-03-21

## 2019-03-21 RX ORDER — AMLODIPINE BESYLATE 2.5 MG/1
10 TABLET ORAL DAILY
Qty: 0 | Refills: 0 | Status: DISCONTINUED | OUTPATIENT
Start: 2019-03-21 | End: 2019-03-22

## 2019-03-21 RX ORDER — HYDRALAZINE HCL 50 MG
10 TABLET ORAL ONCE
Qty: 0 | Refills: 0 | Status: COMPLETED | OUTPATIENT
Start: 2019-03-21 | End: 2019-03-21

## 2019-03-21 RX ORDER — AMLODIPINE BESYLATE 2.5 MG/1
5 TABLET ORAL ONCE
Qty: 0 | Refills: 0 | Status: COMPLETED | OUTPATIENT
Start: 2019-03-21 | End: 2019-03-21

## 2019-03-21 RX ADMIN — Medication 1: at 17:42

## 2019-03-21 RX ADMIN — Medication 1: at 09:30

## 2019-03-21 RX ADMIN — ATORVASTATIN CALCIUM 20 MILLIGRAM(S): 80 TABLET, FILM COATED ORAL at 21:49

## 2019-03-21 RX ADMIN — Medication 7.5 MILLIGRAM(S): at 07:42

## 2019-03-21 RX ADMIN — Medication 100 MILLIGRAM(S): at 05:17

## 2019-03-21 RX ADMIN — ENOXAPARIN SODIUM 40 MILLIGRAM(S): 100 INJECTION SUBCUTANEOUS at 17:40

## 2019-03-21 RX ADMIN — LOSARTAN POTASSIUM 100 MILLIGRAM(S): 100 TABLET, FILM COATED ORAL at 05:17

## 2019-03-21 RX ADMIN — Medication 10 MILLIGRAM(S): at 00:50

## 2019-03-21 RX ADMIN — OXYCODONE HYDROCHLORIDE 10 MILLIGRAM(S): 5 TABLET ORAL at 14:00

## 2019-03-21 RX ADMIN — Medication 100 MILLIGRAM(S): at 15:22

## 2019-03-21 RX ADMIN — OXYCODONE HYDROCHLORIDE 10 MILLIGRAM(S): 5 TABLET ORAL at 12:01

## 2019-03-21 RX ADMIN — PANTOPRAZOLE SODIUM 40 MILLIGRAM(S): 20 TABLET, DELAYED RELEASE ORAL at 05:17

## 2019-03-21 RX ADMIN — SENNA PLUS 2 TABLET(S): 8.6 TABLET ORAL at 21:49

## 2019-03-21 RX ADMIN — SODIUM CHLORIDE 50 MILLILITER(S): 9 INJECTION INTRAMUSCULAR; INTRAVENOUS; SUBCUTANEOUS at 00:51

## 2019-03-21 RX ADMIN — AMLODIPINE BESYLATE 5 MILLIGRAM(S): 2.5 TABLET ORAL at 08:25

## 2019-03-21 RX ADMIN — Medication 1: at 13:12

## 2019-03-21 RX ADMIN — Medication 10 MILLIGRAM(S): at 05:17

## 2019-03-21 RX ADMIN — Medication 100 MILLIGRAM(S): at 21:49

## 2019-03-21 RX ADMIN — AMLODIPINE BESYLATE 5 MILLIGRAM(S): 2.5 TABLET ORAL at 05:17

## 2019-03-21 NOTE — PROGRESS NOTE ADULT - SUBJECTIVE AND OBJECTIVE BOX
Subjective: Patient seen and examined. No new events except as noted.     SUBJECTIVE/ROS:  No chest pain, dyspnea, palpitation, or dizziness.       MEDICATIONS:  MEDICATIONS  (STANDING):  amLODIPine   Tablet 10 milliGRAM(s) Oral daily  atorvastatin 20 milliGRAM(s) Oral at bedtime  dextrose 5%. 1000 milliLiter(s) (50 mL/Hr) IV Continuous <Continuous>  dextrose 50% Injectable 12.5 Gram(s) IV Push once  dextrose 50% Injectable 25 Gram(s) IV Push once  dextrose 50% Injectable 25 Gram(s) IV Push once  docusate sodium 100 milliGRAM(s) Oral three times a day  enoxaparin Injectable 40 milliGRAM(s) SubCutaneous every 24 hours  gabapentin 600 milliGRAM(s) Oral three times a day  insulin lispro (HumaLOG) corrective regimen sliding scale   SubCutaneous three times a day before meals  losartan 100 milliGRAM(s) Oral daily  magnesium citrate Oral Solution 1 Bottle Oral once  pantoprazole    Tablet 40 milliGRAM(s) Oral before breakfast  senna 2 Tablet(s) Oral at bedtime      PHYSICAL EXAM:  T(C): 36.8 (03-21-19 @ 12:45), Max: 37.2 (03-20-19 @ 19:52)  HR: 89 (03-21-19 @ 12:45) (72 - 89)  BP: 150/84 (03-21-19 @ 12:45) (134/75 - 179/105)  RR: 20 (03-21-19 @ 12:45) (16 - 20)  SpO2: 98% (03-21-19 @ 12:45) (95% - 98%)  Wt(kg): --  I&O's Summary    20 Mar 2019 07:01  -  21 Mar 2019 07:00  --------------------------------------------------------  IN: 790 mL / OUT: 1285 mL / NET: -495 mL    21 Mar 2019 07:01  -  21 Mar 2019 15:23  --------------------------------------------------------  IN: 620 mL / OUT: 0 mL / NET: 620 mL            JVP: Normal  Neck: supple  Lung: clear   CV: S1 S2 , Murmur:  Abd: soft  Ext: No edema  neuro: Awake / alert  Psych: flat affect  Skin: normal``    LABS/DATA:    CARDIAC MARKERS:                                10.5   13.70 )-----------( 240      ( 20 Mar 2019 08:46 )             33.4     03-20    136  |  97  |  14  ----------------------------<  139<H>  3.6   |  27  |  0.74    Ca    9.5      20 Mar 2019 06:18      proBNP:   Lipid Profile:   HgA1c:   TSH:     TELE:  EKG:

## 2019-03-21 NOTE — PROGRESS NOTE ADULT - ASSESSMENT
57 YO male w/  lower back pain following a fall approximately 1 month ago.   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   Patient c/o lower back pain, decreased sensation in both legs, left leg weakness and difficulty ambulating unassisted due to weakness.   Denied bowel or bladder dysfunction. Transfer to [facility] from [facility] for w/u and potential sx for t5 dural avf  s/p t4/8 laminoplasty for obliteration of dura avm  supp and bowel regimen  avoid narcotics   diet as per sx   abd xrays noted  gi f/u     dm  controlled  cont current regimen  htn   meds inc for inc bp   dvt proph  cont current meds  pt

## 2019-03-21 NOTE — CONSULT NOTE ADULT - SUBJECTIVE AND OBJECTIVE BOX
Patient is a 58y old  Male who presents with a chief complaint of spinal AVM (21 Mar 2019 09:30)      HPI:  58 year-old Man with a PMH of HTN/HLD, DM who developed lower back pain following a fall approximately 1 month ago. 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 Patient c/o lower back pain, decreased sensation in both legs, left leg weakness and difficulty ambulating unassisted due to weakness. Denied bowel or bladder dysfunction. 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 and again on 3/15/19 s/p angiogram Left T5 AVF s/p coil marker. Patient take to OR on 3/18 by Dr. Willis/ Kory s/p t4-t8 lami for obliteration of t5 dural avf and repeat Angio showed resolution of AV fistula. Post-op course complicated by ileus.      REVIEW OF SYSTEMS  [X] Constitutional WNL       [X] HEENT   [X] Cardio WNL              [X] Resp WNL            [X] GI WNL                            [X]  WNL                               [X] MSK WNL            [X] Neuro WNL                     [X] Cognitive WNL   [X] Psych WNL  [X] Skin WNL      [X] Heme WNL              [X] Endo WNL    PAST MEDICAL & SURGICAL HISTORY  HLD (hyperlipidemia)  Essential hypertension  DM (diabetes mellitus)      SOCIAL HISTORY  Smoking - Denied  EtOH - social   Drugs - Denied    FUNCTIONAL HISTORY  pt lived in pvt home with wife (works during the day), 3 steps to enter +HR however there is a platform with nothing to hold onto prior to entering front door, no steps inside except to basement which pt does not have to negotiate, wife can drive pt to outpatient. Pt has been using RW recently due to 2 falls when using cane, reports his legs "just gave out on me". Prior to sx, pt I with bed mobility/transfers and CG A to ambulate with RW, PT rec: outpt PT.    Independent prior with Ambulation and ADLs.     CURRENT FUNCTIONAL STATUS  - Bed Mobility: min to Mod A   - Transfers: min to Mod A   - Gait: CG for 5 feet with RW   - ADLs: eval pending     ALLERGIES  No Known Allergies        FAMILY HISTORY       VITALS  T(C): 36.9 (03-21-19 @ 04:38), Max: 37.3 (03-20-19 @ 12:25)  HR: 89 (03-21-19 @ 07:03) (72 - 90)  BP: 168/93 (03-21-19 @ 07:03) (134/75 - 179/105)  RR: 18 (03-21-19 @ 04:38) (16 - 20)  SpO2: 96% (03-21-19 @ 04:38) (95% - 96%)  Wt(kg): --    RECENT LABS/IMAGING  CBC Full  -  ( 20 Mar 2019 08:46 )  WBC Count : 13.70 K/uL  Hemoglobin : 10.5 g/dL  Hematocrit : 33.4 %  Platelet Count - Automated : 240 K/uL  Mean Cell Volume : 84.3 fl  Mean Cell Hemoglobin : 26.5 pg  Mean Cell Hemoglobin Concentration : 31.4 gm/dL  Auto Neutrophil # : 11.45 K/uL  Auto Lymphocyte # : 1.42 K/uL  Auto Monocyte # : 0.82 K/uL  Auto Eosinophil # : 0.00 K/uL  Auto Basophil # : 0.00 K/uL  Auto Neutrophil % : 83.6 %  Auto Lymphocyte % : 10.4 %  Auto Monocyte % : 6.0 %  Auto Eosinophil % : 0.0 %  Auto Basophil % : 0.0 %    03-20    136  |  97  |  14  ----------------------------<  139<H>  3.6   |  27  |  0.74    Ca    9.5      20 Mar 2019 06:18            MEDICATIONS   acetaminophen   Tablet .. 650 milliGRAM(s) Oral every 6 hours PRN  amLODIPine   Tablet 5 milliGRAM(s) Oral daily  atorvastatin 20 milliGRAM(s) Oral at bedtime  calcium carbonate    500 mG (Tums) Chewable 1 Tablet(s) Chew every 4 hours PRN  dextrose 40% Gel 15 Gram(s) Oral once PRN  dextrose 5%. 1000 milliLiter(s) IV Continuous <Continuous>  dextrose 50% Injectable 12.5 Gram(s) IV Push once  dextrose 50% Injectable 25 Gram(s) IV Push once  dextrose 50% Injectable 25 Gram(s) IV Push once  diazepam    Tablet 7.5 milliGRAM(s) Oral every 8 hours PRN  docusate sodium 100 milliGRAM(s) Oral three times a day  enoxaparin Injectable 40 milliGRAM(s) SubCutaneous every 24 hours  gabapentin 600 milliGRAM(s) Oral three times a day  glucagon  Injectable 1 milliGRAM(s) IntraMuscular once PRN  insulin lispro (HumaLOG) corrective regimen sliding scale   SubCutaneous three times a day before meals  losartan 100 milliGRAM(s) Oral daily  magnesium citrate Oral Solution 1 Bottle Oral once  melatonin 3 milliGRAM(s) Oral at bedtime PRN  oxyCODONE    IR 5 milliGRAM(s) Oral every 4 hours PRN  oxyCODONE    IR 10 milliGRAM(s) Oral every 4 hours PRN  pantoprazole    Tablet 40 milliGRAM(s) Oral before breakfast  senna 2 Tablet(s) Oral at bedtime      ---------------------------------------------------------------------------------------- Patient is a 58y old  Male who presents with a chief complaint of spinal AVM (21 Mar 2019 09:30)      HPI:  58 year-old Man with a PMH of HTN/HLD, DM who developed lower back pain following a fall approximately 1 month ago. 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 Patient c/o lower back pain, decreased sensation in both legs, left leg weakness and difficulty ambulating unassisted due to weakness. Denied bowel or bladder dysfunction. 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 and again on 3/15/19 s/p angiogram Left T5 AVF s/p coil marker. Patient take to OR on 3/18 by Dr. Willis/ Kory s/p t4-t8 lami for obliteration of t5 dural avf and repeat Angio showed resolution of AV fistula. Post-op course complicated by ileus.      REVIEW OF SYSTEMS  [X] Constitutional +decreased appetite       [X] HEENT WNL  [X] Cardio WNL              [X] Resp WNL            [X] GI +abdominal pain, +constipation                            [X]  WNL                               [X] MSK +LE weakness            [X] Neuro WNL                     [X] Cognitive WNL   [X] Psych WNL      PAST MEDICAL & SURGICAL HISTORY  HLD (hyperlipidemia)  Essential hypertension  DM (diabetes mellitus)      SOCIAL HISTORY  Smoking - Denied  EtOH - social   Drugs - Denied    FUNCTIONAL HISTORY  pt lived in pvt home with wife (works during the day), 3 steps to enter +HR however there is a platform with nothing to hold onto prior to entering front door, no steps inside except to basement which pt does not have to negotiate, wife can drive pt to outpatient. Pt has been using RW recently due to 2 falls when using cane, reports his legs "just gave out on me". Pts wife planning to put handrails in bathroom and shower. Prior to sx, pt I with bed mobility/transfers and CG A to ambulate with RW, PT rec: outpt PT.    Independent prior with Ambulation and ADLs.     CURRENT FUNCTIONAL STATUS  - Bed Mobility: min to Mod A   - Transfers: min to Mod A   - Gait: CG for 5 feet with RW   - ADLs: eval pending     ALLERGIES  No Known Allergies        FAMILY HISTORY       VITALS  T(C): 36.9 (03-21-19 @ 04:38), Max: 37.3 (03-20-19 @ 12:25)  HR: 89 (03-21-19 @ 07:03) (72 - 90)  BP: 168/93 (03-21-19 @ 07:03) (134/75 - 179/105)  RR: 18 (03-21-19 @ 04:38) (16 - 20)  SpO2: 96% (03-21-19 @ 04:38) (95% - 96%)  Wt(kg): --    RECENT LABS/IMAGING  CBC Full  -  ( 20 Mar 2019 08:46 )  WBC Count : 13.70 K/uL  Hemoglobin : 10.5 g/dL  Hematocrit : 33.4 %  Platelet Count - Automated : 240 K/uL  Mean Cell Volume : 84.3 fl  Mean Cell Hemoglobin : 26.5 pg  Mean Cell Hemoglobin Concentration : 31.4 gm/dL  Auto Neutrophil # : 11.45 K/uL  Auto Lymphocyte # : 1.42 K/uL  Auto Monocyte # : 0.82 K/uL  Auto Eosinophil # : 0.00 K/uL  Auto Basophil # : 0.00 K/uL  Auto Neutrophil % : 83.6 %  Auto Lymphocyte % : 10.4 %  Auto Monocyte % : 6.0 %  Auto Eosinophil % : 0.0 %  Auto Basophil % : 0.0 %    03-20    136  |  97  |  14  ----------------------------<  139<H>  3.6   |  27  |  0.74    Ca    9.5      20 Mar 2019 06:18            MEDICATIONS   acetaminophen   Tablet .. 650 milliGRAM(s) Oral every 6 hours PRN  amLODIPine   Tablet 5 milliGRAM(s) Oral daily  atorvastatin 20 milliGRAM(s) Oral at bedtime  calcium carbonate    500 mG (Tums) Chewable 1 Tablet(s) Chew every 4 hours PRN  dextrose 40% Gel 15 Gram(s) Oral once PRN  dextrose 5%. 1000 milliLiter(s) IV Continuous <Continuous>  dextrose 50% Injectable 12.5 Gram(s) IV Push once  dextrose 50% Injectable 25 Gram(s) IV Push once  dextrose 50% Injectable 25 Gram(s) IV Push once  diazepam    Tablet 7.5 milliGRAM(s) Oral every 8 hours PRN  docusate sodium 100 milliGRAM(s) Oral three times a day  enoxaparin Injectable 40 milliGRAM(s) SubCutaneous every 24 hours  gabapentin 600 milliGRAM(s) Oral three times a day  glucagon  Injectable 1 milliGRAM(s) IntraMuscular once PRN  insulin lispro (HumaLOG) corrective regimen sliding scale   SubCutaneous three times a day before meals  losartan 100 milliGRAM(s) Oral daily  magnesium citrate Oral Solution 1 Bottle Oral once  melatonin 3 milliGRAM(s) Oral at bedtime PRN  oxyCODONE    IR 5 milliGRAM(s) Oral every 4 hours PRN  oxyCODONE    IR 10 milliGRAM(s) Oral every 4 hours PRN  pantoprazole    Tablet 40 milliGRAM(s) Oral before breakfast  senna 2 Tablet(s) Oral at bedtime      ----------------------------------------------------------------------------------------  PHYSICAL EXAM  Constitutional: NAD, Resting Comfortable  HEENT: NC/AT  Neck: Supple  Chest: No Respiratory Distress,  Lungs CTAB, No Rales/Rhonchi/Wheezing  CV: RRR, Normal S1-S2, No Murmurs/Gallops/Rubs, No Peripheral Edema  Abdomen: soft, mildly distended, TTP  MSK: No joint swelling, Full ROM   Ext: No C/C/E, Pulses 2+ throughout, No calf tenderness   Neuro Exam      Cognitive: AAO x 3     Communication:  Fluent, No dysarthria      CN II - XII  intact            Motor             LEFT    UE:  SF 5/5, EF 5/5, EE 5/5, WE 5/5, Hand intrinsic 5/5,  wnl            RIGHT UE:  SF 5/5, EF 5/5, EE 5/5, WE 5/5, Hand Intrinsic 5/5,  wnl             LEFT    LE:  HF 2/5, KE 3/5, DF 4/5, PF 5/5             RIGHT LE:  HF 3/5, KE 4/5, DF 4/5, PF 5/5        Sensory: Intact to light touch b/l     Tone: Normal     Reflexes: Negative Babinski       ASSESSMENT/PLAN  58 year-old man with a PMH of HTN, HLD, DM presented with weakness, found to have 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      Recommend   - Disposition:  ACUTE inpatient rehabilitation for the functional deficits consisting of 3 hours of therapy/day & 24 hour RN/daily PMR physician for comorbid medical management. Patient can tolerate intensive therapy consisting of PT/OT and or SLP.  Will continue to follow for ongoing rehab needs and recommendations.  - c/w PT for Bed Mobility, Transfers, Ambulation with AD   - obtain OT eval for ADLs  - Turn Q2hrs while in bed, OOB to chair when possible to prevent Pressure Injury

## 2019-03-21 NOTE — PROGRESS NOTE ADULT - SUBJECTIVE AND OBJECTIVE BOX
HPI:  Patient is a 58 year old male that developed back pain after a previous fall.  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  Recently complained of low back pain with decreased sensation to b/l LE and LLE weakness.  Transferred to [facility] for spinal angiogram and further management.    OVERNIGHT EVENTS: No acute events overnight.  Incisional pain slightly better controlled, complains of muscle spasms, valium increased.  Tolerating diet, however no bowel movement in 4 days.  GI saw, abdominal xray ordered, and bowel regimen started.      Vital Signs Last 24 Hrs  T(C): 36.9 (21 Mar 2019 04:38), Max: 37.3 (20 Mar 2019 12:25)  T(F): 98.4 (21 Mar 2019 04:38), Max: 99.1 (20 Mar 2019 12:25)  HR: 89 (21 Mar 2019 07:03) (72 - 90)  BP: 168/93 (21 Mar 2019 07:03) (134/75 - 179/105)  BP(mean): --  RR: 18 (21 Mar 2019 04:38) (16 - 20)  SpO2: 96% (21 Mar 2019 04:38) (95% - 96%)        PHYSICAL EXAM:  Neurological: awake, alert, oriented x3, follows commands, speech clear and fluent, moves all extremities x4 w/ 5/5 strength throughout, sensation present, intact, equal throughout    Cardiovascular: +s1, s2  Respiratory: clear to auscultation b/l  Gastrointestinal: soft, non-distended, non-tender  Genitourinary: +voiding  Extremities: +dp/pt pulses palpable b/l  Incision/Wound: posterior midline vertical incision dressing c/d/i w/ aquacel ag, hemovac x1, right groin puncture site c/d/i, no hematoma, no erythema    TUBES/LINES:  [x] hemovac x1 (45cc serosanguinous per 24 hours)      DIET:  [x] consistent carbohydrate      LABS:                        10.5   13.70 )-----------( 240      ( 20 Mar 2019 08:46 )             33.4     03-20    136  |  97  |  14  ----------------------------<  139<H>  3.6   |  27  |  0.74    Ca    9.5      20 Mar 2019 06:18          CAPILLARY BLOOD GLUCOSE      POCT Blood Glucose.: 166 mg/dL (21 Mar 2019 09:10)  POCT Blood Glucose.: 134 mg/dL (20 Mar 2019 21:27)  POCT Blood Glucose.: 147 mg/dL (20 Mar 2019 17:13)  POCT Blood Glucose.: 154 mg/dL (20 Mar 2019 12:30)        Allergies    No Known Allergies        MEDICATIONS:  Antibiotics:  none    Neuro:  acetaminophen   Tablet .. 650 milliGRAM(s) Oral every 6 hours PRN  diazepam    Tablet 7.5 milliGRAM(s) Oral every 8 hours PRN  gabapentin 600 milliGRAM(s) Oral three times a day  melatonin 3 milliGRAM(s) Oral at bedtime PRN  oxyCODONE    IR 5 milliGRAM(s) Oral every 4 hours PRN  oxyCODONE    IR 10 milliGRAM(s) Oral every 4 hours PRN    Anticoagulation:  enoxaparin Injectable 40 milliGRAM(s) SubCutaneous every 24 hours    OTHER:  amLODIPine   Tablet 5 milliGRAM(s) Oral daily  atorvastatin 20 milliGRAM(s) Oral at bedtime  calcium carbonate    500 mG (Tums) Chewable 1 Tablet(s) Chew every 4 hours PRN  dextrose 40% Gel 15 Gram(s) Oral once PRN  dextrose 50% Injectable 12.5 Gram(s) IV Push once  dextrose 50% Injectable 25 Gram(s) IV Push once  dextrose 50% Injectable 25 Gram(s) IV Push once  docusate sodium 100 milliGRAM(s) Oral three times a day  glucagon  Injectable 1 milliGRAM(s) IntraMuscular once PRN  insulin lispro (HumaLOG) corrective regimen sliding scale   SubCutaneous three times a day before meals  losartan 100 milliGRAM(s) Oral daily  magnesium citrate Oral Solution 1 Bottle Oral once  pantoprazole    Tablet 40 milliGRAM(s) Oral before breakfast  senna 2 Tablet(s) Oral at bedtime    IVF:  dextrose 5%. 1000 milliLiter(s) IV Continuous <Continuous>  sodium chloride 0.9%. 1000 milliLiter(s) IV Continuous <Continuous>    CULTURES:  none    RADIOLOGY & ADDITIONAL TESTS:  AXR: dilated loops of large and small bowel, consistent with ileus HPI:  Patient is a 58 year old male that developed back pain after a previous fall.  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  Recently complained of low back pain with decreased sensation to b/l LE and LLE weakness.  Transferred to [facility] for spinal angiogram and further management.    OVERNIGHT EVENTS: No acute events overnight.  Incisional pain slightly better controlled, complains of muscle spasms, valium increased.  Tolerating diet, however no bowel movement in 4 days.  GI saw, abdominal xray ordered, and bowel regimen started.      Vital Signs Last 24 Hrs  T(C): 36.9 (21 Mar 2019 04:38), Max: 37.3 (20 Mar 2019 12:25)  T(F): 98.4 (21 Mar 2019 04:38), Max: 99.1 (20 Mar 2019 12:25)  HR: 89 (21 Mar 2019 07:03) (72 - 90)  BP: 168/93 (21 Mar 2019 07:03) (134/75 - 179/105)  BP(mean): --  RR: 18 (21 Mar 2019 04:38) (16 - 20)  SpO2: 96% (21 Mar 2019 04:38) (95% - 96%)        PHYSICAL EXAM:  Neurological: awake, alert, oriented x3, follows commands, speech clear and fluent, moves all extremities x4 w/ 5/5 strength throughout, sensation present, intact, equal throughout    Cardiovascular: +s1, s2  Respiratory: clear to auscultation b/l  Gastrointestinal: soft, non-distended, non-tender  Genitourinary: +voiding  Extremities: +dp/pt pulses palpable b/l  Incision/Wound: posterior midline vertical incision c/d/i w/ staples, hemovac x1, right groin puncture site c/d/i, no hematoma, no erythema    TUBES/LINES:  [x] hemovac x1 (45cc serosanguinous per 24 hours)      DIET:  [x] consistent carbohydrate      LABS:                        10.5   13.70 )-----------( 240      ( 20 Mar 2019 08:46 )             33.4     03-20    136  |  97  |  14  ----------------------------<  139<H>  3.6   |  27  |  0.74    Ca    9.5      20 Mar 2019 06:18          CAPILLARY BLOOD GLUCOSE      POCT Blood Glucose.: 166 mg/dL (21 Mar 2019 09:10)  POCT Blood Glucose.: 134 mg/dL (20 Mar 2019 21:27)  POCT Blood Glucose.: 147 mg/dL (20 Mar 2019 17:13)  POCT Blood Glucose.: 154 mg/dL (20 Mar 2019 12:30)        Allergies    No Known Allergies        MEDICATIONS:  Antibiotics:  none    Neuro:  acetaminophen   Tablet .. 650 milliGRAM(s) Oral every 6 hours PRN  diazepam    Tablet 7.5 milliGRAM(s) Oral every 8 hours PRN  gabapentin 600 milliGRAM(s) Oral three times a day  melatonin 3 milliGRAM(s) Oral at bedtime PRN  oxyCODONE    IR 5 milliGRAM(s) Oral every 4 hours PRN  oxyCODONE    IR 10 milliGRAM(s) Oral every 4 hours PRN    Anticoagulation:  enoxaparin Injectable 40 milliGRAM(s) SubCutaneous every 24 hours    OTHER:  amLODIPine   Tablet 5 milliGRAM(s) Oral daily  atorvastatin 20 milliGRAM(s) Oral at bedtime  calcium carbonate    500 mG (Tums) Chewable 1 Tablet(s) Chew every 4 hours PRN  dextrose 40% Gel 15 Gram(s) Oral once PRN  dextrose 50% Injectable 12.5 Gram(s) IV Push once  dextrose 50% Injectable 25 Gram(s) IV Push once  dextrose 50% Injectable 25 Gram(s) IV Push once  docusate sodium 100 milliGRAM(s) Oral three times a day  glucagon  Injectable 1 milliGRAM(s) IntraMuscular once PRN  insulin lispro (HumaLOG) corrective regimen sliding scale   SubCutaneous three times a day before meals  losartan 100 milliGRAM(s) Oral daily  magnesium citrate Oral Solution 1 Bottle Oral once  pantoprazole    Tablet 40 milliGRAM(s) Oral before breakfast  senna 2 Tablet(s) Oral at bedtime    IVF:  dextrose 5%. 1000 milliLiter(s) IV Continuous <Continuous>  sodium chloride 0.9%. 1000 milliLiter(s) IV Continuous <Continuous>    CULTURES:  none    RADIOLOGY & ADDITIONAL TESTS:  AXR: dilated loops of large and small bowel, consistent with ileus

## 2019-03-21 NOTE — PROGRESS NOTE ADULT - ASSESSMENT
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   s/p T4 - T8 laminoplasty for obliteration of dural AVM (3/18/19).    Abdominal distension ?ileus vs constipation - AXR with dilated loops of small and large bowel; though clinically improving  Dyspepsia with known history of GERD    RECS:  -limit narcotics as feasible  -OOB/ PT  -Continue Colace and Senna, agree with Mag. Citrate PO x1 dose  -PO as tolerated  -Zofran prn  -continue PPI daily     Further care per Neurosurgery  Discussed with pt at bedside, all questions answered    Mike Hale PA-C    [facility]  [phone]

## 2019-03-21 NOTE — PROGRESS NOTE ADULT - ASSESSMENT
HPI:  Patient is a 58 year old male that developed back pain after a previous fall.  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  Recently complained of low back pain with decreased sensation to b/l LE and LLE weakness.  Transferred to [facility] for spinal angiogram and further management.    PROCEDURE: s/p T4-T8 laminectomy for obliteration of T5 arteriovenous malformation on 3/18/2019, s/p spinal angiogram showing no residual malformation  POD#3, PAD#2    PLAN:  -oxycodone for pain control  -valium increased for muscle spasms  -continue protonix  -lovenox and SCDS for DVT prophylaxis  -bowel regimen in place, follow up GI recs for ileus  -losartan and norvasc for bp control, norvasc increased due to elevated bp  -HISS for glucose control  -consistent carbohydrate diet  -out of bed with assistance  -incentive spirometer for lung expansion  -pt - acute SCI rehab upon discharge  -ot eval pending  -am labs    Spectra #[id number]                Assessment:  Please Check When Present   []  GCS  E   V  M     Heart Failure: []Acute, [] acute on chronic , []chronic  Heart Failure:  [] Diastolic (HFpEF), [] Systolic (HFrEF), []Combined (HFpEF and HFrEF), [] RHF, [] Pulm HTN, [] Other    [] TREVER, [] ATN, [] AIN, [] other  [] CKD1, [] CKD2, [] CKD 3, [] CKD 4, [] CKD 5, []ESRD    Encephalopathy: [] Metabolic, [] Hepatic, [] toxic, [] Neurological, [] Other    Abnormal Nurtitional Status: [] malnurtition (see nutrition note), [ ]underweight: BMI < 19, [] morbid obesity: BMI >40, [] Cachexia    [] Sepsis  [] hypovolemic shock,[] cardiogenic shock, [] hemorrhagic shock, [] neuogenic shock  [] Acute Respiratory Failure  []Cerebral edema, [] Brain compression/ herniation,   [] Functional quadriplegia  [] Acute blood loss anemia HPI:  Patient is a 58 year old male that developed back pain after a previous fall.  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  Recently complained of low back pain with decreased sensation to b/l LE and LLE weakness.  Transferred to [facility] for spinal angiogram and further management.    PROCEDURE: s/p T4-T8 laminectomy for obliteration of T5 arteriovenous malformation on 3/18/2019, s/p spinal angiogram showing no residual malformation  POD#3, PAD#2    PLAN:  -hemovac taken off suction and removed with no difficulties.  patient tolerated well with no immediate complications.  -oxycodone for pain control  -valium increased for muscle spasms  -continue protonix  -lovenox and SCDS for DVT prophylaxis  -bowel regimen in place, follow up GI recs for ileus  -losartan and norvasc for bp control, norvasc increased due to elevated bp  -HISS for glucose control  -consistent carbohydrate diet  -out of bed with assistance  -incentive spirometer for lung expansion  -pt - acute SCI rehab upon discharge  -ot eval pending  -am labs    Spectra #[id number]                Assessment:  Please Check When Present   []  GCS  E   V  M     Heart Failure: []Acute, [] acute on chronic , []chronic  Heart Failure:  [] Diastolic (HFpEF), [] Systolic (HFrEF), []Combined (HFpEF and HFrEF), [] RHF, [] Pulm HTN, [] Other    [] TREVER, [] ATN, [] AIN, [] other  [] CKD1, [] CKD2, [] CKD 3, [] CKD 4, [] CKD 5, []ESRD    Encephalopathy: [] Metabolic, [] Hepatic, [] toxic, [] Neurological, [] Other    Abnormal Nurtitional Status: [] malnurtition (see nutrition note), [ ]underweight: BMI < 19, [] morbid obesity: BMI >40, [] Cachexia    [] Sepsis  [] hypovolemic shock,[] cardiogenic shock, [] hemorrhagic shock, [] neuogenic shock  [] Acute Respiratory Failure  []Cerebral edema, [] Brain compression/ herniation,   [] Functional quadriplegia  [] Acute blood loss anemia

## 2019-03-21 NOTE — PROGRESS NOTE ADULT - SUBJECTIVE AND OBJECTIVE BOX
CHIEF COMPLAINT:Patient is a 58y old  Male who presents with a chief complaint of spinal AVM (21 Mar 2019 09:30)    	        PAST MEDICAL & SURGICAL HISTORY:  HLD (hyperlipidemia)  Essential hypertension  DM (diabetes mellitus)          REVIEW OF SYSTEMS:  CONSTITUTIONAL: No fever, weight loss, or fatigue  EYES: No eye pain, visual disturbances, or discharge  NECK: No pain or stiffness  RESPIRATORY: No cough, wheezing, chills or hemoptysis; No Shortness of Breath  CARDIOVASCULAR: No chest pain, palpitations, passing out, dizziness, or leg swelling  GASTROINTESTINAL: abd pain better  +flatus   GENITOURINARY: No dysuria, frequency, hematuria, or incontinence  NEUROLOGICAL: No headaches, memory loss, loss of strength, numbness, or tremors      Medications:  MEDICATIONS  (STANDING):  amLODIPine   Tablet 10 milliGRAM(s) Oral daily  atorvastatin 20 milliGRAM(s) Oral at bedtime  dextrose 5%. 1000 milliLiter(s) (50 mL/Hr) IV Continuous <Continuous>  dextrose 50% Injectable 12.5 Gram(s) IV Push once  dextrose 50% Injectable 25 Gram(s) IV Push once  dextrose 50% Injectable 25 Gram(s) IV Push once  docusate sodium 100 milliGRAM(s) Oral three times a day  enoxaparin Injectable 40 milliGRAM(s) SubCutaneous every 24 hours  gabapentin 600 milliGRAM(s) Oral three times a day  insulin lispro (HumaLOG) corrective regimen sliding scale   SubCutaneous three times a day before meals  losartan 100 milliGRAM(s) Oral daily  magnesium citrate Oral Solution 1 Bottle Oral once  pantoprazole    Tablet 40 milliGRAM(s) Oral before breakfast  senna 2 Tablet(s) Oral at bedtime    MEDICATIONS  (PRN):  acetaminophen   Tablet .. 650 milliGRAM(s) Oral every 6 hours PRN Temp greater or equal to 38.5C (101.3F), Mild Pain (1 - 3)  calcium carbonate    500 mG (Tums) Chewable 1 Tablet(s) Chew every 4 hours PRN Heartburn  dextrose 40% Gel 15 Gram(s) Oral once PRN Blood Glucose LESS THAN 70 milliGRAM(s)/deciliter  diazepam    Tablet 7.5 milliGRAM(s) Oral every 8 hours PRN muscle spasms  glucagon  Injectable 1 milliGRAM(s) IntraMuscular once PRN Glucose LESS THAN 70 milligrams/deciliter  melatonin 3 milliGRAM(s) Oral at bedtime PRN Sleep  oxyCODONE    IR 5 milliGRAM(s) Oral every 4 hours PRN Moderate Pain (4 - 6)  oxyCODONE    IR 10 milliGRAM(s) Oral every 4 hours PRN Severe Pain (7 - 10)    	    PHYSICAL EXAM:  T(C): 36.9 (03-21-19 @ 04:38), Max: 37.2 (03-20-19 @ 19:52)  HR: 89 (03-21-19 @ 07:03) (72 - 89)  BP: 168/93 (03-21-19 @ 07:03) (134/75 - 179/105)  RR: 18 (03-21-19 @ 04:38) (16 - 18)  SpO2: 96% (03-21-19 @ 04:38) (95% - 96%)  Wt(kg): --  I&O's Summary    20 Mar 2019 07:01  -  21 Mar 2019 07:00  --------------------------------------------------------  IN: 790 mL / OUT: 1285 mL / NET: -495 mL    21 Mar 2019 07:01  -  21 Mar 2019 12:41  --------------------------------------------------------  IN: 320 mL / OUT: 0 mL / NET: 320 mL        Appearance: Normal	  HEENT:   Normal oral mucosa, PERRL, EOMI	  Lymphatic: No lymphadenopathy  Cardiovascular: Normal S1 S2, No JVD, No murmurs, No edema  Respiratory: Lungs clear to auscultation	  Psychiatry: A & O x 3,   Gastrointestinal:  Soft, Non-tender, + BS	no r/g  Skin: No rashes, No ecchymoses, No cyanosis	  Neurologic: Non-focal  Extremities: Normal range of motion, No clubbing, cyanosis or edema  Vascular: Peripheral pulses palpable 2+ bilaterally    TELEMETRY: 	    ECG:  	  RADIOLOGY:  OTHER: 	  	  LABS:	 	    CARDIAC MARKERS:                                10.5   13.70 )-----------( 240      ( 20 Mar 2019 08:46 )             33.4     03-20    136  |  97  |  14  ----------------------------<  139<H>  3.6   |  27  |  0.74    Ca    9.5      20 Mar 2019 06:18      proBNP:   Lipid Profile:   HgA1c:   TSH:

## 2019-03-21 NOTE — PROGRESS NOTE ADULT - SUBJECTIVE AND OBJECTIVE BOX
Patient is a 58y old  Male who presented with a chief complaint of spinal AVM (21 Mar 2019 12:41)      INTERVAL HPI/OVERNIGHT EVENTS:  no BM   +flatus  feeling "better"  no nausea, tolerated some PO, appetite remains fair to poor overall    MEDICATIONS  (STANDING):  amLODIPine   Tablet 10 milliGRAM(s) Oral daily  atorvastatin 20 milliGRAM(s) Oral at bedtime  dextrose 5%. 1000 milliLiter(s) (50 mL/Hr) IV Continuous <Continuous>  dextrose 50% Injectable 12.5 Gram(s) IV Push once  dextrose 50% Injectable 25 Gram(s) IV Push once  dextrose 50% Injectable 25 Gram(s) IV Push once  docusate sodium 100 milliGRAM(s) Oral three times a day  enoxaparin Injectable 40 milliGRAM(s) SubCutaneous every 24 hours  gabapentin 600 milliGRAM(s) Oral three times a day  insulin lispro (HumaLOG) corrective regimen sliding scale   SubCutaneous three times a day before meals  losartan 100 milliGRAM(s) Oral daily  magnesium citrate Oral Solution 1 Bottle Oral once  pantoprazole    Tablet 40 milliGRAM(s) Oral before breakfast  senna 2 Tablet(s) Oral at bedtime    MEDICATIONS  (PRN):  acetaminophen   Tablet .. 650 milliGRAM(s) Oral every 6 hours PRN Temp greater or equal to 38.5C (101.3F), Mild Pain (1 - 3)  calcium carbonate    500 mG (Tums) Chewable 1 Tablet(s) Chew every 4 hours PRN Heartburn  dextrose 40% Gel 15 Gram(s) Oral once PRN Blood Glucose LESS THAN 70 milliGRAM(s)/deciliter  diazepam    Tablet 7.5 milliGRAM(s) Oral every 8 hours PRN muscle spasms  glucagon  Injectable 1 milliGRAM(s) IntraMuscular once PRN Glucose LESS THAN 70 milligrams/deciliter  melatonin 3 milliGRAM(s) Oral at bedtime PRN Sleep  oxyCODONE    IR 5 milliGRAM(s) Oral every 4 hours PRN Moderate Pain (4 - 6)  oxyCODONE    IR 10 milliGRAM(s) Oral every 4 hours PRN Severe Pain (7 - 10)      Allergies  No Known Allergies      Review of Systems:  General:  No wt loss, fevers, chills, night sweats, fatigue,   CV:  No pain, palpitatioins, hypo/hypertension  Resp:  No dyspnea, cough, tachypnea, wheezing  GI:  see HPI,  :  No pain, bleeding, incontinence, nocturia  Muscle:  No pain, weakness  Neuro: see HPI  Psych:  No fatigue, insomnia, mood problems, depression  Endocrine:  No polyuria, polydypsia, cold/heat intolerance  Heme:  No petechiae, ecchymosis, easy bruisability  Skin:  No rash, tattoos, scars, edema    Vital Signs Last 24 Hrs  T(C): 36.8 (21 Mar 2019 12:45), Max: 37.2 (20 Mar 2019 19:52)  T(F): 98.2 (21 Mar 2019 12:45), Max: 99 (20 Mar 2019 19:52)  HR: 89 (21 Mar 2019 12:45) (72 - 89)  BP: 150/84 (21 Mar 2019 12:45) (134/75 - 179/105)  BP(mean): --  RR: 20 (21 Mar 2019 12:45) (16 - 20)  SpO2: 98% (21 Mar 2019 12:45) (95% - 98%)    PHYSICAL EXAM:  Constitutional: NAD, well-developed pleasant, non toxic appearing.spouse at bedside. sitting at edge of BED working with PT  Neck: No LAD, supple  Respiratory: CTA and P  Cardiovascular: S1 and S2, RRR, no M  Gastrointestinal: hypoactive BS, softly distended NT no rebound, guarding or rigidity  Back +dressing clean and dry +hemovac  Extremities: No peripheral edema, neg clubbing, cyanosis  Vascular: 2+ peripheral pulses  Neurological: A/O x 3, no focal deficits  good muscle bulk, moves all extremities  Psychiatric: Normal mood, normal affect  Skin: No rashes    LABS:                        10.5   13.70 )-----------( 240      ( 20 Mar 2019 08:46 )             33.4     03-20    136  |  97  |  14  ----------------------------<  139<H>  3.6   |  27  |  0.74    Ca    9.5      20 Mar 2019 06:18        RADIOLOGY & ADDITIONAL TESTS:  < from: Xray Abdomen 1 View PORTABLE -Urgent (03.20.19 @ 13:58) >  FINDINGS:     A drainage catheter overlies the right hemiabdomen. There are midline   surgical staples.    There are multiple dilated loops of small and large bowel. No free air   visualized.     No abnormal calcifications identified. Noacute osseous abnormalities.     The visualized portions of the chest are unremarkable.    IMPRESSION:     Multiple dilated loops of small and large bowel which can be seen in the   setting of ileus.    < end of copied text >

## 2019-03-21 NOTE — PROGRESS NOTE ADULT - ASSESSMENT
HTN  stable    DM  Monitor finger stick. Insulin coverage.     HLD  on statin    lower abd pain   ileus  GI is following     improving

## 2019-03-21 NOTE — PROVIDER CONTACT NOTE (OTHER) - ACTION/TREATMENT ORDERED:
Hydralazine 10mg IVP
IVP labetalol to be ordered. Will continue to monitor.
IVP labetalol to be ordered. Will continue to monitor.
hydralazine to be ordered. Will continue to monitor.

## 2019-03-21 NOTE — CONSULT NOTE ADULT - ATTENDING COMMENTS
Seen and examined with resident. Agree with note.   Patient with incomplete paraplegia.  Patient will need acute rehabilitation when stable.

## 2019-03-21 NOTE — PROGRESS NOTE ADULT - ASSESSMENT
This is a 58y year old Male with the below past medical history who presents with the chief complaint of leg weakness.  Since sept 2018, he has had a first numnbness of his toes and progressive weakness in both legs.  Most problematic getting up from chair.  In feb 2019, his knee gave out and he fell.  He was having pain as well, at times burning. He ws seen by Dr. Vivas a neurologist, had MRI brain with nonspecific changes.  Told had stroke and placed on plavix.  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  He was sent from [facility] to [facility] and had angio confirming av dural fistula.     pt says weakness no worse in legs, no new sx in arms, no changes in speech, swallow, vision, bowel or bladder control.     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 ileus, GI consult appreciated, No BM as of yet  appreciate medicine, cards consult - his BP has been running elevated  Pt now being mobilized  d/w NS PA, drain to be removed today  will need rehab    reviewed with pt and RN in detail

## 2019-03-21 NOTE — CONSULT NOTE ADULT - CONSULT REASON
Based on current ACC/AHA guidelines, patient history and physical exam, the patient is considered to have
abdominal distension
med
spinal avm
Evaluate Rehabilitation Needs

## 2019-03-21 NOTE — PROGRESS NOTE ADULT - SUBJECTIVE AND OBJECTIVE BOX
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  Since sept 2018, he has had a first numnbness of his toes and progressive weakness in both legs.  Most problematic getting up from chair.  In feb 2019, his knee gave out and he fell.  He was having pain as well, at times burning. He ws seen by Dr. Vivas a neurologist, had MRI brain with nonspecific changes.  Told had stroke and placed on plavix.  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  He was sent from [facility] to [facility] and had angio confirming av dural fistula.     pt says weakness no worse in legs, no new sx in arms, no changes in speech, swallow, vision, bowel or bladder control.     s/p  T4-T8 laminoplasty for spinal dural AVF    pt says stomach feeling better, but no BM    Past Medical History:  HLD (hyperlipidemia) (E78.5)  Essential hypertension (I10)  DM (diabetes mellitus) (E11.9)      Past Surgical History:      Social History:  No toxic habits    Family History:  FAMILY HISTORY:      Allergies:  No Known Allergies      ROS:  Constitutional: Patient offers no complaints of fevers or significant weight loss  Ears, Nose, Mouth and Throat: The patient presents with no abnormalities of the head, ears, eyes, nose or throat  Skin: Patient offers no concerns of new rashes or lesions  Respiratory: The patient presents with no abnormalities of the respiratory tract  Cardiovascular: The patient presents with no cardiac abnormalities  Gastrointestinal: The patient presents with no abnormalities of the GI system  Genitourinary: The patient presents with no dysuria, hematuria or frequent urination  Neurological: See HPI  Endocrine: Patient offers no complaints of excessive thirst, urination, or heat/cold intolerance    Advanced care planning reviewed and noted in the chart.    Medications:  acetaminophen   Tablet .. 650 milliGRAM(s) Oral every 6 hours PRN  amLODIPine   Tablet 5 milliGRAM(s) Oral daily  amLODIPine   Tablet 5 milliGRAM(s) Oral once  atorvastatin 20 milliGRAM(s) Oral at bedtime  calcium carbonate    500 mG (Tums) Chewable 1 Tablet(s) Chew every 4 hours PRN  dextrose 40% Gel 15 Gram(s) Oral once PRN  dextrose 5%. 1000 milliLiter(s) IV Continuous <Continuous>  dextrose 50% Injectable 12.5 Gram(s) IV Push once  dextrose 50% Injectable 25 Gram(s) IV Push once  dextrose 50% Injectable 25 Gram(s) IV Push once  diazepam    Tablet 7.5 milliGRAM(s) Oral every 8 hours PRN  docusate sodium 100 milliGRAM(s) Oral three times a day  enoxaparin Injectable 40 milliGRAM(s) SubCutaneous every 24 hours  gabapentin 600 milliGRAM(s) Oral three times a day  glucagon  Injectable 1 milliGRAM(s) IntraMuscular once PRN  insulin lispro (HumaLOG) corrective regimen sliding scale   SubCutaneous three times a day before meals  losartan 100 milliGRAM(s) Oral daily  magnesium citrate Oral Solution 1 Bottle Oral once  melatonin 3 milliGRAM(s) Oral at bedtime PRN  oxyCODONE    IR 5 milliGRAM(s) Oral every 4 hours PRN  oxyCODONE    IR 10 milliGRAM(s) Oral every 4 hours PRN  pantoprazole    Tablet 40 milliGRAM(s) Oral before breakfast  senna 2 Tablet(s) Oral at bedtime  sodium chloride 0.9%. 1000 milliLiter(s) IV Continuous <Continuous>      Labs:  CBC Full  -  ( 20 Mar 2019 08:46 )  WBC Count : 13.70 K/uL  Hemoglobin : 10.5 g/dL  Hematocrit : 33.4 %  Platelet Count - Automated : 240 K/uL  Mean Cell Volume : 84.3 fl  Mean Cell Hemoglobin : 26.5 pg  Mean Cell Hemoglobin Concentration : 31.4 gm/dL  Auto Neutrophil # : 11.45 K/uL  Auto Lymphocyte # : 1.42 K/uL  Auto Monocyte # : 0.82 K/uL  Auto Eosinophil # : 0.00 K/uL  Auto Basophil # : 0.00 K/uL  Auto Neutrophil % : 83.6 %  Auto Lymphocyte % : 10.4 %  Auto Monocyte % : 6.0 %  Auto Eosinophil % : 0.0 %  Auto Basophil % : 0.0 %    03-20    136  |  97  |  14  ----------------------------<  139<H>  3.6   |  27  |  0.74    Ca    9.5      20 Mar 2019 06:18      CAPILLARY BLOOD GLUCOSE      POCT Blood Glucose.: 134 mg/dL (20 Mar 2019 21:27)  POCT Blood Glucose.: 147 mg/dL (20 Mar 2019 17:13)  POCT Blood Glucose.: 154 mg/dL (20 Mar 2019 12:30)  POCT Blood Glucose.: 159 mg/dL (20 Mar 2019 09:06)              Vitals:  Vital Signs Last 24 Hrs  T(C): 36.9 (21 Mar 2019 04:38), Max: 37.3 (20 Mar 2019 12:25)  T(F): 98.4 (21 Mar 2019 04:38), Max: 99.1 (20 Mar 2019 12:25)  HR: 89 (21 Mar 2019 07:03) (72 - 90)  BP: 168/93 (21 Mar 2019 07:03) (134/75 - 179/105)  BP(mean): --  RR: 18 (21 Mar 2019 04:38) (16 - 20)  SpO2: 96% (21 Mar 2019 04:38) (95% - 96%)  NEUROLOGICAL EXAM:    Mental status: Awake, alert, and in no apparent distress. Oriented to person, place and time. Language function is normal. Recent memory, digit span and concentration were normal.     Cranial Nerves: Pupils were equal, round, reactive to light. Extraocular movements were intact. Visual field were full. Fundoscopic exam was deferred. Facial sensation was intact to light touch. There was no facial asymmetry. The palate was upgoing symmetrically and tongue was midline. Hearing acuity was intact to finger rub AU. Shoulder shrug was full bilaterally    Motor exam: Bulk and tone were normal. Strength was 5/5 in arms, 4/5 in legs in knee ext, hip flexion, dorsi on left,  right is 4+ for knee ext/hip flex, but dorsi is 5-/5.   Fine finger movements were symmetric and normal. There was no pronator drift    Reflexes: 2+ in the bilateral upper extremities. 0  in the bilateral lower extremities. Toes were downgoing bilaterally.     Sensation: Intact to light touch, temperature, vibration and proprioception.     Coordination: Finger-nose-finger and heel-to-shin was without dysmetria.     Gait: had walked with assistance as per RN    < from: MR Angio Spinal Canal w/wo IV Cont (03.08.19 @ 21:57) >    EXAM:  MR ANGIO SPINAL CANAL WAW IC        PROCEDURE DATE:  Mar  8 2019         INTERPRETATION:  Clinical information: Evaluate for spinal AV fistula.    Technique: Contrast-enhanced MR angiography of the spine was performed.   7.5 cc's of IV Gadavist was administered without immediate complication   and 0 cc's was discarded.    Comparison: Prior contrast-enhanced MRI examinations of the thoracic and   lumbar spine dated 3/6/2018. Prior noncontrast MRI examinations of the   cervical, thoracic, and lumbar spine from 3/5/2019.    Findings: There is redemonstration of edema signal, swelling, and patchy   enhancement involving the mid lower thoracic spinal cord extending to the   conus medullaris. There is also thickening of the cauda equina nerve   roots and abnormal enhancement. On T2-weighted imaging, intradural extra   medullary serpiginous vessels are again noted within the thecal sac   surrounding the conus and extending craniad into the thoracic canal.    On the MR angiography portion of the examination, there are questionable   segmental radicular arterial feeders at the level of of L1 draining into   radiculomedullary veins. Serpiginous venous vessels abutting the conus   medullaris and thoracic cord are noted extending into the thoracic canal.    The urinary bladder is again noted to be distended on the  localizer   images.    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 Questionable arterial   feeders at the level of L1, as discussed.    Further evaluation with a conventional spinal angiography examination is   recommended.    The possibility of a spinal cord tumor with drop metastases, lymphoma, or   inflammatory process such as neurosarcoidosis or infection such as TB   cannot be completely excluded. Correlate with results of CSF testing from   the prior lumbar puncture procedure already performed on 3/7/2019.                        GENARO LORA M.D., ATTENDING RADIOLOGIST  This document has been electronically signed. Mar  9 2019  1:59PM                  < end of copied text >  < from: MR Lumbar Spine w/ IV Cont (03.07.19 @ 14:08) >    EXAM:  MR SPINE LUMBAR IC      EXAM:  MR SPINE THORACIC IC        PROCEDURE DATE:  Mar  7 2019         INTERPRETATION:  History: Weakness. Abnormal spinal cord on noncontrast   MRI. Left lower extremity weakness.    Description: A postcontrast MRI of the thoracic and lumbar spine was   performed.    Comparison is made to the noncontrast spine MRI from 03/05/2019.    Sagittal and axial T1 postcontrast weighted series were performed.    7.5 cc intravenous Gadavist gadolinium contrast administered, 0 cc   contrast was discarded.    Edema and swelling is again noted involving the mid-lower thoracic spinal   cord, extending to the conus. Thickening of the nerve roots of the cauda   equina is present.    Patchy and nodular enhancement is noted involving the mid-lower thoracic   spinal cord. Abnormal enhancement also involves the thickened nerve roots   of the cauda equina. Linear enhancing vessels are noted on the surface of   the cord.    No abnormal enhancement is noted involving the vertebral bodies.    The urinary bladder is distended, partially visualized suggesting   neurogenic bladder given the spinal cord and cauda equina involvement.    IMPRESSION:    Abnormal enhancement involves the mid-lower thoracic spinal, and also   involves the nerve roots of the cauda equina. A spinal cord tumor with   drop metastases, lymphoma, an inflammatory process (such as sarcoidosis),   or an atypical infection (such as tuberculosis) are in the differential   diagnosis. Some prominent vasculature is noted also raising the   possibility of a dural AV fistula. Consider correlation with lumbar   puncture and spine MRA if clinically warranted.    The urinary bladder is distended, partially visualized suggesting   neurogenic bladder given the spinal cord and cauda equina involvement.                  HALLE LOTT M.D., ATTENDING RADIOLOGIST  This document has been electronically signed. Mar  7 2019  2:37PM                  < end of copied text >

## 2019-03-22 LAB
ANION GAP SERPL CALC-SCNC: 14 MMOL/L — SIGNIFICANT CHANGE UP (ref 5–17)
BUN SERPL-MCNC: 15 MG/DL — SIGNIFICANT CHANGE UP (ref 7–23)
CALCIUM SERPL-MCNC: 9.4 MG/DL — SIGNIFICANT CHANGE UP (ref 8.4–10.5)
CHLORIDE SERPL-SCNC: 97 MMOL/L — SIGNIFICANT CHANGE UP (ref 96–108)
CO2 SERPL-SCNC: 23 MMOL/L — SIGNIFICANT CHANGE UP (ref 22–31)
CREAT SERPL-MCNC: 0.61 MG/DL — SIGNIFICANT CHANGE UP (ref 0.5–1.3)
GLUCOSE BLDC GLUCOMTR-MCNC: 129 MG/DL — HIGH (ref 70–99)
GLUCOSE BLDC GLUCOMTR-MCNC: 143 MG/DL — HIGH (ref 70–99)
GLUCOSE BLDC GLUCOMTR-MCNC: 150 MG/DL — HIGH (ref 70–99)
GLUCOSE BLDC GLUCOMTR-MCNC: 160 MG/DL — HIGH (ref 70–99)
GLUCOSE SERPL-MCNC: 139 MG/DL — HIGH (ref 70–99)
HCT VFR BLD CALC: 35.8 % — LOW (ref 39–50)
HGB BLD-MCNC: 11.2 G/DL — LOW (ref 13–17)
MCHC RBC-ENTMCNC: 26.8 PG — LOW (ref 27–34)
MCHC RBC-ENTMCNC: 31.4 GM/DL — LOW (ref 32–36)
MCV RBC AUTO: 85.3 FL — SIGNIFICANT CHANGE UP (ref 80–100)
PLATELET # BLD AUTO: 296 K/UL — SIGNIFICANT CHANGE UP (ref 150–400)
POTASSIUM SERPL-MCNC: 3.2 MMOL/L — LOW (ref 3.5–5.3)
POTASSIUM SERPL-SCNC: 3.2 MMOL/L — LOW (ref 3.5–5.3)
RBC # BLD: 4.19 M/UL — LOW (ref 4.2–5.8)
RBC # FLD: 14.4 % — SIGNIFICANT CHANGE UP (ref 10.3–14.5)
SODIUM SERPL-SCNC: 134 MMOL/L — LOW (ref 135–145)
WBC # BLD: 11 K/UL — HIGH (ref 3.8–10.5)
WBC # FLD AUTO: 11 K/UL — HIGH (ref 3.8–10.5)

## 2019-03-22 RX ORDER — NIFEDIPINE 30 MG
90 TABLET, EXTENDED RELEASE 24 HR ORAL DAILY
Qty: 0 | Refills: 0 | Status: DISCONTINUED | OUTPATIENT
Start: 2019-03-22 | End: 2019-03-26

## 2019-03-22 RX ORDER — ACETAMINOPHEN 500 MG
1000 TABLET ORAL ONCE
Qty: 0 | Refills: 0 | Status: COMPLETED | OUTPATIENT
Start: 2019-03-22 | End: 2019-03-23

## 2019-03-22 RX ORDER — POTASSIUM CHLORIDE 20 MEQ
40 PACKET (EA) ORAL EVERY 4 HOURS
Qty: 0 | Refills: 0 | Status: COMPLETED | OUTPATIENT
Start: 2019-03-22 | End: 2019-03-22

## 2019-03-22 RX ORDER — POLYETHYLENE GLYCOL 3350 17 G/17G
17 POWDER, FOR SOLUTION ORAL
Qty: 0 | Refills: 0 | Status: DISCONTINUED | OUTPATIENT
Start: 2019-03-22 | End: 2019-03-26

## 2019-03-22 RX ORDER — MULTIVIT WITH MIN/MFOLATE/K2 340-15/3 G
1 POWDER (GRAM) ORAL ONCE
Qty: 0 | Refills: 0 | Status: COMPLETED | OUTPATIENT
Start: 2019-03-22 | End: 2019-03-22

## 2019-03-22 RX ADMIN — OXYCODONE HYDROCHLORIDE 10 MILLIGRAM(S): 5 TABLET ORAL at 08:30

## 2019-03-22 RX ADMIN — ATORVASTATIN CALCIUM 20 MILLIGRAM(S): 80 TABLET, FILM COATED ORAL at 22:15

## 2019-03-22 RX ADMIN — Medication 40 MILLIEQUIVALENT(S): at 11:06

## 2019-03-22 RX ADMIN — Medication 1 ENEMA: at 17:00

## 2019-03-22 RX ADMIN — Medication 40 MILLIEQUIVALENT(S): at 13:39

## 2019-03-22 RX ADMIN — POLYETHYLENE GLYCOL 3350 17 GRAM(S): 17 POWDER, FOR SOLUTION ORAL at 17:58

## 2019-03-22 RX ADMIN — GABAPENTIN 600 MILLIGRAM(S): 400 CAPSULE ORAL at 13:39

## 2019-03-22 RX ADMIN — Medication 100 MILLIGRAM(S): at 05:32

## 2019-03-22 RX ADMIN — ENOXAPARIN SODIUM 40 MILLIGRAM(S): 100 INJECTION SUBCUTANEOUS at 17:58

## 2019-03-22 RX ADMIN — AMLODIPINE BESYLATE 10 MILLIGRAM(S): 2.5 TABLET ORAL at 05:35

## 2019-03-22 RX ADMIN — SENNA PLUS 2 TABLET(S): 8.6 TABLET ORAL at 22:14

## 2019-03-22 RX ADMIN — Medication 1: at 13:38

## 2019-03-22 RX ADMIN — Medication 10 MILLIGRAM(S): at 16:00

## 2019-03-22 RX ADMIN — Medication 1 TABLET(S): at 03:26

## 2019-03-22 RX ADMIN — Medication 100 MILLIGRAM(S): at 22:14

## 2019-03-22 RX ADMIN — Medication 1 BOTTLE: at 10:00

## 2019-03-22 RX ADMIN — Medication 100 MILLIGRAM(S): at 13:39

## 2019-03-22 RX ADMIN — LOSARTAN POTASSIUM 100 MILLIGRAM(S): 100 TABLET, FILM COATED ORAL at 05:32

## 2019-03-22 RX ADMIN — OXYCODONE HYDROCHLORIDE 10 MILLIGRAM(S): 5 TABLET ORAL at 07:31

## 2019-03-22 RX ADMIN — PANTOPRAZOLE SODIUM 40 MILLIGRAM(S): 20 TABLET, DELAYED RELEASE ORAL at 05:32

## 2019-03-22 RX ADMIN — POLYETHYLENE GLYCOL 3350 17 GRAM(S): 17 POWDER, FOR SOLUTION ORAL at 05:32

## 2019-03-22 NOTE — OCCUPATIONAL THERAPY INITIAL EVALUATION ADULT - LIVES WITH, PROFILE
lives with spouse in a private house +6 steps to enter, previously independent with ADLs and ambulation

## 2019-03-22 NOTE — PROGRESS NOTE ADULT - SUBJECTIVE AND OBJECTIVE BOX
Subjective: Patient seen and examined. No new events except as noted.     SUBJECTIVE/ROS:  feels better       MEDICATIONS:  MEDICATIONS  (STANDING):  atorvastatin 20 milliGRAM(s) Oral at bedtime  bisacodyl Suppository 10 milliGRAM(s) Rectal once  dextrose 5%. 1000 milliLiter(s) (50 mL/Hr) IV Continuous <Continuous>  dextrose 50% Injectable 12.5 Gram(s) IV Push once  dextrose 50% Injectable 25 Gram(s) IV Push once  dextrose 50% Injectable 25 Gram(s) IV Push once  docusate sodium 100 milliGRAM(s) Oral three times a day  enoxaparin Injectable 40 milliGRAM(s) SubCutaneous every 24 hours  gabapentin 600 milliGRAM(s) Oral three times a day  insulin lispro (HumaLOG) corrective regimen sliding scale   SubCutaneous three times a day before meals  losartan 100 milliGRAM(s) Oral daily  NIFEdipine XL 90 milliGRAM(s) Oral daily  pantoprazole    Tablet 40 milliGRAM(s) Oral before breakfast  polyethylene glycol 3350 17 Gram(s) Oral two times a day  potassium chloride    Tablet ER 40 milliEquivalent(s) Oral every 4 hours  senna 2 Tablet(s) Oral at bedtime      PHYSICAL EXAM:  T(C): 36.8 (03-22-19 @ 07:41), Max: 36.9 (03-21-19 @ 22:59)  HR: 85 (03-22-19 @ 07:41) (76 - 91)  BP: 172/95 (03-22-19 @ 07:41) (146/88 - 172/95)  RR: 18 (03-22-19 @ 07:41) (16 - 20)  SpO2: 96% (03-22-19 @ 07:41) (96% - 98%)  Wt(kg): --  I&O's Summary    21 Mar 2019 07:01  -  22 Mar 2019 07:00  --------------------------------------------------------  IN: 620 mL / OUT: 0 mL / NET: 620 mL    22 Mar 2019 07:01  -  22 Mar 2019 10:58  --------------------------------------------------------  IN: 240 mL / OUT: 0 mL / NET: 240 mL            JVP: Normal  Neck: supple  Lung: clear   CV: S1 S2 , Murmur:  Abd: soft  Ext: No edema  neuro: Awake / alert  Psych: flat affect  Skin: normal``    LABS/DATA:    CARDIAC MARKERS:                                11.2   11.0  )-----------( 296      ( 22 Mar 2019 06:17 )             35.8     03-22    134<L>  |  97  |  15  ----------------------------<  139<H>  3.2<L>   |  23  |  0.61    Ca    9.4      22 Mar 2019 06:17      proBNP:   Lipid Profile:   HgA1c:   TSH:     TELE:  EKG:

## 2019-03-22 NOTE — OCCUPATIONAL THERAPY INITIAL EVALUATION ADULT - ADL RETRAINING, OT EVAL
GOAL: Pt will be independent with lower body dressing while maintaining spinal precautions in 4 weeks.

## 2019-03-22 NOTE — PROGRESS NOTE ADULT - SUBJECTIVE AND OBJECTIVE BOX
CHIEF COMPLAINT:Patient is a 58y old  Male who presents with a chief complaint of spinal AVM (22 Mar 2019 09:38)    	        PAST MEDICAL & SURGICAL HISTORY:  HLD (hyperlipidemia)  Essential hypertension  DM (diabetes mellitus)          REVIEW OF SYSTEMS:  CONSTITUTIONAL: No fever, weight loss, or fatigue  EYES: No eye pain, visual disturbances, or discharge  NECK: No pain or stiffness  RESPIRATORY: No cough, wheezing, chills or hemoptysis; No Shortness of Breath  CARDIOVASCULAR: No chest pain, palpitations, passing out, dizziness, or leg swelling  GASTROINTESTINAL: dec abd pain +flatus   GENITOURINARY: No dysuria, frequency, hematuria, or incontinence  NEUROLOGICAL: No headaches,   pain slightly better      Medications:  MEDICATIONS  (STANDING):  amLODIPine   Tablet 10 milliGRAM(s) Oral daily  atorvastatin 20 milliGRAM(s) Oral at bedtime  bisacodyl Suppository 10 milliGRAM(s) Rectal once  dextrose 5%. 1000 milliLiter(s) (50 mL/Hr) IV Continuous <Continuous>  dextrose 50% Injectable 12.5 Gram(s) IV Push once  dextrose 50% Injectable 25 Gram(s) IV Push once  dextrose 50% Injectable 25 Gram(s) IV Push once  docusate sodium 100 milliGRAM(s) Oral three times a day  enoxaparin Injectable 40 milliGRAM(s) SubCutaneous every 24 hours  gabapentin 600 milliGRAM(s) Oral three times a day  insulin lispro (HumaLOG) corrective regimen sliding scale   SubCutaneous three times a day before meals  losartan 100 milliGRAM(s) Oral daily  pantoprazole    Tablet 40 milliGRAM(s) Oral before breakfast  polyethylene glycol 3350 17 Gram(s) Oral two times a day  potassium chloride    Tablet ER 40 milliEquivalent(s) Oral every 4 hours  senna 2 Tablet(s) Oral at bedtime    MEDICATIONS  (PRN):  acetaminophen   Tablet .. 650 milliGRAM(s) Oral every 6 hours PRN Temp greater or equal to 38.5C (101.3F), Mild Pain (1 - 3)  calcium carbonate    500 mG (Tums) Chewable 1 Tablet(s) Chew every 4 hours PRN Heartburn  dextrose 40% Gel 15 Gram(s) Oral once PRN Blood Glucose LESS THAN 70 milliGRAM(s)/deciliter  diazepam    Tablet 7.5 milliGRAM(s) Oral every 8 hours PRN muscle spasms  glucagon  Injectable 1 milliGRAM(s) IntraMuscular once PRN Glucose LESS THAN 70 milligrams/deciliter  melatonin 3 milliGRAM(s) Oral at bedtime PRN Sleep  oxyCODONE    IR 5 milliGRAM(s) Oral every 4 hours PRN Moderate Pain (4 - 6)  oxyCODONE    IR 10 milliGRAM(s) Oral every 4 hours PRN Severe Pain (7 - 10)    	    PHYSICAL EXAM:  T(C): 36.8 (03-22-19 @ 07:41), Max: 36.9 (03-21-19 @ 22:59)  HR: 85 (03-22-19 @ 07:41) (76 - 91)  BP: 172/95 (03-22-19 @ 07:41) (146/88 - 172/95)  RR: 18 (03-22-19 @ 07:41) (16 - 20)  SpO2: 96% (03-22-19 @ 07:41) (96% - 98%)  Wt(kg): --  I&O's Summary    21 Mar 2019 07:01  -  22 Mar 2019 07:00  --------------------------------------------------------  IN: 620 mL / OUT: 0 mL / NET: 620 mL    22 Mar 2019 07:01  -  22 Mar 2019 09:56  --------------------------------------------------------  IN: 240 mL / OUT: 0 mL / NET: 240 mL        Appearance: Normal	  HEENT:   Normal oral mucosa, PERRL, EOMI	  Lymphatic: No lymphadenopathy  Cardiovascular: Normal S1 S2, No JVD, No murmurs, No edema  Respiratory: Lungs clear to auscultation	  Psychiatry: A & O x 3, Mood & affect appropriate  Gastrointestinal:  Soft, Non-tender, + BS	  Skin: No rashes, No ecchymoses, No cyanosis	  Neurologic: Non-focal motor equal bl  sensory intact  Extremities: Normal range of motion, No clubbing, cyanosis or edema  Vascular: Peripheral pulses palpable 2+ bilaterally    TELEMETRY: 	    ECG:  	  RADIOLOGY:  OTHER: 	  	  LABS:	 	    CARDIAC MARKERS:                                11.2   11.0  )-----------( 296      ( 22 Mar 2019 06:17 )             35.8     03-22    134<L>  |  97  |  15  ----------------------------<  139<H>  3.2<L>   |  23  |  0.61    Ca    9.4      22 Mar 2019 06:17      proBNP:   Lipid Profile:   HgA1c:   TSH:

## 2019-03-22 NOTE — PROGRESS NOTE ADULT - SUBJECTIVE AND OBJECTIVE BOX
Patient is a 58y old  Male who presented with a chief complaint of spinal AVM (22 Mar 2019 10:58)      INTERVAL HPI/OVERNIGHT EVENTS:  ++flatus, no BM  did not get supp yesterday 9pt requested in place of Mag Citrate)  appetite fair  no nausea or vomiting  has been working with PT/ambulating    MEDICATIONS  (STANDING):  atorvastatin 20 milliGRAM(s) Oral at bedtime  bisacodyl Suppository 10 milliGRAM(s) Rectal once  dextrose 5%. 1000 milliLiter(s) (50 mL/Hr) IV Continuous <Continuous>  dextrose 50% Injectable 12.5 Gram(s) IV Push once  dextrose 50% Injectable 25 Gram(s) IV Push once  dextrose 50% Injectable 25 Gram(s) IV Push once  docusate sodium 100 milliGRAM(s) Oral three times a day  enoxaparin Injectable 40 milliGRAM(s) SubCutaneous every 24 hours  gabapentin 600 milliGRAM(s) Oral three times a day  insulin lispro (HumaLOG) corrective regimen sliding scale   SubCutaneous three times a day before meals  losartan 100 milliGRAM(s) Oral daily  NIFEdipine XL 90 milliGRAM(s) Oral daily  pantoprazole    Tablet 40 milliGRAM(s) Oral before breakfast  polyethylene glycol 3350 17 Gram(s) Oral two times a day  potassium chloride    Tablet ER 40 milliEquivalent(s) Oral every 4 hours  senna 2 Tablet(s) Oral at bedtime    MEDICATIONS  (PRN):  acetaminophen   Tablet .. 650 milliGRAM(s) Oral every 6 hours PRN Temp greater or equal to 38.5C (101.3F), Mild Pain (1 - 3)  calcium carbonate    500 mG (Tums) Chewable 1 Tablet(s) Chew every 4 hours PRN Heartburn  dextrose 40% Gel 15 Gram(s) Oral once PRN Blood Glucose LESS THAN 70 milliGRAM(s)/deciliter  diazepam    Tablet 7.5 milliGRAM(s) Oral every 8 hours PRN muscle spasms  glucagon  Injectable 1 milliGRAM(s) IntraMuscular once PRN Glucose LESS THAN 70 milligrams/deciliter  melatonin 3 milliGRAM(s) Oral at bedtime PRN Sleep  oxyCODONE    IR 5 milliGRAM(s) Oral every 4 hours PRN Moderate Pain (4 - 6)  oxyCODONE    IR 10 milliGRAM(s) Oral every 4 hours PRN Severe Pain (7 - 10)      Allergies  No Known Allergies      Review of Systems:  General:  No wt loss, fevers, chills, night sweats, fatigue,   CV:  No pain, palpitatioins, hypo/hypertension  Resp:  No dyspnea, cough, tachypnea, wheezing  GI:  see HPI,  :  No pain, bleeding, incontinence, nocturia  Muscle:  No pain, weakness  Neuro: see HPI  Psych:  No fatigue, insomnia, mood problems, depression  Endocrine:  No polyuria, polydypsia, cold/heat intolerance  Heme:  No petechiae, ecchymosis, easy bruisability  Skin:  No rash, tattoos, scars, edema    Vital Signs Last 24 Hrs  T(C): 36.8 (22 Mar 2019 07:41), Max: 36.9 (21 Mar 2019 22:59)  T(F): 98.2 (22 Mar 2019 07:41), Max: 98.4 (21 Mar 2019 22:59)  HR: 85 (22 Mar 2019 07:41) (76 - 91)  BP: 172/95 (22 Mar 2019 07:41) (146/88 - 172/95)  BP(mean): --  RR: 18 (22 Mar 2019 07:41) (16 - 20)  SpO2: 96% (22 Mar 2019 07:41) (96% - 98%)    PHYSICAL EXAM:  Constitutional: NAD, well-developed pleasant, non toxic appearing. OOB to chair  Neck: No LAD, supple  Respiratory: CTA and P  Cardiovascular: S1 and S2, RRR, no M  Gastrointestinal: hypoactive BS, softly distended NT no rebound, guarding or rigidity  Back +dressing clean and dry +hemovac  Extremities: No peripheral edema, neg clubbing, cyanosis  Vascular: 2+ peripheral pulses  Neurological: A/O x 3, no focal deficits  good muscle bulk, moves all extremities  Psychiatric: Normal mood, normal affect  Skin: No rashes    LABS:                        11.2   11.0  )-----------( 296      ( 22 Mar 2019 06:17 )             35.8     03-22    134<L>  |  97  |  15  ----------------------------<  139<H>  3.2<L>   |  23  |  0.61    Ca    9.4      22 Mar 2019 06:17        RADIOLOGY & ADDITIONAL TESTS:

## 2019-03-22 NOTE — PROGRESS NOTE ADULT - SUBJECTIVE AND OBJECTIVE BOX
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  Since sept 2018, he has had a first numnbness of his toes and progressive weakness in both legs.  Most problematic getting up from chair.  In feb 2019, his knee gave out and he fell.  He was having pain as well, at times burning. He ws seen by Dr. Vivas a neurologist, had MRI brain with nonspecific changes.  Told had stroke and placed on plavix.  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  He was sent from [facility] to [facility] and had angio confirming av dural fistula.     pt says weakness no worse in legs, no new sx in arms, no changes in speech, swallow, vision, bowel or bladder control.     s/p  T4-T8 laminoplasty for spinal dural AVF    pt says stomach feeling better, but no BM    working with OT, walking with assistance and walker    Past Medical History:  HLD (hyperlipidemia) (E78.5)  Essential hypertension (I10)  DM (diabetes mellitus) (E11.9)      Past Surgical History:      Social History:  No toxic habits    Family History:  FAMILY HISTORY:      Allergies:  No Known Allergies      ROS:  Constitutional: Patient offers no complaints of fevers or significant weight loss  Ears, Nose, Mouth and Throat: The patient presents with no abnormalities of the head, ears, eyes, nose or throat  Skin: Patient offers no concerns of new rashes or lesions  Respiratory: The patient presents with no abnormalities of the respiratory tract  Cardiovascular: The patient presents with no cardiac abnormalities  Gastrointestinal: The patient presents with no abnormalities of the GI system  Genitourinary: The patient presents with no dysuria, hematuria or frequent urination  Neurological: See HPI  Endocrine: Patient offers no complaints of excessive thirst, urination, or heat/cold intolerance    Advanced care planning reviewed and noted in the chart.    Medications:  acetaminophen   Tablet .. 650 milliGRAM(s) Oral every 6 hours PRN  amLODIPine   Tablet 10 milliGRAM(s) Oral daily  atorvastatin 20 milliGRAM(s) Oral at bedtime  bisacodyl Suppository 10 milliGRAM(s) Rectal once  calcium carbonate    500 mG (Tums) Chewable 1 Tablet(s) Chew every 4 hours PRN  dextrose 40% Gel 15 Gram(s) Oral once PRN  dextrose 5%. 1000 milliLiter(s) IV Continuous <Continuous>  dextrose 50% Injectable 12.5 Gram(s) IV Push once  dextrose 50% Injectable 25 Gram(s) IV Push once  dextrose 50% Injectable 25 Gram(s) IV Push once  diazepam    Tablet 7.5 milliGRAM(s) Oral every 8 hours PRN  docusate sodium 100 milliGRAM(s) Oral three times a day  enoxaparin Injectable 40 milliGRAM(s) SubCutaneous every 24 hours  gabapentin 600 milliGRAM(s) Oral three times a day  glucagon  Injectable 1 milliGRAM(s) IntraMuscular once PRN  insulin lispro (HumaLOG) corrective regimen sliding scale   SubCutaneous three times a day before meals  losartan 100 milliGRAM(s) Oral daily  melatonin 3 milliGRAM(s) Oral at bedtime PRN  oxyCODONE    IR 5 milliGRAM(s) Oral every 4 hours PRN  oxyCODONE    IR 10 milliGRAM(s) Oral every 4 hours PRN  pantoprazole    Tablet 40 milliGRAM(s) Oral before breakfast  polyethylene glycol 3350 17 Gram(s) Oral two times a day  senna 2 Tablet(s) Oral at bedtime      Labs:  CBC Full  -  ( 22 Mar 2019 06:17 )  WBC Count : 11.0 K/uL  Hemoglobin : 11.2 g/dL  Hematocrit : 35.8 %  Platelet Count - Automated : 296 K/uL  Mean Cell Volume : 85.3 fl  Mean Cell Hemoglobin : 26.8 pg  Mean Cell Hemoglobin Concentration : 31.4 gm/dL  Auto Neutrophil # : x  Auto Lymphocyte # : x  Auto Monocyte # : x  Auto Eosinophil # : x  Auto Basophil # : x  Auto Neutrophil % : x  Auto Lymphocyte % : x  Auto Monocyte % : x  Auto Eosinophil % : x  Auto Basophil % : x    03-22    134<L>  |  97  |  15  ----------------------------<  139<H>  3.2<L>   |  23  |  0.61    Ca    9.4      22 Mar 2019 06:17      CAPILLARY BLOOD GLUCOSE      POCT Blood Glucose.: 143 mg/dL (22 Mar 2019 09:03)  POCT Blood Glucose.: 170 mg/dL (21 Mar 2019 21:27)  POCT Blood Glucose.: 161 mg/dL (21 Mar 2019 17:38)  POCT Blood Glucose.: 183 mg/dL (21 Mar 2019 13:04)              Vitals:  Vital Signs Last 24 Hrs  T(C): 36.8 (22 Mar 2019 07:41), Max: 36.9 (21 Mar 2019 22:59)  T(F): 98.2 (22 Mar 2019 07:41), Max: 98.4 (21 Mar 2019 22:59)  HR: 85 (22 Mar 2019 07:41) (76 - 91)  BP: 172/95 (22 Mar 2019 07:41) (146/88 - 172/95)  BP(mean): --  RR: 18 (22 Mar 2019 07:41) (16 - 20)  SpO2: 96% (22 Mar 2019 07:41) (96% - 98%)      NEUROLOGICAL EXAM:    Mental status: Awake, alert, and in no apparent distress. Oriented to person, place and time. Language function is normal. Recent memory, digit span and concentration were normal.     Cranial Nerves: Pupils were equal, round, reactive to light. Extraocular movements were intact. Visual field were full. Fundoscopic exam was deferred. Facial sensation was intact to light touch. There was no facial asymmetry. The palate was upgoing symmetrically and tongue was midline. Hearing acuity was intact to finger rub AU. Shoulder shrug was full bilaterally    Motor exam: Bulk and tone were normal. Strength was 5/5 in arms, 4/5 in legs in knee ext, hip flexion, dorsi on left,  right is 4+ for knee ext/hip flex, but dorsi is 5-/5.   Fine finger movements were symmetric and normal. There was no pronator drift (exam on 3/21 as walkign with OT)    Reflexes: 2+ in the bilateral upper extremities. 0  in the bilateral lower extremities. Toes were downgoing bilaterally.     Sensation: Intact to light touch, temperature, vibration and proprioception.     Coordination: Finger-nose-finger and heel-to-shin was without dysmetria.     Gait: using walker with OT    < from: MR Angio Spinal Canal w/wo IV Cont (03.08.19 @ 21:57) >    EXAM:  MR ANGIO SPINAL CANAL WAW IC        PROCEDURE DATE:  Mar  8 2019         INTERPRETATION:  Clinical information: Evaluate for spinal AV fistula.    Technique: Contrast-enhanced MR angiography of the spine was performed.   7.5 cc's of IV Gadavist was administered without immediate complication   and 0 cc's was discarded.    Comparison: Prior contrast-enhanced MRI examinations of the thoracic and   lumbar spine dated 3/6/2018. Prior noncontrast MRI examinations of the   cervical, thoracic, and lumbar spine from 3/5/2019.    Findings: There is redemonstration of edema signal, swelling, and patchy   enhancement involving the mid lower thoracic spinal cord extending to the   conus medullaris. There is also thickening of the cauda equina nerve   roots and abnormal enhancement. On T2-weighted imaging, intradural extra   medullary serpiginous vessels are again noted within the thecal sac   surrounding the conus and extending craniad into the thoracic canal.    On the MR angiography portion of the examination, there are questionable   segmental radicular arterial feeders at the level of of L1 draining into   radiculomedullary veins. Serpiginous venous vessels abutting the conus   medullaris and thoracic cord are noted extending into the thoracic canal.    The urinary bladder is again noted to be distended on the  localizer   images.    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 Questionable arterial   feeders at the level of L1, as discussed.    Further evaluation with a conventional spinal angiography examination is   recommended.    The possibility of a spinal cord tumor with drop metastases, lymphoma, or   inflammatory process such as neurosarcoidosis or infection such as TB   cannot be completely excluded. Correlate with results of CSF testing from   the prior lumbar puncture procedure already performed on 3/7/2019.                        GENARO LORA M.D., ATTENDING RADIOLOGIST  This document has been electronically signed. Mar  9 2019  1:59PM                  < end of copied text >  < from: MR Lumbar Spine w/ IV Cont (03.07.19 @ 14:08) >    EXAM:  MR SPINE LUMBAR IC      EXAM:  MR SPINE THORACIC IC        PROCEDURE DATE:  Mar  7 2019         INTERPRETATION:  History: Weakness. Abnormal spinal cord on noncontrast   MRI. Left lower extremity weakness.    Description: A postcontrast MRI of the thoracic and lumbar spine was   performed.    Comparison is made to the noncontrast spine MRI from 03/05/2019.    Sagittal and axial T1 postcontrast weighted series were performed.    7.5 cc intravenous Gadavist gadolinium contrast administered, 0 cc   contrast was discarded.    Edema and swelling is again noted involving the mid-lower thoracic spinal   cord, extending to the conus. Thickening of the nerve roots of the cauda   equina is present.    Patchy and nodular enhancement is noted involving the mid-lower thoracic   spinal cord. Abnormal enhancement also involves the thickened nerve roots   of the cauda equina. Linear enhancing vessels are noted on the surface of   the cord.    No abnormal enhancement is noted involving the vertebral bodies.    The urinary bladder is distended, partially visualized suggesting   neurogenic bladder given the spinal cord and cauda equina involvement.    IMPRESSION:    Abnormal enhancement involves the mid-lower thoracic spinal, and also   involves the nerve roots of the cauda equina. A spinal cord tumor with   drop metastases, lymphoma, an inflammatory process (such as sarcoidosis),   or an atypical infection (such as tuberculosis) are in the differential   diagnosis. Some prominent vasculature is noted also raising the   possibility of a dural AV fistula. Consider correlation with lumbar   puncture and spine MRA if clinically warranted.    The urinary bladder is distended, partially visualized suggesting   neurogenic bladder given the spinal cord and cauda equina involvement.                  HALLE LOTT M.D., ATTENDING RADIOLOGIST  This document has been electronically signed. Mar  7 2019  2:37PM                  < end of copied text >

## 2019-03-22 NOTE — PROGRESS NOTE ADULT - ASSESSMENT
This is a 58y year old Male with the below past medical history who presents with the chief complaint of leg weakness.  Since sept 2018, he has had a first numnbness of his toes and progressive weakness in both legs.  Most problematic getting up from chair.  In feb 2019, his knee gave out and he fell.  He was having pain as well, at times burning. He ws seen by Dr. Vivas a neurologist, had MRI brain with nonspecific changes.  Told had stroke and placed on plavix.  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  He was sent from [facility] to [facility] and had angio confirming av dural fistula.     pt says weakness no worse in legs, no new sx in arms, no changes in speech, swallow, vision, bowel or bladder control.     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 ileus, GI consult appreciated, No BM as of yet  appreciate medicine, cards consult - his BP has been running elevated  Pt now being mobilized. working with OT currently  appreciate rehab consult concur with acute rehab  post op care as per NS      reviewed with pt  in detail

## 2019-03-22 NOTE — OCCUPATIONAL THERAPY INITIAL EVALUATION ADULT - ADDITIONAL COMMENTS
Patient c/o lower back pain, decreased sensation in both legs, left leg weakness and difficulty ambulating unassisted due to weakness. Doppler: No evidence of bilateral lower extremity deep venous thrombosis. s/p t4-t8 lami for obliteration of t5 dural avf

## 2019-03-22 NOTE — PROGRESS NOTE ADULT - ASSESSMENT
HPI:  Patient is a 58 year old male that developed back pain after a previous fall.  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  Recently complained of low back pain with decreased sensation to b/l LE and LLE weakness.  Transferred to [facility] for spinal angiogram and further management.    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           PLAN:  -oxycodone for pain control  -valium increased for muscle spasms  -continue protonix  -lovenox and SCDS for DVT prophylaxis  -bowel regimen in place, appreciate GI consult/recs for ileus  -losartan and norvasc for bp control, norvasc was increased due to elevated bp yesterday, procardia XL added today- appreciate Dr Kramer consult  -HISS for glucose control  -consistent carbohydrate diet for type 2 DM  -out of bed with assistance  -incentive spirometer for lung expansion  -PT/OT/PMR - acute SCI rehab upon discharge  -hypokalemia 3.2; will supplement and check am lbas  -leukocytosis likely due to intraop decadron, as pt afebrile, will monitor  -increase OOB  -f/u am labs    will discuss with Dr Willis  Spectra #[id number]    Assessment:  Please Check When Present   []  GCS  E   V  M     Heart Failure: []Acute, [] acute on chronic , []chronic  Heart Failure:  [] Diastolic (HFpEF), [] Systolic (HFrEF), []Combined (HFpEF and HFrEF), [] RHF, [] Pulm HTN, [] Other    [] TREVER, [] ATN, [] AIN, [] other  [] CKD1, [] CKD2, [] CKD 3, [] CKD 4, [] CKD 5, []ESRD    Encephalopathy: [] Metabolic, [] Hepatic, [] toxic, [] Neurological, [] Other    Abnormal Nurtitional Status: [] malnurtition (see nutrition note), [ ]underweight: BMI < 19, [] morbid obesity: BMI >40, [] Cachexia    [] Sepsis  [] hypovolemic shock,[] cardiogenic shock, [] hemorrhagic shock, [] neuogenic shock  [] Acute Respiratory Failure  []Cerebral edema, [] Brain compression/ herniation,   [] Functional quadriplegia  [x] Acute blood loss anemia

## 2019-03-22 NOTE — PROGRESS NOTE ADULT - ASSESSMENT
HTN  elevated today  change norvasc to nifedipine     DM  Monitor finger stick. Insulin coverage.     HLD  on statin    lower abd pain   ileus  GI is following     improving

## 2019-03-22 NOTE — OCCUPATIONAL THERAPY INITIAL EVALUATION ADULT - DIAGNOSIS, OT EVAL
Pt demonstrated decreased strength, balance, ROM impacting pt's ability to perform functional mobility and ADLs.

## 2019-03-22 NOTE — OCCUPATIONAL THERAPY INITIAL EVALUATION ADULT - PERTINENT HX OF CURRENT PROBLEM, REHAB EVAL
59 YO male developed lower back pain following a fall approximately 1 month ago. 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

## 2019-03-22 NOTE — PROGRESS NOTE ADULT - ASSESSMENT
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   s/p T4 - T8 laminoplasty for obliteration of dural AVM (3/18/19).    Abdominal distension ?ileus vs constipation - AXR with dilated loops of small and large bowel; though clinically improving  Dyspepsia with known history of GERD    RECS:  -limit narcotics as feasible  -hypokalemia: replete electrolytes  -OOB/ PT  -Continue Colace and Senna  - Mag. Citrate PO x1 dose  -if no BM by this evening, give Dulcolax supp x1 dose  -PO as tolerated  -Zofran prn  -continue PPI daily     Further care per Neurosurgery  Discussed with pt at bedside, all questions answered  Please call over weekend prn with GI concerns   GI service : [phone]    Mike Hale PA-C    [facility]

## 2019-03-22 NOTE — PROGRESS NOTE ADULT - ASSESSMENT
57 YO male w/  lower back pain following a fall approximately 1 month ago.   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   Patient c/o lower back pain, decreased sensation in both legs, left leg weakness and difficulty ambulating unassisted due to weakness.   Denied bowel or bladder dysfunction. Transfer to [facility] from [facility] for w/u and potential sx for t5 dural avf  s/p t4/8 laminoplasty for obliteration of dura avm  supp and bowel regimen  avoid narcotics   diet as per sx   ileus resolving   gi f/u     dm  controlled  cont current regimen  htn   still elevated  inc b p meds    dvt proph  cont current meds  pt

## 2019-03-22 NOTE — PROGRESS NOTE ADULT - SUBJECTIVE AND OBJECTIVE BOX
HPI:  Patient is a 58 year old male that developed back pain after a previous fall.  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  Recently complained of low back pain with decreased sensation to b/l LE and LLE weakness.  Transferred to [facility] for spinal angiogram and further management.    OVERNIGHT EVENTS: No acute events overnight.  Incisional pain slightly better controlled, complains of muscle spasms, valium increased.  Tolerating diet, however no bowel movement in 4 days.  GI saw, abdominal xray ordered, and bowel regimen started.      Vital Signs Last 24 Hrs  T(C): 36.8 (22 Mar 2019 07:41), Max: 36.9 (21 Mar 2019 22:59)  T(F): 98.2 (22 Mar 2019 07:41), Max: 98.4 (21 Mar 2019 22:59)  HR: 85 (22 Mar 2019 07:41) (76 - 91)  BP: 172/95 (22 Mar 2019 07:41) (146/88 - 172/95)  BP(mean): --  RR: 18 (22 Mar 2019 07:41) (16 - 18)  SpO2: 96% (22 Mar 2019 07:41) (96% - 98%)    PHYSICAL EXAM:  Neurological: awake, alert, oriented x3, follows commands, speech clear and fluent, moves all extremities x4 w/ 5/5 strength throughout, sensation present, intact, equal throughout    Cardiovascular: +s1, s2  Respiratory: clear to auscultation b/l  Gastrointestinal: soft, non-distended, non-tender  Genitourinary: +voiding  Extremities: +dp/pt pulses palpable b/l  Incision/Wound: posterior midline vertical incision c/d/i w/ staples, right groin puncture site c/d/i, no hematoma, no erythema    DIET:  [x] consistent carbohydrate      LABS:                                    11.2   11.0  )-----------( 296      ( 22 Mar 2019 06:17 )             35.8   03-22    134<L>  |  97  |  15  ----------------------------<  139<H>  3.2<L>   |  23  |  0.61    Ca    9.4      22 Mar 2019 06:17      MEDICATIONS  (STANDING):  atorvastatin 20 milliGRAM(s) Oral at bedtime  bisacodyl Suppository 10 milliGRAM(s) Rectal once  dextrose 5%. 1000 milliLiter(s) (50 mL/Hr) IV Continuous <Continuous>  dextrose 50% Injectable 12.5 Gram(s) IV Push once  dextrose 50% Injectable 25 Gram(s) IV Push once  dextrose 50% Injectable 25 Gram(s) IV Push once  docusate sodium 100 milliGRAM(s) Oral three times a day  enoxaparin Injectable 40 milliGRAM(s) SubCutaneous every 24 hours  gabapentin 600 milliGRAM(s) Oral three times a day  insulin lispro (HumaLOG) corrective regimen sliding scale   SubCutaneous three times a day before meals  losartan 100 milliGRAM(s) Oral daily  NIFEdipine XL 90 milliGRAM(s) Oral daily  pantoprazole    Tablet 40 milliGRAM(s) Oral before breakfast  polyethylene glycol 3350 17 Gram(s) Oral two times a day  potassium chloride    Tablet ER 40 milliEquivalent(s) Oral every 4 hours  senna 2 Tablet(s) Oral at bedtime    MEDICATIONS  (PRN):  acetaminophen   Tablet .. 650 milliGRAM(s) Oral every 6 hours PRN Temp greater or equal to 38.5C (101.3F), Mild Pain (1 - 3)  calcium carbonate    500 mG (Tums) Chewable 1 Tablet(s) Chew every 4 hours PRN Heartburn  dextrose 40% Gel 15 Gram(s) Oral once PRN Blood Glucose LESS THAN 70 milliGRAM(s)/deciliter  diazepam    Tablet 7.5 milliGRAM(s) Oral every 8 hours PRN muscle spasms  glucagon  Injectable 1 milliGRAM(s) IntraMuscular once PRN Glucose LESS THAN 70 milligrams/deciliter  melatonin 3 milliGRAM(s) Oral at bedtime PRN Sleep  oxyCODONE    IR 5 milliGRAM(s) Oral every 4 hours PRN Moderate Pain (4 - 6)  oxyCODONE    IR 10 milliGRAM(s) Oral every 4 hours PRN Severe Pain (7 - 10)      RADIOLOGY & ADDITIONAL TESTS:  AXR: dilated loops of large and small bowel, consistent with ileus

## 2019-03-23 LAB
ANION GAP SERPL CALC-SCNC: 13 MMOL/L — SIGNIFICANT CHANGE UP (ref 5–17)
BASOPHILS # BLD AUTO: 0.03 K/UL — SIGNIFICANT CHANGE UP (ref 0–0.2)
BASOPHILS NFR BLD AUTO: 0.3 % — SIGNIFICANT CHANGE UP (ref 0–2)
BUN SERPL-MCNC: 15 MG/DL — SIGNIFICANT CHANGE UP (ref 7–23)
CALCIUM SERPL-MCNC: 9.5 MG/DL — SIGNIFICANT CHANGE UP (ref 8.4–10.5)
CHLORIDE SERPL-SCNC: 98 MMOL/L — SIGNIFICANT CHANGE UP (ref 96–108)
CO2 SERPL-SCNC: 24 MMOL/L — SIGNIFICANT CHANGE UP (ref 22–31)
CREAT SERPL-MCNC: 0.71 MG/DL — SIGNIFICANT CHANGE UP (ref 0.5–1.3)
EOSINOPHIL # BLD AUTO: 0.18 K/UL — SIGNIFICANT CHANGE UP (ref 0–0.5)
EOSINOPHIL NFR BLD AUTO: 1.8 % — SIGNIFICANT CHANGE UP (ref 0–6)
GLUCOSE BLDC GLUCOMTR-MCNC: 123 MG/DL — HIGH (ref 70–99)
GLUCOSE BLDC GLUCOMTR-MCNC: 172 MG/DL — HIGH (ref 70–99)
GLUCOSE BLDC GLUCOMTR-MCNC: 198 MG/DL — HIGH (ref 70–99)
GLUCOSE BLDC GLUCOMTR-MCNC: 204 MG/DL — HIGH (ref 70–99)
GLUCOSE SERPL-MCNC: 130 MG/DL — HIGH (ref 70–99)
HCT VFR BLD CALC: 35.4 % — LOW (ref 39–50)
HGB BLD-MCNC: 11.3 G/DL — LOW (ref 13–17)
IMM GRANULOCYTES NFR BLD AUTO: 0.5 % — SIGNIFICANT CHANGE UP (ref 0–1.5)
LYMPHOCYTES # BLD AUTO: 1.56 K/UL — SIGNIFICANT CHANGE UP (ref 1–3.3)
LYMPHOCYTES # BLD AUTO: 15.8 % — SIGNIFICANT CHANGE UP (ref 13–44)
MCHC RBC-ENTMCNC: 26.3 PG — LOW (ref 27–34)
MCHC RBC-ENTMCNC: 31.9 GM/DL — LOW (ref 32–36)
MCV RBC AUTO: 82.5 FL — SIGNIFICANT CHANGE UP (ref 80–100)
MONOCYTES # BLD AUTO: 1.22 K/UL — HIGH (ref 0–0.9)
MONOCYTES NFR BLD AUTO: 12.3 % — SIGNIFICANT CHANGE UP (ref 2–14)
NEUTROPHILS # BLD AUTO: 6.86 K/UL — SIGNIFICANT CHANGE UP (ref 1.8–7.4)
NEUTROPHILS NFR BLD AUTO: 69.3 % — SIGNIFICANT CHANGE UP (ref 43–77)
PLATELET # BLD AUTO: 351 K/UL — SIGNIFICANT CHANGE UP (ref 150–400)
POTASSIUM SERPL-MCNC: 4.1 MMOL/L — SIGNIFICANT CHANGE UP (ref 3.5–5.3)
POTASSIUM SERPL-SCNC: 4.1 MMOL/L — SIGNIFICANT CHANGE UP (ref 3.5–5.3)
RBC # BLD: 4.29 M/UL — SIGNIFICANT CHANGE UP (ref 4.2–5.8)
RBC # FLD: 15.7 % — HIGH (ref 10.3–14.5)
SODIUM SERPL-SCNC: 135 MMOL/L — SIGNIFICANT CHANGE UP (ref 135–145)
WBC # BLD: 9.9 K/UL — SIGNIFICANT CHANGE UP (ref 3.8–10.5)
WBC # FLD AUTO: 9.9 K/UL — SIGNIFICANT CHANGE UP (ref 3.8–10.5)

## 2019-03-23 RX ADMIN — POLYETHYLENE GLYCOL 3350 17 GRAM(S): 17 POWDER, FOR SOLUTION ORAL at 05:28

## 2019-03-23 RX ADMIN — Medication 30 MILLILITER(S): at 21:39

## 2019-03-23 RX ADMIN — Medication 100 MILLIGRAM(S): at 05:27

## 2019-03-23 RX ADMIN — Medication 100 MILLIGRAM(S): at 13:20

## 2019-03-23 RX ADMIN — ENOXAPARIN SODIUM 40 MILLIGRAM(S): 100 INJECTION SUBCUTANEOUS at 16:30

## 2019-03-23 RX ADMIN — Medication 100 MILLIGRAM(S): at 21:39

## 2019-03-23 RX ADMIN — Medication 650 MILLIGRAM(S): at 15:53

## 2019-03-23 RX ADMIN — Medication 90 MILLIGRAM(S): at 06:08

## 2019-03-23 RX ADMIN — Medication 650 MILLIGRAM(S): at 10:31

## 2019-03-23 RX ADMIN — Medication 650 MILLIGRAM(S): at 16:23

## 2019-03-23 RX ADMIN — PANTOPRAZOLE SODIUM 40 MILLIGRAM(S): 20 TABLET, DELAYED RELEASE ORAL at 05:31

## 2019-03-23 RX ADMIN — Medication 1: at 13:19

## 2019-03-23 RX ADMIN — SENNA PLUS 2 TABLET(S): 8.6 TABLET ORAL at 21:39

## 2019-03-23 RX ADMIN — Medication 400 MILLIGRAM(S): at 06:43

## 2019-03-23 RX ADMIN — ATORVASTATIN CALCIUM 20 MILLIGRAM(S): 80 TABLET, FILM COATED ORAL at 21:39

## 2019-03-23 RX ADMIN — Medication 650 MILLIGRAM(S): at 11:01

## 2019-03-23 RX ADMIN — Medication 5 MILLIGRAM(S): at 21:39

## 2019-03-23 RX ADMIN — POLYETHYLENE GLYCOL 3350 17 GRAM(S): 17 POWDER, FOR SOLUTION ORAL at 17:54

## 2019-03-23 RX ADMIN — LOSARTAN POTASSIUM 100 MILLIGRAM(S): 100 TABLET, FILM COATED ORAL at 05:31

## 2019-03-23 NOTE — PROGRESS NOTE ADULT - ASSESSMENT
HPI:  Patient is a 58 year old male that developed back pain after a previous fall.  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  Recently complained of low back pain with decreased sensation to b/l LE and LLE weakness.  Transferred to [facility] for spinal angiogram and further management.    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

## 2019-03-23 NOTE — PROGRESS NOTE ADULT - SUBJECTIVE AND OBJECTIVE BOX
OVERNIGHT EVENTS: No acute events overnight. Pt had a bowel movement yesterday s/p enema. States leg strength/sensation stable. Has been OOB. Currently awaiting rehab.     Vital Signs Last 24 Hrs  T(C): 36.8 (23 Mar 2019 07:58), Max: 37.2 (22 Mar 2019 14:08)  T(F): 98.3 (23 Mar 2019 07:58), Max: 99 (22 Mar 2019 14:08)  HR: 82 (23 Mar 2019 07:58) (74 - 90)  BP: 120/66 (23 Mar 2019 07:58) (120/66 - 157/93)  BP(mean): --  RR: 20 (23 Mar 2019 07:58) (17 - 20)  SpO2: 98% (23 Mar 2019 07:58) (96% - 98%)    PHYSICAL EXAM:  Neurological: awake, alert, oriented x3, follows commands, speech clear and fluent, moves all extremities x4 w/ 5/5 strength throughout, sensation present, intact, equal throughout  Respiratory: no respiratory distress, no accessory muscle use  Gastrointestinal: soft, non-distended, non-tender  Genitourinary: +voiding  Extremities: +dp/pt pulses palpable b/l  Incision/Wound: posterior midline vertical incision c/d/i w/ staples, right groin puncture site c/d/i, no hematoma, no erythema    DIET:  [x] consistent carbohydrate      LABS:                                                    11.2   11.0  )-----------( 296      ( 22 Mar 2019 06:17 )             35.8   03-23    135  |  98  |  15  ----------------------------<  130<H>  4.1   |  24  |  0.71    Ca    9.5      23 Mar 2019 07:12        MEDICATIONS  (STANDING):  atorvastatin 20 milliGRAM(s) Oral at bedtime  dextrose 5%. 1000 milliLiter(s) (50 mL/Hr) IV Continuous <Continuous>  dextrose 50% Injectable 12.5 Gram(s) IV Push once  dextrose 50% Injectable 25 Gram(s) IV Push once  dextrose 50% Injectable 25 Gram(s) IV Push once  docusate sodium 100 milliGRAM(s) Oral three times a day  enoxaparin Injectable 40 milliGRAM(s) SubCutaneous every 24 hours  gabapentin 600 milliGRAM(s) Oral three times a day  insulin lispro (HumaLOG) corrective regimen sliding scale   SubCutaneous three times a day before meals  losartan 100 milliGRAM(s) Oral daily  NIFEdipine XL 90 milliGRAM(s) Oral daily  pantoprazole    Tablet 40 milliGRAM(s) Oral before breakfast  polyethylene glycol 3350 17 Gram(s) Oral two times a day  senna 2 Tablet(s) Oral at bedtime    MEDICATIONS  (PRN):  acetaminophen   Tablet .. 650 milliGRAM(s) Oral every 6 hours PRN Temp greater or equal to 38.5C (101.3F), Mild Pain (1 - 3)  calcium carbonate    500 mG (Tums) Chewable 1 Tablet(s) Chew every 4 hours PRN Heartburn  dextrose 40% Gel 15 Gram(s) Oral once PRN Blood Glucose LESS THAN 70 milliGRAM(s)/deciliter  diazepam    Tablet 7.5 milliGRAM(s) Oral every 8 hours PRN muscle spasms  glucagon  Injectable 1 milliGRAM(s) IntraMuscular once PRN Glucose LESS THAN 70 milligrams/deciliter  melatonin 3 milliGRAM(s) Oral at bedtime PRN Sleep  oxyCODONE    IR 5 milliGRAM(s) Oral every 4 hours PRN Moderate Pain (4 - 6)  oxyCODONE    IR 10 milliGRAM(s) Oral every 4 hours PRN Severe Pain (7 - 10)      RADIOLOGY & ADDITIONAL TESTS:  AXR: dilated loops of large and small bowel, consistent with ileus

## 2019-03-23 NOTE — PROGRESS NOTE ADULT - ASSESSMENT
HTN  better today  cont current meds     DM  Monitor finger stick. Insulin coverage.     HLD  on statin

## 2019-03-23 NOTE — PROGRESS NOTE ADULT - SUBJECTIVE AND OBJECTIVE BOX
Subjective: Patient seen and examined. No new events except as noted.     SUBJECTIVE/ROS:  No chest pain, dyspnea, palpitation, or dizziness.       MEDICATIONS:  MEDICATIONS  (STANDING):  atorvastatin 20 milliGRAM(s) Oral at bedtime  dextrose 5%. 1000 milliLiter(s) (50 mL/Hr) IV Continuous <Continuous>  dextrose 50% Injectable 12.5 Gram(s) IV Push once  dextrose 50% Injectable 25 Gram(s) IV Push once  dextrose 50% Injectable 25 Gram(s) IV Push once  docusate sodium 100 milliGRAM(s) Oral three times a day  enoxaparin Injectable 40 milliGRAM(s) SubCutaneous every 24 hours  gabapentin 600 milliGRAM(s) Oral three times a day  insulin lispro (HumaLOG) corrective regimen sliding scale   SubCutaneous three times a day before meals  losartan 100 milliGRAM(s) Oral daily  NIFEdipine XL 90 milliGRAM(s) Oral daily  pantoprazole    Tablet 40 milliGRAM(s) Oral before breakfast  polyethylene glycol 3350 17 Gram(s) Oral two times a day  senna 2 Tablet(s) Oral at bedtime      PHYSICAL EXAM:  T(C): 36.8 (03-23-19 @ 07:58), Max: 37.2 (03-22-19 @ 14:08)  HR: 82 (03-23-19 @ 07:58) (74 - 90)  BP: 120/66 (03-23-19 @ 07:58) (120/66 - 157/93)  RR: 20 (03-23-19 @ 07:58) (17 - 20)  SpO2: 98% (03-23-19 @ 07:58) (96% - 98%)  Wt(kg): --  I&O's Summary    22 Mar 2019 07:01  -  23 Mar 2019 07:00  --------------------------------------------------------  IN: 240 mL / OUT: 0 mL / NET: 240 mL    23 Mar 2019 07:01  -  23 Mar 2019 10:57  --------------------------------------------------------  IN: 300 mL / OUT: 150 mL / NET: 150 mL            JVP: Normal  Neck: supple  Lung: clear   CV: S1 S2 , Murmur:  Abd: soft  Ext: No edema  neuro: Awake / alert  Psych: flat affect  Skin: normal``    LABS/DATA:    CARDIAC MARKERS:                                11.3   9.90  )-----------( 351      ( 23 Mar 2019 09:53 )             35.4     03-23    135  |  98  |  15  ----------------------------<  130<H>  4.1   |  24  |  0.71    Ca    9.5      23 Mar 2019 07:12      proBNP:   Lipid Profile:   HgA1c:   TSH:     TELE:  EKG:

## 2019-03-23 NOTE — PROGRESS NOTE ADULT - ASSESSMENT
57 YO male w/  lower back pain following a fall approximately 1 month ago.   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   Patient c/o lower back pain, decreased sensation in both legs, left leg weakness and difficulty ambulating unassisted due to weakness.   Denied bowel or bladder dysfunction. Transfer to [facility] from [facility] for w/u and potential sx for t5 dural avf  s/p t4/8 laminoplasty for obliteration of dura avm  supp and bowel regimen  diet as per sx   ileus resolved       dm  controlled  cont current regimen  htn   improved     dvt proph  cont current meds  pt    rehab as per neurosx

## 2019-03-23 NOTE — PROGRESS NOTE ADULT - SUBJECTIVE AND OBJECTIVE BOX
CHIEF COMPLAINT:Patient is a 58y old  Male who presents with a chief complaint of spinal AVM (23 Mar 2019 09:55)    	        PAST MEDICAL & SURGICAL HISTORY:  HLD (hyperlipidemia)  Essential hypertension  DM (diabetes mellitus)          REVIEW OF SYSTEMS:  CONSTITUTIONAL: No fever, weight loss, or fatigue  EYES: No eye pain, visual disturbances, or discharge  NECK: No pain or stiffness  RESPIRATORY: No cough, wheezing, chills or hemoptysis; No Shortness of Breath  CARDIOVASCULAR: No chest pain, palpitations, passing out, dizziness, or leg swelling  GASTROINTESTINAL:  + bm  / flatus   GENITOURINARY: No dysuria, frequency, hematuria, or incontinence  NEUROLOGICAL: No headaches, memory loss, loss of strength, numbness, or tremors  SKIN: No itching, burning, rashes, or lesions   LYMPH Nodes: No enlarged glands  ENDOCRINE: No heat or cold intolerance; No hair loss  MUSCULOSKELETAL: No joint pain or swelling; No muscle, back, or extremity pain    Medications:  MEDICATIONS  (STANDING):  atorvastatin 20 milliGRAM(s) Oral at bedtime  dextrose 5%. 1000 milliLiter(s) (50 mL/Hr) IV Continuous <Continuous>  dextrose 50% Injectable 12.5 Gram(s) IV Push once  dextrose 50% Injectable 25 Gram(s) IV Push once  dextrose 50% Injectable 25 Gram(s) IV Push once  docusate sodium 100 milliGRAM(s) Oral three times a day  enoxaparin Injectable 40 milliGRAM(s) SubCutaneous every 24 hours  gabapentin 600 milliGRAM(s) Oral three times a day  insulin lispro (HumaLOG) corrective regimen sliding scale   SubCutaneous three times a day before meals  losartan 100 milliGRAM(s) Oral daily  NIFEdipine XL 90 milliGRAM(s) Oral daily  pantoprazole    Tablet 40 milliGRAM(s) Oral before breakfast  polyethylene glycol 3350 17 Gram(s) Oral two times a day  senna 2 Tablet(s) Oral at bedtime    MEDICATIONS  (PRN):  acetaminophen   Tablet .. 650 milliGRAM(s) Oral every 6 hours PRN Temp greater or equal to 38.5C (101.3F), Mild Pain (1 - 3)  calcium carbonate    500 mG (Tums) Chewable 1 Tablet(s) Chew every 4 hours PRN Heartburn  dextrose 40% Gel 15 Gram(s) Oral once PRN Blood Glucose LESS THAN 70 milliGRAM(s)/deciliter  diazepam    Tablet 7.5 milliGRAM(s) Oral every 8 hours PRN muscle spasms  glucagon  Injectable 1 milliGRAM(s) IntraMuscular once PRN Glucose LESS THAN 70 milligrams/deciliter  melatonin 3 milliGRAM(s) Oral at bedtime PRN Sleep  oxyCODONE    IR 5 milliGRAM(s) Oral every 4 hours PRN Moderate Pain (4 - 6)  oxyCODONE    IR 10 milliGRAM(s) Oral every 4 hours PRN Severe Pain (7 - 10)    	    PHYSICAL EXAM:  T(C): 36.8 (03-23-19 @ 07:58), Max: 37.2 (03-22-19 @ 14:08)  HR: 82 (03-23-19 @ 07:58) (74 - 90)  BP: 120/66 (03-23-19 @ 07:58) (120/66 - 157/93)  RR: 20 (03-23-19 @ 07:58) (17 - 20)  SpO2: 98% (03-23-19 @ 07:58) (96% - 98%)  Wt(kg): --  I&O's Summary    22 Mar 2019 07:01  -  23 Mar 2019 07:00  --------------------------------------------------------  IN: 240 mL / OUT: 0 mL / NET: 240 mL    23 Mar 2019 07:01  -  23 Mar 2019 10:12  --------------------------------------------------------  IN: 0 mL / OUT: 150 mL / NET: -150 mL        Appearance: Normal	  HEENT:   Normal oral mucosa, PERRL, EOMI	  Lymphatic: No lymphadenopathy  Cardiovascular: Normal S1 S2, No JVD, No murmurs, No edema  Respiratory: Lungs clear to auscultation	  Psychiatry: A & O x 3, Mood & affect appropriate  Gastrointestinal:  Soft, Non-tender, + BS	  Skin: No rashes, No ecchymoses, No cyanosis	  Neurologic: Non-focal  Extremities: Normal range of motion, No clubbing, cyanosis or edema  Vascular: Peripheral pulses palpable 2+ bilaterally    TELEMETRY: 	    ECG:  	  RADIOLOGY:  OTHER: 	  	  LABS:	 	    CARDIAC MARKERS:                                11.3   9.90  )-----------( 351      ( 23 Mar 2019 09:53 )             35.4     03-23    135  |  98  |  15  ----------------------------<  130<H>  4.1   |  24  |  0.71    Ca    9.5      23 Mar 2019 07:12      proBNP:   Lipid Profile:   HgA1c:   TSH:

## 2019-03-24 LAB
GLUCOSE BLDC GLUCOMTR-MCNC: 123 MG/DL — HIGH (ref 70–99)
GLUCOSE BLDC GLUCOMTR-MCNC: 125 MG/DL — HIGH (ref 70–99)
GLUCOSE BLDC GLUCOMTR-MCNC: 180 MG/DL — HIGH (ref 70–99)

## 2019-03-24 RX ORDER — ACETAMINOPHEN 500 MG
1000 TABLET ORAL ONCE
Qty: 0 | Refills: 0 | Status: COMPLETED | OUTPATIENT
Start: 2019-03-24 | End: 2019-03-25

## 2019-03-24 RX ORDER — ACETAMINOPHEN 500 MG
1000 TABLET ORAL ONCE
Qty: 0 | Refills: 0 | Status: DISCONTINUED | OUTPATIENT
Start: 2019-03-24 | End: 2019-03-26

## 2019-03-24 RX ORDER — METOCLOPRAMIDE HCL 10 MG
10 TABLET ORAL EVERY 8 HOURS
Qty: 0 | Refills: 0 | Status: DISCONTINUED | OUTPATIENT
Start: 2019-03-24 | End: 2019-03-26

## 2019-03-24 RX ADMIN — OXYCODONE HYDROCHLORIDE 10 MILLIGRAM(S): 5 TABLET ORAL at 04:00

## 2019-03-24 RX ADMIN — GABAPENTIN 600 MILLIGRAM(S): 400 CAPSULE ORAL at 05:24

## 2019-03-24 RX ADMIN — Medication 650 MILLIGRAM(S): at 01:18

## 2019-03-24 RX ADMIN — Medication 100 MILLIGRAM(S): at 21:11

## 2019-03-24 RX ADMIN — Medication 1: at 13:04

## 2019-03-24 RX ADMIN — Medication 90 MILLIGRAM(S): at 05:24

## 2019-03-24 RX ADMIN — Medication 10 MILLIGRAM(S): at 16:29

## 2019-03-24 RX ADMIN — ATORVASTATIN CALCIUM 20 MILLIGRAM(S): 80 TABLET, FILM COATED ORAL at 21:10

## 2019-03-24 RX ADMIN — ENOXAPARIN SODIUM 40 MILLIGRAM(S): 100 INJECTION SUBCUTANEOUS at 16:29

## 2019-03-24 RX ADMIN — SENNA PLUS 2 TABLET(S): 8.6 TABLET ORAL at 21:11

## 2019-03-24 RX ADMIN — Medication 650 MILLIGRAM(S): at 01:48

## 2019-03-24 RX ADMIN — PANTOPRAZOLE SODIUM 40 MILLIGRAM(S): 20 TABLET, DELAYED RELEASE ORAL at 05:24

## 2019-03-24 RX ADMIN — POLYETHYLENE GLYCOL 3350 17 GRAM(S): 17 POWDER, FOR SOLUTION ORAL at 16:31

## 2019-03-24 RX ADMIN — GABAPENTIN 600 MILLIGRAM(S): 400 CAPSULE ORAL at 13:05

## 2019-03-24 RX ADMIN — OXYCODONE HYDROCHLORIDE 10 MILLIGRAM(S): 5 TABLET ORAL at 04:46

## 2019-03-24 RX ADMIN — LOSARTAN POTASSIUM 100 MILLIGRAM(S): 100 TABLET, FILM COATED ORAL at 05:24

## 2019-03-24 RX ADMIN — Medication 10 MILLIGRAM(S): at 21:10

## 2019-03-24 RX ADMIN — Medication 100 MILLIGRAM(S): at 13:05

## 2019-03-24 RX ADMIN — Medication 100 MILLIGRAM(S): at 05:24

## 2019-03-24 RX ADMIN — GABAPENTIN 600 MILLIGRAM(S): 400 CAPSULE ORAL at 21:10

## 2019-03-24 NOTE — PROGRESS NOTE ADULT - ASSESSMENT
57 YO male w/  lower back pain following a fall approximately 1 month ago.   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   Patient c/o lower back pain, decreased sensation in both legs, left leg weakness and difficulty ambulating unassisted due to weakness.   Denied bowel or bladder dysfunction. Transfer to [facility] from [facility] for w/u and potential sx for t5 dural avf  s/p t4/8 laminoplasty for obliteration of dura avm  supp and bowel regimen  diet as per sx   ileus resolved       dm  controlled  cont current regimen  htn   improved   monitor closely   adjust meds prn     dvt proph  cont current meds  pt    rehab as per neurosx

## 2019-03-24 NOTE — PROGRESS NOTE ADULT - SUBJECTIVE AND OBJECTIVE BOX
CHIEF COMPLAINT:Patient is a 58y old  Male who presents with a chief complaint of spinal AVM (23 Mar 2019 10:57)    	        PAST MEDICAL & SURGICAL HISTORY:  HLD (hyperlipidemia)  Essential hypertension  DM (diabetes mellitus)          REVIEW OF SYSTEMS:  CONSTITUTIONAL: No fever, weight loss, or fatigue  EYES: No eye pain, visual disturbances, or discharge  NECK: No pain or stiffness  RESPIRATORY: No cough, wheezing, chills or hemoptysis; No Shortness of Breath  CARDIOVASCULAR: No chest pain, palpitations, passing out, dizziness, or leg swelling  GASTROINTESTINAL: No abdominal or epigastric pain. No nausea, vomiting, or hematemesis;   +flatus  GENITOURINARY: No dysuria, frequency, hematuria, or incontinence  NEUROLOGICAL: No headaches,  some back pain earlier     Medications:  MEDICATIONS  (STANDING):  atorvastatin 20 milliGRAM(s) Oral at bedtime  dextrose 5%. 1000 milliLiter(s) (50 mL/Hr) IV Continuous <Continuous>  dextrose 50% Injectable 12.5 Gram(s) IV Push once  dextrose 50% Injectable 25 Gram(s) IV Push once  dextrose 50% Injectable 25 Gram(s) IV Push once  docusate sodium 100 milliGRAM(s) Oral three times a day  enoxaparin Injectable 40 milliGRAM(s) SubCutaneous every 24 hours  gabapentin 600 milliGRAM(s) Oral three times a day  insulin lispro (HumaLOG) corrective regimen sliding scale   SubCutaneous three times a day before meals  losartan 100 milliGRAM(s) Oral daily  NIFEdipine XL 90 milliGRAM(s) Oral daily  pantoprazole    Tablet 40 milliGRAM(s) Oral before breakfast  polyethylene glycol 3350 17 Gram(s) Oral two times a day  senna 2 Tablet(s) Oral at bedtime    MEDICATIONS  (PRN):  acetaminophen   Tablet .. 650 milliGRAM(s) Oral every 6 hours PRN Temp greater or equal to 38.5C (101.3F), Mild Pain (1 - 3)  aluminum hydroxide/magnesium hydroxide/simethicone Suspension 30 milliLiter(s) Oral every 4 hours PRN Dyspepsia  bisacodyl 5 milliGRAM(s) Oral every 12 hours PRN Constipation  calcium carbonate    500 mG (Tums) Chewable 1 Tablet(s) Chew every 4 hours PRN Heartburn  dextrose 40% Gel 15 Gram(s) Oral once PRN Blood Glucose LESS THAN 70 milliGRAM(s)/deciliter  diazepam    Tablet 7.5 milliGRAM(s) Oral every 8 hours PRN muscle spasms  glucagon  Injectable 1 milliGRAM(s) IntraMuscular once PRN Glucose LESS THAN 70 milligrams/deciliter  melatonin 3 milliGRAM(s) Oral at bedtime PRN Sleep  oxyCODONE    IR 5 milliGRAM(s) Oral every 4 hours PRN Moderate Pain (4 - 6)  oxyCODONE    IR 10 milliGRAM(s) Oral every 4 hours PRN Severe Pain (7 - 10)    	    PHYSICAL EXAM:  T(C): 36.8 (03-24-19 @ 05:10), Max: 37.1 (03-23-19 @ 12:45)  HR: 85 (03-24-19 @ 05:10) (69 - 85)  BP: 161/92 (03-24-19 @ 05:10) (117/68 - 161/92)  RR: 18 (03-24-19 @ 05:10) (16 - 18)  SpO2: 97% (03-24-19 @ 05:10) (97% - 98%)  Wt(kg): --  I&O's Summary    23 Mar 2019 07:01  -  24 Mar 2019 07:00  --------------------------------------------------------  IN: 580 mL / OUT: 875 mL / NET: -295 mL        Appearance: Normal	  HEENT:   Normal oral mucosa, PERRL, EOMI	  Lymphatic: No lymphadenopathy  Cardiovascular: Normal S1 S2, No JVD, No murmurs, No edema  Respiratory: Lungs clear to auscultation	  Psychiatry: A & O x 3, Mood & affect appropriate  Gastrointestinal:  Soft, Non-tender, + BS	  Skin: No rashes, No ecchymoses, No cyanosis	  Neurologic: Non-focal  Extremities: Normal range of motion, No clubbing, cyanosis or edema  Vascular: Peripheral pulses palpable 2+ bilaterally    TELEMETRY: 	    ECG:  	  RADIOLOGY:  OTHER: 	  	  LABS:	 	    CARDIAC MARKERS:                                11.3   9.90  )-----------( 351      ( 23 Mar 2019 09:53 )             35.4     03-23    135  |  98  |  15  ----------------------------<  130<H>  4.1   |  24  |  0.71    Ca    9.5      23 Mar 2019 07:12      proBNP:   Lipid Profile:   HgA1c:   TSH:

## 2019-03-24 NOTE — PROGRESS NOTE ADULT - SUBJECTIVE AND OBJECTIVE BOX
Subjective: Patient seen and examined. No new events except as noted.     SUBJECTIVE/ROS:  No chest pain, dyspnea, palpitation, or dizziness.     MEDICATIONS:  MEDICATIONS  (STANDING):  atorvastatin 20 milliGRAM(s) Oral at bedtime  dextrose 5%. 1000 milliLiter(s) (50 mL/Hr) IV Continuous <Continuous>  dextrose 50% Injectable 12.5 Gram(s) IV Push once  dextrose 50% Injectable 25 Gram(s) IV Push once  dextrose 50% Injectable 25 Gram(s) IV Push once  docusate sodium 100 milliGRAM(s) Oral three times a day  enoxaparin Injectable 40 milliGRAM(s) SubCutaneous every 24 hours  gabapentin 600 milliGRAM(s) Oral three times a day  insulin lispro (HumaLOG) corrective regimen sliding scale   SubCutaneous three times a day before meals  losartan 100 milliGRAM(s) Oral daily  NIFEdipine XL 90 milliGRAM(s) Oral daily  pantoprazole    Tablet 40 milliGRAM(s) Oral before breakfast  polyethylene glycol 3350 17 Gram(s) Oral two times a day  senna 2 Tablet(s) Oral at bedtime      PHYSICAL EXAM:  T(C): 36.8 (03-24-19 @ 08:28), Max: 37.1 (03-23-19 @ 12:45)  HR: 69 (03-24-19 @ 08:28) (69 - 85)  BP: 146/88 (03-24-19 @ 08:28) (117/68 - 161/92)  RR: 20 (03-24-19 @ 08:28) (16 - 20)  SpO2: 98% (03-24-19 @ 08:28) (97% - 98%)  Wt(kg): --  I&O's Summary    23 Mar 2019 07:01  -  24 Mar 2019 07:00  --------------------------------------------------------  IN: 580 mL / OUT: 875 mL / NET: -295 mL    24 Mar 2019 07:01  -  24 Mar 2019 10:40  --------------------------------------------------------  IN: 0 mL / OUT: 200 mL / NET: -200 mL            JVP: Normal  Neck: supple  Lung: clear   CV: S1 S2 , Murmur:  Abd: soft  Ext: No edema  neuro: Awake / alert  Psych: flat affect  Skin: normal``    LABS/DATA:    CARDIAC MARKERS:                                11.3   9.90  )-----------( 351      ( 23 Mar 2019 09:53 )             35.4     03-23    135  |  98  |  15  ----------------------------<  130<H>  4.1   |  24  |  0.71    Ca    9.5      23 Mar 2019 07:12      proBNP:   Lipid Profile:   HgA1c:   TSH:     TELE:  EKG:

## 2019-03-24 NOTE — PROGRESS NOTE ADULT - SUBJECTIVE AND OBJECTIVE BOX
Patient is a 58 year old male that developed back pain after a previous fall.  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  Recently complained of low back pain with decreased sensation to b/l LE and LLE weakness.  Transferred to [facility] for spinal angiogram and further management.    OVERNIGHT EVENTS: No BM since fri with enema; will order suppository    Vital Signs Last 24 Hrs  T(C): 36.3 (24 Mar 2019 12:12), Max: 37.1 (23 Mar 2019 23:41)  T(F): 97.3 (24 Mar 2019 12:12), Max: 98.8 (23 Mar 2019 23:41)  HR: 72 (24 Mar 2019 12:12) (69 - 85)  BP: 116/66 (24 Mar 2019 12:12) (116/66 - 161/92)  BP(mean): --  RR: 20 (24 Mar 2019 12:12) (16 - 20)  SpO2: 97% (24 Mar 2019 12:12) (97% - 98%)    PHYSICAL EXAM:  Neurological: awake, alert, oriented x3, follows commands, speech clear and fluent, moves all extremities x4 w/ 5/5 strength throughout, sensation present, intact, equal throughout  Cardiovascular: +s1, s2  Respiratory: clear to auscultation b/l  Gastrointestinal: soft, slightly distended, non-tender; +hypoactive BS  Genitourinary: +voiding  Extremities: +dp/pt pulses palpable b/l  Incision/Wound: posterior midline vertical incision c/d/i w/ staples, right groin puncture site c/d/i, no hematoma, no erythema    DIET:  [x] consistent carbohydrate      LABS:                             11.3   9.90  )-----------( 351      ( 23 Mar 2019 09:53 )             35.4   03-23    135  |  98  |  15  ----------------------------<  130<H>  4.1   |  24  |  0.71    Ca    9.5      23 Mar 2019 07:12    MEDICATIONS  (STANDING):  atorvastatin 20 milliGRAM(s) Oral at bedtime  bisacodyl Suppository 10 milliGRAM(s) Rectal once  dextrose 5%. 1000 milliLiter(s) (50 mL/Hr) IV Continuous <Continuous>  dextrose 50% Injectable 12.5 Gram(s) IV Push once  dextrose 50% Injectable 25 Gram(s) IV Push once  dextrose 50% Injectable 25 Gram(s) IV Push once  docusate sodium 100 milliGRAM(s) Oral three times a day  enoxaparin Injectable 40 milliGRAM(s) SubCutaneous every 24 hours  gabapentin 600 milliGRAM(s) Oral three times a day  insulin lispro (HumaLOG) corrective regimen sliding scale   SubCutaneous three times a day before meals  losartan 100 milliGRAM(s) Oral daily  NIFEdipine XL 90 milliGRAM(s) Oral daily  pantoprazole    Tablet 40 milliGRAM(s) Oral before breakfast  polyethylene glycol 3350 17 Gram(s) Oral two times a day  senna 2 Tablet(s) Oral at bedtime    MEDICATIONS  (PRN):  acetaminophen   Tablet .. 650 milliGRAM(s) Oral every 6 hours PRN Temp greater or equal to 38.5C (101.3F), Mild Pain (1 - 3)  acetaminophen  IVPB .. 1000 milliGRAM(s) IV Intermittent once PRN Severe Pain (7 - 10)  acetaminophen  IVPB .. 1000 milliGRAM(s) IV Intermittent once PRN Severe Pain (7 - 10)  aluminum hydroxide/magnesium hydroxide/simethicone Suspension 30 milliLiter(s) Oral every 4 hours PRN Dyspepsia  bisacodyl Suppository 10 milliGRAM(s) Rectal daily PRN Constipation  calcium carbonate    500 mG (Tums) Chewable 1 Tablet(s) Chew every 4 hours PRN Heartburn  dextrose 40% Gel 15 Gram(s) Oral once PRN Blood Glucose LESS THAN 70 milliGRAM(s)/deciliter  diazepam    Tablet 7.5 milliGRAM(s) Oral every 8 hours PRN muscle spasms  glucagon  Injectable 1 milliGRAM(s) IntraMuscular once PRN Glucose LESS THAN 70 milligrams/deciliter  melatonin 3 milliGRAM(s) Oral at bedtime PRN Sleep    IMAGING:  < from: Xray Abdomen 1 View PORTABLE -Urgent (03.20.19 @ 13:58) >  IMPRESSION:     Multiple dilated loops of small and large bowel which can be seen in the   setting of ileus.    < end of copied text >

## 2019-03-25 LAB
ANION GAP SERPL CALC-SCNC: 12 MMOL/L — SIGNIFICANT CHANGE UP (ref 5–17)
BUN SERPL-MCNC: 13 MG/DL — SIGNIFICANT CHANGE UP (ref 7–23)
CALCIUM SERPL-MCNC: 9.4 MG/DL — SIGNIFICANT CHANGE UP (ref 8.4–10.5)
CHLORIDE SERPL-SCNC: 100 MMOL/L — SIGNIFICANT CHANGE UP (ref 96–108)
CO2 SERPL-SCNC: 21 MMOL/L — LOW (ref 22–31)
CREAT SERPL-MCNC: 0.62 MG/DL — SIGNIFICANT CHANGE UP (ref 0.5–1.3)
GLUCOSE BLDC GLUCOMTR-MCNC: 118 MG/DL — HIGH (ref 70–99)
GLUCOSE BLDC GLUCOMTR-MCNC: 131 MG/DL — HIGH (ref 70–99)
GLUCOSE BLDC GLUCOMTR-MCNC: 157 MG/DL — HIGH (ref 70–99)
GLUCOSE BLDC GLUCOMTR-MCNC: 198 MG/DL — HIGH (ref 70–99)
GLUCOSE SERPL-MCNC: 168 MG/DL — HIGH (ref 70–99)
POTASSIUM SERPL-MCNC: 3.9 MMOL/L — SIGNIFICANT CHANGE UP (ref 3.5–5.3)
POTASSIUM SERPL-SCNC: 3.9 MMOL/L — SIGNIFICANT CHANGE UP (ref 3.5–5.3)
SODIUM SERPL-SCNC: 133 MMOL/L — LOW (ref 135–145)

## 2019-03-25 RX ADMIN — Medication 1: at 13:13

## 2019-03-25 RX ADMIN — POLYETHYLENE GLYCOL 3350 17 GRAM(S): 17 POWDER, FOR SOLUTION ORAL at 17:39

## 2019-03-25 RX ADMIN — GABAPENTIN 600 MILLIGRAM(S): 400 CAPSULE ORAL at 13:14

## 2019-03-25 RX ADMIN — Medication 100 MILLIGRAM(S): at 13:14

## 2019-03-25 RX ADMIN — GABAPENTIN 600 MILLIGRAM(S): 400 CAPSULE ORAL at 05:17

## 2019-03-25 RX ADMIN — GABAPENTIN 600 MILLIGRAM(S): 400 CAPSULE ORAL at 21:39

## 2019-03-25 RX ADMIN — Medication 1000 MILLIGRAM(S): at 10:10

## 2019-03-25 RX ADMIN — Medication 100 MILLIGRAM(S): at 05:17

## 2019-03-25 RX ADMIN — ATORVASTATIN CALCIUM 20 MILLIGRAM(S): 80 TABLET, FILM COATED ORAL at 21:39

## 2019-03-25 RX ADMIN — LOSARTAN POTASSIUM 100 MILLIGRAM(S): 100 TABLET, FILM COATED ORAL at 05:18

## 2019-03-25 RX ADMIN — Medication 10 MILLIGRAM(S): at 21:39

## 2019-03-25 RX ADMIN — Medication 1: at 17:39

## 2019-03-25 RX ADMIN — POLYETHYLENE GLYCOL 3350 17 GRAM(S): 17 POWDER, FOR SOLUTION ORAL at 05:20

## 2019-03-25 RX ADMIN — Medication 400 MILLIGRAM(S): at 09:32

## 2019-03-25 RX ADMIN — PANTOPRAZOLE SODIUM 40 MILLIGRAM(S): 20 TABLET, DELAYED RELEASE ORAL at 09:02

## 2019-03-25 RX ADMIN — Medication 90 MILLIGRAM(S): at 05:18

## 2019-03-25 RX ADMIN — SENNA PLUS 2 TABLET(S): 8.6 TABLET ORAL at 21:39

## 2019-03-25 RX ADMIN — Medication 100 MILLIGRAM(S): at 21:39

## 2019-03-25 RX ADMIN — ENOXAPARIN SODIUM 40 MILLIGRAM(S): 100 INJECTION SUBCUTANEOUS at 17:39

## 2019-03-25 RX ADMIN — Medication 10 MILLIGRAM(S): at 10:30

## 2019-03-25 RX ADMIN — Medication 10 MILLIGRAM(S): at 13:14

## 2019-03-25 RX ADMIN — Medication 30 MILLILITER(S): at 05:17

## 2019-03-25 NOTE — DIETITIAN INITIAL EVALUATION ADULT. - NS AS NUTRI INTERV ED CONTENT3
Nutrition relationship to health/disease/Recommended modifications/Reviewed T2DM Nutrition Therapy Handout. Discussed balanced meal pattern, monitoring portion sizes, carbohydrate counting, including protein at each meal and snack, snacks in between meals, and limiting concentrated sweets. Reinforced importance of self-monitoring blood glucose levels and encourage daily finger stick checks. Encouraged adequate hydration and fiber consumption for constipation prevention. Advised pt to resume physical activity as medically allowed. Recommended pt follow up with outpatient endocrinologist upon D/C. Pt/spouse verbalized understanding./Purpose of the nutrition education/Priority modifications

## 2019-03-25 NOTE — DIETITIAN INITIAL EVALUATION ADULT. - PHYSICAL APPEARANCE
Nutrition focused physical exam deferred at this time. No visual signs of muscle wasting/fat loss observed./well nourished

## 2019-03-25 NOTE — PROGRESS NOTE ADULT - ASSESSMENT
This is a 58y year old Male with the below past medical history who presents with the chief complaint of leg weakness.  Since sept 2018, he has had a first numnbness of his toes and progressive weakness in both legs.  Most problematic getting up from chair.  In feb 2019, his knee gave out and he fell.  He was having pain as well, at times burning. He ws seen by Dr. Vivas a neurologist, had MRI brain with nonspecific changes.  Told had stroke and placed on plavix.  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  He was sent from [facility] to [facility] and had angio confirming av dural fistula.     pt says weakness no worse in legs, no new sx in arms, no changes in speech, swallow, vision, bowel or bladder control.     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 ileus, GI consult appreciated,   appreciate medicine, cards consult - his BP has been running elevated  Pt now being mobilized. working with OT currently  appreciate rehab consult concur with acute rehab  post op care as per NS  possible rehab tommorrow      reviewed with pt  and wife in detail

## 2019-03-25 NOTE — DIETITIAN INITIAL EVALUATION ADULT. - OTHER INFO
Pt seen as length of stay. Reports improved appetite/intake upon visit, consumed ~75% of dinner tray last night. Pt observed to be consuming breakfast with good appetite this morning. Tolerating consistent CHO diet. Pt with T2DM on oral meds, no insulin. Checks finger sticks weekly, usually 130-140. States persistent constipation, RN aware. Last BM 3/17. On bowel regimen. T2DM/constipation education provided. Pt amenable to sugar free healthshake to optimize nutrition needs s/p surgery. Denies any other GI distress at this time. No issues chewing/swallowing. Pt takes Multivitamin, Colace, CoQ10, Glucosamine/Chondroitin with MSM, Senna at home. NKFA.

## 2019-03-25 NOTE — DIETITIAN INITIAL EVALUATION ADULT. - PERTINENT LABORATORY DATA
03-25 Na133 mmol/L<L> Glu 168 mg/dL<H> K+ 3.9 mmol/L Cr  0.62 mg/dL BUN 13 mg/dL, Finger sticks (3/24) 123-198. 3/10 HgbA1c 6.9

## 2019-03-25 NOTE — PROGRESS NOTE ADULT - SUBJECTIVE AND OBJECTIVE BOX
Patient is a 58 year old male that developed back pain after a previous fall.  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  Recently complained of low back pain with decreased sensation to b/l LE and LLE weakness.  Transferred to [facility] for spinal angiogram and further management.    OVERNIGHT EVENTS: + BM yesterday suppository given    Vital Signs Last 24 Hrs  T(C): 36.8 (25 Mar 2019 08:57), Max: 37.1 (24 Mar 2019 23:41)  T(F): 98.2 (25 Mar 2019 08:57), Max: 98.7 (24 Mar 2019 23:41)  HR: 79 (25 Mar 2019 08:57) (69 - 93)  BP: 123/74 (25 Mar 2019 08:57) (116/66 - 156/90)  BP(mean): --  RR: 22 (25 Mar 2019 08:57) (18 - 22)  SpO2: 98% (25 Mar 2019 08:57) (97% - 98%)    PHYSICAL EXAM:  Neurological: awake, alert, oriented x3, follows commands, speech clear and fluent, moves all extremities x4 w/ 5/5 strength throughout, sensation present, intact, equal throughout  Cardiovascular: +s1, s2  Respiratory: clear to auscultation b/l  Gastrointestinal: soft, slightly distended, non-tender; +hypoactive BS but improved from yesterday  Genitourinary: +voiding  Extremities: +dp/pt pulses palpable b/l  Incision/Wound: posterior midline vertical incision c/d/i w/ staples, right groin puncture site c/d/i, no hematoma, no erythema    DIET:  [x] consistent carbohydrate      LABS:     03-25    133<L>  |  100  |  13  ----------------------------<  168<H>  3.9   |  21<L>  |  0.62    Ca    9.4      25 Mar 2019 06:42      MEDICATIONS  (STANDING):  atorvastatin 20 milliGRAM(s) Oral at bedtime  bisacodyl Suppository 10 milliGRAM(s) Rectal once  dextrose 5%. 1000 milliLiter(s) (50 mL/Hr) IV Continuous <Continuous>  dextrose 50% Injectable 12.5 Gram(s) IV Push once  dextrose 50% Injectable 25 Gram(s) IV Push once  dextrose 50% Injectable 25 Gram(s) IV Push once  docusate sodium 100 milliGRAM(s) Oral three times a day  enoxaparin Injectable 40 milliGRAM(s) SubCutaneous every 24 hours  gabapentin 600 milliGRAM(s) Oral three times a day  insulin lispro (HumaLOG) corrective regimen sliding scale   SubCutaneous three times a day before meals  losartan 100 milliGRAM(s) Oral daily  NIFEdipine XL 90 milliGRAM(s) Oral daily  pantoprazole    Tablet 40 milliGRAM(s) Oral before breakfast  polyethylene glycol 3350 17 Gram(s) Oral two times a day  senna 2 Tablet(s) Oral at bedtime    MEDICATIONS  (PRN):  acetaminophen   Tablet .. 650 milliGRAM(s) Oral every 6 hours PRN Temp greater or equal to 38.5C (101.3F), Mild Pain (1 - 3)  acetaminophen  IVPB .. 1000 milliGRAM(s) IV Intermittent once PRN Severe Pain (7 - 10)  acetaminophen  IVPB .. 1000 milliGRAM(s) IV Intermittent once PRN Severe Pain (7 - 10)  aluminum hydroxide/magnesium hydroxide/simethicone Suspension 30 milliLiter(s) Oral every 4 hours PRN Dyspepsia  bisacodyl Suppository 10 milliGRAM(s) Rectal daily PRN Constipation  calcium carbonate    500 mG (Tums) Chewable 1 Tablet(s) Chew every 4 hours PRN Heartburn  dextrose 40% Gel 15 Gram(s) Oral once PRN Blood Glucose LESS THAN 70 milliGRAM(s)/deciliter  diazepam    Tablet 7.5 milliGRAM(s) Oral every 8 hours PRN muscle spasms  glucagon  Injectable 1 milliGRAM(s) IntraMuscular once PRN Glucose LESS THAN 70 milligrams/deciliter  melatonin 3 milliGRAM(s) Oral at bedtime PRN Sleep    IMAGING:  < from: Xray Abdomen 1 View PORTABLE -Urgent (03.20.19 @ 13:58) >  IMPRESSION:     Multiple dilated loops of small and large bowel which can be seen in the   setting of ileus.    < end of copied text >

## 2019-03-25 NOTE — DIETITIAN INITIAL EVALUATION ADULT. - ENERGY NEEDS
ht: 70 inches, wt: 178 pounds, BMI: 25.5 kg/m2, IBW: 166 pounds (+/- 10%), 107 %IBW  Edema: None Skin per nursing documentation: No pressure ulcers  Other pertinent information: Pt is a 57 y/o M with PMH T2DM, HTN, HLD. Presents with lower back pain, decreased sensation in both legs, left leg weakness and difficulty ambulating unassisted due to weakness. s/p T4-T8 laminoplasty for spinal dural AVF (3/18). Being followed by GI for abdominal distension/constipation.

## 2019-03-25 NOTE — PROGRESS NOTE ADULT - ASSESSMENT
HPI:  Patient is a 58 year old male that developed back pain after a previous fall.  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  Recently complained of low back pain with decreased sensation to b/l LE and LLE weakness.  Transferred to [facility] for spinal angiogram and further management.    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           PLAN:  -d/c narcotics given no BM/constipation/ post op ileus- IV tylenol ordered and instructed patient to increase movement/ambulation; +BM with suppository yesterday  --bowel regimen in place, appreciate GI consult/recs for ileus  -continue protonix  -lovenox and SCDS for DVT prophylaxis  -losartan and norvasc for bp control, norvasc was increased due to elevated bp, procardia XL added - appreciate Dr Kramer consult- improved BP control   -HISS for glucose control; consistent carbohydrate diet for type 2 DM  -out of bed with assistance  -incentive spirometer for lung expansion  -PT/OT/PMR - acute SCI rehab upon discharge, likely in next 24-48hrs  -leukocytosis resolved; likely due to intraop decadron, as pt afebrile, will monitor  -increase OOB  -f/u am labs    will discuss with Dr Willis  Spectra #[id number]    Assessment:  Please Check When Present   []  GCS  E   V  M     Heart Failure: []Acute, [] acute on chronic , []chronic  Heart Failure:  [] Diastolic (HFpEF), [] Systolic (HFrEF), []Combined (HFpEF and HFrEF), [] RHF, [] Pulm HTN, [] Other    [] TREVER, [] ATN, [] AIN, [] other  [] CKD1, [] CKD2, [] CKD 3, [] CKD 4, [] CKD 5, []ESRD    Encephalopathy: [] Metabolic, [] Hepatic, [] toxic, [] Neurological, [] Other    Abnormal Nurtitional Status: [] malnurtition (see nutrition note), [ ]underweight: BMI < 19, [] morbid obesity: BMI >40, [] Cachexia    [] Sepsis  [] hypovolemic shock,[] cardiogenic shock, [] hemorrhagic shock, [] neuogenic shock  [] Acute Respiratory Failure  []Cerebral edema, [] Brain compression/ herniation,   [] Functional quadriplegia  [x] Acute blood loss anemia

## 2019-03-25 NOTE — PROGRESS NOTE ADULT - ASSESSMENT
59 YO male w/  lower back pain following a fall approximately 1 month ago.   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   Patient c/o lower back pain, decreased sensation in both legs, left leg weakness and difficulty ambulating unassisted due to weakness.   Denied bowel or bladder dysfunction. Transfer to [facility] from [facility] for w/u and potential sx for t5 dural avf  s/p t4/8 laminoplasty for obliteration of dura avm  supp and bowel regimen  diet as per sx   ileus resolved       dm  controlled  cont current regimen  htn   improved   c/w meds    monitor closely   adjust meds prn     dvt proph  cont current meds  pt    rehab as per neurosx

## 2019-03-25 NOTE — PROGRESS NOTE ADULT - SUBJECTIVE AND OBJECTIVE BOX
CHIEF COMPLAINT:Patient is a 58y old  Male who presents with a chief complaint of spinal AVM (25 Mar 2019 11:51)    	        PAST MEDICAL & SURGICAL HISTORY:  HLD (hyperlipidemia)  Essential hypertension  DM (diabetes mellitus)          REVIEW OF SYSTEMS:  CONSTITUTIONAL:feels better overall  EYES: No eye pain, visual disturbances, or discharge  NECK: No pain or stiffness  RESPIRATORY: No cough, wheezing, chills or hemoptysis; No Shortness of Breath  CARDIOVASCULAR: No chest pain, palpitations, passing out, dizziness, or leg swelling  GASTROINTESTINAL: No abdominal or epigastric pain. No nausea, vomiting, or hematemesis; No diarrhea or constipation. No melena or hematochezia.  GENITOURINARY: No dysuria, frequency, hematuria, or incontinence  NEUROLOGICAL: No headaches,  less pain     Medications:  MEDICATIONS  (STANDING):  atorvastatin 20 milliGRAM(s) Oral at bedtime  dextrose 5%. 1000 milliLiter(s) (50 mL/Hr) IV Continuous <Continuous>  dextrose 50% Injectable 12.5 Gram(s) IV Push once  dextrose 50% Injectable 25 Gram(s) IV Push once  dextrose 50% Injectable 25 Gram(s) IV Push once  docusate sodium 100 milliGRAM(s) Oral three times a day  enoxaparin Injectable 40 milliGRAM(s) SubCutaneous every 24 hours  gabapentin 600 milliGRAM(s) Oral three times a day  insulin lispro (HumaLOG) corrective regimen sliding scale   SubCutaneous three times a day before meals  losartan 100 milliGRAM(s) Oral daily  metoclopramide Injectable 10 milliGRAM(s) IV Push every 8 hours  NIFEdipine XL 90 milliGRAM(s) Oral daily  pantoprazole    Tablet 40 milliGRAM(s) Oral before breakfast  polyethylene glycol 3350 17 Gram(s) Oral two times a day  senna 2 Tablet(s) Oral at bedtime    MEDICATIONS  (PRN):  acetaminophen   Tablet .. 650 milliGRAM(s) Oral every 6 hours PRN Temp greater or equal to 38.5C (101.3F), Mild Pain (1 - 3)  acetaminophen  IVPB .. 1000 milliGRAM(s) IV Intermittent once PRN Severe Pain (7 - 10)  aluminum hydroxide/magnesium hydroxide/simethicone Suspension 30 milliLiter(s) Oral every 4 hours PRN Dyspepsia  bisacodyl Suppository 10 milliGRAM(s) Rectal daily PRN Constipation  calcium carbonate    500 mG (Tums) Chewable 1 Tablet(s) Chew every 4 hours PRN Heartburn  dextrose 40% Gel 15 Gram(s) Oral once PRN Blood Glucose LESS THAN 70 milliGRAM(s)/deciliter  glucagon  Injectable 1 milliGRAM(s) IntraMuscular once PRN Glucose LESS THAN 70 milligrams/deciliter  melatonin 3 milliGRAM(s) Oral at bedtime PRN Sleep    	    PHYSICAL EXAM:  T(C): 36.8 (03-25-19 @ 08:57), Max: 37.1 (03-24-19 @ 23:41)  HR: 79 (03-25-19 @ 08:57) (69 - 93)  BP: 123/74 (03-25-19 @ 08:57) (116/66 - 156/90)  RR: 22 (03-25-19 @ 08:57) (18 - 22)  SpO2: 98% (03-25-19 @ 08:57) (97% - 98%)  Wt(kg): --  I&O's Summary    24 Mar 2019 07:01  -  25 Mar 2019 07:00  --------------------------------------------------------  IN: 540 mL / OUT: 350 mL / NET: 190 mL        Appearance: Normal	  HEENT:   Normal oral mucosa, PERRL, EOMI	  Lymphatic: No lymphadenopathy  Cardiovascular: Normal S1 S2, No JVD, No murmurs, No edema  Respiratory: Lungs clear to auscultation	  Psychiatry: A & O x 3, Mood & affect appropriate  Gastrointestinal:  Soft, Non-tender, + BS	  Skin: No rashes, No ecchymoses, No cyanosis	  Neurologic: Non-focal   Extremities: Normal range of motion, No clubbing, cyanosis or edema  Vascular: Peripheral pulses palpable 2+ bilaterally    TELEMETRY: 	    ECG:  	  RADIOLOGY:  OTHER: 	  	  LABS:	 	    CARDIAC MARKERS:            03-25    133<L>  |  100  |  13  ----------------------------<  168<H>  3.9   |  21<L>  |  0.62    Ca    9.4      25 Mar 2019 06:42      proBNP:   Lipid Profile:   HgA1c:   TSH:

## 2019-03-25 NOTE — PROGRESS NOTE ADULT - ASSESSMENT
HTN  labile   cont current meds   outpt follow up     DM  Monitor finger stick. Insulin coverage.     HLD  on statin

## 2019-03-25 NOTE — DIETITIAN INITIAL EVALUATION ADULT. - ADHERENCE
Pt with T2DM (HgbA1c 6.9%), complies with therapeutic diet at home. Pt consumes 3 balanced meals per day (whole grains, lean protein, low in added sugar), no snacks in between./good

## 2019-03-25 NOTE — DIETITIAN INITIAL EVALUATION ADULT. - NS FNS WEIGHT CHANGE REASON
Pt endorses 10 pound weight loss due to diminished appetite. States  pounds. Current weight 178 pounds./unintentional

## 2019-03-25 NOTE — DIETITIAN INITIAL EVALUATION ADULT. - NS AS NUTRI INTERV MEALS SNACK
Continue with consistent CHO diet as ordered. Encourage adequate PO intake. Provide sugar-free healthshake 1x/day per pt request./General/healthful diet

## 2019-03-25 NOTE — PROGRESS NOTE ADULT - SUBJECTIVE AND OBJECTIVE BOX
Subjective: Patient seen and examined. No new events except as noted.     SUBJECTIVE/ROS:  feels ok  No chest pain, dyspnea, palpitation, or dizziness.       MEDICATIONS:  MEDICATIONS  (STANDING):  atorvastatin 20 milliGRAM(s) Oral at bedtime  dextrose 5%. 1000 milliLiter(s) (50 mL/Hr) IV Continuous <Continuous>  dextrose 50% Injectable 12.5 Gram(s) IV Push once  dextrose 50% Injectable 25 Gram(s) IV Push once  dextrose 50% Injectable 25 Gram(s) IV Push once  docusate sodium 100 milliGRAM(s) Oral three times a day  enoxaparin Injectable 40 milliGRAM(s) SubCutaneous every 24 hours  gabapentin 600 milliGRAM(s) Oral three times a day  insulin lispro (HumaLOG) corrective regimen sliding scale   SubCutaneous three times a day before meals  losartan 100 milliGRAM(s) Oral daily  metoclopramide Injectable 10 milliGRAM(s) IV Push every 8 hours  NIFEdipine XL 90 milliGRAM(s) Oral daily  pantoprazole    Tablet 40 milliGRAM(s) Oral before breakfast  polyethylene glycol 3350 17 Gram(s) Oral two times a day  senna 2 Tablet(s) Oral at bedtime      PHYSICAL EXAM:  T(C): 36.9 (03-25-19 @ 04:55), Max: 37.1 (03-24-19 @ 23:41)  HR: 93 (03-25-19 @ 04:55) (69 - 93)  BP: 156/90 (03-25-19 @ 04:55) (116/66 - 156/90)  RR: 18 (03-25-19 @ 04:55) (18 - 20)  SpO2: 98% (03-25-19 @ 04:55) (97% - 98%)  Wt(kg): --  I&O's Summary    24 Mar 2019 07:01  -  25 Mar 2019 07:00  --------------------------------------------------------  IN: 540 mL / OUT: 350 mL / NET: 190 mL            JVP: Normal  Neck: supple  Lung: clear   CV: S1 S2 , Murmur:  Abd: soft  Ext: No edema  neuro: Awake / alert  Psych: flat affect  Skin: normal``    LABS/DATA:    CARDIAC MARKERS:                                11.3   9.90  )-----------( 351      ( 23 Mar 2019 09:53 )             35.4     03-25    133<L>  |  100  |  13  ----------------------------<  168<H>  3.9   |  21<L>  |  0.62    Ca    9.4      25 Mar 2019 06:42      proBNP:   Lipid Profile:   HgA1c:   TSH:     TELE:  EKG:

## 2019-03-25 NOTE — PROGRESS NOTE ADULT - SUBJECTIVE AND OBJECTIVE BOX
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  Since sept 2018, he has had a first numnbness of his toes and progressive weakness in both legs.  Most problematic getting up from chair.  In feb 2019, his knee gave out and he fell.  He was having pain as well, at times burning. He ws seen by Dr. Vivas a neurologist, had MRI brain with nonspecific changes.  Told had stroke and placed on plavix.  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  He was sent from [facility] to [facility] and had angio confirming av dural fistula.     pt says weakness no worse in legs, no new sx in arms, no changes in speech, swallow, vision, bowel or bladder control.     s/p  T4-T8 laminoplasty for spinal dural AVF    pt says stomach feeling better, but no BM    working with OT, walking with assistance and walker    urination is better    Past Medical History:  HLD (hyperlipidemia) (E78.5)  Essential hypertension (I10)  DM (diabetes mellitus) (E11.9)      Past Surgical History:      Social History:  No toxic habits    Family History:  FAMILY HISTORY:      Allergies:  No Known Allergies      ROS:  Constitutional: Patient offers no complaints of fevers or significant weight loss  Ears, Nose, Mouth and Throat: The patient presents with no abnormalities of the head, ears, eyes, nose or throat  Skin: Patient offers no concerns of new rashes or lesions  Respiratory: The patient presents with no abnormalities of the respiratory tract  Cardiovascular: The patient presents with no cardiac abnormalities  Gastrointestinal: The patient presents with no abnormalities of the GI system  Genitourinary: The patient presents with no dysuria, hematuria or frequent urination  Neurological: See HPI  Endocrine: Patient offers no complaints of excessive thirst, urination, or heat/cold intolerance    Advanced care planning reviewed and noted in the chart.    Medications:  acetaminophen   Tablet .. 650 milliGRAM(s) Oral every 6 hours PRN  acetaminophen  IVPB .. 1000 milliGRAM(s) IV Intermittent once PRN  aluminum hydroxide/magnesium hydroxide/simethicone Suspension 30 milliLiter(s) Oral every 4 hours PRN  atorvastatin 20 milliGRAM(s) Oral at bedtime  bisacodyl Suppository 10 milliGRAM(s) Rectal daily PRN  calcium carbonate    500 mG (Tums) Chewable 1 Tablet(s) Chew every 4 hours PRN  dextrose 40% Gel 15 Gram(s) Oral once PRN  dextrose 5%. 1000 milliLiter(s) IV Continuous <Continuous>  dextrose 50% Injectable 12.5 Gram(s) IV Push once  dextrose 50% Injectable 25 Gram(s) IV Push once  dextrose 50% Injectable 25 Gram(s) IV Push once  docusate sodium 100 milliGRAM(s) Oral three times a day  enoxaparin Injectable 40 milliGRAM(s) SubCutaneous every 24 hours  gabapentin 600 milliGRAM(s) Oral three times a day  glucagon  Injectable 1 milliGRAM(s) IntraMuscular once PRN  insulin lispro (HumaLOG) corrective regimen sliding scale   SubCutaneous three times a day before meals  losartan 100 milliGRAM(s) Oral daily  melatonin 3 milliGRAM(s) Oral at bedtime PRN  metoclopramide Injectable 10 milliGRAM(s) IV Push every 8 hours  NIFEdipine XL 90 milliGRAM(s) Oral daily  pantoprazole    Tablet 40 milliGRAM(s) Oral before breakfast  polyethylene glycol 3350 17 Gram(s) Oral two times a day  senna 2 Tablet(s) Oral at bedtime      Labs:  CBC Full  -  ( 23 Mar 2019 09:53 )  WBC Count : 9.90 K/uL  Hemoglobin : 11.3 g/dL  Hematocrit : 35.4 %  Platelet Count - Automated : 351 K/uL  Mean Cell Volume : 82.5 fl  Mean Cell Hemoglobin : 26.3 pg  Mean Cell Hemoglobin Concentration : 31.9 gm/dL  Auto Neutrophil # : 6.86 K/uL  Auto Lymphocyte # : 1.56 K/uL  Auto Monocyte # : 1.22 K/uL  Auto Eosinophil # : 0.18 K/uL  Auto Basophil # : 0.03 K/uL  Auto Neutrophil % : 69.3 %  Auto Lymphocyte % : 15.8 %  Auto Monocyte % : 12.3 %  Auto Eosinophil % : 1.8 %  Auto Basophil % : 0.3 %    03-25    133<L>  |  100  |  13  ----------------------------<  168<H>  3.9   |  21<L>  |  0.62    Ca    9.4      25 Mar 2019 06:42      CAPILLARY BLOOD GLUCOSE      POCT Blood Glucose.: 131 mg/dL (25 Mar 2019 08:40)  POCT Blood Glucose.: 125 mg/dL (24 Mar 2019 17:06)  POCT Blood Glucose.: 180 mg/dL (24 Mar 2019 12:28)              Vitals:  Vital Signs Last 24 Hrs  T(C): 36.8 (25 Mar 2019 08:57), Max: 37.1 (24 Mar 2019 23:41)  T(F): 98.2 (25 Mar 2019 08:57), Max: 98.7 (24 Mar 2019 23:41)  HR: 79 (25 Mar 2019 08:57) (69 - 93)  BP: 123/74 (25 Mar 2019 08:57) (116/66 - 156/90)  BP(mean): --  RR: 22 (25 Mar 2019 08:57) (18 - 22)  SpO2: 98% (25 Mar 2019 08:57) (97% - 98%)      NEUROLOGICAL EXAM:    Mental status: Awake, alert, and in no apparent distress. Oriented to person, place and time. Language function is normal. Recent memory, digit span and concentration were normal.     Cranial Nerves: Pupils were equal, round, reactive to light. Extraocular movements were intact. Visual field were full. Fundoscopic exam was deferred. Facial sensation was intact to light touch. There was no facial asymmetry. The palate was upgoing symmetrically and tongue was midline. Hearing acuity was intact to finger rub AU. Shoulder shrug was full bilaterally    Motor exam: Bulk and tone were normal. Strength was 5/5 in arms, 4/5 in legs in knee ext, hip flexion, dorsi on left,  right is 4+ for knee ext/hip flex, but dorsi is 5-/5.   Fine finger movements were symmetric and normal. There was no pronator drift     Reflexes: 2+ in the bilateral upper extremities. 0  in the bilateral lower extremities. Toes were downgoing bilaterally.     Sensation: Intact to light touch, temperature, vibration and proprioception.     Coordination: Finger-nose-finger and heel-to-shin was without dysmetria.     Gait: defer    < from: MR Angio Spinal Canal w/wo IV Cont (03.08.19 @ 21:57) >    EXAM:  MR ANGIO SPINAL CANAL WAW IC        PROCEDURE DATE:  Mar  8 2019         INTERPRETATION:  Clinical information: Evaluate for spinal AV fistula.    Technique: Contrast-enhanced MR angiography of the spine was performed.   7.5 cc's of IV Gadavist was administered without immediate complication   and 0 cc's was discarded.    Comparison: Prior contrast-enhanced MRI examinations of the thoracic and   lumbar spine dated 3/6/2018. Prior noncontrast MRI examinations of the   cervical, thoracic, and lumbar spine from 3/5/2019.    Findings: There is redemonstration of edema signal, swelling, and patchy   enhancement involving the mid lower thoracic spinal cord extending to the   conus medullaris. There is also thickening of the cauda equina nerve   roots and abnormal enhancement. On T2-weighted imaging, intradural extra   medullary serpiginous vessels are again noted within the thecal sac   surrounding the conus and extending craniad into the thoracic canal.    On the MR angiography portion of the examination, there are questionable   segmental radicular arterial feeders at the level of of L1 draining into   radiculomedullary veins. Serpiginous venous vessels abutting the conus   medullaris and thoracic cord are noted extending into the thoracic canal.    The urinary bladder is again noted to be distended on the  localizer   images.    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 Questionable arterial   feeders at the level of L1, as discussed.    Further evaluation with a conventional spinal angiography examination is   recommended.    The possibility of a spinal cord tumor with drop metastases, lymphoma, or   inflammatory process such as neurosarcoidosis or infection such as TB   cannot be completely excluded. Correlate with results of CSF testing from   the prior lumbar puncture procedure already performed on 3/7/2019.                        GENARO LORA M.D., ATTENDING RADIOLOGIST  This document has been electronically signed. Mar  9 2019  1:59PM                  < end of copied text >  < from: MR Lumbar Spine w/ IV Cont (03.07.19 @ 14:08) >    EXAM:  MR SPINE LUMBAR IC      EXAM:  MR SPINE THORACIC IC        PROCEDURE DATE:  Mar  7 2019         INTERPRETATION:  History: Weakness. Abnormal spinal cord on noncontrast   MRI. Left lower extremity weakness.    Description: A postcontrast MRI of the thoracic and lumbar spine was   performed.    Comparison is made to the noncontrast spine MRI from 03/05/2019.    Sagittal and axial T1 postcontrast weighted series were performed.    7.5 cc intravenous Gadavist gadolinium contrast administered, 0 cc   contrast was discarded.    Edema and swelling is again noted involving the mid-lower thoracic spinal   cord, extending to the conus. Thickening of the nerve roots of the cauda   equina is present.    Patchy and nodular enhancement is noted involving the mid-lower thoracic   spinal cord. Abnormal enhancement also involves the thickened nerve roots   of the cauda equina. Linear enhancing vessels are noted on the surface of   the cord.    No abnormal enhancement is noted involving the vertebral bodies.    The urinary bladder is distended, partially visualized suggesting   neurogenic bladder given the spinal cord and cauda equina involvement.    IMPRESSION:    Abnormal enhancement involves the mid-lower thoracic spinal, and also   involves the nerve roots of the cauda equina. A spinal cord tumor with   drop metastases, lymphoma, an inflammatory process (such as sarcoidosis),   or an atypical infection (such as tuberculosis) are in the differential   diagnosis. Some prominent vasculature is noted also raising the   possibility of a dural AV fistula. Consider correlation with lumbar   puncture and spine MRA if clinically warranted.    The urinary bladder is distended, partially visualized suggesting   neurogenic bladder given the spinal cord and cauda equina involvement.                  HALLE LOTT M.D., ATTENDING RADIOLOGIST  This document has been electronically signed. Mar  7 2019  2:37PM                  < end of copied text >

## 2019-03-26 ENCOUNTER — INPATIENT (INPATIENT)
Facility: HOSPITAL | Age: 59
LOS: 13 days | Discharge: HOME HEALTH | DRG: 949 | End: 2019-04-09
Attending: SPECIALIST | Admitting: SPECIALIST
Payer: COMMERCIAL

## 2019-03-26 ENCOUNTER — TRANSCRIPTION ENCOUNTER (OUTPATIENT)
Age: 59
End: 2019-03-26

## 2019-03-26 VITALS
TEMPERATURE: 98 F | OXYGEN SATURATION: 99 % | DIASTOLIC BLOOD PRESSURE: 89 MMHG | RESPIRATION RATE: 17 BRPM | HEART RATE: 88 BPM | SYSTOLIC BLOOD PRESSURE: 139 MMHG

## 2019-03-26 VITALS
TEMPERATURE: 98 F | DIASTOLIC BLOOD PRESSURE: 82 MMHG | RESPIRATION RATE: 14 BRPM | HEIGHT: 70 IN | SYSTOLIC BLOOD PRESSURE: 149 MMHG | WEIGHT: 190.48 LBS | HEART RATE: 73 BPM | OXYGEN SATURATION: 100 %

## 2019-03-26 DIAGNOSIS — I10 ESSENTIAL (PRIMARY) HYPERTENSION: ICD-10-CM

## 2019-03-26 DIAGNOSIS — E78.5 HYPERLIPIDEMIA, UNSPECIFIED: ICD-10-CM

## 2019-03-26 DIAGNOSIS — S24.109S: ICD-10-CM

## 2019-03-26 DIAGNOSIS — Q27.39 ARTERIOVENOUS MALFORMATION, OTHER SITE: ICD-10-CM

## 2019-03-26 DIAGNOSIS — K91.89 OTHER POSTPROCEDURAL COMPLICATIONS AND DISORDERS OF DIGESTIVE SYSTEM: ICD-10-CM

## 2019-03-26 DIAGNOSIS — S34.139A UNSPECIFIED INJURY TO SACRAL SPINAL CORD, INITIAL ENCOUNTER: ICD-10-CM

## 2019-03-26 DIAGNOSIS — E11.9 TYPE 2 DIABETES MELLITUS WITHOUT COMPLICATIONS: ICD-10-CM

## 2019-03-26 LAB
ANION GAP SERPL CALC-SCNC: 13 MMOL/L — SIGNIFICANT CHANGE UP (ref 5–17)
BUN SERPL-MCNC: 13 MG/DL — SIGNIFICANT CHANGE UP (ref 7–23)
CALCIUM SERPL-MCNC: 9.2 MG/DL — SIGNIFICANT CHANGE UP (ref 8.4–10.5)
CHLORIDE SERPL-SCNC: 100 MMOL/L — SIGNIFICANT CHANGE UP (ref 96–108)
CO2 SERPL-SCNC: 24 MMOL/L — SIGNIFICANT CHANGE UP (ref 22–31)
CREAT SERPL-MCNC: 0.75 MG/DL — SIGNIFICANT CHANGE UP (ref 0.5–1.3)
GLUCOSE BLDC GLUCOMTR-MCNC: 143 MG/DL — HIGH (ref 70–99)
GLUCOSE BLDC GLUCOMTR-MCNC: 151 MG/DL — HIGH (ref 70–99)
GLUCOSE BLDC GLUCOMTR-MCNC: 164 MG/DL — HIGH (ref 70–99)
GLUCOSE BLDC GLUCOMTR-MCNC: 177 MG/DL — HIGH (ref 70–99)
GLUCOSE SERPL-MCNC: 151 MG/DL — HIGH (ref 70–99)
POTASSIUM SERPL-MCNC: 4 MMOL/L — SIGNIFICANT CHANGE UP (ref 3.5–5.3)
POTASSIUM SERPL-SCNC: 4 MMOL/L — SIGNIFICANT CHANGE UP (ref 3.5–5.3)
SODIUM SERPL-SCNC: 137 MMOL/L — SIGNIFICANT CHANGE UP (ref 135–145)

## 2019-03-26 PROCEDURE — 97110 THERAPEUTIC EXERCISES: CPT

## 2019-03-26 PROCEDURE — 76000 FLUOROSCOPY <1 HR PHYS/QHP: CPT

## 2019-03-26 PROCEDURE — 86850 RBC ANTIBODY SCREEN: CPT

## 2019-03-26 PROCEDURE — 80061 LIPID PANEL: CPT

## 2019-03-26 PROCEDURE — C1769: CPT

## 2019-03-26 PROCEDURE — 75705 ARTERY X-RAYS SPINE: CPT

## 2019-03-26 PROCEDURE — C1889: CPT

## 2019-03-26 PROCEDURE — 84443 ASSAY THYROID STIM HORMONE: CPT

## 2019-03-26 PROCEDURE — 84132 ASSAY OF SERUM POTASSIUM: CPT

## 2019-03-26 PROCEDURE — 84295 ASSAY OF SERUM SODIUM: CPT

## 2019-03-26 PROCEDURE — 82962 GLUCOSE BLOOD TEST: CPT

## 2019-03-26 PROCEDURE — 86900 BLOOD TYPING SEROLOGIC ABO: CPT

## 2019-03-26 PROCEDURE — 37242 VASC EMBOLIZE/OCCLUDE ARTERY: CPT

## 2019-03-26 PROCEDURE — 85014 HEMATOCRIT: CPT

## 2019-03-26 PROCEDURE — C2628: CPT

## 2019-03-26 PROCEDURE — 97530 THERAPEUTIC ACTIVITIES: CPT

## 2019-03-26 PROCEDURE — 99223 1ST HOSP IP/OBS HIGH 75: CPT | Mod: AI

## 2019-03-26 PROCEDURE — 76380 CAT SCAN FOLLOW-UP STUDY: CPT

## 2019-03-26 PROCEDURE — 93970 EXTREMITY STUDY: CPT

## 2019-03-26 PROCEDURE — 82330 ASSAY OF CALCIUM: CPT

## 2019-03-26 PROCEDURE — 97166 OT EVAL MOD COMPLEX 45 MIN: CPT

## 2019-03-26 PROCEDURE — 86803 HEPATITIS C AB TEST: CPT

## 2019-03-26 PROCEDURE — 83605 ASSAY OF LACTIC ACID: CPT

## 2019-03-26 PROCEDURE — P9045: CPT

## 2019-03-26 PROCEDURE — 97116 GAIT TRAINING THERAPY: CPT

## 2019-03-26 PROCEDURE — C1887: CPT

## 2019-03-26 PROCEDURE — 85730 THROMBOPLASTIN TIME PARTIAL: CPT

## 2019-03-26 PROCEDURE — 36245 INS CATH ABD/L-EXT ART 1ST: CPT | Mod: 59

## 2019-03-26 PROCEDURE — 83735 ASSAY OF MAGNESIUM: CPT

## 2019-03-26 PROCEDURE — 80048 BASIC METABOLIC PNL TOTAL CA: CPT

## 2019-03-26 PROCEDURE — 85027 COMPLETE CBC AUTOMATED: CPT

## 2019-03-26 PROCEDURE — 36226 PLACE CATH VERTEBRAL ART: CPT

## 2019-03-26 PROCEDURE — 82565 ASSAY OF CREATININE: CPT

## 2019-03-26 PROCEDURE — 93306 TTE W/DOPPLER COMPLETE: CPT

## 2019-03-26 PROCEDURE — C1894: CPT

## 2019-03-26 PROCEDURE — 74018 RADEX ABDOMEN 1 VIEW: CPT

## 2019-03-26 PROCEDURE — 82435 ASSAY OF BLOOD CHLORIDE: CPT

## 2019-03-26 PROCEDURE — 82803 BLOOD GASES ANY COMBINATION: CPT

## 2019-03-26 PROCEDURE — C1713: CPT

## 2019-03-26 PROCEDURE — 36215 PLACE CATHETER IN ARTERY: CPT

## 2019-03-26 PROCEDURE — 97164 PT RE-EVAL EST PLAN CARE: CPT

## 2019-03-26 PROCEDURE — 85610 PROTHROMBIN TIME: CPT

## 2019-03-26 PROCEDURE — 86901 BLOOD TYPING SEROLOGIC RH(D): CPT

## 2019-03-26 PROCEDURE — 80053 COMPREHEN METABOLIC PANEL: CPT

## 2019-03-26 PROCEDURE — 82947 ASSAY GLUCOSE BLOOD QUANT: CPT

## 2019-03-26 PROCEDURE — 97161 PT EVAL LOW COMPLEX 20 MIN: CPT

## 2019-03-26 PROCEDURE — 84100 ASSAY OF PHOSPHORUS: CPT

## 2019-03-26 PROCEDURE — 83036 HEMOGLOBIN GLYCOSYLATED A1C: CPT

## 2019-03-26 RX ORDER — PANTOPRAZOLE SODIUM 20 MG/1
1 TABLET, DELAYED RELEASE ORAL
Qty: 0 | Refills: 0 | COMMUNITY
Start: 2019-03-26

## 2019-03-26 RX ORDER — OXYCODONE HYDROCHLORIDE 5 MG/1
1 TABLET ORAL
Qty: 0 | Refills: 0 | COMMUNITY
Start: 2019-03-26

## 2019-03-26 RX ORDER — DOCUSATE SODIUM 100 MG
100 CAPSULE ORAL THREE TIMES A DAY
Qty: 0 | Refills: 0 | Status: DISCONTINUED | OUTPATIENT
Start: 2019-03-26 | End: 2019-04-09

## 2019-03-26 RX ORDER — DEXTROSE 50 % IN WATER 50 %
15 SYRINGE (ML) INTRAVENOUS ONCE
Qty: 0 | Refills: 0 | Status: DISCONTINUED | OUTPATIENT
Start: 2019-03-26 | End: 2019-04-09

## 2019-03-26 RX ORDER — LANOLIN ALCOHOL/MO/W.PET/CERES
1 CREAM (GRAM) TOPICAL
Qty: 0 | Refills: 0 | DISCHARGE
Start: 2019-03-26

## 2019-03-26 RX ORDER — ACETAMINOPHEN 500 MG
650 TABLET ORAL EVERY 4 HOURS
Qty: 0 | Refills: 0 | Status: DISCONTINUED | OUTPATIENT
Start: 2019-03-26 | End: 2019-04-09

## 2019-03-26 RX ORDER — ASCORBIC ACID 60 MG
500 TABLET,CHEWABLE ORAL
Qty: 0 | Refills: 0 | Status: DISCONTINUED | OUTPATIENT
Start: 2019-03-27 | End: 2019-04-09

## 2019-03-26 RX ORDER — POLYETHYLENE GLYCOL 3350 17 G/17G
17 POWDER, FOR SOLUTION ORAL
Qty: 0 | Refills: 0 | Status: DISCONTINUED | OUTPATIENT
Start: 2019-03-26 | End: 2019-04-09

## 2019-03-26 RX ORDER — AMLODIPINE BESYLATE 2.5 MG/1
1 TABLET ORAL
Qty: 0 | Refills: 0 | COMMUNITY

## 2019-03-26 RX ORDER — NIFEDIPINE 30 MG
1 TABLET, EXTENDED RELEASE 24 HR ORAL
Qty: 0 | Refills: 0 | COMMUNITY
Start: 2019-03-26

## 2019-03-26 RX ORDER — SODIUM CHLORIDE 9 MG/ML
1000 INJECTION, SOLUTION INTRAVENOUS
Qty: 0 | Refills: 0 | Status: DISCONTINUED | OUTPATIENT
Start: 2019-03-26 | End: 2019-04-09

## 2019-03-26 RX ORDER — ENOXAPARIN SODIUM 100 MG/ML
40 INJECTION SUBCUTANEOUS
Qty: 0 | Refills: 0 | Status: DISCONTINUED | OUTPATIENT
Start: 2019-03-26 | End: 2019-04-09

## 2019-03-26 RX ORDER — NIFEDIPINE 30 MG
90 TABLET, EXTENDED RELEASE 24 HR ORAL DAILY
Qty: 0 | Refills: 0 | Status: DISCONTINUED | OUTPATIENT
Start: 2019-03-27 | End: 2019-04-02

## 2019-03-26 RX ORDER — POLYETHYLENE GLYCOL 3350 17 G/17G
17 POWDER, FOR SOLUTION ORAL
Qty: 0 | Refills: 0 | COMMUNITY
Start: 2019-03-26

## 2019-03-26 RX ORDER — DOCUSATE SODIUM 100 MG
1 CAPSULE ORAL
Qty: 0 | Refills: 0 | COMMUNITY
Start: 2019-03-26

## 2019-03-26 RX ORDER — ATORVASTATIN CALCIUM 80 MG/1
20 TABLET, FILM COATED ORAL AT BEDTIME
Qty: 0 | Refills: 0 | Status: DISCONTINUED | OUTPATIENT
Start: 2019-03-26 | End: 2019-04-09

## 2019-03-26 RX ORDER — GLUCAGON INJECTION, SOLUTION 0.5 MG/.1ML
1 INJECTION, SOLUTION SUBCUTANEOUS ONCE
Qty: 0 | Refills: 0 | Status: DISCONTINUED | OUTPATIENT
Start: 2019-03-26 | End: 2019-04-09

## 2019-03-26 RX ORDER — CALCIUM CARBONATE 500(1250)
1 TABLET ORAL
Qty: 0 | Refills: 0 | DISCHARGE
Start: 2019-03-26

## 2019-03-26 RX ORDER — GABAPENTIN 400 MG/1
600 CAPSULE ORAL THREE TIMES A DAY
Qty: 0 | Refills: 0 | Status: DISCONTINUED | OUTPATIENT
Start: 2019-03-26 | End: 2019-04-09

## 2019-03-26 RX ORDER — INSULIN LISPRO 100/ML
VIAL (ML) SUBCUTANEOUS
Qty: 0 | Refills: 0 | Status: DISCONTINUED | OUTPATIENT
Start: 2019-03-26 | End: 2019-04-05

## 2019-03-26 RX ORDER — SENNA PLUS 8.6 MG/1
2 TABLET ORAL AT BEDTIME
Qty: 0 | Refills: 0 | Status: DISCONTINUED | OUTPATIENT
Start: 2019-03-26 | End: 2019-04-02

## 2019-03-26 RX ORDER — LOSARTAN POTASSIUM 100 MG/1
100 TABLET, FILM COATED ORAL DAILY
Qty: 0 | Refills: 0 | Status: DISCONTINUED | OUTPATIENT
Start: 2019-03-27 | End: 2019-04-09

## 2019-03-26 RX ORDER — DEXTROSE 50 % IN WATER 50 %
12.5 SYRINGE (ML) INTRAVENOUS ONCE
Qty: 0 | Refills: 0 | Status: DISCONTINUED | OUTPATIENT
Start: 2019-03-26 | End: 2019-04-09

## 2019-03-26 RX ORDER — GABAPENTIN 400 MG/1
1 CAPSULE ORAL
Qty: 0 | Refills: 0 | COMMUNITY

## 2019-03-26 RX ORDER — INSULIN LISPRO 100/ML
VIAL (ML) SUBCUTANEOUS AT BEDTIME
Qty: 0 | Refills: 0 | Status: DISCONTINUED | OUTPATIENT
Start: 2019-03-26 | End: 2019-04-05

## 2019-03-26 RX ORDER — DEXTROSE 50 % IN WATER 50 %
25 SYRINGE (ML) INTRAVENOUS ONCE
Qty: 0 | Refills: 0 | Status: DISCONTINUED | OUTPATIENT
Start: 2019-03-26 | End: 2019-04-09

## 2019-03-26 RX ORDER — LANOLIN ALCOHOL/MO/W.PET/CERES
3 CREAM (GRAM) TOPICAL AT BEDTIME
Qty: 0 | Refills: 0 | Status: DISCONTINUED | OUTPATIENT
Start: 2019-03-26 | End: 2019-04-09

## 2019-03-26 RX ORDER — ATORVASTATIN CALCIUM 80 MG/1
1 TABLET, FILM COATED ORAL
Qty: 0 | Refills: 0 | COMMUNITY
Start: 2019-03-26

## 2019-03-26 RX ORDER — GABAPENTIN 400 MG/1
2 CAPSULE ORAL
Qty: 0 | Refills: 0 | DISCHARGE
Start: 2019-03-26

## 2019-03-26 RX ORDER — SENNA PLUS 8.6 MG/1
2 TABLET ORAL
Qty: 0 | Refills: 0 | COMMUNITY
Start: 2019-03-26

## 2019-03-26 RX ORDER — ENOXAPARIN SODIUM 100 MG/ML
40 INJECTION SUBCUTANEOUS
Qty: 0 | Refills: 0 | COMMUNITY
Start: 2019-03-26

## 2019-03-26 RX ORDER — PANTOPRAZOLE SODIUM 20 MG/1
40 TABLET, DELAYED RELEASE ORAL
Qty: 0 | Refills: 0 | Status: DISCONTINUED | OUTPATIENT
Start: 2019-03-27 | End: 2019-04-09

## 2019-03-26 RX ADMIN — GABAPENTIN 600 MILLIGRAM(S): 400 CAPSULE ORAL at 05:33

## 2019-03-26 RX ADMIN — GABAPENTIN 600 MILLIGRAM(S): 400 CAPSULE ORAL at 22:00

## 2019-03-26 RX ADMIN — POLYETHYLENE GLYCOL 3350 17 GRAM(S): 17 POWDER, FOR SOLUTION ORAL at 05:36

## 2019-03-26 RX ADMIN — Medication 100 MILLIGRAM(S): at 22:00

## 2019-03-26 RX ADMIN — Medication 650 MILLIGRAM(S): at 18:15

## 2019-03-26 RX ADMIN — ATORVASTATIN CALCIUM 20 MILLIGRAM(S): 80 TABLET, FILM COATED ORAL at 22:00

## 2019-03-26 RX ADMIN — Medication 100 MILLIGRAM(S): at 11:23

## 2019-03-26 RX ADMIN — Medication 100 MILLIGRAM(S): at 05:33

## 2019-03-26 RX ADMIN — GABAPENTIN 600 MILLIGRAM(S): 400 CAPSULE ORAL at 11:23

## 2019-03-26 RX ADMIN — LOSARTAN POTASSIUM 100 MILLIGRAM(S): 100 TABLET, FILM COATED ORAL at 05:33

## 2019-03-26 RX ADMIN — PANTOPRAZOLE SODIUM 40 MILLIGRAM(S): 20 TABLET, DELAYED RELEASE ORAL at 05:33

## 2019-03-26 RX ADMIN — POLYETHYLENE GLYCOL 3350 17 GRAM(S): 17 POWDER, FOR SOLUTION ORAL at 17:39

## 2019-03-26 RX ADMIN — Medication 650 MILLIGRAM(S): at 17:43

## 2019-03-26 RX ADMIN — ENOXAPARIN SODIUM 40 MILLIGRAM(S): 100 INJECTION SUBCUTANEOUS at 22:00

## 2019-03-26 RX ADMIN — Medication 90 MILLIGRAM(S): at 05:33

## 2019-03-26 RX ADMIN — Medication 10 MILLIGRAM(S): at 05:33

## 2019-03-26 NOTE — PROGRESS NOTE ADULT - REASON FOR ADMISSION
spinal AVM
spinal AVM
Admitted to [facility] on 3/4 with progressively worsening bilateral lower extremity weakness; transferred to [facility] on 3/10 for further neurosurgical management; 3/11 s/p spinal angiogram revealing T5 dural Arterio-venous fistula
spinal AVM
3/11/19 s/p spinal angiogram revealing T5 dural arteriovenous fistula  3/15/19 s/p angiogram Left T5 AVF s/p coil marker
Admitted to [facility] on 3/4 with progressively worsening bilateral lower extremity weakness; transferred to [facility] on 3/10 for further neurosurgical management; 3/11 s/p spinal angiogram revealing T5 dural Arterio-venous fistula
Admitted to [facility] on 3/4 with progressively worsening bilateral lower extremity weakness; transferred to [facility] on 3/10 for further neurosurgical management; 3/11 s/p spinal angiogram revealing T5 dural Arterio-venous fistula
spinal AVM

## 2019-03-26 NOTE — PROGRESS NOTE ADULT - ASSESSMENT
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   s/p T4 - T8 laminoplasty for obliteration of dural AVM (3/18/19).    Abdominal distension ?ileus vs constipation - AXR with dilated loops of small and large bowel last week.  Now with improved bowel function.  Dyspepsia with known history of GERD    RECS:  -Limit narcotics as feasible  -Continue present bowel regimen  -OOB/PT  -Continue PPI daily

## 2019-03-26 NOTE — DISCHARGE NOTE PROVIDER - NSDCACTIVITY_GEN_ALL_CORE
No heavy lifting/straining/Do not drive or operate machinery/Walking - Indoors allowed/Stairs allowed/Walking - Outdoors allowed/Bathing allowed/Showering allowed/Do not make important decisions

## 2019-03-26 NOTE — DISCHARGE NOTE PROVIDER - CARE PROVIDERS DIRECT ADDRESSES
[email or web address]

## 2019-03-26 NOTE — DISCHARGE NOTE NURSING/CASE MANAGEMENT/SOCIAL WORK - NSDCDPATPORTLINK_GEN_ALL_CORE
You can access the [facility] Patient Portal, offered by [facility], by registering with the following website: [email or web address]

## 2019-03-26 NOTE — H&P ADULT - NSHPPHYSICALEXAM_GEN_ALL_CORE
Vital Signs Last 24 Hrs  T(C): 36.6 (26 Mar 2019 14:34), Max: 37.2 (25 Mar 2019 23:36)  T(F): 97.8 (26 Mar 2019 14:34), Max: 99 (25 Mar 2019 23:36)  HR: 73 (26 Mar 2019 14:34) (69 - 88)  BP: 149/82 (26 Mar 2019 14:34) (114/70 - 149/82)  BP(mean): --  RR: 14 (26 Mar 2019 14:34) (14 - 19)  SpO2: 100% (26 Mar 2019 14:34) (98% - 100%)    Patient AAOX3  Gen: NAD  Heart :S1S2+  Lungs: clear  Abdomen: soft, distended, BS - hypoactive, non tender  Ext: no edema, no calf tenderness  Skin: Back incision: staples +, clean  Neuro:aaox3, no aphasia or dysarthria  cranial nerves appear intact  motor UE 5/5,   LE: Right HF 3/5, KE 4/5, ankle DF/PF 5/5, Right EHL 5/5,  Left HF 2/5, KE 3/5, ankle DF 4/5, PF 4/5. EHL 3/5  Sensory impaired to light touch - left side compared to right   Tone normal in LE

## 2019-03-26 NOTE — DISCHARGE NOTE PROVIDER - NSDCFUADDINST_GEN_ALL_CORE_FT
please notify physician if fevers, bleeding, swelling, pain not relieved by medication, increased sluggishness or irritability, increased nausea or vomiting, inability to tolerate foods or liquids.   please do not engage in strenuous activity, heavy lifting, drive, or return to work or school until cleared by surgeon.   please keep incision clean and dry, do not submerge wound in water for prolonged periods of time, pat dry after showering, and do not use any creams or ointments to incision.   Do not drive or return to school/work until cleared by surgeon   staple removal for 4/1/19.

## 2019-03-26 NOTE — H&P ADULT - NSHPLABSRESULTS_GEN_ALL_CORE
03-26    137  |  100  |  13  ----------------------------<  151<H>  4.0   |  24  |  0.75    Ca    9.2      26 Mar 2019 06:39      < from: MR Angio Spinal Canal w/wo IV Cont (03.08.19 @ 21:57) >  EXAM:  MR ANGIO SPINAL CANAL WAW IC    PROCEDURE DATE:  Mar  8 2019   INTERPRETATION:  Clinical information: Evaluate for spinal AV fistula.    Technique: Contrast-enhanced MR angiography of the spine was performed.   7.5 cc's of IV Gadavist was administered without immediate complication   and 0 cc's was discarded.    Comparison: Prior contrast-enhanced MRI examinations of the thoracic and   lumbar spine dated 3/6/2018. Prior noncontrast MRI examinations of the   cervical, thoracic, and lumbar spine from 3/5/2019.    Findings: There is redemonstration of edema signal, swelling, and patchy   enhancement involving the mid lower thoracic spinal cord extending to the   conus medullaris. There is also thickening of the cauda equina nerve   roots and abnormal enhancement. On T2-weighted imaging, intradural extra   medullary serpiginous vessels are again noted within the thecal sac   surrounding the conus and extending craniad into the thoracic canal.    On the MR angiography portion of the examination, there are questionable   segmental radicular arterial feeders at the level of of L1 draining into   radiculomedullary veins. Serpiginous venous vessels abutting the conus   medullaris and thoracic cord are noted extending into the thoracic canal.    The urinary bladder is again noted to be distended on the  localizer   images.    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 Questionable arterial   feeders at the level of L1, as discussed.    Further evaluation with a conventional spinal angiography examination is   recommended.    The possibility of a spinal cord tumor with drop metastases, lymphoma, or   inflammatory process such as neurosarcoidosis or infection such as TB   cannot be completely excluded. Correlate with results of CSF testing from   the prior lumbar puncture procedure already performed on 3/7/2019.    GENARO LORA M.D., ATTENDING RADIOLOGIST  This document has been electronically signed. Mar  9 2019  1:59PM      < end of copied text >          < from: VA Duplex Lower Ext Vein Scan, Bilat (03.11.19 @ 10:05) >  EXAM:  DUPLEX SCAN EXT VEINS LOWER BI                        PROCEDURE DATE:  03/11/2019    INTERPRETATION:  CLINICAL INFORMATION: Lower extremity pain and swelling,   rule out DVT    COMPARISON: Prior examination, dated 3/4/2019, showed no DVT.    TECHNIQUE: Duplex sonography of the BILATERAL LOWER extremities with   color and spectral Doppler, with and without compression.      FINDINGS:    There is normal compressibility of the bilateral common femoral, femoral   and popliteal veins. No calf vein thrombosis is detected.    Doppler examination shows normal spontaneous and phasic flow.    IMPRESSION:   No evidence of bilateral lower extremity deep venous thrombosis.      DEANDRE BRAVO M.D., ATTENDING RADIOLOGIST  This document has been electronically signed. Mar 11 2019 12:37PM    < end of copied text >

## 2019-03-26 NOTE — PROGRESS NOTE ADULT - SUBJECTIVE AND OBJECTIVE BOX
Subjective: Patient seen and examined. No new events except as noted.     SUBJECTIVE/ROS:  No chest pain, dyspnea, palpitation, or dizziness.       MEDICATIONS:  MEDICATIONS  (STANDING):  atorvastatin 20 milliGRAM(s) Oral at bedtime  dextrose 5%. 1000 milliLiter(s) (50 mL/Hr) IV Continuous <Continuous>  dextrose 50% Injectable 12.5 Gram(s) IV Push once  dextrose 50% Injectable 25 Gram(s) IV Push once  dextrose 50% Injectable 25 Gram(s) IV Push once  docusate sodium 100 milliGRAM(s) Oral three times a day  enoxaparin Injectable 40 milliGRAM(s) SubCutaneous every 24 hours  gabapentin 600 milliGRAM(s) Oral three times a day  insulin lispro (HumaLOG) corrective regimen sliding scale   SubCutaneous three times a day before meals  losartan 100 milliGRAM(s) Oral daily  metoclopramide Injectable 10 milliGRAM(s) IV Push every 8 hours  NIFEdipine XL 90 milliGRAM(s) Oral daily  pantoprazole    Tablet 40 milliGRAM(s) Oral before breakfast  polyethylene glycol 3350 17 Gram(s) Oral two times a day  senna 2 Tablet(s) Oral at bedtime      PHYSICAL EXAM:  T(C): 36.9 (03-26-19 @ 08:04), Max: 37.2 (03-25-19 @ 23:36)  HR: 88 (03-26-19 @ 08:04) (69 - 88)  BP: 139/89 (03-26-19 @ 08:04) (114/70 - 141/80)  RR: 17 (03-26-19 @ 08:04) (17 - 22)  SpO2: 99% (03-26-19 @ 08:04) (98% - 100%)  Wt(kg): --  I&O's Summary    25 Mar 2019 07:01  -  26 Mar 2019 07:00  --------------------------------------------------------  IN: 720 mL / OUT: 400 mL / NET: 320 mL            JVP: Normal  Neck: supple  Lung: clear   CV: S1 S2 , Murmur:  Abd: soft  Ext: No edema  neuro: Awake / alert  Psych: flat affect  Skin: normal``    LABS/DATA:    CARDIAC MARKERS:            03-26    137  |  100  |  13  ----------------------------<  151<H>  4.0   |  24  |  0.75    Ca    9.2      26 Mar 2019 06:39      proBNP:   Lipid Profile:   HgA1c:   TSH:     TELE:  EKG:

## 2019-03-26 NOTE — DISCHARGE NOTE PROVIDER - REASON FOR ADMISSION
3/19/19 s/p spinal angiogram showing no residual malformation  3/18/19 s/p T4-T8 laminectomy for obliteration of T5 arteriovenous malformation    3/15/19 s/p angiogram Left T5 AVF s/p coil marker  3/11 s/p spinal angiogram revealing T5 dural arteriovenous fistula

## 2019-03-26 NOTE — DISCHARGE NOTE PROVIDER - CARE PROVIDER_API CALL
Burton Horn)  Neurological Surgery  [address] [place] [address]  Phone: [phone]  Fax: [phone]  Follow Up Time:     Teddy Willis)  Neurological Surgery; Pediatric Neurological Surgery  [address], [address]  [place], [place] [id number]  Phone: [phone]  Fax: [phone]  Follow Up Time:

## 2019-03-26 NOTE — H&P ADULT - HISTORY OF PRESENT ILLNESS
This is a 58 year-old Man with a PMH of HTN/HLD, DM who developed lower back pain following a fall approximately 1 month ago. Prior to this fall, patient had first developed numbness in his toes with progressive weakness of both legs. After this fall about 1 month ago, he was initially admitted to [facility] on 3/4/19 for progressively worsening bilateral lower extremity weakness. 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 He was then referred for neurosurgical evaluation and treatment at [facility] on 3/10/19. Patient c/o lower back pain, decreased sensation in both legs, left leg weakness and difficulty ambulating unassisted due to weakness. Denied bowel or bladder dysfunction. Patient was transferred to [facility] for elective spinal angiography and found  via T2-Lumbar 4 selective spinal angiography was performed and demonstrated a left T5 lesion dural AV fistula and again on 3/15/19 s/p angiogram Left T5 AVF s/p coil marker by Dr Horn. Patient was taken to OR on 3/18 by Krystle Willis and Kory s/p t4-t8 laminectomy for obliteration of t5 dural AVF and repeat Angio showed resolution of AV fistula. Post-op course complicated by ileus, which has now resolved.    Patient was also followed by cardiology for blood pressure management.     Patient is deemed medically stable for admission to [facility]'s acute IPR on 3/26/19. This is a 58 year-old right handed male with a PMH of HTN/HLD, DM-II who developed lower back pain following a fall approx (2/6/19) 1 month ago. Prior to this fall, patient had first developed numbness in his toes with progressive weakness of both legs. After this fall about 1 month ago, he was initially admitted to [facility] on 3/4/19 for progressively worsening bilateral lower extremity weakness. 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 He was then referred for neurosurgical evaluation and treatment at [facility] on 3/10/19. Patient c/o lower back pain, decreased sensation in both legs, left leg weakness and difficulty ambulating unassisted due to weakness. Denied bowel or bladder dysfunction. Patient was transferred to [facility] for elective spinal angiography and found  via T2-Lumbar 4 selective spinal angiography was performed and demonstrated a left T5 lesion dural AV fistula and again on 3/15/19 s/p angiogram Left T5 AVF s/p coil marker by Dr Horn. Patient was taken to OR on 3/18 by Krystle Willis and Kory s/p t4-t8 laminectomy for obliteration of t5 dural AVF and repeat Angio showed resolution of AV fistula. Post-op course complicated by ileus, which has now resolved.  Patient was also followed by cardiology for blood pressure management.     Patient is deemed medically stable for admission to [facility]'s acute IPR on 3/26/19.

## 2019-03-26 NOTE — PROGRESS NOTE ADULT - PROVIDER SPECIALTY LIST ADULT
Cardiology
Gastroenterology
Internal Medicine
NSICU
NSICU
Neurology
Neurosurgery
Internal Medicine
Neurosurgery
Gastroenterology
Gastroenterology
Internal Medicine
Internal Medicine
Neurosurgery
Neurosurgery

## 2019-03-26 NOTE — H&P ADULT - ASSESSMENT
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      GI: continue bowel regimen with miralax, senna and colace. PRN suppositories. Avoid opioids for pain management due to recent ileus.   : monitor PVRs for retention   DVT ppx: continue lovenox and IPCs  respiratory ppx: continue incentive spirometer   Pain management: continue gabapentin 600mg TID, continue Tylenol PRN      Precautions:     fall                                                                             Diet: CC    DVT Prophylaxis:    lovenox, IPCs                                      Medical Prognosis: good     Prescreen Comparison: I have reviewed the prescreen information and I found no relevant changes between the preadmission  screening and my post admission evaluation.     Expected Therapy:   P.T.   1.5     hrs/day           O. T.    1.5   hrs/day           S.L.P.    n/a    hrs/day                    P&O    n/a                                               Excpected Frequency: 5 days/7 day period    Rehab Potential:     good                         Estimated Disposition:       home with home health services                    ELOS:      18-21        days      Rationale For Inpatient Rehab Admission- Patient demonstrates the following:     [X] Medically appropriate for rehabilitation admission   [X] Has attainable rehab goals with an appropriate discharge plan  [X] Has rehabilitation potential (expected to make significant improvement within a reasonable period of time)  [X] Requires close medical management by a rehab physician, rehab nursing care and comprehensive interdisciplinary team (including         PT, OT, SLP and/or prosthetics and orthotics) 58 year-old man with a PMH of HTN, HLD, DM presented with weakness, found to have non-traumatic spinal cord injury- incomplete paraplegia secondary to Thoracic AVF s/p repair, T4-T8 laminectomy for obliteration of AVF who is now with ADL, and functional  impairments.      : monitor PVRs for retention     DVT ppx: continue lovenox and IPCs    respiratory ppx: continue incentive spirometer     Pain management: continue gabapentin 600mg TID, continue Tylenol PRN      Precautions:     fall                                                                             Diet: CC    DVT Prophylaxis:    lovenox, IPCs                                      Medical Prognosis: good     Prescreen Comparison: I have reviewed the prescreen information and I found no relevant changes between the preadmission  screening and my post admission evaluation.     Expected Therapy:   P.T.   2    hrs/day           O. T.    1  hrs/day           S.L.P.    n/a    hrs/day                    P&O    n/a           Expected Frequency: 5 days/7 day period    Rehab Potential:     good                         Estimated Disposition:       home with home health services                    ELOS:      18-21        days      Rationale For Inpatient Rehab Admission- Patient demonstrates the following:     [X] Medically appropriate for rehabilitation admission   [X] Has attainable rehab goals with an appropriate discharge plan  [X] Has rehabilitation potential (expected to make significant improvement within a reasonable period of time)  [X] Requires close medical management by a rehab physician, rehab nursing care and comprehensive interdisciplinary team (including         PT, OT, SLP and/or prosthetics and orthotics) 58 year-old man with a PMH of HTN, HLD, DM presented with weakness, found to have 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      : monitor PVRs for retention     DVT ppx: continue lovenox and IPCs    respiratory ppx: continue incentive spirometer     Pain management: continue gabapentin 600mg TID, continue Tylenol PRN      Precautions:     fall                                                                             Diet: CC    DVT Prophylaxis:    lovenox, IPCs                                      Medical Prognosis: good     Prescreen Comparison: I have reviewed the prescreen information and I found no relevant changes between the preadmission  screening and my post admission evaluation.     Expected Therapy:   P.T.   2    hrs/day           O. T.    1  hrs/day           S.L.P.    n/a    hrs/day                    P&O    n/a           Expected Frequency: 5 days/7 day period    Rehab Potential:     good                         Estimated Disposition:       home with home health services                    ELOS:      18-21        days      Rationale For Inpatient Rehab Admission- Patient demonstrates the following:     [X] Medically appropriate for rehabilitation admission   [X] Has attainable rehab goals with an appropriate discharge plan  [X] Has rehabilitation potential (expected to make significant improvement within a reasonable period of time)  [X] Requires close medical management by a rehab physician, rehab nursing care and comprehensive interdisciplinary team (including         PT, OT, SLP and/or prosthetics and orthotics)

## 2019-03-26 NOTE — H&P ADULT - NSHPREVIEWOFSYSTEMS_GEN_ALL_CORE
Any major surgery within past 100 days?    YES/NO    Two or more falls within past one year?      YES/NO    One fall with injury within past one year?   YES/NO Any major surgery within past 100 days?    YES    Two or more falls within past one year?      YES- 2 Falls    One fall with injury within past one year?   NO

## 2019-03-26 NOTE — DISCHARGE NOTE NURSING/CASE MANAGEMENT/SOCIAL WORK - NSDCPEPTSTRK_GEN_ALL_CORE
Prescribed medications/Call 911 for stroke/Risk factors for stroke/Need for follow up after discharge/Stroke education booklet/Stroke support groups for patients, families, and friends/Stroke warning signs and symptoms/Signs and symptoms of stroke

## 2019-03-26 NOTE — PROGRESS NOTE ADULT - SUBJECTIVE AND OBJECTIVE BOX
Patient is a 58y old  Male who presented with a chief complaint of spinal AVM (22 Mar 2019 10:58)    INTERVAL HPI/OVERNIGHT EVENTS:  ++flatus, large BM yesterday  appetite ok  no nausea or vomiting    MEDICATIONS  (STANDING):  atorvastatin 20 milliGRAM(s) Oral at bedtime  dextrose 5%. 1000 milliLiter(s) (50 mL/Hr) IV Continuous <Continuous>  dextrose 50% Injectable 12.5 Gram(s) IV Push once  dextrose 50% Injectable 25 Gram(s) IV Push once  dextrose 50% Injectable 25 Gram(s) IV Push once  docusate sodium 100 milliGRAM(s) Oral three times a day  enoxaparin Injectable 40 milliGRAM(s) SubCutaneous every 24 hours  gabapentin 600 milliGRAM(s) Oral three times a day  insulin lispro (HumaLOG) corrective regimen sliding scale   SubCutaneous three times a day before meals  losartan 100 milliGRAM(s) Oral daily  metoclopramide Injectable 10 milliGRAM(s) IV Push every 8 hours  NIFEdipine XL 90 milliGRAM(s) Oral daily  pantoprazole    Tablet 40 milliGRAM(s) Oral before breakfast  polyethylene glycol 3350 17 Gram(s) Oral two times a day  senna 2 Tablet(s) Oral at bedtime    MEDICATIONS  (PRN):  acetaminophen   Tablet .. 650 milliGRAM(s) Oral every 6 hours PRN Temp greater or equal to 38.5C (101.3F), Mild Pain (1 - 3)  acetaminophen  IVPB .. 1000 milliGRAM(s) IV Intermittent once PRN Severe Pain (7 - 10)  aluminum hydroxide/magnesium hydroxide/simethicone Suspension 30 milliLiter(s) Oral every 4 hours PRN Dyspepsia  bisacodyl Suppository 10 milliGRAM(s) Rectal daily PRN Constipation  calcium carbonate    500 mG (Tums) Chewable 1 Tablet(s) Chew every 4 hours PRN Heartburn  dextrose 40% Gel 15 Gram(s) Oral once PRN Blood Glucose LESS THAN 70 milliGRAM(s)/deciliter  glucagon  Injectable 1 milliGRAM(s) IntraMuscular once PRN Glucose LESS THAN 70 milligrams/deciliter  melatonin 3 milliGRAM(s) Oral at bedtime PRN Sleep    Allergies  No Known Allergies    Review of Systems:  General:  No wt loss, fevers, chills, night sweats, fatigue,   CV:  No pain, palpitatioins, hypo/hypertension  Resp:  No dyspnea, cough, tachypnea, wheezing  GI:  see HPI,  :  No pain, bleeding, incontinence, nocturia  Muscle:  No pain, weakness  Neuro: see HPI  Psych:  No fatigue, insomnia, mood problems, depression  Endocrine:  No polyuria, polydypsia, cold/heat intolerance  Heme:  No petechiae, ecchymosis, easy bruisability  Skin:  No rash, tattoos, scars, edema    T(F): 98.4 (03-26-19 @ 08:04), Max: 99 (03-25-19 @ 23:36)  HR: 88 (03-26-19 @ 08:04) (69 - 88)  BP: 139/89 (03-26-19 @ 08:04) (114/70 - 141/80)  RR: 17 (03-26-19 @ 08:04) (17 - 20)  SpO2: 99% (03-26-19 @ 08:04) (98% - 100%)  Wt(kg): --  ,   I&O's Summary    25 Mar 2019 07:01  -  26 Mar 2019 07:00  --------------------------------------------------------  IN: 720 mL / OUT: 400 mL / NET: 320 mL    PHYSICAL EXAM:  Constitutional: NAD, well-developed pleasant, non toxic appearing. OOB to chair  Neck: No LAD, supple  Respiratory: CTA and P  Cardiovascular: S1 and S2, RRR, no M  Gastrointestinal: normoactive BS, soft, NT no rebound, guarding or rigidity  Back +dressing clean and dry +hemovac  Extremities: No peripheral edema, neg clubbing, cyanosis  Vascular: 2+ peripheral pulses  Neurological: A/O x 3, no focal deficits  good muscle bulk, moves all extremities  Psychiatric: Normal mood, normal affect  Skin: No rashes    LABS:            03-26    137  |  100  |  13  ----------------------------<  151<H>  4.0   |  24  |  0.75    Ca    9.2      26 Mar 2019 06:39    RADIOLOGY & ADDITIONAL TESTS:

## 2019-03-26 NOTE — H&P ADULT - PROBLEM SELECTOR PLAN 1
Start comprehensive PT and OT program.   Continue bowel regimen.  Continue gabapentin for pain management with PRN Tylenol.

## 2019-03-26 NOTE — H&P ADULT - NSHPSOCIALHISTORY_GEN_ALL_CORE
pt lived in pvt home with wife (works during the day), 3 steps to enter +HR however there is a platform with nothing to hold onto prior to entering front door, no steps inside except to basement which pt does not have to negotiate, wife can drive pt to outpatient. Pt has been using RW recently due to 2 falls when using cane, reports his legs "just gave out on me". Pts wife planning to put handrails in bathroom and shower. Prior to sx, pt I with bed mobility/transfers and CG A to ambulate with RW, PT rec: outpt PT.    Independent prior with Ambulation and ADLs.

## 2019-03-26 NOTE — PROGRESS NOTE ADULT - ATTENDING COMMENTS
Carter Torre MD, FACP, FACG, AGAF  [facility]  [phone]
MOnitor lytes closely and replete as needed. Agree with above management.

## 2019-03-26 NOTE — PROGRESS NOTE ADULT - SUBJECTIVE AND OBJECTIVE BOX
Patient is a 58 year old male that developed back pain after a previous fall.  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  Recently complained of low back pain with decreased sensation to b/l LE and LLE weakness.  Transferred to [facility] for spinal angiogram and further management.    OVERNIGHT EVENTS: + BM again overnight    Vital Signs Last 24 Hrs  T(C): 36.9 (26 Mar 2019 08:04), Max: 37.2 (25 Mar 2019 23:36)  T(F): 98.4 (26 Mar 2019 08:04), Max: 99 (25 Mar 2019 23:36)  HR: 88 (26 Mar 2019 08:04) (69 - 88)  BP: 139/89 (26 Mar 2019 08:04) (114/70 - 141/80)  BP(mean): --  RR: 17 (26 Mar 2019 08:04) (17 - 19)  SpO2: 99% (26 Mar 2019 08:04) (98% - 99%)      PHYSICAL EXAM:  Neurological: awake, alert, oriented x3, follows commands, speech clear and fluent, moves all extremities x4 w/ 5/5 strength throughout, sensation present, intact, equal throughout  Cardiovascular: +s1, s2  Respiratory: clear to auscultation b/l  Gastrointestinal: soft, slightly distended, non-tender; +BS improved from yesterday  Genitourinary: +voiding  Extremities: +dp/pt pulses palpable b/l  Incision/Wound: posterior midline vertical incision c/d/i w/ staples, right groin puncture site c/d/i, no hematoma, no erythema    DIET:  [x] consistent carbohydrate      LABS:    03-26    137  |  100  |  13  ----------------------------<  151<H>  4.0   |  24  |  0.75    Ca    9.2      26 Mar 2019 06:39        MEDICATIONS  (STANDING):  atorvastatin 20 milliGRAM(s) Oral at bedtime  dextrose 5%. 1000 milliLiter(s) (50 mL/Hr) IV Continuous <Continuous>  dextrose 50% Injectable 12.5 Gram(s) IV Push once  dextrose 50% Injectable 25 Gram(s) IV Push once  dextrose 50% Injectable 25 Gram(s) IV Push once  docusate sodium 100 milliGRAM(s) Oral three times a day  enoxaparin Injectable 40 milliGRAM(s) SubCutaneous every 24 hours  gabapentin 600 milliGRAM(s) Oral three times a day  insulin lispro (HumaLOG) corrective regimen sliding scale   SubCutaneous three times a day before meals  losartan 100 milliGRAM(s) Oral daily  metoclopramide Injectable 10 milliGRAM(s) IV Push every 8 hours  NIFEdipine XL 90 milliGRAM(s) Oral daily  pantoprazole    Tablet 40 milliGRAM(s) Oral before breakfast  polyethylene glycol 3350 17 Gram(s) Oral two times a day  senna 2 Tablet(s) Oral at bedtime    MEDICATIONS  (PRN):  acetaminophen   Tablet .. 650 milliGRAM(s) Oral every 6 hours PRN Temp greater or equal to 38.5C (101.3F), Mild Pain (1 - 3)  acetaminophen  IVPB .. 1000 milliGRAM(s) IV Intermittent once PRN Severe Pain (7 - 10)  aluminum hydroxide/magnesium hydroxide/simethicone Suspension 30 milliLiter(s) Oral every 4 hours PRN Dyspepsia  bisacodyl Suppository 10 milliGRAM(s) Rectal daily PRN Constipation  calcium carbonate    500 mG (Tums) Chewable 1 Tablet(s) Chew every 4 hours PRN Heartburn  dextrose 40% Gel 15 Gram(s) Oral once PRN Blood Glucose LESS THAN 70 milliGRAM(s)/deciliter  glucagon  Injectable 1 milliGRAM(s) IntraMuscular once PRN Glucose LESS THAN 70 milligrams/deciliter  melatonin 3 milliGRAM(s) Oral at bedtime PRN Sleep    IMAGING:  < from: Xray Abdomen 1 View PORTABLE -Urgent (03.20.19 @ 13:58) >  IMPRESSION:     Multiple dilated loops of small and large bowel which can be seen in the   setting of ileus.    < end of copied text >

## 2019-03-26 NOTE — DISCHARGE NOTE PROVIDER - HOSPITAL COURSE
57 YO male developed lower back pain following a fall approximately 1 month ago. 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 Patient c/o lower back pain, decreased sensation in both legs, left leg weakness and difficulty ambulating unassisted due to weakness. Denied bowel or bladder dysfunction    Tx to [facility] for spinal Angio (10 Mar 2019 17:35)        patient admitted for treatment of known spinal dural AV fistula. 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 3/11/19 lower extremity duplex negative for DVTs. 3/15/19 s/p angiogram Left T5 AVF s/p coil marker    3/18/19 s/p T4-T8 laminectomy for obliteration of T5 arteriovenous malformation. and 3/19/19 s/p spinal angiogram showing no residual malformation. He was followed in consultation by medicine, cardiology, neurology, and GI during hospitalization. HTN medications were adjusted and improved blood pressure control. Surgical drain removed 3/21/19. Post operative course complicated by ileus noted on 3/20/19 abdominal xray. After GI was consulted and multiple medications he had successful bowel movement. PT/OT/PMR evaluated him and recommended acute rehab. On 3/26/19 he was medically and neurologically stable for discharge to rehab.

## 2019-03-26 NOTE — PROGRESS NOTE ADULT - ASSESSMENT
HPI:  Patient is a 58 year old male that developed back pain after a previous fall.  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  Recently complained of low back pain with decreased sensation to b/l LE and LLE weakness.  Transferred to [facility] for spinal angiogram and further management.    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           PLAN:  -resolved post op ileus, GI following  -continue protonix  -lovenox and SCDS for DVT prophylaxis  -losartan and procardia XL for HTN - appreciate Dr Kramer consult- improved BP control   -HISS for glucose control; consistent carbohydrate diet for type 2 DM  -out of bed with assistance  -incentive spirometer for lung expansion  -PT/OT/PMR - acute SCI rehab upon discharge  -resolved hyponatremia, unclear etiology  d/c IVL and d/c to rehab today    will discuss with Dr Willis  Spectra #[id number]    Assessment:  Please Check When Present   []  GCS  E   V  M     Heart Failure: []Acute, [] acute on chronic , []chronic  Heart Failure:  [] Diastolic (HFpEF), [] Systolic (HFrEF), []Combined (HFpEF and HFrEF), [] RHF, [] Pulm HTN, [] Other    [] TREVER, [] ATN, [] AIN, [] other  [] CKD1, [] CKD2, [] CKD 3, [] CKD 4, [] CKD 5, []ESRD    Encephalopathy: [] Metabolic, [] Hepatic, [] toxic, [] Neurological, [] Other    Abnormal Nurtitional Status: [] malnurtition (see nutrition note), [ ]underweight: BMI < 19, [] morbid obesity: BMI >40, [] Cachexia    [] Sepsis  [] hypovolemic shock,[] cardiogenic shock, [] hemorrhagic shock, [] neurogenic shock  [] Acute Respiratory Failure  []Cerebral edema, [] Brain compression/ herniation,   [] Functional quadriplegia  [x] Acute blood loss anemia

## 2019-03-27 LAB
ALBUMIN SERPL ELPH-MCNC: 2.9 G/DL — LOW (ref 3.3–5)
ALP SERPL-CCNC: 68 U/L — SIGNIFICANT CHANGE UP (ref 40–120)
ALT FLD-CCNC: 38 U/L DA — SIGNIFICANT CHANGE UP (ref 10–45)
ANION GAP SERPL CALC-SCNC: 9 MMOL/L — SIGNIFICANT CHANGE UP (ref 5–17)
AST SERPL-CCNC: 28 U/L — SIGNIFICANT CHANGE UP (ref 10–40)
BASOPHILS # BLD AUTO: 0.1 K/UL — SIGNIFICANT CHANGE UP (ref 0–0.2)
BASOPHILS NFR BLD AUTO: 1 % — SIGNIFICANT CHANGE UP (ref 0–2)
BILIRUB SERPL-MCNC: 0.3 MG/DL — SIGNIFICANT CHANGE UP (ref 0.2–1.2)
BUN SERPL-MCNC: 17 MG/DL — SIGNIFICANT CHANGE UP (ref 7–23)
CALCIUM SERPL-MCNC: 9.4 MG/DL — SIGNIFICANT CHANGE UP (ref 8.4–10.5)
CHLORIDE SERPL-SCNC: 104 MMOL/L — SIGNIFICANT CHANGE UP (ref 96–108)
CO2 SERPL-SCNC: 28 MMOL/L — SIGNIFICANT CHANGE UP (ref 22–31)
CREAT SERPL-MCNC: 0.88 MG/DL — SIGNIFICANT CHANGE UP (ref 0.5–1.3)
EOSINOPHIL # BLD AUTO: 0.3 K/UL — SIGNIFICANT CHANGE UP (ref 0–0.5)
EOSINOPHIL NFR BLD AUTO: 3.8 % — SIGNIFICANT CHANGE UP (ref 0–6)
GLUCOSE BLDC GLUCOMTR-MCNC: 124 MG/DL — HIGH (ref 70–99)
GLUCOSE BLDC GLUCOMTR-MCNC: 139 MG/DL — HIGH (ref 70–99)
GLUCOSE BLDC GLUCOMTR-MCNC: 143 MG/DL — HIGH (ref 70–99)
GLUCOSE BLDC GLUCOMTR-MCNC: 161 MG/DL — HIGH (ref 70–99)
GLUCOSE SERPL-MCNC: 144 MG/DL — HIGH (ref 70–99)
HCT VFR BLD CALC: 33.5 % — LOW (ref 39–50)
HGB BLD-MCNC: 10.7 G/DL — LOW (ref 13–17)
LYMPHOCYTES # BLD AUTO: 2.2 K/UL — SIGNIFICANT CHANGE UP (ref 1–3.3)
LYMPHOCYTES # BLD AUTO: 26.6 % — SIGNIFICANT CHANGE UP (ref 13–44)
MAGNESIUM SERPL-MCNC: 1.9 MG/DL — SIGNIFICANT CHANGE UP (ref 1.6–2.6)
MCHC RBC-ENTMCNC: 28 PG — SIGNIFICANT CHANGE UP (ref 27–34)
MCHC RBC-ENTMCNC: 32 GM/DL — SIGNIFICANT CHANGE UP (ref 32–36)
MCV RBC AUTO: 87.8 FL — SIGNIFICANT CHANGE UP (ref 80–100)
MONOCYTES # BLD AUTO: 0.9 K/UL — SIGNIFICANT CHANGE UP (ref 0–0.9)
MONOCYTES NFR BLD AUTO: 11.1 % — SIGNIFICANT CHANGE UP (ref 2–14)
NEUTROPHILS # BLD AUTO: 4.8 K/UL — SIGNIFICANT CHANGE UP (ref 1.8–7.4)
NEUTROPHILS NFR BLD AUTO: 57.4 % — SIGNIFICANT CHANGE UP (ref 43–77)
PHOSPHATE SERPL-MCNC: 4.1 MG/DL — SIGNIFICANT CHANGE UP (ref 2.5–4.5)
PLATELET # BLD AUTO: 375 K/UL — SIGNIFICANT CHANGE UP (ref 150–400)
POTASSIUM SERPL-MCNC: 4.3 MMOL/L — SIGNIFICANT CHANGE UP (ref 3.5–5.3)
POTASSIUM SERPL-SCNC: 4.3 MMOL/L — SIGNIFICANT CHANGE UP (ref 3.5–5.3)
PROT SERPL-MCNC: 7.1 G/DL — SIGNIFICANT CHANGE UP (ref 6–8.3)
RBC # BLD: 3.82 M/UL — LOW (ref 4.2–5.8)
RBC # FLD: 14.8 % — HIGH (ref 10.3–14.5)
SODIUM SERPL-SCNC: 141 MMOL/L — SIGNIFICANT CHANGE UP (ref 135–145)
WBC # BLD: 8.3 K/UL — SIGNIFICANT CHANGE UP (ref 3.8–10.5)
WBC # FLD AUTO: 8.3 K/UL — SIGNIFICANT CHANGE UP (ref 3.8–10.5)

## 2019-03-27 PROCEDURE — 99255 IP/OBS CONSLTJ NEW/EST HI 80: CPT

## 2019-03-27 PROCEDURE — 99232 SBSQ HOSP IP/OBS MODERATE 35: CPT

## 2019-03-27 RX ORDER — METFORMIN HYDROCHLORIDE 850 MG/1
250 TABLET ORAL
Qty: 0 | Refills: 0 | Status: DISCONTINUED | OUTPATIENT
Start: 2019-03-27 | End: 2019-04-01

## 2019-03-27 RX ADMIN — GABAPENTIN 600 MILLIGRAM(S): 400 CAPSULE ORAL at 05:43

## 2019-03-27 RX ADMIN — Medication 100 MILLIGRAM(S): at 05:43

## 2019-03-27 RX ADMIN — POLYETHYLENE GLYCOL 3350 17 GRAM(S): 17 POWDER, FOR SOLUTION ORAL at 17:27

## 2019-03-27 RX ADMIN — Medication 1 TABLET(S): at 12:18

## 2019-03-27 RX ADMIN — Medication 500 MILLIGRAM(S): at 05:42

## 2019-03-27 RX ADMIN — PANTOPRAZOLE SODIUM 40 MILLIGRAM(S): 20 TABLET, DELAYED RELEASE ORAL at 05:46

## 2019-03-27 RX ADMIN — Medication 650 MILLIGRAM(S): at 09:13

## 2019-03-27 RX ADMIN — ATORVASTATIN CALCIUM 20 MILLIGRAM(S): 80 TABLET, FILM COATED ORAL at 22:12

## 2019-03-27 RX ADMIN — Medication 650 MILLIGRAM(S): at 16:45

## 2019-03-27 RX ADMIN — LOSARTAN POTASSIUM 100 MILLIGRAM(S): 100 TABLET, FILM COATED ORAL at 05:43

## 2019-03-27 RX ADMIN — GABAPENTIN 600 MILLIGRAM(S): 400 CAPSULE ORAL at 22:12

## 2019-03-27 RX ADMIN — Medication 1: at 07:43

## 2019-03-27 RX ADMIN — GABAPENTIN 600 MILLIGRAM(S): 400 CAPSULE ORAL at 13:13

## 2019-03-27 RX ADMIN — Medication 650 MILLIGRAM(S): at 15:43

## 2019-03-27 RX ADMIN — Medication 650 MILLIGRAM(S): at 05:43

## 2019-03-27 RX ADMIN — Medication 100 MILLIGRAM(S): at 22:12

## 2019-03-27 RX ADMIN — Medication 100 MILLIGRAM(S): at 13:12

## 2019-03-27 RX ADMIN — Medication 650 MILLIGRAM(S): at 07:08

## 2019-03-27 RX ADMIN — Medication 500 MILLIGRAM(S): at 17:27

## 2019-03-27 RX ADMIN — ENOXAPARIN SODIUM 40 MILLIGRAM(S): 100 INJECTION SUBCUTANEOUS at 22:11

## 2019-03-27 RX ADMIN — POLYETHYLENE GLYCOL 3350 17 GRAM(S): 17 POWDER, FOR SOLUTION ORAL at 05:42

## 2019-03-27 RX ADMIN — Medication 90 MILLIGRAM(S): at 05:43

## 2019-03-27 RX ADMIN — Medication 650 MILLIGRAM(S): at 10:15

## 2019-03-27 RX ADMIN — METFORMIN HYDROCHLORIDE 250 MILLIGRAM(S): 850 TABLET ORAL at 17:27

## 2019-03-27 NOTE — CHART NOTE - NSCHARTNOTEFT_GEN_A_CORE
REHABILITATION DIAGNOSIS/IMPAIRMENT GROUP CODE:  Non traumatic Injury of thoracic spinal cord: s/p laminectomy and obliteration of thoracic AVM      COMORBIDITIES/COMPLICATING CONDITIONS IMPACTING REHABILITATION:  HEALTH ISSUES - PROBLEM Dx:  Ileus, postoperative: Ileus, postoperative  DM (diabetes mellitus): DM (diabetes mellitus)-II  HTN        PAST MEDICAL & SURGICAL HISTORY:  HLD (hyperlipidemia)  Essential hypertension  DM (diabetes mellitus)  No significant past surgical history      Based upon consideration of the patient's impairments, functional status, complicating conditions and any other contributing factors and after information garnered from the assessments of all therapy disciplines involved in treating the patient and other pertinent clinicians:    INTERDISCIPLINARY REHABILITATION INTERVENTIONS:    [ X  ] Transfer Training  [ x  ] Bed Mobility  [ x  ] Therapeutic Exercise  [ x  ] Balance/Coordination Exercises  [  x ] Locomotion retraining  [ x  ] Stairs  [ x  ] Functional Transfer Training  [  x ] Bowel/Bladder program  [ x  ] Pain Management  [  x ] Skin/Wound Care  [   ] Visual/Perceptual Training  [   ] Therapeutic Recreation Activities  [ x  ] Neuromuscular Re-education  [x   ] Activities of Daily Living  [   ] Speech Exercise  [   ] Swallowing Exercises  [   ] Vital Stim  [   ] Dietary Supplements  [   ] Calorie Count  [   ] Cognitive Exercises  [   ] Cognitive/Linguistic Treatment  [   ] Behavior Program  [   ] Neuropsych Therapy  [ x  ] Patient/Family Counseling  [ x  ] Family Training  [   ] Community Re-entry  [   ] Orthotic Evaluation  [   ] Prosthetic Eval/Training    MEDICAL PROGNOSIS:  fair    REHAB POTENTIAL:  fair  EXPECTED DAILY THERAPY:         PT:2 hrs/day        OT:1 hr/day        ST:NA       P&O:NA    EXPECTED INTENSITY OF PROGRAM:  3 hrs/day     EXPECTED FREQUENCY OF PROGRAM:  5 days/week     ESTIMATED LOS:  10-14 days    ESTIMATED DISPOSITION:  home     INTERDISCIPLINARY FUNCTIONAL OUTCOMES/GOALS:         Gait/Mobility:Modified independent       Transfers:Modified independent       ADLs:Modified independent       Functional Transfers:Modified independent       Medication Management:Modified independent       Communication:na       Cognitive:na       Dysphagia:na       Bladder:Modified independent       Bowel:Modified independent

## 2019-03-27 NOTE — PROGRESS NOTE ADULT - SUBJECTIVE AND OBJECTIVE BOX
INTERVAL SUBJECTIVE & REVIEW OF SYMPTOMS:  Chart reviewed. Patient seen at bedside  Had some back discomfort last night, that improved with tylenol  + BM     REVIEW OF SYSTEMS  [ x  ] Constitutional WNL  [ x  ] Cardio WNL  [ x  ] Resp WNL  [ x  ] GI WNL  [ x  ]  WNL  [   ] Heme WNL  [   ] Endo WNL  [   ] Skin WNL  [   ] MSK WNL  [   ] Neuro WNL  [   ] Cognitive WNL  [   ] Psych WNL    VITAL SIGNS  Vital Signs Last 24 Hrs  T(C): 36.7 (27 Mar 2019 08:46), Max: 37.2 (26 Mar 2019 22:08)  T(F): 98 (27 Mar 2019 08:46), Max: 99 (26 Mar 2019 22:08)  HR: 74 (27 Mar 2019 08:46) (65 - 76)  BP: 152/84 (27 Mar 2019 08:46) (149/82 - 167/87)  RR: 14 (27 Mar 2019 08:46) (14 - 14)  SpO2: 98% (27 Mar 2019 08:46) (98% - 100%)  Bladder scans:232, 230    PHYSICAL EXAM  General: NAD  Cardio: S1S2+  Resp: clear  Abdomen: soft, NT , distended , BS hypoactive  Extrem: no edema or calf tenderness, Motor UE 5/5, RLE 4/5, left HF 3/5, KE 4/5, ankle 4/5 , EHL left weaker that left  Sensory : impaired to light touch left > right   Skin: back incision with staples +    Functional Exam:   Eval today     MEDICATIONS  (STANDING):  ascorbic acid 500 milliGRAM(s) Oral two times a day  atorvastatin 20 milliGRAM(s) Oral at bedtime  dextrose 5%. 1000 milliLiter(s) (50 mL/Hr) IV Continuous <Continuous>  dextrose 50% Injectable 12.5 Gram(s) IV Push once  dextrose 50% Injectable 25 Gram(s) IV Push once  dextrose 50% Injectable 25 Gram(s) IV Push once  docusate sodium 100 milliGRAM(s) Oral three times a day  enoxaparin Injectable 40 milliGRAM(s) SubCutaneous <User Schedule>  gabapentin 600 milliGRAM(s) Oral three times a day  insulin lispro (HumaLOG) corrective regimen sliding scale   SubCutaneous three times a day before meals  insulin lispro (HumaLOG) corrective regimen sliding scale   SubCutaneous at bedtime  losartan 100 milliGRAM(s) Oral daily  metFORMIN 250 milliGRAM(s) Oral two times a day  multivitamin 1 Tablet(s) Oral daily  NIFEdipine XL 90 milliGRAM(s) Oral daily  pantoprazole    Tablet 40 milliGRAM(s) Oral before breakfast  polyethylene glycol 3350 17 Gram(s) Oral two times a day    MEDICATIONS  (PRN):  acetaminophen   Tablet .. 650 milliGRAM(s) Oral every 4 hours PRN Mild Pain (1 - 3)  aluminum hydroxide/magnesium hydroxide/simethicone Suspension 30 milliLiter(s) Oral every 4 hours PRN Dyspepsia  bisacodyl Suppository 10 milliGRAM(s) Rectal daily PRN Constipation  dextrose 40% Gel 15 Gram(s) Oral once PRN Blood Glucose LESS THAN 70 milliGRAM(s)/deciliter  glucagon  Injectable 1 milliGRAM(s) IntraMuscular once PRN Glucose LESS THAN 70 milligrams/deciliter  melatonin 3 milliGRAM(s) Oral at bedtime PRN Insomnia  senna 2 Tablet(s) Oral at bedtime PRN Constipation    CAPILLARY BLOOD GLUCOSE      POCT Blood Glucose.: 124 mg/dL (27 Mar 2019 11:58)  POCT Blood Glucose.: 161 mg/dL (27 Mar 2019 07:30)  POCT Blood Glucose.: 164 mg/dL (26 Mar 2019 22:03)  POCT Blood Glucose.: 151 mg/dL (26 Mar 2019 16:20)          RECENT LABS:                        10.7   8.3   )-----------( 375      ( 27 Mar 2019 05:40 )             33.5     03-27    141  |  104  |  17  ----------------------------<  144<H>  4.3   |  28  |  0.88    Ca    9.4      27 Mar 2019 05:40  Phos  4.1     03-27  Mg     1.9     03-27    TPro  7.1  /  Alb  2.9<L>  /  TBili  0.3  /  DBili  x   /  AST  28  /  ALT  38  /  AlkPhos  68  03-27    RADIOLOGY/OTHER RESULTS:    A/P:    58 year-old man with a PMH of HTN, HLD, DM presented with weakness, found to have 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    Begin full rehab program:PT/OT 3 hrs/day 5 days/week    DVT prophylaxis: on Lovenox    DM-II: Diet, SSI, Start metformin 250mg BID    HTN: on losartan and Procardia    Hyperlipidemia: on statin    GI prophylaxis: on protonix    Pain management: on tylenol prn, and gabapentin    Bowel program: Post op ileus now improved, continue current bowel regimen- Miralax BID, bisacodyl supp and senna prn    Hospitalist consult noted    Bladder program: Toilet schedule prn. Monitor Bladder scans. Patient reports using medications for prostate issues

## 2019-03-27 NOTE — DIETITIAN INITIAL EVALUATION ADULT. - PERTINENT LABORATORY DATA
3/27 - Na-141 ,K-4.3 ,BUN-17 ,Creat-0.88 ,Gluc-144H, 3/11 - Hemoglobin A1c - 6.9H POCT (over Last 2 Days) - Ranging from 118-198

## 2019-03-27 NOTE — DIETITIAN INITIAL EVALUATION ADULT. - ADHERENCE
fair/Patient Does Follow "Low Carb" Diet @Home Occasionally & Does Take Vitamin & Protein Supplements @Home

## 2019-03-27 NOTE — DIETITIAN INITIAL EVALUATION ADULT. - OTHER INFO
58yr Old Female - Dx: Spinal Cord Injury - Initial Assessment - Consistent Carbohydrate Diet w/ Thin Liquids, Denies Food Allergy, Tolerates Diet, No Chewing/Swallowing Complications (Per Pt), No Pressure Ulcers, No Edema, No Recent N/V/D/C

## 2019-03-27 NOTE — CONSULT NOTE ADULT - ASSESSMENT
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    HLD- STATIN    HTN- LOSARTAN, PROCARDIA    GERD- PROTONIX    C/W CURRENT CARE, d/w dr. richards

## 2019-03-27 NOTE — CONSULT NOTE ADULT - SUBJECTIVE AND OBJECTIVE BOX
58 year-old right handed male with a PMH of HTN/HLD, DM-II who developed lower back pain following a fall approx (2/6/19) 1 month ago. Prior to this fall, patient had first developed numbness in his toes with progressive weakness of both legs. After this fall about 1 month ago, he was initially admitted to [facility] on 3/4/19 for progressively worsening bilateral lower extremity weakness. 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 He was then referred for neurosurgical evaluation and treatment at [facility] on 3/10/19. Patient c/o lower back pain, decreased sensation in both legs, left leg weakness and difficulty ambulating unassisted due to weakness. Denied bowel or bladder dysfunction. Patient was transferred to 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 Patient was taken to OR on 3/18 by Krystle Willis and Kory s/p t4-t8 laminectomy for obliteration of t5 dural AVF and repeat Angio showed resolution of AV fistula. Post-op course complicated by ileus, which has now resolved.  Patient was also followed by cardiology for blood pressure management. Patient deemed medically stable for admission to [facility]'s acute IPR on 3/26/19.     seen at t he bedside, seems sleepy, but responsive, c/o lower extremity weakness, more on the left side, associated with numbness of the toes, for 1-2 months, no pain, no sob, no n/v, tolerating po diet .      Review of Systems: per hpi, all other negative      Allergies and Intolerances:        Allergies:  	No Known Allergies:     Home Medications:   * Patient Currently Takes Medications as of 10-Mar-2019 17:13 documented in Structured Notes  · 	senna oral tablet: 2 tab(s) orally once a day (at bedtime)  · 	docusate sodium 100 mg oral capsule: 1 cap(s) orally 3 times a day  · 	melatonin 3 mg oral tablet: 1 tab(s) orally once a day (at bedtime), As needed, Sleep  · 	atorvastatin 20 mg oral tablet: 1 tab(s) orally once a day (at bedtime)  · 	heparin: 5000 unit(s) subcutaneous every 8 hours  · 	losartan 100 mg oral tablet: 1 tab(s) orally once a day  · 	oxyCODONE 10 mg oral tablet: 1 tab(s) orally every 6 hours, As needed, Moderate Pain (4 - 6)  · 	acetaminophen 325 mg oral tablet: 2 tab(s) orally every 6 hours, As needed, Temp greater or equal to 38C (100.4F), Mild Pain (1 - 3)  · 	metFORMIN 500 mg oral tablet: 1 tab(s) orally 2 times a day  · 	amLODIPine 5 mg oral tablet: 1 tab(s) orally once a day  · 	Neurontin 600 mg oral tablet: 1 tab(s) orally 3 times a day  · 	Co Q-10: 300 milligram(s) orally once a day  · 	Glucosamine & Chondroitin with MSM oral tablet: 1500 milligram(s) orally once a day    .    Patient History:   Past Medical, Past Surgical, and Family History:  PAST MEDICAL HISTORY:  DM (diabetes mellitus)     Essential hypertension     HLD (hyperlipidemia).     No significant past surgical history.     No pertinent family history in first degree relatives.     No Pertinent Family History in first degree relatives of: similar to current illness.    Social History: no smoking or etoh    Vital Signs Last 24 Hrs  T(C): 36.7 (27 Mar 2019 08:46), Max: 37.2 (26 Mar 2019 22:08)  T(F): 98 (27 Mar 2019 08:46), Max: 99 (26 Mar 2019 22:08)  HR: 74 (27 Mar 2019 08:46) (65 - 76)  BP: 152/84 (27 Mar 2019 08:46) (149/82 - 167/87)  BP(mean): --  RR: 14 (27 Mar 2019 08:46) (14 - 14)  SpO2: 98% (27 Mar 2019 08:46) (98% - 100%)    NAD, EVELYN,MMM,NCAT  SUPPLE  CLEAR  S1S2  SOFT NT BS PRESENT  NO PEDAL EDEMA  AAO X 3  BL LE WEAKNESS, LEFT > RIGHT, UE WEAKNESS LEFT > RIGHT                10.7                 141  | 28   | 17           8.3   >-----------< 375     ------------------------< 144                   33.5                 4.3  | 104  | 0.88                                         Ca 9.4   Mg 1.9   Ph 4.1      Hemoglobin A1C, Whole Blood: 6.9    CAPILLARY BLOOD GLUCOSE      POCT Blood Glucose.: 161 mg/dL (27 Mar 2019 07:30)  POCT Blood Glucose.: 164 mg/dL (26 Mar 2019 22:03)  POCT Blood Glucose.: 151 mg/dL (26 Mar 2019 16:20)  POCT Blood Glucose.: 177 mg/dL (26 Mar 2019 12:43)      Labs reviewed:     CXR personally reviewed: Clear lung fields bilaterally  < from: Xray Chest 1 View- PORTABLE-Routine (03.04.19 @ 20:55) >    EXAM:  XR CHEST PORTABLE ROUTINE 1V        PROCEDURE DATE:  Mar  4 2019         INTERPRETATION:    CLINICAL INDICATION: Weakness.    TECHNIQUE: Portable frontal view of the chest.    COMPARISON: None available.    FINDINGS:     Cardiac silhouette is within normal limits.  Lungs are clear.  No pleural effusions or pneumothorax.  No acute osseous abnormality.      IMPRESSION:    Clear lungs.      < from: Xray Abdomen 1 View PORTABLE -Urgent (03.20.19 @ 13:58) >    EXAM:  XR ABDOMEN PORTABLE URGENT 1V                            PROCEDURE DATE:  03/20/2019            INTERPRETATION:    CLINICAL INDICATION: Abdominal distention. Evaluate for ileus.    TECHNIQUE: Frontal view of the abdomen.     COMPARISON: Noneavailable    FINDINGS:     A drainage catheter overlies the right hemiabdomen. There are midline   surgical staples.    There are multiple dilated loops of small and large bowel. No free air   visualized.     No abnormal calcifications identified. Noacute osseous abnormalities.     The visualized portions of the chest are unremarkable.    IMPRESSION:     Multiple dilated loops of small and large bowel which can be seen in the   setting of ileus.          ECG reviewed and interpreted:   < from: 12 Lead ECG (03.05.19 @ 17:21) >  entricular Rate 87 BPM    Atrial Rate 87 BPM    P-R Interval 128 ms    QRS Duration 82 ms    Q-T Interval 350 ms    QTC Calculation(Bezet) 421 ms    P Axis 57 degrees    R Axis 27 degrees    T Axis 50 degrees    Diagnosis Line Normal sinus rhythm  Normal ECG        < from: MR Angio Spinal Canal w/wo IV Cont (03.08.19 @ 21:57) >  IMPRESSION: Findings suspicious for a type I dural AVF with associated   venous hypertension and long segment cord edema and enhancement of the  thoracic cord extending to the conus medullaris. Questionable arterial   feeders at the level of L1, as discussed.    Further evaluation with a conventional spinal angiography examination is   recommended.    The possibility of a spinal cord tumor with drop metastases, lymphoma, or   inflammatory process such as neurosarcoidosis or infection such as TB   cannot be completely excluded. Correlate with results of CSF testing from   the prior lumbar puncture procedure already performed on 3/7/2019.

## 2019-03-28 LAB
GLUCOSE BLDC GLUCOMTR-MCNC: 111 MG/DL — HIGH (ref 70–99)
GLUCOSE BLDC GLUCOMTR-MCNC: 126 MG/DL — HIGH (ref 70–99)
GLUCOSE BLDC GLUCOMTR-MCNC: 156 MG/DL — HIGH (ref 70–99)
GLUCOSE BLDC GLUCOMTR-MCNC: 188 MG/DL — HIGH (ref 70–99)

## 2019-03-28 PROCEDURE — 99232 SBSQ HOSP IP/OBS MODERATE 35: CPT

## 2019-03-28 PROCEDURE — 99232 SBSQ HOSP IP/OBS MODERATE 35: CPT | Mod: GC

## 2019-03-28 RX ORDER — ALFUZOSIN HYDROCHLORIDE 10 MG/1
10 TABLET, EXTENDED RELEASE ORAL AT BEDTIME
Qty: 0 | Refills: 0 | Status: DISCONTINUED | OUTPATIENT
Start: 2019-03-28 | End: 2019-04-09

## 2019-03-28 RX ADMIN — POLYETHYLENE GLYCOL 3350 17 GRAM(S): 17 POWDER, FOR SOLUTION ORAL at 17:28

## 2019-03-28 RX ADMIN — PANTOPRAZOLE SODIUM 40 MILLIGRAM(S): 20 TABLET, DELAYED RELEASE ORAL at 06:26

## 2019-03-28 RX ADMIN — Medication 1 TABLET(S): at 11:44

## 2019-03-28 RX ADMIN — METFORMIN HYDROCHLORIDE 250 MILLIGRAM(S): 850 TABLET ORAL at 06:26

## 2019-03-28 RX ADMIN — LOSARTAN POTASSIUM 100 MILLIGRAM(S): 100 TABLET, FILM COATED ORAL at 06:26

## 2019-03-28 RX ADMIN — Medication 100 MILLIGRAM(S): at 06:28

## 2019-03-28 RX ADMIN — ENOXAPARIN SODIUM 40 MILLIGRAM(S): 100 INJECTION SUBCUTANEOUS at 20:49

## 2019-03-28 RX ADMIN — GABAPENTIN 600 MILLIGRAM(S): 400 CAPSULE ORAL at 20:49

## 2019-03-28 RX ADMIN — Medication 30 MILLILITER(S): at 20:45

## 2019-03-28 RX ADMIN — Medication 30 MILLILITER(S): at 01:11

## 2019-03-28 RX ADMIN — Medication 100 MILLIGRAM(S): at 15:07

## 2019-03-28 RX ADMIN — GABAPENTIN 600 MILLIGRAM(S): 400 CAPSULE ORAL at 06:26

## 2019-03-28 RX ADMIN — METFORMIN HYDROCHLORIDE 250 MILLIGRAM(S): 850 TABLET ORAL at 17:27

## 2019-03-28 RX ADMIN — Medication 500 MILLIGRAM(S): at 17:27

## 2019-03-28 RX ADMIN — Medication 500 MILLIGRAM(S): at 06:26

## 2019-03-28 RX ADMIN — Medication 90 MILLIGRAM(S): at 06:26

## 2019-03-28 RX ADMIN — ALFUZOSIN HYDROCHLORIDE 10 MILLIGRAM(S): 10 TABLET, EXTENDED RELEASE ORAL at 20:49

## 2019-03-28 RX ADMIN — Medication 650 MILLIGRAM(S): at 08:11

## 2019-03-28 RX ADMIN — Medication 1: at 07:54

## 2019-03-28 RX ADMIN — Medication 650 MILLIGRAM(S): at 03:15

## 2019-03-28 RX ADMIN — Medication 100 MILLIGRAM(S): at 20:49

## 2019-03-28 RX ADMIN — Medication 650 MILLIGRAM(S): at 06:29

## 2019-03-28 RX ADMIN — ATORVASTATIN CALCIUM 20 MILLIGRAM(S): 80 TABLET, FILM COATED ORAL at 20:49

## 2019-03-28 RX ADMIN — Medication 650 MILLIGRAM(S): at 11:41

## 2019-03-28 RX ADMIN — GABAPENTIN 600 MILLIGRAM(S): 400 CAPSULE ORAL at 15:06

## 2019-03-28 RX ADMIN — POLYETHYLENE GLYCOL 3350 17 GRAM(S): 17 POWDER, FOR SOLUTION ORAL at 06:28

## 2019-03-28 NOTE — PROGRESS NOTE ADULT - ATTENDING COMMENTS
Chart reviewed. Patient seen at bedside. States that he feels well. Slept ok, Back pain controlled with tylenol.   Back incision : staples +, clean, no swelling  Motor exam stable with left  LE weakness  Tolerated therapy evaluation yesterday  Team conference today : Goals of modified independent: dc date 4/11/19    2. Hx of BPH - to restart on home med- uroxtral, Patient voiding- monitor bladder scans x1    3. Post op ileus: BM yesterday, Bowel sounds hypoactive. On miralax and not on any narcotics at this time

## 2019-03-28 NOTE — PROGRESS NOTE ADULT - SUBJECTIVE AND OBJECTIVE BOX
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    Vital Signs Last 24 Hrs  T(C): 37.1 (28 Mar 2019 08:41), Max: 37.1 (28 Mar 2019 08:41)  T(F): 98.8 (28 Mar 2019 08:41), Max: 98.8 (28 Mar 2019 08:41)  HR: 72 (28 Mar 2019 08:41) (72 - 79)  BP: 144/90 (28 Mar 2019 08:41) (144/90 - 168/96)  BP(mean): --  RR: 14 (28 Mar 2019 08:41) (14 - 15)  SpO2: 99% (28 Mar 2019 08:41) (98% - 99%)      NAD, EVELYN, MMM, NCAT  SUPPLE  CLEAR  S1S2  SOFT NT BS PRESENT  NO PEDAL EDEMA  AAO X 3  BL LE WEAKNESS, LEFT > RIGHT, UE WEAKNESS LEFT > RIGHT                    10.7                 141  | 28   | 17           8.3   >-----------< 375     ------------------------< 144                   33.5                 4.3  | 104  | 0.88                                         Ca 9.4   Mg 1.9   Ph 4.1      CAPILLARY BLOOD GLUCOSE      POCT Blood Glucose.: 156 mg/dL (28 Mar 2019 07:39)  POCT Blood Glucose.: 143 mg/dL (27 Mar 2019 21:12)  POCT Blood Glucose.: 139 mg/dL (27 Mar 2019 16:41)  POCT Blood Glucose.: 124 mg/dL (27 Mar 2019 11:58)      Hemoglobin A1C, Whole Blood: 6.9

## 2019-03-28 NOTE — PROGRESS NOTE ADULT - SUBJECTIVE AND OBJECTIVE BOX
INTERVAL SUBJECTIVE & REVIEW OF SYMPTOMS:  Chart reviewed. Patient seen at bedside.  No acute events overnight.  Feeling well.  +BM.      REVIEW OF SYSTEMS  [ x  ] Constitutional WNL  [ x  ] Cardio WNL  [ x  ] Resp WNL  [ x  ] GI WNL  [ x  ]  WNL  [   ] Heme WNL  [   ] Endo WNL  [   ] Skin WNL  [   ] MSK WNL  [   ] Neuro WNL  [   ] Cognitive WNL  [   ] Psych WNL    VITAL SIGNS  Vital Signs Last 24 Hrs  T(C): 37.1 (28 Mar 2019 08:41), Max: 37.1 (28 Mar 2019 08:41)  T(F): 98.8 (28 Mar 2019 08:41), Max: 98.8 (28 Mar 2019 08:41)  HR: 72 (28 Mar 2019 08:41) (72 - 79)  BP: 144/90 (28 Mar 2019 08:41) (144/90 - 168/96)  RR: 14 (28 Mar 2019 08:41) (14 - 15)  SpO2: 99% (28 Mar 2019 08:41) (98% - 99%)    PHYSICAL EXAM  General: NAD  Cardio: S1S2+  Resp: clear  Abdomen: soft, NT , distended , BS hypoactive  Extrem: no edema or calf tenderness, Motor UE 5/5, RLE 4/5, left HF 3/5, KE 4/5, ankle 4/5 , EHL left weaker that left  Sensory : impaired to light touch left > right   Skin: back incision with staples +    Functional Exam:   Transfers: min/mod assist  Gait: RW with min assist and WC follow 80'  ADLs: eating: independent; toileting transfer: max assist, upper bod dressing: mod assist; lower body dressing: dependent     MEDICATIONS  (STANDING):  ascorbic acid 500 milliGRAM(s) Oral two times a day  atorvastatin 20 milliGRAM(s) Oral at bedtime  dextrose 5%. 1000 milliLiter(s) (50 mL/Hr) IV Continuous <Continuous>  dextrose 50% Injectable 12.5 Gram(s) IV Push once  dextrose 50% Injectable 25 Gram(s) IV Push once  dextrose 50% Injectable 25 Gram(s) IV Push once  docusate sodium 100 milliGRAM(s) Oral three times a day  enoxaparin Injectable 40 milliGRAM(s) SubCutaneous <User Schedule>  gabapentin 600 milliGRAM(s) Oral three times a day  insulin lispro (HumaLOG) corrective regimen sliding scale   SubCutaneous three times a day before meals  insulin lispro (HumaLOG) corrective regimen sliding scale   SubCutaneous at bedtime  losartan 100 milliGRAM(s) Oral daily  metFORMIN 250 milliGRAM(s) Oral two times a day  multivitamin 1 Tablet(s) Oral daily  NIFEdipine XL 90 milliGRAM(s) Oral daily  pantoprazole    Tablet 40 milliGRAM(s) Oral before breakfast  polyethylene glycol 3350 17 Gram(s) Oral two times a day    MEDICATIONS  (PRN):  acetaminophen   Tablet .. 650 milliGRAM(s) Oral every 4 hours PRN Mild Pain (1 - 3)  aluminum hydroxide/magnesium hydroxide/simethicone Suspension 30 milliLiter(s) Oral every 4 hours PRN Dyspepsia  bisacodyl Suppository 10 milliGRAM(s) Rectal daily PRN Constipation  dextrose 40% Gel 15 Gram(s) Oral once PRN Blood Glucose LESS THAN 70 milliGRAM(s)/deciliter  glucagon  Injectable 1 milliGRAM(s) IntraMuscular once PRN Glucose LESS THAN 70 milligrams/deciliter  melatonin 3 milliGRAM(s) Oral at bedtime PRN Insomnia  senna 2 Tablet(s) Oral at bedtime PRN Constipation      CAPILLARY BLOOD GLUCOSE  POCT Blood Glucose.: 156 mg/dL (28 Mar 2019 07:39)  POCT Blood Glucose.: 143 mg/dL (27 Mar 2019 21:12)  POCT Blood Glucose.: 139 mg/dL (27 Mar 2019 16:41)  POCT Blood Glucose.: 124 mg/dL (27 Mar 2019 11:58)        RECENT LABS:                        10.7   8.3   )-----------( 375      ( 27 Mar 2019 05:40 )             33.5     03-27    141  |  104  |  17  ----------------------------<  144<H>  4.3   |  28  |  0.88    Ca    9.4      27 Mar 2019 05:40  Phos  4.1     03-27  Mg     1.9     03-27    TPro  7.1  /  Alb  2.9<L>  /  TBili  0.3  /  DBili  x   /  AST  28  /  ALT  38  /  AlkPhos  68  03-27        A/P:    58 year-old man with a PMH of HTN, HLD, DM presented with weakness, found to have 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    Continue full rehab program: PT/OT 3 hrs/day 5 days/week    Surgical icision: staple removal 4/1. OK to shower per discharge summary.     DVT prophylaxis: on Lovenox    DM-II: Diet, SSI, continue metformin 250mg BID    HTN: on losartan and Procardia    Hyperlipidemia: on statin    GI prophylaxis: on protonix    Pain management: on tylenol prn, and gabapentin    Bowel program: Post op ileus now improved, continue current bowel regimen- Miralax BID, bisacodyl supp and senna prn    Hospitalist consult noted    Bladder program: Toilet schedule prn. Monitor Bladder scans. Patient reports using Alfuzosin for prostate issues.  Discussed with pharmacy; pt will need to bring his own as we do not have comparable medication and pt has not had success with flomax in the past

## 2019-03-29 LAB
ANION GAP SERPL CALC-SCNC: 7 MMOL/L — SIGNIFICANT CHANGE UP (ref 5–17)
BUN SERPL-MCNC: 20 MG/DL — SIGNIFICANT CHANGE UP (ref 7–23)
CALCIUM SERPL-MCNC: 9.4 MG/DL — SIGNIFICANT CHANGE UP (ref 8.4–10.5)
CHLORIDE SERPL-SCNC: 104 MMOL/L — SIGNIFICANT CHANGE UP (ref 96–108)
CO2 SERPL-SCNC: 27 MMOL/L — SIGNIFICANT CHANGE UP (ref 22–31)
CREAT SERPL-MCNC: 0.86 MG/DL — SIGNIFICANT CHANGE UP (ref 0.5–1.3)
GLUCOSE BLDC GLUCOMTR-MCNC: 141 MG/DL — HIGH (ref 70–99)
GLUCOSE BLDC GLUCOMTR-MCNC: 146 MG/DL — HIGH (ref 70–99)
GLUCOSE BLDC GLUCOMTR-MCNC: 151 MG/DL — HIGH (ref 70–99)
GLUCOSE BLDC GLUCOMTR-MCNC: 190 MG/DL — HIGH (ref 70–99)
GLUCOSE SERPL-MCNC: 157 MG/DL — HIGH (ref 70–99)
HCT VFR BLD CALC: 31.5 % — LOW (ref 39–50)
HGB BLD-MCNC: 9.6 G/DL — LOW (ref 13–17)
MCHC RBC-ENTMCNC: 26.4 PG — LOW (ref 27–34)
MCHC RBC-ENTMCNC: 30.3 GM/DL — LOW (ref 32–36)
MCV RBC AUTO: 87.1 FL — SIGNIFICANT CHANGE UP (ref 80–100)
PLATELET # BLD AUTO: 340 K/UL — SIGNIFICANT CHANGE UP (ref 150–400)
POTASSIUM SERPL-MCNC: 4.1 MMOL/L — SIGNIFICANT CHANGE UP (ref 3.5–5.3)
POTASSIUM SERPL-SCNC: 4.1 MMOL/L — SIGNIFICANT CHANGE UP (ref 3.5–5.3)
RBC # BLD: 3.62 M/UL — LOW (ref 4.2–5.8)
RBC # FLD: 14.8 % — HIGH (ref 10.3–14.5)
SODIUM SERPL-SCNC: 138 MMOL/L — SIGNIFICANT CHANGE UP (ref 135–145)
WBC # BLD: 7.4 K/UL — SIGNIFICANT CHANGE UP (ref 3.8–10.5)
WBC # FLD AUTO: 7.4 K/UL — SIGNIFICANT CHANGE UP (ref 3.8–10.5)

## 2019-03-29 PROCEDURE — 99232 SBSQ HOSP IP/OBS MODERATE 35: CPT | Mod: GC

## 2019-03-29 PROCEDURE — 99232 SBSQ HOSP IP/OBS MODERATE 35: CPT

## 2019-03-29 RX ORDER — TRAMADOL HYDROCHLORIDE 50 MG/1
25 TABLET ORAL EVERY 6 HOURS
Qty: 0 | Refills: 0 | Status: DISCONTINUED | OUTPATIENT
Start: 2019-03-29 | End: 2019-04-04

## 2019-03-29 RX ADMIN — Medication 650 MILLIGRAM(S): at 13:45

## 2019-03-29 RX ADMIN — Medication 1 TABLET(S): at 12:06

## 2019-03-29 RX ADMIN — Medication 650 MILLIGRAM(S): at 13:00

## 2019-03-29 RX ADMIN — METFORMIN HYDROCHLORIDE 250 MILLIGRAM(S): 850 TABLET ORAL at 17:29

## 2019-03-29 RX ADMIN — ATORVASTATIN CALCIUM 20 MILLIGRAM(S): 80 TABLET, FILM COATED ORAL at 21:39

## 2019-03-29 RX ADMIN — ENOXAPARIN SODIUM 40 MILLIGRAM(S): 100 INJECTION SUBCUTANEOUS at 21:39

## 2019-03-29 RX ADMIN — Medication 100 MILLIGRAM(S): at 13:02

## 2019-03-29 RX ADMIN — Medication 650 MILLIGRAM(S): at 08:35

## 2019-03-29 RX ADMIN — GABAPENTIN 600 MILLIGRAM(S): 400 CAPSULE ORAL at 21:39

## 2019-03-29 RX ADMIN — GABAPENTIN 600 MILLIGRAM(S): 400 CAPSULE ORAL at 13:02

## 2019-03-29 RX ADMIN — Medication 100 MILLIGRAM(S): at 21:39

## 2019-03-29 RX ADMIN — GABAPENTIN 600 MILLIGRAM(S): 400 CAPSULE ORAL at 05:56

## 2019-03-29 RX ADMIN — LOSARTAN POTASSIUM 100 MILLIGRAM(S): 100 TABLET, FILM COATED ORAL at 05:56

## 2019-03-29 RX ADMIN — TRAMADOL HYDROCHLORIDE 25 MILLIGRAM(S): 50 TABLET ORAL at 21:39

## 2019-03-29 RX ADMIN — Medication 500 MILLIGRAM(S): at 05:56

## 2019-03-29 RX ADMIN — Medication 1: at 17:29

## 2019-03-29 RX ADMIN — ALFUZOSIN HYDROCHLORIDE 10 MILLIGRAM(S): 10 TABLET, EXTENDED RELEASE ORAL at 21:39

## 2019-03-29 RX ADMIN — Medication 500 MILLIGRAM(S): at 17:29

## 2019-03-29 RX ADMIN — PANTOPRAZOLE SODIUM 40 MILLIGRAM(S): 20 TABLET, DELAYED RELEASE ORAL at 05:56

## 2019-03-29 RX ADMIN — POLYETHYLENE GLYCOL 3350 17 GRAM(S): 17 POWDER, FOR SOLUTION ORAL at 05:58

## 2019-03-29 RX ADMIN — Medication 1: at 07:46

## 2019-03-29 RX ADMIN — Medication 90 MILLIGRAM(S): at 05:56

## 2019-03-29 RX ADMIN — TRAMADOL HYDROCHLORIDE 25 MILLIGRAM(S): 50 TABLET ORAL at 22:30

## 2019-03-29 RX ADMIN — Medication 100 MILLIGRAM(S): at 05:56

## 2019-03-29 RX ADMIN — METFORMIN HYDROCHLORIDE 250 MILLIGRAM(S): 850 TABLET ORAL at 05:56

## 2019-03-29 RX ADMIN — Medication 650 MILLIGRAM(S): at 07:37

## 2019-03-29 NOTE — PROGRESS NOTE ADULT - ATTENDING COMMENTS
Chart reviewed. BP noted to be on lower side  Patient appears tired and am shower. Reports good control of pain at this time  Continue full rehab program    Labs on Monday. Hospitalist janis noted

## 2019-03-29 NOTE — PROGRESS NOTE ADULT - SUBJECTIVE AND OBJECTIVE BOX
INTERVAL SUBJECTIVE & REVIEW OF SYMPTOMS:  Chart reviewed. No acute events overnight.  Sleeping/eating well.  No BM x2d.  Discussed with patient the need for suppository if no BM by the end of day today.  He understand and agrees.     REVIEW OF SYSTEMS  [ x  ] Constitutional WNL  [ x  ] Cardio WNL  [ x  ] Resp WNL  [ x  ] GI WNL  [ x  ]  WNL  [   ] Heme WNL  [   ] Endo WNL  [   ] Skin WNL  [   ] MSK WNL  [   ] Neuro WNL  [   ] Cognitive WNL  [   ] Psych WNL    OBJECTIVE  Vital Signs Last 24 Hrs  T(C): 36.6 (29 Mar 2019 09:12), Max: 37.1 (28 Mar 2019 20:44)  T(F): 97.8 (29 Mar 2019 09:12), Max: 98.8 (28 Mar 2019 20:44)  HR: 83 (29 Mar 2019 09:12) (75 - 98)  BP: 110/71 (29 Mar 2019 09:12) (110/71 - 140/85)  BP(mean): --  RR: 14 (29 Mar 2019 09:12) (14 - 14)  SpO2: 99% (29 Mar 2019 09:12) (98% - 99%)    PHYSICAL EXAM  General: NAD  Cardio: S1S2+  Resp: clear  Abdomen: soft, NT , distended , BS hypoactive  Extrem: no edema or calf tenderness, Motor UE 5/5, RLE 4/5, left HF 3/5, KE 4/5, ankle 4/5 , EHL left weaker that left  Sensory : impaired to light touch left > right   Skin: back incision with staples +    Functional Exam:   Transfers: min/mod assist  Gait: RW with min assist and WC follow 80'  ADLs: eating: independent; toileting transfer: max assist, upper bod dressing: mod assist; lower body dressing: dependent     MEDICATIONS  (STANDING):  alfuzosin 10 milliGRAM(s) Oral at bedtime  ascorbic acid 500 milliGRAM(s) Oral two times a day  atorvastatin 20 milliGRAM(s) Oral at bedtime  dextrose 5%. 1000 milliLiter(s) (50 mL/Hr) IV Continuous <Continuous>  dextrose 50% Injectable 12.5 Gram(s) IV Push once  dextrose 50% Injectable 25 Gram(s) IV Push once  dextrose 50% Injectable 25 Gram(s) IV Push once  docusate sodium 100 milliGRAM(s) Oral three times a day  enoxaparin Injectable 40 milliGRAM(s) SubCutaneous <User Schedule>  gabapentin 600 milliGRAM(s) Oral three times a day  insulin lispro (HumaLOG) corrective regimen sliding scale   SubCutaneous three times a day before meals  insulin lispro (HumaLOG) corrective regimen sliding scale   SubCutaneous at bedtime  losartan 100 milliGRAM(s) Oral daily  metFORMIN 250 milliGRAM(s) Oral two times a day  multivitamin 1 Tablet(s) Oral daily  NIFEdipine XL 90 milliGRAM(s) Oral daily  pantoprazole    Tablet 40 milliGRAM(s) Oral before breakfast  polyethylene glycol 3350 17 Gram(s) Oral two times a day    MEDICATIONS  (PRN):  acetaminophen   Tablet .. 650 milliGRAM(s) Oral every 4 hours PRN Mild Pain (1 - 3)  aluminum hydroxide/magnesium hydroxide/simethicone Suspension 30 milliLiter(s) Oral every 4 hours PRN Dyspepsia  bisacodyl Suppository 10 milliGRAM(s) Rectal daily PRN Constipation  dextrose 40% Gel 15 Gram(s) Oral once PRN Blood Glucose LESS THAN 70 milliGRAM(s)/deciliter  glucagon  Injectable 1 milliGRAM(s) IntraMuscular once PRN Glucose LESS THAN 70 milligrams/deciliter  melatonin 3 milliGRAM(s) Oral at bedtime PRN Insomnia  senna 2 Tablet(s) Oral at bedtime PRN Constipation      CAPILLARY BLOOD GLUCOSE      POCT Blood Glucose.: 151 mg/dL (29 Mar 2019 07:40)  POCT Blood Glucose.: 188 mg/dL (28 Mar 2019 20:46)  POCT Blood Glucose.: 111 mg/dL (28 Mar 2019 16:53)  POCT Blood Glucose.: 126 mg/dL (28 Mar 2019 11:42)        RECENT LABS:               9.6    7.4   )-----------( 340      ( 29 Mar 2019 05:26 )             31.5     29 Mar 2019 05:26    138    |  104    |  20     ----------------------------<  157    4.1     |  27     |  0.86     Ca    9.4        29 Mar 2019 05:26            A/P:    58 year-old man with a PMH of HTN, HLD, DM presented with weakness, found to have 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    Continue full rehab program: PT/OT 3 hrs/day 5 days/week    Surgical incision: staple removal 4/1. OK to shower per discharge summary.     DVT prophylaxis: on Lovenox    DM-II: Diet, SSI, continue metformin 250mg BID    HTN: on losartan and Procardia    Hyperlipidemia: on statin    GI prophylaxis: on protonix    Pain management: on tylenol prn, and gabapentin    Bowel program: Post op ileus now improved, continue current bowel regimen- Miralax BID, bisacodyl supp and senna prn    Hospitalist consult noted    Bladder program: Toilet schedule prn. Monitor Bladder scans. Continue alfuzosin.

## 2019-03-29 NOTE — PROGRESS NOTE ADULT - SUBJECTIVE AND OBJECTIVE BOX
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    Vital Signs Last 24 Hrs  T(C): 36.6 (29 Mar 2019 09:12), Max: 37.1 (28 Mar 2019 20:44)  T(F): 97.8 (29 Mar 2019 09:12), Max: 98.8 (28 Mar 2019 20:44)  HR: 83 (29 Mar 2019 09:12) (75 - 98)  BP: 110/71 (29 Mar 2019 09:12) (110/71 - 140/85)  BP(mean): --  RR: 14 (29 Mar 2019 09:12) (14 - 14)  SpO2: 99% (29 Mar 2019 09:12) (98% - 99%)      NAD, EVELYN, MMM, NCAT  SUPPLE  CLEAR  S1S2  SOFT NT BS PRESENT  NO PEDAL EDEMA  AAO X 3  BL LE WEAKNESS, LEFT > RIGHT, UE WEAKNESS LEFT > RIGHT      Hemoglobin A1C, Whole Blood: 6.9                9.6                  138  | 27   | 20           7.4   >-----------< 340     ------------------------< 157                   31.5                 4.1  | 104  | 0.86                                         Ca 9.4   Mg x     Ph x        CAPILLARY BLOOD GLUCOSE      POCT Blood Glucose.: 151 mg/dL (29 Mar 2019 07:40)  POCT Blood Glucose.: 188 mg/dL (28 Mar 2019 20:46)  POCT Blood Glucose.: 111 mg/dL (28 Mar 2019 16:53)  POCT Blood Glucose.: 126 mg/dL (28 Mar 2019 11:42)

## 2019-03-30 LAB
GLUCOSE BLDC GLUCOMTR-MCNC: 141 MG/DL — HIGH (ref 70–99)
GLUCOSE BLDC GLUCOMTR-MCNC: 145 MG/DL — HIGH (ref 70–99)
GLUCOSE BLDC GLUCOMTR-MCNC: 147 MG/DL — HIGH (ref 70–99)
GLUCOSE BLDC GLUCOMTR-MCNC: 153 MG/DL — HIGH (ref 70–99)

## 2019-03-30 PROCEDURE — 99232 SBSQ HOSP IP/OBS MODERATE 35: CPT

## 2019-03-30 RX ORDER — LIDOCAINE 4 G/100G
2 CREAM TOPICAL
Qty: 0 | Refills: 0 | Status: DISCONTINUED | OUTPATIENT
Start: 2019-03-30 | End: 2019-04-09

## 2019-03-30 RX ORDER — LIDOCAINE 4 G/100G
2 CREAM TOPICAL
Qty: 0 | Refills: 0 | Status: DISCONTINUED | OUTPATIENT
Start: 2019-03-30 | End: 2019-03-30

## 2019-03-30 RX ADMIN — TRAMADOL HYDROCHLORIDE 25 MILLIGRAM(S): 50 TABLET ORAL at 05:37

## 2019-03-30 RX ADMIN — TRAMADOL HYDROCHLORIDE 25 MILLIGRAM(S): 50 TABLET ORAL at 14:33

## 2019-03-30 RX ADMIN — GABAPENTIN 600 MILLIGRAM(S): 400 CAPSULE ORAL at 05:36

## 2019-03-30 RX ADMIN — TRAMADOL HYDROCHLORIDE 25 MILLIGRAM(S): 50 TABLET ORAL at 15:09

## 2019-03-30 RX ADMIN — Medication 500 MILLIGRAM(S): at 05:37

## 2019-03-30 RX ADMIN — POLYETHYLENE GLYCOL 3350 17 GRAM(S): 17 POWDER, FOR SOLUTION ORAL at 05:36

## 2019-03-30 RX ADMIN — TRAMADOL HYDROCHLORIDE 25 MILLIGRAM(S): 50 TABLET ORAL at 23:33

## 2019-03-30 RX ADMIN — ALFUZOSIN HYDROCHLORIDE 10 MILLIGRAM(S): 10 TABLET, EXTENDED RELEASE ORAL at 22:45

## 2019-03-30 RX ADMIN — PANTOPRAZOLE SODIUM 40 MILLIGRAM(S): 20 TABLET, DELAYED RELEASE ORAL at 05:38

## 2019-03-30 RX ADMIN — ENOXAPARIN SODIUM 40 MILLIGRAM(S): 100 INJECTION SUBCUTANEOUS at 22:45

## 2019-03-30 RX ADMIN — Medication 100 MILLIGRAM(S): at 22:45

## 2019-03-30 RX ADMIN — Medication 100 MILLIGRAM(S): at 05:36

## 2019-03-30 RX ADMIN — METFORMIN HYDROCHLORIDE 250 MILLIGRAM(S): 850 TABLET ORAL at 17:10

## 2019-03-30 RX ADMIN — Medication 1 TABLET(S): at 12:03

## 2019-03-30 RX ADMIN — LOSARTAN POTASSIUM 100 MILLIGRAM(S): 100 TABLET, FILM COATED ORAL at 05:36

## 2019-03-30 RX ADMIN — GABAPENTIN 600 MILLIGRAM(S): 400 CAPSULE ORAL at 13:27

## 2019-03-30 RX ADMIN — ATORVASTATIN CALCIUM 20 MILLIGRAM(S): 80 TABLET, FILM COATED ORAL at 22:45

## 2019-03-30 RX ADMIN — Medication 90 MILLIGRAM(S): at 05:36

## 2019-03-30 RX ADMIN — METFORMIN HYDROCHLORIDE 250 MILLIGRAM(S): 850 TABLET ORAL at 05:36

## 2019-03-30 RX ADMIN — Medication 500 MILLIGRAM(S): at 17:10

## 2019-03-30 RX ADMIN — GABAPENTIN 600 MILLIGRAM(S): 400 CAPSULE ORAL at 22:45

## 2019-03-30 RX ADMIN — TRAMADOL HYDROCHLORIDE 25 MILLIGRAM(S): 50 TABLET ORAL at 22:46

## 2019-03-30 RX ADMIN — Medication 100 MILLIGRAM(S): at 13:27

## 2019-03-30 NOTE — PROGRESS NOTE ADULT - SUBJECTIVE AND OBJECTIVE BOX
INTERVAL SUBJECTIVE & REVIEW OF SYMPTOMS:  Chart reviewed. No acute events overnight.  Slept well.  Last BM 3/29.  Denies SOB, dyspnea, cough, dysuria or frequency.  Pt c/o back pain relieved with ice and Tramadol.     REVIEW OF SYSTEMS  [ x  ] Constitutional WNL  [ x  ] Cardio WNL  [ x  ] Resp WNL  [ x  ] GI WNL  [ x  ]  WNL  [   ] Heme WNL  [   ] Endo WNL  [   ] Skin WNL  [   ] MSK WNL  [   ] Neuro WNL  [   ] Cognitive WNL  [   ] Psych WNL    OBJECTIVE  Vital Signs Last 24 Hrs  T(C): 36.2 (30 Mar 2019 08:31), Max: 36.7 (29 Mar 2019 23:11)  T(F): 97.1 (30 Mar 2019 08:31), Max: 98 (29 Mar 2019 23:11)  HR: 88 (30 Mar 2019 08:31) (82 - 88)  BP: 105/64 (30 Mar 2019 08:31) (105/64 - 136/81)  BP(mean): --  RR: 14 (30 Mar 2019 08:31) (14 - 14)  SpO2: 99% (30 Mar 2019 08:31) (96% - 99%)    PHYSICAL EXAM  General: NAD  Cardio: S1S2+  Resp: clear  Abdomen: soft, NT , distended , BS hypoactive  Extrem: no edema or calf tenderness, Motor UE 5/5, RLE 4/5, left HF 3/5, KE 4/5, ankle 4/5 , EHL left weaker that left  Sensory : impaired to light touch left > right   Skin: back incision with staples + No erythema or edema noted    Functional Exam:   Transfers: CG/min assist  Gait: RW '  ADLs: eating: independent; toileting transfer: max assist, upper bod dressing: mod assist; lower body dressing: dependent     MEDICATIONS  (STANDING):  alfuzosin 10 milliGRAM(s) Oral at bedtime  ascorbic acid 500 milliGRAM(s) Oral two times a day  atorvastatin 20 milliGRAM(s) Oral at bedtime  dextrose 5%. 1000 milliLiter(s) (50 mL/Hr) IV Continuous <Continuous>  dextrose 50% Injectable 12.5 Gram(s) IV Push once  dextrose 50% Injectable 25 Gram(s) IV Push once  dextrose 50% Injectable 25 Gram(s) IV Push once  docusate sodium 100 milliGRAM(s) Oral three times a day  enoxaparin Injectable 40 milliGRAM(s) SubCutaneous <User Schedule>  gabapentin 600 milliGRAM(s) Oral three times a day  insulin lispro (HumaLOG) corrective regimen sliding scale   SubCutaneous three times a day before meals  insulin lispro (HumaLOG) corrective regimen sliding scale   SubCutaneous at bedtime  lidocaine   Patch 2 Patch Transdermal <User Schedule>  losartan 100 milliGRAM(s) Oral daily  metFORMIN 250 milliGRAM(s) Oral two times a day  multivitamin 1 Tablet(s) Oral daily  NIFEdipine XL 90 milliGRAM(s) Oral daily  pantoprazole    Tablet 40 milliGRAM(s) Oral before breakfast  polyethylene glycol 3350 17 Gram(s) Oral two times a day    MEDICATIONS  (PRN):  acetaminophen   Tablet .. 650 milliGRAM(s) Oral every 4 hours PRN Mild Pain (1 - 3)  aluminum hydroxide/magnesium hydroxide/simethicone Suspension 30 milliLiter(s) Oral every 4 hours PRN Dyspepsia  bisacodyl Suppository 10 milliGRAM(s) Rectal daily PRN Constipation  dextrose 40% Gel 15 Gram(s) Oral once PRN Blood Glucose LESS THAN 70 milliGRAM(s)/deciliter  glucagon  Injectable 1 milliGRAM(s) IntraMuscular once PRN Glucose LESS THAN 70 milligrams/deciliter  melatonin 3 milliGRAM(s) Oral at bedtime PRN Insomnia  senna 2 Tablet(s) Oral at bedtime PRN Constipation  traMADol 25 milliGRAM(s) Oral every 6 hours PRN Severe Pain (7 - 10)    CAPILLARY BLOOD GLUCOSE      POCT Blood Glucose.: 147 mg/dL (30 Mar 2019 12:02)  POCT Blood Glucose.: 145 mg/dL (30 Mar 2019 07:40)  POCT Blood Glucose.: 146 mg/dL (29 Mar 2019 21:29)  POCT Blood Glucose.: 190 mg/dL (29 Mar 2019 16:42)          RECENT LABS:               9.6    7.4   )-----------( 340      ( 29 Mar 2019 05:26 )             31.5     29 Mar 2019 05:26    138    |  104    |  20     ----------------------------<  157    4.1     |  27     |  0.86     Ca    9.4        29 Mar 2019 05:26            A/P:    58 year-old man with a PMH of HTN, HLD, DM presented with weakness, found to have 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    Continue full rehab program: PT/OT 3 hrs/day 5 days/week    Surgical incision: staple removal 4/1. OK to shower per discharge summary.     DVT prophylaxis: on Lovenox    DM-II: Diet, SSI, continue metformin 250mg BID    HTN: on losartan and Procardia    Hyperlipidemia: on statin    GI prophylaxis: on protonix    Pain management: on tylenol prn, and gabapentin.  Ice prn.  Add Lidoderm patches    Bowel program: Post op ileus now improved, continue current bowel regimen- Miralax BID, bisacodyl supp and senna prn    Hospitalist consult noted    Bladder program: Toilet schedule prn. Monitor Bladder scans. Continue alfuzosin.

## 2019-03-30 NOTE — PROGRESS NOTE ADULT - SUBJECTIVE AND OBJECTIVE BOX
HPI:  This is a 58 year-old right handed male with a PMH of HTN/HLD, DM-II who developed lower back pain following a fall approx (2/6/19) 1 month ago. Prior to this fall, patient had first developed numbness in his toes with progressive weakness of both legs. After this fall about 1 month ago, he was initially admitted to [facility] on 3/4/19 for progressively worsening bilateral lower extremity weakness. 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 He was then referred for neurosurgical evaluation and treatment at [facility] on 3/10/19. Patient c/o lower back pain, decreased sensation in both legs, left leg weakness and difficulty ambulating unassisted due to weakness. Denied bowel or bladder dysfunction. Patient was transferred to [facility] for elective spinal angiography and found  via T2-Lumbar 4 selective spinal angiography was performed and demonstrated a left T5 lesion dural AV fistula and again on 3/15/19 s/p angiogram Left T5 AVF s/p coil marker by Dr Horn. Patient was taken to OR on 3/18 by Krystle Willis and Kory s/p t4-t8 laminectomy for obliteration of t5 dural AVF and repeat Angio showed resolution of AV fistula. Post-op course complicated by ileus, which has now resolved.  Patient was also followed by cardiology for blood pressure management.     Patient is deemed medically stable for admission to [facility]'s acute IPR on 3/26/19. (26 Mar 2019 11:14)      Subjective  no new complaints        PAST MEDICAL & SURGICAL HISTORY:  HLD (hyperlipidemia)  Essential hypertension  DM (diabetes mellitus)  No significant past surgical history      MedsMEDICATIONS  (STANDING):  alfuzosin 10 milliGRAM(s) Oral at bedtime  ascorbic acid 500 milliGRAM(s) Oral two times a day  atorvastatin 20 milliGRAM(s) Oral at bedtime  dextrose 5%. 1000 milliLiter(s) (50 mL/Hr) IV Continuous <Continuous>  dextrose 50% Injectable 12.5 Gram(s) IV Push once  dextrose 50% Injectable 25 Gram(s) IV Push once  dextrose 50% Injectable 25 Gram(s) IV Push once  docusate sodium 100 milliGRAM(s) Oral three times a day  enoxaparin Injectable 40 milliGRAM(s) SubCutaneous <User Schedule>  gabapentin 600 milliGRAM(s) Oral three times a day  insulin lispro (HumaLOG) corrective regimen sliding scale   SubCutaneous three times a day before meals  insulin lispro (HumaLOG) corrective regimen sliding scale   SubCutaneous at bedtime  lidocaine   Patch 2 Patch Transdermal <User Schedule>  losartan 100 milliGRAM(s) Oral daily  metFORMIN 250 milliGRAM(s) Oral two times a day  multivitamin 1 Tablet(s) Oral daily  NIFEdipine XL 90 milliGRAM(s) Oral daily  pantoprazole    Tablet 40 milliGRAM(s) Oral before breakfast  polyethylene glycol 3350 17 Gram(s) Oral two times a day    MEDICATIONS  (PRN):  acetaminophen   Tablet .. 650 milliGRAM(s) Oral every 4 hours PRN Mild Pain (1 - 3)  aluminum hydroxide/magnesium hydroxide/simethicone Suspension 30 milliLiter(s) Oral every 4 hours PRN Dyspepsia  bisacodyl Suppository 10 milliGRAM(s) Rectal daily PRN Constipation  dextrose 40% Gel 15 Gram(s) Oral once PRN Blood Glucose LESS THAN 70 milliGRAM(s)/deciliter  glucagon  Injectable 1 milliGRAM(s) IntraMuscular once PRN Glucose LESS THAN 70 milligrams/deciliter  melatonin 3 milliGRAM(s) Oral at bedtime PRN Insomnia  senna 2 Tablet(s) Oral at bedtime PRN Constipation  traMADol 25 milliGRAM(s) Oral every 6 hours PRN Severe Pain (7 - 10)      Vital Signs Last 24 Hrs  T(C): 36.2 (30 Mar 2019 08:31), Max: 36.7 (29 Mar 2019 23:11)  T(F): 97.1 (30 Mar 2019 08:31), Max: 98 (29 Mar 2019 23:11)  HR: 88 (30 Mar 2019 08:31) (82 - 88)  BP: 105/64 (30 Mar 2019 08:31) (105/64 - 136/81)  BP(mean): --  RR: 14 (30 Mar 2019 08:31) (14 - 14)  SpO2: 99% (30 Mar 2019 08:31) (96% - 99%)  I&O's Summary      PHYSICAL EXAM:  GENERAL: NAD  NECK: Supple  NERVOUS SYSTEM:  awake and alert  HEART: S1s2 NL , RRR  CHEST/LUNG: Clear to percussion bilaterally  ABDOMEN: Soft, Nontender, Nondistended; Bowel sounds present  EXTREMITIES:  No edema      LABS:  |03-29-19 @ 05:26            9.6<L>  7.4>--------------<340            31.5<L>      138  |  104  |  20  --------------------------<157<H>  4.1  |  9.4  |  0.86    Mg -- / Phos--    PT -- / INR --    PTT --    Lipase --  03-29-19 @ 05:26  CAPILLARY BLOOD GLUCOSE      POCT Blood Glucose.: 147 mg/dL (30 Mar 2019 12:02)  POCT Blood Glucose.: 145 mg/dL (30 Mar 2019 07:40)  POCT Blood Glucose.: 146 mg/dL (29 Mar 2019 21:29)  POCT Blood Glucose.: 190 mg/dL (29 Mar 2019 16:42)    Imaging Personally Reviewed:  [ ] YES  [ ] NO      HEALTH ISSUES - PROBLEM Dx:  Thoracic AVM( AV fistula) s/p repair/T4-T8 laminectomy  PT/OT per rehab    DM2  Glucose 100's  Humalog scale, Metformin    HTN  Cont losartan/nifedipine              Care Discussed with Consultants/Other Providers [ x] YES  [ ] NO

## 2019-03-31 LAB
GLUCOSE BLDC GLUCOMTR-MCNC: 151 MG/DL — HIGH (ref 70–99)
GLUCOSE BLDC GLUCOMTR-MCNC: 155 MG/DL — HIGH (ref 70–99)
GLUCOSE BLDC GLUCOMTR-MCNC: 175 MG/DL — HIGH (ref 70–99)
GLUCOSE BLDC GLUCOMTR-MCNC: 184 MG/DL — HIGH (ref 70–99)

## 2019-03-31 PROCEDURE — 99232 SBSQ HOSP IP/OBS MODERATE 35: CPT

## 2019-03-31 RX ADMIN — ENOXAPARIN SODIUM 40 MILLIGRAM(S): 100 INJECTION SUBCUTANEOUS at 21:29

## 2019-03-31 RX ADMIN — LIDOCAINE 2 PATCH: 4 CREAM TOPICAL at 06:16

## 2019-03-31 RX ADMIN — Medication 1: at 07:58

## 2019-03-31 RX ADMIN — GABAPENTIN 600 MILLIGRAM(S): 400 CAPSULE ORAL at 06:16

## 2019-03-31 RX ADMIN — GABAPENTIN 600 MILLIGRAM(S): 400 CAPSULE ORAL at 21:30

## 2019-03-31 RX ADMIN — LOSARTAN POTASSIUM 100 MILLIGRAM(S): 100 TABLET, FILM COATED ORAL at 06:17

## 2019-03-31 RX ADMIN — Medication 500 MILLIGRAM(S): at 06:18

## 2019-03-31 RX ADMIN — Medication 650 MILLIGRAM(S): at 06:45

## 2019-03-31 RX ADMIN — LIDOCAINE 2 PATCH: 4 CREAM TOPICAL at 06:44

## 2019-03-31 RX ADMIN — METFORMIN HYDROCHLORIDE 250 MILLIGRAM(S): 850 TABLET ORAL at 17:01

## 2019-03-31 RX ADMIN — Medication 100 MILLIGRAM(S): at 06:17

## 2019-03-31 RX ADMIN — PANTOPRAZOLE SODIUM 40 MILLIGRAM(S): 20 TABLET, DELAYED RELEASE ORAL at 06:18

## 2019-03-31 RX ADMIN — Medication 650 MILLIGRAM(S): at 06:17

## 2019-03-31 RX ADMIN — TRAMADOL HYDROCHLORIDE 25 MILLIGRAM(S): 50 TABLET ORAL at 16:30

## 2019-03-31 RX ADMIN — TRAMADOL HYDROCHLORIDE 25 MILLIGRAM(S): 50 TABLET ORAL at 15:45

## 2019-03-31 RX ADMIN — Medication 1: at 17:01

## 2019-03-31 RX ADMIN — METFORMIN HYDROCHLORIDE 250 MILLIGRAM(S): 850 TABLET ORAL at 07:57

## 2019-03-31 RX ADMIN — GABAPENTIN 600 MILLIGRAM(S): 400 CAPSULE ORAL at 14:40

## 2019-03-31 RX ADMIN — ATORVASTATIN CALCIUM 20 MILLIGRAM(S): 80 TABLET, FILM COATED ORAL at 21:30

## 2019-03-31 RX ADMIN — Medication 90 MILLIGRAM(S): at 06:18

## 2019-03-31 RX ADMIN — LIDOCAINE 2 PATCH: 4 CREAM TOPICAL at 19:00

## 2019-03-31 RX ADMIN — Medication 100 MILLIGRAM(S): at 21:30

## 2019-03-31 RX ADMIN — Medication 1 TABLET(S): at 11:45

## 2019-03-31 RX ADMIN — Medication 500 MILLIGRAM(S): at 17:01

## 2019-03-31 RX ADMIN — POLYETHYLENE GLYCOL 3350 17 GRAM(S): 17 POWDER, FOR SOLUTION ORAL at 06:16

## 2019-03-31 RX ADMIN — Medication 1: at 11:45

## 2019-03-31 RX ADMIN — ALFUZOSIN HYDROCHLORIDE 10 MILLIGRAM(S): 10 TABLET, EXTENDED RELEASE ORAL at 21:30

## 2019-03-31 NOTE — PROGRESS NOTE ADULT - SUBJECTIVE AND OBJECTIVE BOX
INTERVAL SUBJECTIVE & REVIEW OF SYMPTOMS:  Chart reviewed. No acute events overnight.  Slept well.  Last BM yesterday.  Denies SOB, dyspnea, cough, dysuria or frequency.  Pt c/o back pain relieved with ice, Lidoderm and Tramadol. Persistent numbness left foot    REVIEW OF SYSTEMS  [ x  ] Constitutional WNL  [ x  ] Cardio WNL  [ x  ] Resp WNL  [ x  ] GI WNL  [ x  ]  WNL  [   ] Heme WNL  [   ] Endo WNL  [   ] Skin WNL  [   ] MSK WNL  [   ] Neuro WNL  [   ] Cognitive WNL  [   ] Psych WNL    OBJECTIVE  Vital Signs Last 24 Hrs  T(C): 36.7 (31 Mar 2019 08:02), Max: 36.8 (30 Mar 2019 22:13)  T(F): 98.1 (31 Mar 2019 08:02), Max: 98.2 (30 Mar 2019 22:13)  HR: 79 (31 Mar 2019 08:02) (73 - 86)  BP: 136/78 (31 Mar 2019 08:02) (128/70 - 143/80)  BP(mean): --  RR: 14 (31 Mar 2019 08:02) (14 - 14)  SpO2: 100% (31 Mar 2019 08:02) (97% - 100%)    PHYSICAL EXAM  General: NAD  Cardio: S1S2+  Resp: clear  Abdomen: soft, NT , distended  Extrem: no edema or calf tenderness, Motor UE 5/5, RLE 4/5, left HF 3/5, KE 4/5, ankle 4/5 , EHL left weaker that left  Sensory : impaired to light touch left > right   Skin: back incision with staples + No erythema or edema noted    Functional Exam:   Transfers: CG/min assist  Gait: RW '  ADLs: eating: independent; toileting transfer: max assist, upper bod dressing: mod assist; lower body dressing: dependent     MEDICATIONS  (STANDING):  alfuzosin 10 milliGRAM(s) Oral at bedtime  ascorbic acid 500 milliGRAM(s) Oral two times a day  atorvastatin 20 milliGRAM(s) Oral at bedtime  dextrose 5%. 1000 milliLiter(s) (50 mL/Hr) IV Continuous <Continuous>  dextrose 50% Injectable 12.5 Gram(s) IV Push once  dextrose 50% Injectable 25 Gram(s) IV Push once  dextrose 50% Injectable 25 Gram(s) IV Push once  docusate sodium 100 milliGRAM(s) Oral three times a day  enoxaparin Injectable 40 milliGRAM(s) SubCutaneous <User Schedule>  gabapentin 600 milliGRAM(s) Oral three times a day  insulin lispro (HumaLOG) corrective regimen sliding scale   SubCutaneous three times a day before meals  insulin lispro (HumaLOG) corrective regimen sliding scale   SubCutaneous at bedtime  lidocaine   Patch 2 Patch Transdermal <User Schedule>  losartan 100 milliGRAM(s) Oral daily  metFORMIN 250 milliGRAM(s) Oral two times a day  multivitamin 1 Tablet(s) Oral daily  NIFEdipine XL 90 milliGRAM(s) Oral daily  pantoprazole    Tablet 40 milliGRAM(s) Oral before breakfast  polyethylene glycol 3350 17 Gram(s) Oral two times a day    MEDICATIONS  (PRN):  acetaminophen   Tablet .. 650 milliGRAM(s) Oral every 4 hours PRN Mild Pain (1 - 3)  aluminum hydroxide/magnesium hydroxide/simethicone Suspension 30 milliLiter(s) Oral every 4 hours PRN Dyspepsia  bisacodyl Suppository 10 milliGRAM(s) Rectal daily PRN Constipation  dextrose 40% Gel 15 Gram(s) Oral once PRN Blood Glucose LESS THAN 70 milliGRAM(s)/deciliter  glucagon  Injectable 1 milliGRAM(s) IntraMuscular once PRN Glucose LESS THAN 70 milligrams/deciliter  melatonin 3 milliGRAM(s) Oral at bedtime PRN Insomnia  senna 2 Tablet(s) Oral at bedtime PRN Constipation  traMADol 25 milliGRAM(s) Oral every 6 hours PRN Severe Pain (7 - 10)    CAPILLARY BLOOD GLUCOSE      POCT Blood Glucose.: 151 mg/dL (31 Mar 2019 07:56)  POCT Blood Glucose.: 153 mg/dL (30 Mar 2019 22:28)  POCT Blood Glucose.: 141 mg/dL (30 Mar 2019 16:41)  POCT Blood Glucose.: 147 mg/dL (30 Mar 2019 12:02)          RECENT LABS:               9.6    7.4   )-----------( 340      ( 29 Mar 2019 05:26 )             31.5     29 Mar 2019 05:26    138    |  104    |  20     ----------------------------<  157    4.1     |  27     |  0.86     Ca    9.4        29 Mar 2019 05:26            A/P:    58 year-old man with a PMH of HTN, HLD, DM presented with weakness, found to have 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    Continue full rehab program: PT/OT 3 hrs/day 5 days/week    Surgical incision: staple removal 4/1. OK to shower per discharge summary.     DVT prophylaxis: on Lovenox    DM-II: Diet, SSI, continue metformin 250mg BID    HTN: on losartan and Procardia    Hyperlipidemia: on statin    GI prophylaxis: on protonix    Pain management: on tylenol prn, and gabapentin.  Ice prn.  Add Lidoderm patches    Bowel program: Post op ileus improved, continue current bowel regimen- Miralax BID, bisacodyl supp and senna prn    Hospitalist consult noted    Bladder program: Toilet schedule prn. Latest .  DC Bladder scans. Continue alfuzosin.

## 2019-04-01 LAB
ANION GAP SERPL CALC-SCNC: 9 MMOL/L — SIGNIFICANT CHANGE UP (ref 5–17)
BUN SERPL-MCNC: 19 MG/DL — SIGNIFICANT CHANGE UP (ref 7–23)
CALCIUM SERPL-MCNC: 9.4 MG/DL — SIGNIFICANT CHANGE UP (ref 8.4–10.5)
CHLORIDE SERPL-SCNC: 104 MMOL/L — SIGNIFICANT CHANGE UP (ref 96–108)
CO2 SERPL-SCNC: 27 MMOL/L — SIGNIFICANT CHANGE UP (ref 22–31)
CREAT SERPL-MCNC: 0.78 MG/DL — SIGNIFICANT CHANGE UP (ref 0.5–1.3)
GLUCOSE BLDC GLUCOMTR-MCNC: 133 MG/DL — HIGH (ref 70–99)
GLUCOSE BLDC GLUCOMTR-MCNC: 147 MG/DL — HIGH (ref 70–99)
GLUCOSE BLDC GLUCOMTR-MCNC: 154 MG/DL — HIGH (ref 70–99)
GLUCOSE BLDC GLUCOMTR-MCNC: 158 MG/DL — HIGH (ref 70–99)
GLUCOSE SERPL-MCNC: 153 MG/DL — HIGH (ref 70–99)
HCT VFR BLD CALC: 30.7 % — LOW (ref 39–50)
HGB BLD-MCNC: 9.9 G/DL — LOW (ref 13–17)
MCHC RBC-ENTMCNC: 28.6 PG — SIGNIFICANT CHANGE UP (ref 27–34)
MCHC RBC-ENTMCNC: 32.3 GM/DL — SIGNIFICANT CHANGE UP (ref 32–36)
MCV RBC AUTO: 88.6 FL — SIGNIFICANT CHANGE UP (ref 80–100)
PLATELET # BLD AUTO: 296 K/UL — SIGNIFICANT CHANGE UP (ref 150–400)
POTASSIUM SERPL-MCNC: 4.2 MMOL/L — SIGNIFICANT CHANGE UP (ref 3.5–5.3)
POTASSIUM SERPL-SCNC: 4.2 MMOL/L — SIGNIFICANT CHANGE UP (ref 3.5–5.3)
RBC # BLD: 3.47 M/UL — LOW (ref 4.2–5.8)
RBC # FLD: 15 % — HIGH (ref 10.3–14.5)
SODIUM SERPL-SCNC: 140 MMOL/L — SIGNIFICANT CHANGE UP (ref 135–145)
WBC # BLD: 8.2 K/UL — SIGNIFICANT CHANGE UP (ref 3.8–10.5)
WBC # FLD AUTO: 8.2 K/UL — SIGNIFICANT CHANGE UP (ref 3.8–10.5)

## 2019-04-01 PROCEDURE — 99232 SBSQ HOSP IP/OBS MODERATE 35: CPT | Mod: GC

## 2019-04-01 PROCEDURE — 99232 SBSQ HOSP IP/OBS MODERATE 35: CPT

## 2019-04-01 RX ORDER — METFORMIN HYDROCHLORIDE 850 MG/1
250 TABLET ORAL THREE TIMES A DAY
Qty: 0 | Refills: 0 | Status: DISCONTINUED | OUTPATIENT
Start: 2019-04-01 | End: 2019-04-05

## 2019-04-01 RX ADMIN — PANTOPRAZOLE SODIUM 40 MILLIGRAM(S): 20 TABLET, DELAYED RELEASE ORAL at 06:05

## 2019-04-01 RX ADMIN — TRAMADOL HYDROCHLORIDE 25 MILLIGRAM(S): 50 TABLET ORAL at 16:20

## 2019-04-01 RX ADMIN — LIDOCAINE 2 PATCH: 4 CREAM TOPICAL at 17:20

## 2019-04-01 RX ADMIN — TRAMADOL HYDROCHLORIDE 25 MILLIGRAM(S): 50 TABLET ORAL at 15:17

## 2019-04-01 RX ADMIN — METFORMIN HYDROCHLORIDE 250 MILLIGRAM(S): 850 TABLET ORAL at 08:04

## 2019-04-01 RX ADMIN — Medication 90 MILLIGRAM(S): at 06:15

## 2019-04-01 RX ADMIN — GABAPENTIN 600 MILLIGRAM(S): 400 CAPSULE ORAL at 13:35

## 2019-04-01 RX ADMIN — GABAPENTIN 600 MILLIGRAM(S): 400 CAPSULE ORAL at 21:39

## 2019-04-01 RX ADMIN — ALFUZOSIN HYDROCHLORIDE 10 MILLIGRAM(S): 10 TABLET, EXTENDED RELEASE ORAL at 21:39

## 2019-04-01 RX ADMIN — GABAPENTIN 600 MILLIGRAM(S): 400 CAPSULE ORAL at 06:05

## 2019-04-01 RX ADMIN — TRAMADOL HYDROCHLORIDE 25 MILLIGRAM(S): 50 TABLET ORAL at 06:27

## 2019-04-01 RX ADMIN — POLYETHYLENE GLYCOL 3350 17 GRAM(S): 17 POWDER, FOR SOLUTION ORAL at 16:36

## 2019-04-01 RX ADMIN — TRAMADOL HYDROCHLORIDE 25 MILLIGRAM(S): 50 TABLET ORAL at 00:12

## 2019-04-01 RX ADMIN — LIDOCAINE 2 PATCH: 4 CREAM TOPICAL at 08:06

## 2019-04-01 RX ADMIN — ENOXAPARIN SODIUM 40 MILLIGRAM(S): 100 INJECTION SUBCUTANEOUS at 21:38

## 2019-04-01 RX ADMIN — LIDOCAINE 2 PATCH: 4 CREAM TOPICAL at 06:16

## 2019-04-01 RX ADMIN — METFORMIN HYDROCHLORIDE 250 MILLIGRAM(S): 850 TABLET ORAL at 21:39

## 2019-04-01 RX ADMIN — Medication 1: at 16:36

## 2019-04-01 RX ADMIN — Medication 3 MILLIGRAM(S): at 00:12

## 2019-04-01 RX ADMIN — Medication 500 MILLIGRAM(S): at 06:05

## 2019-04-01 RX ADMIN — TRAMADOL HYDROCHLORIDE 25 MILLIGRAM(S): 50 TABLET ORAL at 01:00

## 2019-04-01 RX ADMIN — Medication 1 TABLET(S): at 12:03

## 2019-04-01 RX ADMIN — Medication 500 MILLIGRAM(S): at 16:36

## 2019-04-01 RX ADMIN — Medication 1: at 08:04

## 2019-04-01 RX ADMIN — TRAMADOL HYDROCHLORIDE 25 MILLIGRAM(S): 50 TABLET ORAL at 06:06

## 2019-04-01 RX ADMIN — ATORVASTATIN CALCIUM 20 MILLIGRAM(S): 80 TABLET, FILM COATED ORAL at 21:39

## 2019-04-01 RX ADMIN — POLYETHYLENE GLYCOL 3350 17 GRAM(S): 17 POWDER, FOR SOLUTION ORAL at 06:05

## 2019-04-01 RX ADMIN — TRAMADOL HYDROCHLORIDE 25 MILLIGRAM(S): 50 TABLET ORAL at 21:40

## 2019-04-01 RX ADMIN — Medication 100 MILLIGRAM(S): at 13:35

## 2019-04-01 RX ADMIN — Medication 100 MILLIGRAM(S): at 21:39

## 2019-04-01 RX ADMIN — Medication 100 MILLIGRAM(S): at 06:05

## 2019-04-01 RX ADMIN — TRAMADOL HYDROCHLORIDE 25 MILLIGRAM(S): 50 TABLET ORAL at 22:30

## 2019-04-01 RX ADMIN — LOSARTAN POTASSIUM 100 MILLIGRAM(S): 100 TABLET, FILM COATED ORAL at 06:05

## 2019-04-01 RX ADMIN — METFORMIN HYDROCHLORIDE 250 MILLIGRAM(S): 850 TABLET ORAL at 13:35

## 2019-04-01 NOTE — PROGRESS NOTE ADULT - ATTENDING COMMENTS
Chart reviewed. Patient started on tramadol for pain over the weekend   Pain well controlled with current regimen  Back incision staples + , no swelling or erythema.  Staples to be removed today - ? alternate   Continue full rehab program     Call placed for P2P with insurance at [phone] # [phone]  [id number]. Called on 3/29 and today     2. Hospitalist fu noted . Metformin dose increased to TID    3. Bowel program: patient having regular BM

## 2019-04-01 NOTE — PROGRESS NOTE ADULT - SUBJECTIVE AND OBJECTIVE BOX
INTERVAL SUBJECTIVE & REVIEW OF SYMPTOMS:  Pt seen and examined in therapy.  No acute events overnight.  Spinal pain improved with tramadol.  (+) BM.      REVIEW OF SYSTEMS  [ x  ] Constitutional WNL  [ x  ] Cardio WNL  [ x  ] Resp WNL  [ x  ] GI WNL  [ x  ]  WNL  [   ] Heme WNL  [   ] Endo WNL  [   ] Skin WNL  [   ] MSK WNL  [   ] Neuro WNL  [   ] Cognitive WNL  [   ] Psych WNL    OBJECTIVE  Vital Signs Last 24 Hrs  T(C): 36.7 (01 Apr 2019 07:39), Max: 36.8 (01 Apr 2019 06:08)  T(F): 98 (01 Apr 2019 07:39), Max: 98.3 (01 Apr 2019 06:08)  HR: 66 (01 Apr 2019 07:39) (66 - 77)  BP: 116/80 (01 Apr 2019 07:39) (116/80 - 143/76)  RR: 14 (01 Apr 2019 07:39) (14 - 14)  SpO2: 100% (01 Apr 2019 07:39) (100% - 100%)    PHYSICAL EXAM  General: NAD  Cardio: S1S2+  Resp: clear  Abdomen: soft, NT , distended  Extrem: no edema or calf tenderness, Motor UE 5/5, RLE 4/5, left HF 3/5, KE 4/5, ankle 4/5 , EHL left weaker that left  Sensory : impaired to light touch left > right   Skin: back incision with staples + No erythema or edema noted    Functional Exam:   Transfers: CG/min assist (sit/stand); mod/max assist (sit/supine)  Gait: 130' with RW/min assist  ADLs: eating: independent; toileting transfer: min assist, upper bod dressing: mod assist; lower body dressing: max assist    MEDICATIONS  (STANDING):  alfuzosin 10 milliGRAM(s) Oral at bedtime  ascorbic acid 500 milliGRAM(s) Oral two times a day  atorvastatin 20 milliGRAM(s) Oral at bedtime  dextrose 5%. 1000 milliLiter(s) (50 mL/Hr) IV Continuous <Continuous>  dextrose 50% Injectable 12.5 Gram(s) IV Push once  dextrose 50% Injectable 25 Gram(s) IV Push once  dextrose 50% Injectable 25 Gram(s) IV Push once  docusate sodium 100 milliGRAM(s) Oral three times a day  enoxaparin Injectable 40 milliGRAM(s) SubCutaneous <User Schedule>  gabapentin 600 milliGRAM(s) Oral three times a day  insulin lispro (HumaLOG) corrective regimen sliding scale   SubCutaneous three times a day before meals  insulin lispro (HumaLOG) corrective regimen sliding scale   SubCutaneous at bedtime  lidocaine   Patch 2 Patch Transdermal <User Schedule>  losartan 100 milliGRAM(s) Oral daily  metFORMIN 250 milliGRAM(s) Oral two times a day  multivitamin 1 Tablet(s) Oral daily  NIFEdipine XL 90 milliGRAM(s) Oral daily  pantoprazole    Tablet 40 milliGRAM(s) Oral before breakfast  polyethylene glycol 3350 17 Gram(s) Oral two times a day    MEDICATIONS  (PRN):  acetaminophen   Tablet .. 650 milliGRAM(s) Oral every 4 hours PRN Mild Pain (1 - 3)  aluminum hydroxide/magnesium hydroxide/simethicone Suspension 30 milliLiter(s) Oral every 4 hours PRN Dyspepsia  bisacodyl Suppository 10 milliGRAM(s) Rectal daily PRN Constipation  dextrose 40% Gel 15 Gram(s) Oral once PRN Blood Glucose LESS THAN 70 milliGRAM(s)/deciliter  glucagon  Injectable 1 milliGRAM(s) IntraMuscular once PRN Glucose LESS THAN 70 milligrams/deciliter  melatonin 3 milliGRAM(s) Oral at bedtime PRN Insomnia  senna 2 Tablet(s) Oral at bedtime PRN Constipation  traMADol 25 milliGRAM(s) Oral every 6 hours PRN Severe Pain (7 - 10)        CAPILLARY BLOOD GLUCOSE  POCT Blood Glucose.: 158 mg/dL (01 Apr 2019 07:37)  POCT Blood Glucose.: 184 mg/dL (31 Mar 2019 21:23)  POCT Blood Glucose.: 175 mg/dL (31 Mar 2019 16:58)  POCT Blood Glucose.: 155 mg/dL (31 Mar 2019 11:43)        LABS:                        9.9    8.2   )-----------( 296      ( 01 Apr 2019 05:30 )             30.7     01 Apr 2019 05:30    140    |  104    |  19     ----------------------------<  153    4.2     |  27     |  0.78     Ca    9.4        01 Apr 2019 05:30            A/P:    58 year-old man with a PMH of HTN, HLD, DM presented with weakness, found to have 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    Continue full rehab program: PT/OT 3 hrs/day 5 days/week    Surgical incision: staple removal 4/1. OK to shower per discharge summary.     DVT prophylaxis: on Lovenox    DM-II: Diet, SSI, continue metformin 250mg BID    HTN: on losartan and Procardia    Hyperlipidemia: on statin    GI prophylaxis: on protonix    Pain management: on tylenol prn, and gabapentin.  Ice prn.  continue lidoderm patch and tramadol PRN.    Bowel program: Post op ileus improved, continue current bowel regimen- Miralax BID, bisacodyl supp and senna prn    Hospitalist consult noted    Bladder program: Toilet schedule prn. Latest .  DC Bladder scans. Continue alfuzosin.

## 2019-04-01 NOTE — PROGRESS NOTE ADULT - SUBJECTIVE AND OBJECTIVE BOX
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      Vital Signs Last 24 Hrs  T(C): 36.7 (01 Apr 2019 07:39), Max: 36.8 (01 Apr 2019 06:08)  T(F): 98 (01 Apr 2019 07:39), Max: 98.3 (01 Apr 2019 06:08)  HR: 66 (01 Apr 2019 07:39) (66 - 77)  BP: 116/80 (01 Apr 2019 07:39) (116/80 - 143/76)  BP(mean): --  RR: 14 (01 Apr 2019 07:39) (14 - 14)  SpO2: 100% (01 Apr 2019 07:39) (100% - 100%)      NAD, EVELYN, MMM, NCAT  SUPPLE  CLEAR  S1S2  SOFT NT BS PRESENT  NO PEDAL EDEMA  AAO X 3  BL LE WEAKNESS, LEFT > RIGHT, UE WEAKNESS LEFT > RIGHT      Hemoglobin A1C, Whole Blood: 6.9                 9.9                  140  | 27   | 19           8.2   >-----------< 296     ------------------------< 153                   30.7                 4.2  | 104  | 0.78                                         Ca 9.4   Mg x     Ph x        CAPILLARY BLOOD GLUCOSE      POCT Blood Glucose.: 158 mg/dL (01 Apr 2019 07:37)  POCT Blood Glucose.: 184 mg/dL (31 Mar 2019 21:23)  POCT Blood Glucose.: 175 mg/dL (31 Mar 2019 16:58)  POCT Blood Glucose.: 155 mg/dL (31 Mar 2019 11:43)

## 2019-04-02 LAB
GLUCOSE BLDC GLUCOMTR-MCNC: 136 MG/DL — HIGH (ref 70–99)
GLUCOSE BLDC GLUCOMTR-MCNC: 138 MG/DL — HIGH (ref 70–99)
GLUCOSE BLDC GLUCOMTR-MCNC: 145 MG/DL — HIGH (ref 70–99)
GLUCOSE BLDC GLUCOMTR-MCNC: 170 MG/DL — HIGH (ref 70–99)

## 2019-04-02 PROCEDURE — 99232 SBSQ HOSP IP/OBS MODERATE 35: CPT | Mod: GC

## 2019-04-02 RX ORDER — SENNA PLUS 8.6 MG/1
2 TABLET ORAL AT BEDTIME
Qty: 0 | Refills: 0 | Status: DISCONTINUED | OUTPATIENT
Start: 2019-04-02 | End: 2019-04-09

## 2019-04-02 RX ORDER — NIFEDIPINE 30 MG
60 TABLET, EXTENDED RELEASE 24 HR ORAL
Qty: 0 | Refills: 0 | Status: DISCONTINUED | OUTPATIENT
Start: 2019-04-02 | End: 2019-04-08

## 2019-04-02 RX ADMIN — PANTOPRAZOLE SODIUM 40 MILLIGRAM(S): 20 TABLET, DELAYED RELEASE ORAL at 05:55

## 2019-04-02 RX ADMIN — TRAMADOL HYDROCHLORIDE 25 MILLIGRAM(S): 50 TABLET ORAL at 14:14

## 2019-04-02 RX ADMIN — GABAPENTIN 600 MILLIGRAM(S): 400 CAPSULE ORAL at 05:56

## 2019-04-02 RX ADMIN — Medication 1 TABLET(S): at 11:44

## 2019-04-02 RX ADMIN — METFORMIN HYDROCHLORIDE 250 MILLIGRAM(S): 850 TABLET ORAL at 05:55

## 2019-04-02 RX ADMIN — Medication 100 MILLIGRAM(S): at 13:08

## 2019-04-02 RX ADMIN — POLYETHYLENE GLYCOL 3350 17 GRAM(S): 17 POWDER, FOR SOLUTION ORAL at 06:01

## 2019-04-02 RX ADMIN — GABAPENTIN 600 MILLIGRAM(S): 400 CAPSULE ORAL at 21:02

## 2019-04-02 RX ADMIN — ALFUZOSIN HYDROCHLORIDE 10 MILLIGRAM(S): 10 TABLET, EXTENDED RELEASE ORAL at 21:01

## 2019-04-02 RX ADMIN — Medication 500 MILLIGRAM(S): at 05:56

## 2019-04-02 RX ADMIN — LIDOCAINE 2 PATCH: 4 CREAM TOPICAL at 18:30

## 2019-04-02 RX ADMIN — METFORMIN HYDROCHLORIDE 250 MILLIGRAM(S): 850 TABLET ORAL at 13:08

## 2019-04-02 RX ADMIN — POLYETHYLENE GLYCOL 3350 17 GRAM(S): 17 POWDER, FOR SOLUTION ORAL at 17:12

## 2019-04-02 RX ADMIN — LIDOCAINE 2 PATCH: 4 CREAM TOPICAL at 07:38

## 2019-04-02 RX ADMIN — LIDOCAINE 2 PATCH: 4 CREAM TOPICAL at 06:03

## 2019-04-02 RX ADMIN — LOSARTAN POTASSIUM 100 MILLIGRAM(S): 100 TABLET, FILM COATED ORAL at 05:55

## 2019-04-02 RX ADMIN — GABAPENTIN 600 MILLIGRAM(S): 400 CAPSULE ORAL at 13:08

## 2019-04-02 RX ADMIN — Medication 100 MILLIGRAM(S): at 06:01

## 2019-04-02 RX ADMIN — Medication 100 MILLIGRAM(S): at 21:02

## 2019-04-02 RX ADMIN — SENNA PLUS 2 TABLET(S): 8.6 TABLET ORAL at 21:03

## 2019-04-02 RX ADMIN — ENOXAPARIN SODIUM 40 MILLIGRAM(S): 100 INJECTION SUBCUTANEOUS at 21:02

## 2019-04-02 RX ADMIN — Medication 500 MILLIGRAM(S): at 17:11

## 2019-04-02 RX ADMIN — METFORMIN HYDROCHLORIDE 250 MILLIGRAM(S): 850 TABLET ORAL at 21:04

## 2019-04-02 RX ADMIN — TRAMADOL HYDROCHLORIDE 25 MILLIGRAM(S): 50 TABLET ORAL at 13:18

## 2019-04-02 RX ADMIN — Medication 90 MILLIGRAM(S): at 05:56

## 2019-04-02 RX ADMIN — ATORVASTATIN CALCIUM 20 MILLIGRAM(S): 80 TABLET, FILM COATED ORAL at 21:02

## 2019-04-02 NOTE — PROGRESS NOTE ADULT - ATTENDING COMMENTS
Chart reviewed. Patient seen at bedside. Had a good night and pain well controlled  No BM for 2 days. reports some discomfort. will add suppository     2. BP varied. Patient reports some dizziness this afternoon. overall trend lower. will reduce dose of procardia xl 60 mg daily Chart reviewed. Patient seen at bedside. Had a good night and pain well controlled  No BM for 2 days. reports some discomfort. will add suppository     2. BP varied. Patient reports some dizziness this afternoon. overall trend lower. will reduce dose of procardia xl 60 mg daily    Addendum :  Alternate staples removed today. Incision clean and healing well

## 2019-04-02 NOTE — PROGRESS NOTE ADULT - SUBJECTIVE AND OBJECTIVE BOX
INTERVAL SUBJECTIVE & REVIEW OF SYMPTOMS:  Pt seen and examined at bedside.  No acute events overnight.  Pain is well controlled on current regimen.  No BM since 3/31; (+) flatus.      REVIEW OF SYSTEMS  [ x  ] Constitutional WNL  [ x  ] Cardio WNL  [ x  ] Resp WNL  [ x  ] GI WNL  [ x  ]  WNL  [   ] Heme WNL  [   ] Endo WNL  [   ] Skin WNL  [   ] MSK WNL  [   ] Neuro WNL  [   ] Cognitive WNL  [   ] Psych WNL    OBJECTIVE  Vital Signs Last 24 Hrs  T(C): 37.1 (02 Apr 2019 08:46), Max: 37.1 (02 Apr 2019 08:46)  T(F): 98.8 (02 Apr 2019 08:46), Max: 98.8 (02 Apr 2019 08:46)  HR: 76 (02 Apr 2019 08:46) (76 - 89)  BP: 114/70 (02 Apr 2019 08:46) (114/70 - 128/73)  RR: 14 (02 Apr 2019 08:46) (14 - 14)  SpO2: 99% (02 Apr 2019 08:46) (97% - 99%)    PHYSICAL EXAM  General: NAD  Cardio: S1S2+  Resp: clear  Abdomen: soft, NT , distended  Extrem: no edema or calf tenderness, Motor UE 5/5, RLE 4/5, left HF 3/5, KE 4/5, ankle 4/5 , EHL left weaker that left  Sensory : impaired to light touch left > right   Skin: back incision with staples + No erythema or edema noted    Functional Exam:   Transfers: CG/min assist (sit/stand); mod/max assist (sit/supine)  Gait: 130' with RW/min assist  ADLs: eating: independent; toileting transfer: min assist, upper bod dressing: mod assist; lower body dressing: max assist    MEDICATIONS  (STANDING):  alfuzosin 10 milliGRAM(s) Oral at bedtime  ascorbic acid 500 milliGRAM(s) Oral two times a day  atorvastatin 20 milliGRAM(s) Oral at bedtime  dextrose 5%. 1000 milliLiter(s) (50 mL/Hr) IV Continuous <Continuous>  dextrose 50% Injectable 12.5 Gram(s) IV Push once  dextrose 50% Injectable 25 Gram(s) IV Push once  dextrose 50% Injectable 25 Gram(s) IV Push once  docusate sodium 100 milliGRAM(s) Oral three times a day  enoxaparin Injectable 40 milliGRAM(s) SubCutaneous <User Schedule>  gabapentin 600 milliGRAM(s) Oral three times a day  insulin lispro (HumaLOG) corrective regimen sliding scale   SubCutaneous three times a day before meals  insulin lispro (HumaLOG) corrective regimen sliding scale   SubCutaneous at bedtime  lidocaine   Patch 2 Patch Transdermal <User Schedule>  losartan 100 milliGRAM(s) Oral daily  metFORMIN 250 milliGRAM(s) Oral three times a day  multivitamin 1 Tablet(s) Oral daily  NIFEdipine XL 90 milliGRAM(s) Oral daily  pantoprazole    Tablet 40 milliGRAM(s) Oral before breakfast  polyethylene glycol 3350 17 Gram(s) Oral two times a day    MEDICATIONS  (PRN):  acetaminophen   Tablet .. 650 milliGRAM(s) Oral every 4 hours PRN Mild Pain (1 - 3)  aluminum hydroxide/magnesium hydroxide/simethicone Suspension 30 milliLiter(s) Oral every 4 hours PRN Dyspepsia  bisacodyl Suppository 10 milliGRAM(s) Rectal daily PRN Constipation  dextrose 40% Gel 15 Gram(s) Oral once PRN Blood Glucose LESS THAN 70 milliGRAM(s)/deciliter  glucagon  Injectable 1 milliGRAM(s) IntraMuscular once PRN Glucose LESS THAN 70 milligrams/deciliter  melatonin 3 milliGRAM(s) Oral at bedtime PRN Insomnia  senna 2 Tablet(s) Oral at bedtime PRN Constipation  traMADol 25 milliGRAM(s) Oral every 6 hours PRN Severe Pain (7 - 10)      CAPILLARY BLOOD GLUCOSE      POCT Blood Glucose.: 145 mg/dL (02 Apr 2019 07:37)  POCT Blood Glucose.: 147 mg/dL (01 Apr 2019 21:30)  POCT Blood Glucose.: 154 mg/dL (01 Apr 2019 16:34)  POCT Blood Glucose.: 133 mg/dL (01 Apr 2019 11:49)        LABS:                        9.9    8.2   )-----------( 296      ( 01 Apr 2019 05:30 )             30.7     01 Apr 2019 05:30    140    |  104    |  19     ----------------------------<  153    4.2     |  27     |  0.78     Ca    9.4        01 Apr 2019 05:30            A/P:    58 year-old man with a PMH of HTN, HLD, DM presented with weakness, found to have 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    Continue full rehab program: PT/OT 3 hrs/day 5 days/week    Surgical incision: staple removal 4/1. OK to shower per discharge summary.     DVT prophylaxis: on Lovenox    DM-II: Diet, SSI, continue metformin 250mg BID    HTN: on losartan and Procardia    Hyperlipidemia: on statin    GI prophylaxis: on protonix    Pain management: on tylenol prn, and gabapentin.  Ice prn.  continue lidoderm patch and tramadol PRN.    Bowel program: Post op ileus improved, continue current bowel regimen- Miralax BID, bisacodyl supp and senna prn.  May require suppository if no BM by this afternoon.      Hospitalist consult noted    Bladder program: Toilet schedule prn. Latest .  DC Bladder scans. Continue alfuzosin. INTERVAL SUBJECTIVE & REVIEW OF SYMPTOMS:  Pt seen and examined at bedside.  No acute events overnight.  Pain is well controlled on current regimen.  No BM since 3/31; (+) flatus.      REVIEW OF SYSTEMS  [ x  ] Constitutional WNL  [ x  ] Cardio WNL  [ x  ] Resp WNL  [ x  ] GI WNL  [ x  ]  WNL  [   ] Heme WNL  [   ] Endo WNL  [   ] Skin WNL  [   ] MSK WNL  [   ] Neuro WNL  [   ] Cognitive WNL  [   ] Psych WNL    OBJECTIVE  Vital Signs Last 24 Hrs  T(C): 37.1 (02 Apr 2019 08:46), Max: 37.1 (02 Apr 2019 08:46)  T(F): 98.8 (02 Apr 2019 08:46), Max: 98.8 (02 Apr 2019 08:46)  HR: 76 (02 Apr 2019 08:46) (76 - 89)  BP: 114/70 (02 Apr 2019 08:46) (114/70 - 128/73)  RR: 14 (02 Apr 2019 08:46) (14 - 14)  SpO2: 99% (02 Apr 2019 08:46) (97% - 99%)    PHYSICAL EXAM  General: NAD  Cardio: S1S2+  Resp: clear  Abdomen: soft, NT , distended  Extrem: no edema or calf tenderness, Motor UE 5/5, RLE 4/5, left HF 3/5, KE 4/5, ankle 4/5 , EHL left weaker that left  Sensory : impaired to light touch left > right   Skin: back incision with staples + No erythema or edema noted    Functional Exam:   Transfers: CG/min assist (sit/stand); mod/max assist (sit/supine)  Gait: 130' with RW/min assist  ADLs: eating: independent; toileting transfer: min assist, upper bod dressing: mod assist; lower body dressing: max assist    MEDICATIONS  (STANDING):  alfuzosin 10 milliGRAM(s) Oral at bedtime  ascorbic acid 500 milliGRAM(s) Oral two times a day  atorvastatin 20 milliGRAM(s) Oral at bedtime  dextrose 5%. 1000 milliLiter(s) (50 mL/Hr) IV Continuous <Continuous>  dextrose 50% Injectable 12.5 Gram(s) IV Push once  dextrose 50% Injectable 25 Gram(s) IV Push once  dextrose 50% Injectable 25 Gram(s) IV Push once  docusate sodium 100 milliGRAM(s) Oral three times a day  enoxaparin Injectable 40 milliGRAM(s) SubCutaneous <User Schedule>  gabapentin 600 milliGRAM(s) Oral three times a day  insulin lispro (HumaLOG) corrective regimen sliding scale   SubCutaneous three times a day before meals  insulin lispro (HumaLOG) corrective regimen sliding scale   SubCutaneous at bedtime  lidocaine   Patch 2 Patch Transdermal <User Schedule>  losartan 100 milliGRAM(s) Oral daily  metFORMIN 250 milliGRAM(s) Oral three times a day  multivitamin 1 Tablet(s) Oral daily  NIFEdipine XL 90 milliGRAM(s) Oral daily  pantoprazole    Tablet 40 milliGRAM(s) Oral before breakfast  polyethylene glycol 3350 17 Gram(s) Oral two times a day    MEDICATIONS  (PRN):  acetaminophen   Tablet .. 650 milliGRAM(s) Oral every 4 hours PRN Mild Pain (1 - 3)  aluminum hydroxide/magnesium hydroxide/simethicone Suspension 30 milliLiter(s) Oral every 4 hours PRN Dyspepsia  bisacodyl Suppository 10 milliGRAM(s) Rectal daily PRN Constipation  dextrose 40% Gel 15 Gram(s) Oral once PRN Blood Glucose LESS THAN 70 milliGRAM(s)/deciliter  glucagon  Injectable 1 milliGRAM(s) IntraMuscular once PRN Glucose LESS THAN 70 milligrams/deciliter  melatonin 3 milliGRAM(s) Oral at bedtime PRN Insomnia  senna 2 Tablet(s) Oral at bedtime PRN Constipation  traMADol 25 milliGRAM(s) Oral every 6 hours PRN Severe Pain (7 - 10)      CAPILLARY BLOOD GLUCOSE      POCT Blood Glucose.: 145 mg/dL (02 Apr 2019 07:37)  POCT Blood Glucose.: 147 mg/dL (01 Apr 2019 21:30)  POCT Blood Glucose.: 154 mg/dL (01 Apr 2019 16:34)  POCT Blood Glucose.: 133 mg/dL (01 Apr 2019 11:49)        LABS:                        9.9    8.2   )-----------( 296      ( 01 Apr 2019 05:30 )             30.7     01 Apr 2019 05:30    140    |  104    |  19     ----------------------------<  153    4.2     |  27     |  0.78     Ca    9.4        01 Apr 2019 05:30            A/P:    58 year-old man with a PMH of HTN, HLD, DM presented with weakness, found to have 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    Continue full rehab program: PT/OT 3 hrs/day 5 days/week    Surgical incision: staple removal 4/1. OK to shower per discharge summary.     DVT prophylaxis: on Lovenox    DM-II: Diet, SSI, continue metformin 250mg TID ( Dose changed to TID 4/1)     HTN: on losartan and Procardia    Hyperlipidemia: on statin    GI prophylaxis: on protonix    Pain management: on tylenol prn, and gabapentin.  Ice prn.  continue lidoderm patch and tramadol PRN.    Bowel program: Post op ileus improved, continue current bowel regimen- Miralax BID, bisacodyl supp and senna prn.  May require suppository if no BM by this afternoon.      Hospitalist consult noted    Bladder program: Toilet schedule prn. Latest .  DC Bladder scans. Continue alfuzosin.

## 2019-04-03 LAB
ANION GAP SERPL CALC-SCNC: 9 MMOL/L — SIGNIFICANT CHANGE UP (ref 5–17)
BUN SERPL-MCNC: 25 MG/DL — HIGH (ref 7–23)
CALCIUM SERPL-MCNC: 9 MG/DL — SIGNIFICANT CHANGE UP (ref 8.4–10.5)
CHLORIDE SERPL-SCNC: 104 MMOL/L — SIGNIFICANT CHANGE UP (ref 96–108)
CO2 SERPL-SCNC: 27 MMOL/L — SIGNIFICANT CHANGE UP (ref 22–31)
CREAT SERPL-MCNC: 1.04 MG/DL — SIGNIFICANT CHANGE UP (ref 0.5–1.3)
GLUCOSE BLDC GLUCOMTR-MCNC: 117 MG/DL — HIGH (ref 70–99)
GLUCOSE BLDC GLUCOMTR-MCNC: 155 MG/DL — HIGH (ref 70–99)
GLUCOSE BLDC GLUCOMTR-MCNC: 158 MG/DL — HIGH (ref 70–99)
GLUCOSE BLDC GLUCOMTR-MCNC: 211 MG/DL — HIGH (ref 70–99)
GLUCOSE SERPL-MCNC: 165 MG/DL — HIGH (ref 70–99)
HCT VFR BLD CALC: 30.5 % — LOW (ref 39–50)
HGB BLD-MCNC: 9.9 G/DL — LOW (ref 13–17)
MCHC RBC-ENTMCNC: 28.2 PG — SIGNIFICANT CHANGE UP (ref 27–34)
MCHC RBC-ENTMCNC: 32.3 GM/DL — SIGNIFICANT CHANGE UP (ref 32–36)
MCV RBC AUTO: 87.2 FL — SIGNIFICANT CHANGE UP (ref 80–100)
PLATELET # BLD AUTO: 271 K/UL — SIGNIFICANT CHANGE UP (ref 150–400)
POTASSIUM SERPL-MCNC: 3.9 MMOL/L — SIGNIFICANT CHANGE UP (ref 3.5–5.3)
POTASSIUM SERPL-SCNC: 3.9 MMOL/L — SIGNIFICANT CHANGE UP (ref 3.5–5.3)
RBC # BLD: 3.5 M/UL — LOW (ref 4.2–5.8)
RBC # FLD: 15.2 % — HIGH (ref 10.3–14.5)
SODIUM SERPL-SCNC: 140 MMOL/L — SIGNIFICANT CHANGE UP (ref 135–145)
WBC # BLD: 8.5 K/UL — SIGNIFICANT CHANGE UP (ref 3.8–10.5)
WBC # FLD AUTO: 8.5 K/UL — SIGNIFICANT CHANGE UP (ref 3.8–10.5)

## 2019-04-03 PROCEDURE — 99233 SBSQ HOSP IP/OBS HIGH 50: CPT

## 2019-04-03 PROCEDURE — 99232 SBSQ HOSP IP/OBS MODERATE 35: CPT

## 2019-04-03 RX ADMIN — GABAPENTIN 600 MILLIGRAM(S): 400 CAPSULE ORAL at 06:24

## 2019-04-03 RX ADMIN — LOSARTAN POTASSIUM 100 MILLIGRAM(S): 100 TABLET, FILM COATED ORAL at 06:25

## 2019-04-03 RX ADMIN — ALFUZOSIN HYDROCHLORIDE 10 MILLIGRAM(S): 10 TABLET, EXTENDED RELEASE ORAL at 21:43

## 2019-04-03 RX ADMIN — Medication 1 TABLET(S): at 12:27

## 2019-04-03 RX ADMIN — ENOXAPARIN SODIUM 40 MILLIGRAM(S): 100 INJECTION SUBCUTANEOUS at 21:43

## 2019-04-03 RX ADMIN — TRAMADOL HYDROCHLORIDE 25 MILLIGRAM(S): 50 TABLET ORAL at 08:52

## 2019-04-03 RX ADMIN — GABAPENTIN 600 MILLIGRAM(S): 400 CAPSULE ORAL at 13:24

## 2019-04-03 RX ADMIN — TRAMADOL HYDROCHLORIDE 25 MILLIGRAM(S): 50 TABLET ORAL at 02:00

## 2019-04-03 RX ADMIN — Medication 500 MILLIGRAM(S): at 06:24

## 2019-04-03 RX ADMIN — Medication 500 MILLIGRAM(S): at 17:10

## 2019-04-03 RX ADMIN — LIDOCAINE 2 PATCH: 4 CREAM TOPICAL at 19:00

## 2019-04-03 RX ADMIN — METFORMIN HYDROCHLORIDE 250 MILLIGRAM(S): 850 TABLET ORAL at 21:45

## 2019-04-03 RX ADMIN — Medication 100 MILLIGRAM(S): at 21:44

## 2019-04-03 RX ADMIN — Medication 1: at 07:28

## 2019-04-03 RX ADMIN — TRAMADOL HYDROCHLORIDE 25 MILLIGRAM(S): 50 TABLET ORAL at 05:00

## 2019-04-03 RX ADMIN — PANTOPRAZOLE SODIUM 40 MILLIGRAM(S): 20 TABLET, DELAYED RELEASE ORAL at 06:24

## 2019-04-03 RX ADMIN — Medication 100 MILLIGRAM(S): at 13:23

## 2019-04-03 RX ADMIN — Medication 100 MILLIGRAM(S): at 06:24

## 2019-04-03 RX ADMIN — METFORMIN HYDROCHLORIDE 250 MILLIGRAM(S): 850 TABLET ORAL at 13:24

## 2019-04-03 RX ADMIN — Medication 650 MILLIGRAM(S): at 14:15

## 2019-04-03 RX ADMIN — Medication 650 MILLIGRAM(S): at 13:49

## 2019-04-03 RX ADMIN — TRAMADOL HYDROCHLORIDE 25 MILLIGRAM(S): 50 TABLET ORAL at 22:30

## 2019-04-03 RX ADMIN — TRAMADOL HYDROCHLORIDE 25 MILLIGRAM(S): 50 TABLET ORAL at 09:00

## 2019-04-03 RX ADMIN — Medication 30 MILLILITER(S): at 02:00

## 2019-04-03 RX ADMIN — METFORMIN HYDROCHLORIDE 250 MILLIGRAM(S): 850 TABLET ORAL at 06:25

## 2019-04-03 RX ADMIN — SENNA PLUS 2 TABLET(S): 8.6 TABLET ORAL at 21:44

## 2019-04-03 RX ADMIN — ATORVASTATIN CALCIUM 20 MILLIGRAM(S): 80 TABLET, FILM COATED ORAL at 21:44

## 2019-04-03 RX ADMIN — GABAPENTIN 600 MILLIGRAM(S): 400 CAPSULE ORAL at 21:45

## 2019-04-03 RX ADMIN — LIDOCAINE 2 PATCH: 4 CREAM TOPICAL at 07:28

## 2019-04-03 RX ADMIN — POLYETHYLENE GLYCOL 3350 17 GRAM(S): 17 POWDER, FOR SOLUTION ORAL at 06:25

## 2019-04-03 RX ADMIN — Medication 1: at 17:09

## 2019-04-03 RX ADMIN — TRAMADOL HYDROCHLORIDE 25 MILLIGRAM(S): 50 TABLET ORAL at 21:49

## 2019-04-03 NOTE — PROGRESS NOTE ADULT - SUBJECTIVE AND OBJECTIVE BOX
INTERVAL SUBJECTIVE & REVIEW OF SYMPTOMS:  Pt seen and examined at bedside.  Slept well. Takes tramadol at night  + bm x2. did not have to take supp      REVIEW OF SYSTEMS  [ x  ] Constitutional WNL  [ x  ] Cardio WNL  [ x  ] Resp WNL  [ x  ] GI WNL  [ x  ]  WNL  [   ] Heme WNL  [   ] Endo WNL  [   ] Skin WNL  [   ] MSK WNL    OBJECTIVE  Vital Signs Last 24 Hrs  T(C): 36.9 (03 Apr 2019 07:27), Max: 36.9 (03 Apr 2019 07:27)  T(F): 98.4 (03 Apr 2019 07:27), Max: 98.4 (03 Apr 2019 07:27)  HR: 98 (03 Apr 2019 07:27) (80 - 98)  BP: 125/78 (03 Apr 2019 07:27) (114/76 - 128/80)  BP(mean): --  RR: 14 (03 Apr 2019 07:27) (14 - 14)  SpO2: 99% (03 Apr 2019 07:27) (97% - 99%)        PHYSICAL EXAM  General: NAD  Cardio: S1S2+  Resp: clear  Abdomen: soft, NT , distended  Extrem: no edema or calf tenderness, Motor UE 5/5, RLE 4/5, left HF 3/5, KE 4/5, ankle 4/5 , EHL left weaker that left  Sensory : impaired to light touch left > right   Skin: back incision with staples +( some removed on 4/2)  No erythema or edema noted    Functional Exam:   Transfers: CG/CS  Gait: ' with CG  ADLs:toileting transfer: min assist, upper bod dressing: mod assist; lower body dressing: max assist      MEDICATIONS  (STANDING):  alfuzosin 10 milliGRAM(s) Oral at bedtime  ascorbic acid 500 milliGRAM(s) Oral two times a day  atorvastatin 20 milliGRAM(s) Oral at bedtime  dextrose 5%. 1000 milliLiter(s) (50 mL/Hr) IV Continuous <Continuous>  dextrose 50% Injectable 12.5 Gram(s) IV Push once  dextrose 50% Injectable 25 Gram(s) IV Push once  dextrose 50% Injectable 25 Gram(s) IV Push once  docusate sodium 100 milliGRAM(s) Oral three times a day  enoxaparin Injectable 40 milliGRAM(s) SubCutaneous <User Schedule>  gabapentin 600 milliGRAM(s) Oral three times a day  insulin lispro (HumaLOG) corrective regimen sliding scale   SubCutaneous three times a day before meals  insulin lispro (HumaLOG) corrective regimen sliding scale   SubCutaneous at bedtime  lidocaine   Patch 2 Patch Transdermal <User Schedule>  losartan 100 milliGRAM(s) Oral daily  metFORMIN 250 milliGRAM(s) Oral three times a day  multivitamin 1 Tablet(s) Oral daily  NIFEdipine XL 60 milliGRAM(s) Oral <User Schedule>  pantoprazole    Tablet 40 milliGRAM(s) Oral before breakfast  polyethylene glycol 3350 17 Gram(s) Oral two times a day  senna 2 Tablet(s) Oral at bedtime    MEDICATIONS  (PRN):  acetaminophen   Tablet .. 650 milliGRAM(s) Oral every 4 hours PRN Mild Pain (1 - 3)  aluminum hydroxide/magnesium hydroxide/simethicone Suspension 30 milliLiter(s) Oral every 4 hours PRN Dyspepsia  bisacodyl Suppository 10 milliGRAM(s) Rectal daily PRN Constipation  dextrose 40% Gel 15 Gram(s) Oral once PRN Blood Glucose LESS THAN 70 milliGRAM(s)/deciliter  glucagon  Injectable 1 milliGRAM(s) IntraMuscular once PRN Glucose LESS THAN 70 milligrams/deciliter  melatonin 3 milliGRAM(s) Oral at bedtime PRN Insomnia  traMADol 25 milliGRAM(s) Oral every 6 hours PRN Severe Pain (7 - 10)    CAPILLARY BLOOD GLUCOSE      POCT Blood Glucose.: 155 mg/dL (03 Apr 2019 07:25)  POCT Blood Glucose.: 170 mg/dL (02 Apr 2019 20:59)  POCT Blood Glucose.: 138 mg/dL (02 Apr 2019 16:45)  POCT Blood Glucose.: 136 mg/dL (02 Apr 2019 11:41)    LABS:                        9.9    8.2   )-----------( 296      ( 01 Apr 2019 05:30 )             30.7     01 Apr 2019 05:30    140    |  104    |  19     ----------------------------<  153    4.2     |  27     |  0.78     Ca    9.4        01 Apr 2019 05:30            A/P:    58 year-old man with a PMH of HTN, HLD, DM presented with weakness, found to have 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    Continue full rehab program: PT/OT 3 hrs/day 5 days/week    DVT prophylaxis: on Lovenox    DM-II: Diet, SSI, metformin 250mg TID ( Dose changed to TID 4/1)     HTN: on losartan and Procardia. Procardia dose reduced to 60mg daily . May need further reduction     Hyperlipidemia: on statin    GI prophylaxis: on protonix    Pain management: on tylenol prn, gabapentin, lidoderm patch and tramadol PRN. Modalities prn     bowel program: Post op ileus improved, continue current bowel regimen- Miralax BID, bisacodyl supp and senna prn.     Bladder program: Toilet schedule prn Continue alfuzosin.     Hospitalist janis noted    Called insurance company again regarding peer to peer

## 2019-04-03 NOTE — CHART NOTE - NSCHARTNOTEFT_GEN_A_CORE
Nutrition Follow Up Note  Hospital Course (Per Electronic Medical Record):   Source: Medical Record [X] Patient [X]    Diet: Consistent Carbohydrate Diet w/ Thin Liquids  Tolerates Diet Well  No Chewing/Swallowing Difficulties  No Recent Nausea, Vomiting, Diarrhea or Constipation   Consumes % of Meals (as Per Documentation) - States Improved Intake  Educated Patient on Blood Glucose Management     Enteral/Parenteral Nutrition: N/A    Current Weight: 190.4lb on 3/26  Obtain New Weight  Obtain Weights Weekly     Pertinent Medications: MEDICATIONS  (STANDING):  alfuzosin 10 milliGRAM(s) Oral at bedtime  ascorbic acid 500 milliGRAM(s) Oral two times a day  atorvastatin 20 milliGRAM(s) Oral at bedtime  dextrose 5%. 1000 milliLiter(s) (50 mL/Hr) IV Continuous <Continuous>  dextrose 50% Injectable 12.5 Gram(s) IV Push once  dextrose 50% Injectable 25 Gram(s) IV Push once  dextrose 50% Injectable 25 Gram(s) IV Push once  docusate sodium 100 milliGRAM(s) Oral three times a day  enoxaparin Injectable 40 milliGRAM(s) SubCutaneous <User Schedule>  gabapentin 600 milliGRAM(s) Oral three times a day  insulin lispro (HumaLOG) corrective regimen sliding scale   SubCutaneous three times a day before meals  insulin lispro (HumaLOG) corrective regimen sliding scale   SubCutaneous at bedtime  lidocaine   Patch 2 Patch Transdermal <User Schedule>  losartan 100 milliGRAM(s) Oral daily  metFORMIN 250 milliGRAM(s) Oral three times a day  multivitamin 1 Tablet(s) Oral daily  NIFEdipine XL 60 milliGRAM(s) Oral <User Schedule>  pantoprazole    Tablet 40 milliGRAM(s) Oral before breakfast  polyethylene glycol 3350 17 Gram(s) Oral two times a day  senna 2 Tablet(s) Oral at bedtime    MEDICATIONS  (PRN):  acetaminophen   Tablet .. 650 milliGRAM(s) Oral every 4 hours PRN Mild Pain (1 - 3)  aluminum hydroxide/magnesium hydroxide/simethicone Suspension 30 milliLiter(s) Oral every 4 hours PRN Dyspepsia  bisacodyl Suppository 10 milliGRAM(s) Rectal daily PRN Constipation  dextrose 40% Gel 15 Gram(s) Oral once PRN Blood Glucose LESS THAN 70 milliGRAM(s)/deciliter  glucagon  Injectable 1 milliGRAM(s) IntraMuscular once PRN Glucose LESS THAN 70 milligrams/deciliter  melatonin 3 milliGRAM(s) Oral at bedtime PRN Insomnia  traMADol 25 milliGRAM(s) Oral every 6 hours PRN Severe Pain (7 - 10)    Pertinent Labs:  04-03 Na140 mmol/L Glu 165 mg/dL<H> K+ 3.9 mmol/L Cr  1.04 mg/dL BUN 25 mg/dL<H> 03-11 [id number] 6.9 %<H> 03-10 Chol 123 mg/dL LDL 67 mg/dL HDL 40 mg/dL Trig 79 mg/dL    POCT (over Last 2 Days) - Ranging from 133-170  Educated Patient on Blood Glucose Management     Skin: No Pressure Ulcers     Edema: None Noted     Last BM: on 4/2    Estimated Needs:   [X] No Change since Previous Assessment    Previous Nutrition Diagnosis:   No Active Nutrition Dx @ This Present Time    Nutrition Diagnosis is [X] Not Applicable      New Nutrition Diagnosis: [X] Not Applicable    Interventions:   1. Recommend Continue Nutrition Plan of Care   2. Educated Patient on Blood Glucose Management      Monitoring & Evaluation:   [X] Weights   [X] PO Intake   [X] Follow Up (Per Protocol)  [X] Tolerance to Diet Prescription   [X] Other: Labs & POCT    RD Remains Available.  Chapincito Amador RDN

## 2019-04-03 NOTE — PROGRESS NOTE ADULT - SUBJECTIVE AND OBJECTIVE BOX
TASH AFRIA  58y  Male    Patient is a 58y old  Male who presents with a chief complaint of functional deficits 2/2 SCI from dura AVM surgery (02 Apr 2019 09:44)    Pt seen and examined, no acute events overnight     PAST MEDICAL & SURGICAL HISTORY:  HLD (hyperlipidemia)  Essential hypertension  DM (diabetes mellitus)  No significant past surgical history        MedsMEDICATIONS  (STANDING):  alfuzosin 10 milliGRAM(s) Oral at bedtime  ascorbic acid 500 milliGRAM(s) Oral two times a day  atorvastatin 20 milliGRAM(s) Oral at bedtime  dextrose 5%. 1000 milliLiter(s) (50 mL/Hr) IV Continuous <Continuous>  dextrose 50% Injectable 12.5 Gram(s) IV Push once  dextrose 50% Injectable 25 Gram(s) IV Push once  dextrose 50% Injectable 25 Gram(s) IV Push once  docusate sodium 100 milliGRAM(s) Oral three times a day  enoxaparin Injectable 40 milliGRAM(s) SubCutaneous <User Schedule>  gabapentin 600 milliGRAM(s) Oral three times a day  insulin lispro (HumaLOG) corrective regimen sliding scale   SubCutaneous three times a day before meals  insulin lispro (HumaLOG) corrective regimen sliding scale   SubCutaneous at bedtime  lidocaine   Patch 2 Patch Transdermal <User Schedule>  losartan 100 milliGRAM(s) Oral daily  metFORMIN 250 milliGRAM(s) Oral three times a day  multivitamin 1 Tablet(s) Oral daily  NIFEdipine XL 60 milliGRAM(s) Oral <User Schedule>  pantoprazole    Tablet 40 milliGRAM(s) Oral before breakfast  polyethylene glycol 3350 17 Gram(s) Oral two times a day  senna 2 Tablet(s) Oral at bedtime    MEDICATIONS  (PRN):  acetaminophen   Tablet .. 650 milliGRAM(s) Oral every 4 hours PRN Mild Pain (1 - 3)  aluminum hydroxide/magnesium hydroxide/simethicone Suspension 30 milliLiter(s) Oral every 4 hours PRN Dyspepsia  bisacodyl Suppository 10 milliGRAM(s) Rectal daily PRN Constipation  dextrose 40% Gel 15 Gram(s) Oral once PRN Blood Glucose LESS THAN 70 milliGRAM(s)/deciliter  glucagon  Injectable 1 milliGRAM(s) IntraMuscular once PRN Glucose LESS THAN 70 milligrams/deciliter  melatonin 3 milliGRAM(s) Oral at bedtime PRN Insomnia  traMADol 25 milliGRAM(s) Oral every 6 hours PRN Severe Pain (7 - 10)      Vital Signs Last 24 Hrs  T(C): 36.9 (03 Apr 2019 07:27), Max: 36.9 (03 Apr 2019 07:27)  T(F): 98.4 (03 Apr 2019 07:27), Max: 98.4 (03 Apr 2019 07:27)  HR: 98 (03 Apr 2019 07:27) (80 - 98)  BP: 125/78 (03 Apr 2019 07:27) (114/76 - 128/80)  BP(mean): --  RR: 14 (03 Apr 2019 07:27) (14 - 14)  SpO2: 99% (03 Apr 2019 07:27) (97% - 99%)    PHYSICAL EXAM:  GENERAL: NAD  HEAD:  NC/AT  EYES: EOMI, PERRLA, conjunctiva and sclera clear  NERVOUS SYSTEM:  Alert & Oriented X3  CHEST/LUNG: Clear lungs b/l  HEART: S1S2, RRR   ABDOMEN: Soft, non-tender, non-distended, + bowel sounds  SKIN: incision area cdi     LABS:                          9.9    8.5   )-----------( 271      ( 03 Apr 2019 05:16 )             30.5       04-03    140  |  104  |  25<H>  ----------------------------<  165<H>  3.9   |  27  |  1.04    Ca    9.0      03 Apr 2019 05:16                                      RADIOLOGY & ADDITIONAL TESTS:    Imaging Personally Reviewed:  [ ] YES  [ ] NO      HEALTH ISSUES - PROBLEM Dx:  Ileus, postoperative: Ileus, postoperative  Hyperlipidemia, unspecified hyperlipidemia type: Hyperlipidemia, unspecified hyperlipidemia type  Essential hypertension: Essential hypertension  DM (diabetes mellitus): DM (diabetes mellitus)  Injury of thoracic spinal cord, sequela: Injury of thoracic spinal cord, sequela          Care Discussed with Consultants/Other Providers [ x] YES  [ ] NO

## 2019-04-04 LAB
GLUCOSE BLDC GLUCOMTR-MCNC: 136 MG/DL — HIGH (ref 70–99)
GLUCOSE BLDC GLUCOMTR-MCNC: 142 MG/DL — HIGH (ref 70–99)
GLUCOSE BLDC GLUCOMTR-MCNC: 149 MG/DL — HIGH (ref 70–99)
GLUCOSE BLDC GLUCOMTR-MCNC: 150 MG/DL — HIGH (ref 70–99)

## 2019-04-04 PROCEDURE — 99232 SBSQ HOSP IP/OBS MODERATE 35: CPT | Mod: GC

## 2019-04-04 RX ORDER — TRAMADOL HYDROCHLORIDE 50 MG/1
25 TABLET ORAL EVERY 6 HOURS
Qty: 0 | Refills: 0 | Status: DISCONTINUED | OUTPATIENT
Start: 2019-04-04 | End: 2019-04-09

## 2019-04-04 RX ADMIN — GABAPENTIN 600 MILLIGRAM(S): 400 CAPSULE ORAL at 15:04

## 2019-04-04 RX ADMIN — LIDOCAINE 2 PATCH: 4 CREAM TOPICAL at 07:00

## 2019-04-04 RX ADMIN — GABAPENTIN 600 MILLIGRAM(S): 400 CAPSULE ORAL at 06:10

## 2019-04-04 RX ADMIN — POLYETHYLENE GLYCOL 3350 17 GRAM(S): 17 POWDER, FOR SOLUTION ORAL at 06:11

## 2019-04-04 RX ADMIN — LIDOCAINE 2 PATCH: 4 CREAM TOPICAL at 06:11

## 2019-04-04 RX ADMIN — ENOXAPARIN SODIUM 40 MILLIGRAM(S): 100 INJECTION SUBCUTANEOUS at 21:44

## 2019-04-04 RX ADMIN — TRAMADOL HYDROCHLORIDE 25 MILLIGRAM(S): 50 TABLET ORAL at 08:13

## 2019-04-04 RX ADMIN — Medication 100 MILLIGRAM(S): at 21:46

## 2019-04-04 RX ADMIN — Medication 1 TABLET(S): at 12:21

## 2019-04-04 RX ADMIN — METFORMIN HYDROCHLORIDE 250 MILLIGRAM(S): 850 TABLET ORAL at 15:04

## 2019-04-04 RX ADMIN — PANTOPRAZOLE SODIUM 40 MILLIGRAM(S): 20 TABLET, DELAYED RELEASE ORAL at 06:10

## 2019-04-04 RX ADMIN — LIDOCAINE 2 PATCH: 4 CREAM TOPICAL at 18:51

## 2019-04-04 RX ADMIN — Medication 500 MILLIGRAM(S): at 06:15

## 2019-04-04 RX ADMIN — TRAMADOL HYDROCHLORIDE 25 MILLIGRAM(S): 50 TABLET ORAL at 22:45

## 2019-04-04 RX ADMIN — METFORMIN HYDROCHLORIDE 250 MILLIGRAM(S): 850 TABLET ORAL at 06:10

## 2019-04-04 RX ADMIN — Medication 60 MILLIGRAM(S): at 12:21

## 2019-04-04 RX ADMIN — POLYETHYLENE GLYCOL 3350 17 GRAM(S): 17 POWDER, FOR SOLUTION ORAL at 17:36

## 2019-04-04 RX ADMIN — ATORVASTATIN CALCIUM 20 MILLIGRAM(S): 80 TABLET, FILM COATED ORAL at 21:45

## 2019-04-04 RX ADMIN — METFORMIN HYDROCHLORIDE 250 MILLIGRAM(S): 850 TABLET ORAL at 21:45

## 2019-04-04 RX ADMIN — GABAPENTIN 600 MILLIGRAM(S): 400 CAPSULE ORAL at 21:45

## 2019-04-04 RX ADMIN — TRAMADOL HYDROCHLORIDE 25 MILLIGRAM(S): 50 TABLET ORAL at 21:46

## 2019-04-04 RX ADMIN — LIDOCAINE 2 PATCH: 4 CREAM TOPICAL at 03:32

## 2019-04-04 RX ADMIN — Medication 100 MILLIGRAM(S): at 06:10

## 2019-04-04 RX ADMIN — LOSARTAN POTASSIUM 100 MILLIGRAM(S): 100 TABLET, FILM COATED ORAL at 06:11

## 2019-04-04 RX ADMIN — ALFUZOSIN HYDROCHLORIDE 10 MILLIGRAM(S): 10 TABLET, EXTENDED RELEASE ORAL at 21:45

## 2019-04-04 RX ADMIN — Medication 500 MILLIGRAM(S): at 17:36

## 2019-04-04 NOTE — PROGRESS NOTE ADULT - SUBJECTIVE AND OBJECTIVE BOX
INTERVAL SUBJECTIVE & REVIEW OF SYMPTOMS:  Pt seen and examined in therapy.  No acute events overnight.  Sleeping and eating well.  (+) BM.  No urinary complaints.      REVIEW OF SYSTEMS  [ x  ] Constitutional WNL  [ x  ] Cardio WNL  [ x  ] Resp WNL  [ x  ] GI WNL  [ x  ]  WNL  [   ] Heme WNL  [   ] Endo WNL  [   ] Skin WNL  [   ] MSK WNL    OBJECTIVE  Vital Signs Last 24 Hrs  T(C): 36.6 (03 Apr 2019 20:46), Max: 36.6 (03 Apr 2019 20:46)  T(F): 97.9 (03 Apr 2019 20:46), Max: 97.9 (03 Apr 2019 20:46)  HR: 76 (04 Apr 2019 05:55) (74 - 88)  BP: 130/75 (04 Apr 2019 05:55) (119/72 - 133/79)  RR: 14 (04 Apr 2019 05:55) (14 - 14)  SpO2: 98% (04 Apr 2019 05:55) (98% - 99%)      PHYSICAL EXAM  General: NAD  Cardio: S1S2+  Resp: clear  Abdomen: soft, NT , distended  Extrem: no edema or calf tenderness, Motor UE 5/5, RLE 4/5, left HF 3/5, KE 4/5, ankle 4/5 , EHL left weaker that left  Sensory : impaired to light touch left > right   Skin: back incision with staples +( some removed on 4/2)  No erythema or edema noted    Functional Exam:   Transfers: CG/CS except sit/supine which is min assist  Bed Mobility: sit/supine: mod assist  Gait: ' with CG  ADLs: toileting transfer: min assist, upper body dressing: mod assist; lower body dressing: max assist        MEDICATIONS  (STANDING):  alfuzosin 10 milliGRAM(s) Oral at bedtime  ascorbic acid 500 milliGRAM(s) Oral two times a day  atorvastatin 20 milliGRAM(s) Oral at bedtime  dextrose 5%. 1000 milliLiter(s) (50 mL/Hr) IV Continuous <Continuous>  dextrose 50% Injectable 12.5 Gram(s) IV Push once  dextrose 50% Injectable 25 Gram(s) IV Push once  dextrose 50% Injectable 25 Gram(s) IV Push once  docusate sodium 100 milliGRAM(s) Oral three times a day  enoxaparin Injectable 40 milliGRAM(s) SubCutaneous <User Schedule>  gabapentin 600 milliGRAM(s) Oral three times a day  insulin lispro (HumaLOG) corrective regimen sliding scale   SubCutaneous three times a day before meals  insulin lispro (HumaLOG) corrective regimen sliding scale   SubCutaneous at bedtime  lidocaine   Patch 2 Patch Transdermal <User Schedule>  losartan 100 milliGRAM(s) Oral daily  metFORMIN 250 milliGRAM(s) Oral three times a day  multivitamin 1 Tablet(s) Oral daily  NIFEdipine XL 60 milliGRAM(s) Oral <User Schedule>  pantoprazole    Tablet 40 milliGRAM(s) Oral before breakfast  polyethylene glycol 3350 17 Gram(s) Oral two times a day  senna 2 Tablet(s) Oral at bedtime    MEDICATIONS  (PRN):  acetaminophen   Tablet .. 650 milliGRAM(s) Oral every 4 hours PRN Mild Pain (1 - 3)  aluminum hydroxide/magnesium hydroxide/simethicone Suspension 30 milliLiter(s) Oral every 4 hours PRN Dyspepsia  bisacodyl Suppository 10 milliGRAM(s) Rectal daily PRN Constipation  dextrose 40% Gel 15 Gram(s) Oral once PRN Blood Glucose LESS THAN 70 milliGRAM(s)/deciliter  glucagon  Injectable 1 milliGRAM(s) IntraMuscular once PRN Glucose LESS THAN 70 milligrams/deciliter  melatonin 3 milliGRAM(s) Oral at bedtime PRN Insomnia  traMADol 25 milliGRAM(s) Oral every 6 hours PRN Severe Pain (7 - 10)      CAPILLARY BLOOD GLUCOSE  POCT Blood Glucose.: 142 mg/dL (04 Apr 2019 07:47)  POCT Blood Glucose.: 211 mg/dL (03 Apr 2019 21:41)  POCT Blood Glucose.: 158 mg/dL (03 Apr 2019 16:36)  POCT Blood Glucose.: 117 mg/dL (03 Apr 2019 12:01)      LABS:      Ca    9.0        03 Apr 2019 05:16          A/P:    58 year-old man with a PMH of HTN, HLD, DM presented with weakness, found to have 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    Continue full rehab program: PT/OT 3 hrs/day 5 days/week    DVT prophylaxis: on Lovenox    DM-II: Diet, SSI, metformin 250mg TID (Dose changed to TID 4/1)     HTN: on losartan and Procardia. Procardia dose reduced to 60mg daily . May need further reduction     Hyperlipidemia: on statin    GI prophylaxis: on protonix    Pain management: on tylenol prn, gabapentin, lidoderm patch and tramadol PRN. Modalities prn     bowel program: Post op ileus improved, continue current bowel regimen- Miralax BID, bisacodyl supp and senna prn.     Bladder program: Toilet schedule prn. Continue alfuzosin.     Hospitalist janis noted    Called insurance company again regarding peer to peer INTERVAL SUBJECTIVE & REVIEW OF SYMPTOMS:  Pt seen and examined in therapy.  No acute events overnight.  Sleeping and eating well.  (+) BM.  No urinary complaints.      REVIEW OF SYSTEMS  [ x  ] Constitutional WNL  [ x  ] Cardio WNL  [ x  ] Resp WNL  [ x  ] GI WNL  [ x  ]  WNL  [   ] Heme WNL  [   ] Endo WNL  [   ] Skin WNL  [   ] MSK WNL    OBJECTIVE  Vital Signs Last 24 Hrs  T(C): 36.6 (03 Apr 2019 20:46), Max: 36.6 (03 Apr 2019 20:46)  T(F): 97.9 (03 Apr 2019 20:46), Max: 97.9 (03 Apr 2019 20:46)  HR: 76 (04 Apr 2019 05:55) (74 - 88)  BP: 130/75 (04 Apr 2019 05:55) (119/72 - 133/79)  RR: 14 (04 Apr 2019 05:55) (14 - 14)  SpO2: 98% (04 Apr 2019 05:55) (98% - 99%)      PHYSICAL EXAM  General: NAD  Cardio: S1S2+  Resp: clear  Abdomen: soft, NT , distended  Extrem: no edema or calf tenderness, Motor UE 5/5, RLE 4/5, left HF 3/5, KE 4/5, ankle 4/5 , EHL left weaker that left  Sensory : impaired to light touch left > right   Skin: back incision with staples +( some removed on 4/2)  No erythema or edema noted    Functional Exam:   Transfers: CG/CS except sit/supine which is min assist  Bed Mobility: sit/supine: mod assist  Gait: ' with CG  ADLs: toileting transfer: min assist, upper body dressing: mod assist; lower body dressing: max assist        MEDICATIONS  (STANDING):  alfuzosin 10 milliGRAM(s) Oral at bedtime  ascorbic acid 500 milliGRAM(s) Oral two times a day  atorvastatin 20 milliGRAM(s) Oral at bedtime  dextrose 5%. 1000 milliLiter(s) (50 mL/Hr) IV Continuous <Continuous>  dextrose 50% Injectable 12.5 Gram(s) IV Push once  dextrose 50% Injectable 25 Gram(s) IV Push once  dextrose 50% Injectable 25 Gram(s) IV Push once  docusate sodium 100 milliGRAM(s) Oral three times a day  enoxaparin Injectable 40 milliGRAM(s) SubCutaneous <User Schedule>  gabapentin 600 milliGRAM(s) Oral three times a day  insulin lispro (HumaLOG) corrective regimen sliding scale   SubCutaneous three times a day before meals  insulin lispro (HumaLOG) corrective regimen sliding scale   SubCutaneous at bedtime  lidocaine   Patch 2 Patch Transdermal <User Schedule>  losartan 100 milliGRAM(s) Oral daily  metFORMIN 250 milliGRAM(s) Oral three times a day  multivitamin 1 Tablet(s) Oral daily  NIFEdipine XL 60 milliGRAM(s) Oral <User Schedule>  pantoprazole    Tablet 40 milliGRAM(s) Oral before breakfast  polyethylene glycol 3350 17 Gram(s) Oral two times a day  senna 2 Tablet(s) Oral at bedtime    MEDICATIONS  (PRN):  acetaminophen   Tablet .. 650 milliGRAM(s) Oral every 4 hours PRN Mild Pain (1 - 3)  aluminum hydroxide/magnesium hydroxide/simethicone Suspension 30 milliLiter(s) Oral every 4 hours PRN Dyspepsia  bisacodyl Suppository 10 milliGRAM(s) Rectal daily PRN Constipation  dextrose 40% Gel 15 Gram(s) Oral once PRN Blood Glucose LESS THAN 70 milliGRAM(s)/deciliter  glucagon  Injectable 1 milliGRAM(s) IntraMuscular once PRN Glucose LESS THAN 70 milligrams/deciliter  melatonin 3 milliGRAM(s) Oral at bedtime PRN Insomnia  traMADol 25 milliGRAM(s) Oral every 6 hours PRN Severe Pain (7 - 10)      CAPILLARY BLOOD GLUCOSE  POCT Blood Glucose.: 142 mg/dL (04 Apr 2019 07:47)  POCT Blood Glucose.: 211 mg/dL (03 Apr 2019 21:41)  POCT Blood Glucose.: 158 mg/dL (03 Apr 2019 16:36)  POCT Blood Glucose.: 117 mg/dL (03 Apr 2019 12:01)      LABS:      Ca    9.0        03 Apr 2019 05:16          A/P:    58 year-old man with a PMH of HTN, HLD, DM presented with weakness, found to have 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    Continue full rehab program: PT/OT 3 hrs/day 5 days/week    DVT prophylaxis: on Lovenox    DM-II: Diet, SSI, metformin 250mg TID (Dose changed to TID 4/1)     HTN: on losartan and Procardia. Procardia dose reduced to 60mg daily . May need further reduction     Hyperlipidemia: on statin    GI prophylaxis: on protonix    Pain management: on tylenol prn, gabapentin, lidoderm patch and tramadol PRN. Modalities prn     bowel program: Post op ileus improved, continue current bowel regimen- Miralax BID, bisacodyl supp and senna prn.     Bladder program: Toilet schedule prn. Continue alfuzosin.     Hospitalist janis noted

## 2019-04-04 NOTE — PROGRESS NOTE ADULT - ATTENDING COMMENTS
Chart reviewed. Patient seen at bedside.  BP stable. No further dizziness  Progressing well in therapy.   Acute rehab denied by insurance. Expedited appeal being done. Patient aware    Family training in am. D/C planning singh 4/9 or earlier based on insurance approval

## 2019-04-05 ENCOUNTER — TRANSCRIPTION ENCOUNTER (OUTPATIENT)
Age: 59
End: 2019-04-05

## 2019-04-05 LAB
ANION GAP SERPL CALC-SCNC: 11 MMOL/L — SIGNIFICANT CHANGE UP (ref 5–17)
BUN SERPL-MCNC: 17 MG/DL — SIGNIFICANT CHANGE UP (ref 7–23)
CALCIUM SERPL-MCNC: 9.5 MG/DL — SIGNIFICANT CHANGE UP (ref 8.4–10.5)
CHLORIDE SERPL-SCNC: 104 MMOL/L — SIGNIFICANT CHANGE UP (ref 96–108)
CO2 SERPL-SCNC: 25 MMOL/L — SIGNIFICANT CHANGE UP (ref 22–31)
CREAT SERPL-MCNC: 0.78 MG/DL — SIGNIFICANT CHANGE UP (ref 0.5–1.3)
GLUCOSE BLDC GLUCOMTR-MCNC: 163 MG/DL — HIGH (ref 70–99)
GLUCOSE BLDC GLUCOMTR-MCNC: 167 MG/DL — HIGH (ref 70–99)
GLUCOSE SERPL-MCNC: 147 MG/DL — HIGH (ref 70–99)
HCT VFR BLD CALC: 32.2 % — LOW (ref 39–50)
HGB BLD-MCNC: 9.8 G/DL — LOW (ref 13–17)
MCHC RBC-ENTMCNC: 26.4 PG — LOW (ref 27–34)
MCHC RBC-ENTMCNC: 30.4 GM/DL — LOW (ref 32–36)
MCV RBC AUTO: 86.9 FL — SIGNIFICANT CHANGE UP (ref 80–100)
PLATELET # BLD AUTO: 246 K/UL — SIGNIFICANT CHANGE UP (ref 150–400)
POTASSIUM SERPL-MCNC: 3.8 MMOL/L — SIGNIFICANT CHANGE UP (ref 3.5–5.3)
POTASSIUM SERPL-SCNC: 3.8 MMOL/L — SIGNIFICANT CHANGE UP (ref 3.5–5.3)
RBC # BLD: 3.71 M/UL — LOW (ref 4.2–5.8)
RBC # FLD: 15.1 % — HIGH (ref 10.3–14.5)
SODIUM SERPL-SCNC: 140 MMOL/L — SIGNIFICANT CHANGE UP (ref 135–145)
WBC # BLD: 7.4 K/UL — SIGNIFICANT CHANGE UP (ref 3.8–10.5)
WBC # FLD AUTO: 7.4 K/UL — SIGNIFICANT CHANGE UP (ref 3.8–10.5)

## 2019-04-05 PROCEDURE — 99232 SBSQ HOSP IP/OBS MODERATE 35: CPT | Mod: GC

## 2019-04-05 PROCEDURE — 99233 SBSQ HOSP IP/OBS HIGH 50: CPT

## 2019-04-05 RX ORDER — ACETAMINOPHEN 500 MG
2 TABLET ORAL
Qty: 0 | Refills: 0 | DISCHARGE
Start: 2019-04-05

## 2019-04-05 RX ORDER — ATORVASTATIN CALCIUM 80 MG/1
1 TABLET, FILM COATED ORAL
Qty: 0 | Refills: 0 | DISCHARGE
Start: 2019-04-05

## 2019-04-05 RX ORDER — NIFEDIPINE 30 MG
1 TABLET, EXTENDED RELEASE 24 HR ORAL
Qty: 0 | Refills: 0 | COMMUNITY
Start: 2019-04-05

## 2019-04-05 RX ORDER — METFORMIN HYDROCHLORIDE 850 MG/1
500 TABLET ORAL
Qty: 0 | Refills: 0 | Status: DISCONTINUED | OUTPATIENT
Start: 2019-04-05 | End: 2019-04-09

## 2019-04-05 RX ORDER — POLYETHYLENE GLYCOL 3350 17 G/17G
17 POWDER, FOR SOLUTION ORAL
Qty: 0 | Refills: 0 | COMMUNITY
Start: 2019-04-05

## 2019-04-05 RX ORDER — LOSARTAN POTASSIUM 100 MG/1
1 TABLET, FILM COATED ORAL
Qty: 0 | Refills: 0 | COMMUNITY
Start: 2019-04-05

## 2019-04-05 RX ORDER — POLYETHYLENE GLYCOL 3350 17 G/17G
17 POWDER, FOR SOLUTION ORAL
Qty: 0 | Refills: 0 | DISCHARGE
Start: 2019-04-05

## 2019-04-05 RX ORDER — METFORMIN HYDROCHLORIDE 850 MG/1
1 TABLET ORAL
Qty: 0 | Refills: 0 | COMMUNITY

## 2019-04-05 RX ORDER — SENNA PLUS 8.6 MG/1
2 TABLET ORAL
Qty: 0 | Refills: 0 | DISCHARGE
Start: 2019-04-05

## 2019-04-05 RX ORDER — LOSARTAN POTASSIUM 100 MG/1
1 TABLET, FILM COATED ORAL
Qty: 30 | Refills: 0
Start: 2019-04-05 | End: 2019-05-04

## 2019-04-05 RX ORDER — TRAMADOL HYDROCHLORIDE 50 MG/1
0.5 TABLET ORAL
Qty: 0 | Refills: 0 | COMMUNITY
Start: 2019-04-05

## 2019-04-05 RX ORDER — DOCUSATE SODIUM 100 MG
1 CAPSULE ORAL
Qty: 0 | Refills: 0 | DISCHARGE
Start: 2019-04-05

## 2019-04-05 RX ORDER — NIFEDIPINE 30 MG
1 TABLET, EXTENDED RELEASE 24 HR ORAL
Qty: 3030 | Refills: 0 | OUTPATIENT
Start: 2019-04-05 | End: 2019-04-18

## 2019-04-05 RX ORDER — METFORMIN HYDROCHLORIDE 850 MG/1
1 TABLET ORAL
Qty: 60 | Refills: 0
Start: 2019-04-05 | End: 2019-05-04

## 2019-04-05 RX ORDER — LIDOCAINE 4 G/100G
2 CREAM TOPICAL
Qty: 7 | Refills: 0
Start: 2019-04-05 | End: 2019-04-11

## 2019-04-05 RX ORDER — PANTOPRAZOLE SODIUM 20 MG/1
1 TABLET, DELAYED RELEASE ORAL
Qty: 30 | Refills: 0
Start: 2019-04-05 | End: 2019-05-04

## 2019-04-05 RX ORDER — INSULIN LISPRO 100/ML
VIAL (ML) SUBCUTANEOUS
Qty: 0 | Refills: 0 | Status: DISCONTINUED | OUTPATIENT
Start: 2019-04-05 | End: 2019-04-09

## 2019-04-05 RX ORDER — ALFUZOSIN HYDROCHLORIDE 10 MG/1
1 TABLET, EXTENDED RELEASE ORAL
Qty: 0 | Refills: 0 | DISCHARGE
Start: 2019-04-05

## 2019-04-05 RX ADMIN — Medication 500 MILLIGRAM(S): at 05:53

## 2019-04-05 RX ADMIN — TRAMADOL HYDROCHLORIDE 25 MILLIGRAM(S): 50 TABLET ORAL at 07:58

## 2019-04-05 RX ADMIN — LOSARTAN POTASSIUM 100 MILLIGRAM(S): 100 TABLET, FILM COATED ORAL at 05:53

## 2019-04-05 RX ADMIN — TRAMADOL HYDROCHLORIDE 25 MILLIGRAM(S): 50 TABLET ORAL at 08:30

## 2019-04-05 RX ADMIN — LIDOCAINE 2 PATCH: 4 CREAM TOPICAL at 18:44

## 2019-04-05 RX ADMIN — Medication 1: at 07:52

## 2019-04-05 RX ADMIN — METFORMIN HYDROCHLORIDE 500 MILLIGRAM(S): 850 TABLET ORAL at 17:34

## 2019-04-05 RX ADMIN — METFORMIN HYDROCHLORIDE 250 MILLIGRAM(S): 850 TABLET ORAL at 05:53

## 2019-04-05 RX ADMIN — ALFUZOSIN HYDROCHLORIDE 10 MILLIGRAM(S): 10 TABLET, EXTENDED RELEASE ORAL at 21:31

## 2019-04-05 RX ADMIN — ENOXAPARIN SODIUM 40 MILLIGRAM(S): 100 INJECTION SUBCUTANEOUS at 21:31

## 2019-04-05 RX ADMIN — PANTOPRAZOLE SODIUM 40 MILLIGRAM(S): 20 TABLET, DELAYED RELEASE ORAL at 06:15

## 2019-04-05 RX ADMIN — GABAPENTIN 600 MILLIGRAM(S): 400 CAPSULE ORAL at 05:53

## 2019-04-05 RX ADMIN — ATORVASTATIN CALCIUM 20 MILLIGRAM(S): 80 TABLET, FILM COATED ORAL at 21:31

## 2019-04-05 RX ADMIN — Medication 500 MILLIGRAM(S): at 17:32

## 2019-04-05 RX ADMIN — LIDOCAINE 2 PATCH: 4 CREAM TOPICAL at 05:55

## 2019-04-05 RX ADMIN — TRAMADOL HYDROCHLORIDE 25 MILLIGRAM(S): 50 TABLET ORAL at 15:03

## 2019-04-05 RX ADMIN — TRAMADOL HYDROCHLORIDE 25 MILLIGRAM(S): 50 TABLET ORAL at 21:30

## 2019-04-05 RX ADMIN — Medication 100 MILLIGRAM(S): at 06:05

## 2019-04-05 RX ADMIN — Medication 1: at 17:33

## 2019-04-05 RX ADMIN — GABAPENTIN 600 MILLIGRAM(S): 400 CAPSULE ORAL at 15:05

## 2019-04-05 RX ADMIN — LIDOCAINE 2 PATCH: 4 CREAM TOPICAL at 07:00

## 2019-04-05 RX ADMIN — GABAPENTIN 600 MILLIGRAM(S): 400 CAPSULE ORAL at 21:30

## 2019-04-05 RX ADMIN — Medication 1 TABLET(S): at 12:30

## 2019-04-05 RX ADMIN — POLYETHYLENE GLYCOL 3350 17 GRAM(S): 17 POWDER, FOR SOLUTION ORAL at 05:53

## 2019-04-05 RX ADMIN — TRAMADOL HYDROCHLORIDE 25 MILLIGRAM(S): 50 TABLET ORAL at 23:28

## 2019-04-05 NOTE — DISCHARGE NOTE PROVIDER - NSDCCPCAREPLAN_GEN_ALL_CORE_FT
PRINCIPAL DISCHARGE DIAGNOSIS  Diagnosis: AVF (arteriovenous fistula)  Assessment and Plan of Treatment: You were admitted to rehab because you had surgery to remove a vascular malformation in your spine.  Please continue medications as instructed.  Also continue therapies and precautions as instructed.  Follow up with your surgeon within 1-2 weeks.      SECONDARY DISCHARGE DIAGNOSES  Diagnosis: Diabetes mellitus  Assessment and Plan of Treatment: Continue medications as instructed    Diagnosis: Hypertension  Assessment and Plan of Treatment: Continue medications as instructed.  Please follow up with your primary care provider within 1-2 weeks of discharge from rehab.

## 2019-04-05 NOTE — DISCHARGE NOTE PROVIDER - HOSPITAL COURSE
This is a 58 year-old right handed male with a PMH of HTN/HLD, DM-II who developed lower back pain following a fall approx (2/6/19) 1 month ago. Prior to this fall, patient had first developed numbness in his toes with progressive weakness of both legs. After this fall about 1 month ago, he was initially [facility]/4/19 for progressively worsening bilateral lower extremity weakness. 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 He was then referred for neurosurgical evaluation and treatment at [facility] on 3/10/19. Patient c/o lower back pain, decreased sensation in both legs, left leg weakness and difficulty ambulating unassisted due to weakness. Denied bowel or bladder dysfunction. Patient was transferred to [facility] for elective spinal angiography and found  via T2-Lumbar 4 selective spinal angiography was performed and demonstrated a left T5 lesion dural AV fistula and again on 3/15/19 s/p angiogram Left T5 AVF s/p coil marker by Dr Horn. Patient was taken to OR on 3/18 by Krystle Willis and Kory s/p t4-t8 laminectomy for obliteration of t5 dural AVF and repeat Angio showed resolution of AV fistula. Post-op course complicated by ileus, which has now resolved.  Patient was also followed by cardiology for blood pressure management.         Patient was deemed medically stable for admission to [facility]'s acute IPR on 3/26/19.         Patient participated in daily therapies and made good functional gains.  Rehab course notable for incisional pain; managed with Tramadol.  Pt had regular BMs.  Metformin restarted.          Patient seen and examined on day of discharge.  Medications, medication side effects, and discharge instructions were reviewed with the patient, who expressed understanding of all information.  Patient was medically and functionally optimized and cleared for discharge. This is a 58 year-old right handed male with a PMH of HTN/HLD, DM-II who developed lower back pain following a fall approx (2/6/19) 1 month ago. Prior to this fall, patient had first developed numbness in his toes with progressive weakness of both legs. After this fall about 1 month ago, he was initially admitted to [facility] on 3/4/19 for progressively worsening bilateral lower extremity weakness. 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 He was then referred for neurosurgical evaluation and treatment at [facility] on 3/10/19. Patient c/o lower back pain, decreased sensation in both legs, left leg weakness and difficulty ambulating unassisted due to weakness. Denied bowel or bladder dysfunction. Patient was transferred to [facility] for elective spinal angiography and found  via T2-Lumbar 4 selective spinal angiography was performed and demonstrated a left T5 lesion dural AV fistula and again on 3/15/19 s/p angiogram Left T5 AVF s/p coil marker by Dr Horn. Patient was taken to OR on 3/18 by Krystle Willis and Kory s/p t4-t8 laminectomy for obliteration of t5 dural AVF and repeat Angio showed resolution of AV fistula. Post-op course complicated by ileus, which has now resolved.  Patient was also followed by cardiology for blood pressure management.         Patient was deemed medically stable for admission to [facility]'s acute IPR on 3/26/19.         Patient participated in daily therapies and made good functional gains.  Rehab course notable for incisional pain; managed with Tramadol.  Pt had regular BMs.  Metformin restarted.  Staples removed 4/5; tolerated well.        Patient seen and examined on day of discharge.  Medications, medication side effects, and discharge instructions were reviewed with the patient, who expressed understanding of all information.  Patient was medically and functionally optimized and cleared for discharge.

## 2019-04-05 NOTE — DISCHARGE NOTE PROVIDER - PROVIDER TOKENS
PROVIDER:[TOKEN:[[id number]],FOLLOWUP:[1 week]],PROVIDER:[TOKEN:[[id number]],FOLLOWUP:[1 week]]

## 2019-04-05 NOTE — PROGRESS NOTE ADULT - SUBJECTIVE AND OBJECTIVE BOX
TASH FARIA  58y  Male    Patient is a 58y old  Male who presents with a chief complaint of functional deficits 2/2 SCI from dura AVM surgery (04 Apr 2019 09:30)    Pt seen and examined, no events overnight, feels well     PAST MEDICAL & SURGICAL HISTORY:  HLD (hyperlipidemia)  Essential hypertension  DM (diabetes mellitus)  No significant past surgical history        MedsMEDICATIONS  (STANDING):  alfuzosin 10 milliGRAM(s) Oral at bedtime  ascorbic acid 500 milliGRAM(s) Oral two times a day  atorvastatin 20 milliGRAM(s) Oral at bedtime  dextrose 5%. 1000 milliLiter(s) (50 mL/Hr) IV Continuous <Continuous>  dextrose 50% Injectable 12.5 Gram(s) IV Push once  dextrose 50% Injectable 25 Gram(s) IV Push once  dextrose 50% Injectable 25 Gram(s) IV Push once  docusate sodium 100 milliGRAM(s) Oral three times a day  enoxaparin Injectable 40 milliGRAM(s) SubCutaneous <User Schedule>  gabapentin 600 milliGRAM(s) Oral three times a day  insulin lispro (HumaLOG) corrective regimen sliding scale   SubCutaneous three times a day before meals  insulin lispro (HumaLOG) corrective regimen sliding scale   SubCutaneous at bedtime  lidocaine   Patch 2 Patch Transdermal <User Schedule>  losartan 100 milliGRAM(s) Oral daily  metFORMIN 250 milliGRAM(s) Oral three times a day  multivitamin 1 Tablet(s) Oral daily  NIFEdipine XL 60 milliGRAM(s) Oral <User Schedule>  pantoprazole    Tablet 40 milliGRAM(s) Oral before breakfast  polyethylene glycol 3350 17 Gram(s) Oral two times a day  senna 2 Tablet(s) Oral at bedtime    MEDICATIONS  (PRN):  acetaminophen   Tablet .. 650 milliGRAM(s) Oral every 4 hours PRN Mild Pain (1 - 3)  aluminum hydroxide/magnesium hydroxide/simethicone Suspension 30 milliLiter(s) Oral every 4 hours PRN Dyspepsia  bisacodyl Suppository 10 milliGRAM(s) Rectal daily PRN Constipation  dextrose 40% Gel 15 Gram(s) Oral once PRN Blood Glucose LESS THAN 70 milliGRAM(s)/deciliter  glucagon  Injectable 1 milliGRAM(s) IntraMuscular once PRN Glucose LESS THAN 70 milligrams/deciliter  melatonin 3 milliGRAM(s) Oral at bedtime PRN Insomnia  traMADol 25 milliGRAM(s) Oral every 6 hours PRN Severe Pain (7 - 10)      Vital Signs Last 24 Hrs  T(C): 36.9 (04 Apr 2019 09:49), Max: 36.9 (04 Apr 2019 09:49)  T(F): 98.4 (04 Apr 2019 09:49), Max: 98.4 (04 Apr 2019 09:49)  HR: 70 (04 Apr 2019 09:49) (70 - 70)  BP: 129/82 (04 Apr 2019 09:49) (129/82 - 129/82)  BP(mean): --  RR: 14 (04 Apr 2019 09:49) (14 - 14)  SpO2: 98% (04 Apr 2019 09:49) (98% - 98%)    PHYSICAL EXAM:  GENERAL: NAD  HEAD:  NC/AT  NERVOUS SYSTEM:  Alert & Oriented X3  CHEST/LUNG: Clear lungs b/l  HEART: S1S2, RRR   ABDOMEN: Soft, non-tender, non-distended, + bowel sounds  EXTREMITIES:  2+ Peripheral Pulses, No clubbing, cyanosis, or edema      LABS:                          9.8    7.4   )-----------( 246      ( 05 Apr 2019 05:32 )             32.2       04-05    140  |  104  |  17  ----------------------------<  147<H>  3.8   |  25  |  0.78    Ca    9.5      05 Apr 2019 05:32                                      RADIOLOGY & ADDITIONAL TESTS:    Imaging Personally Reviewed:  [ ] YES  [ ] NO      HEALTH ISSUES - PROBLEM Dx:  Ileus, postoperative: Ileus, postoperative  Hyperlipidemia, unspecified hyperlipidemia type: Hyperlipidemia, unspecified hyperlipidemia type  Essential hypertension: Essential hypertension  DM (diabetes mellitus): DM (diabetes mellitus)  Injury of thoracic spinal cord, sequela: Injury of thoracic spinal cord, sequela          Care Discussed with Consultants/Other Providers [ x] YES  [ ] NO

## 2019-04-05 NOTE — DISCHARGE NOTE PROVIDER - CARE PROVIDER_API CALL
Burton Horn)  Neurological Surgery  [address] [place] [address]  Phone: [phone]  Fax: [phone]  Follow Up Time: 1 week    Teddy Willis)  Neurological Surgery; Pediatric Neurological Surgery  [address], [address]  [place], [place] [id number]  Phone: [phone]  Fax: [phone]  Follow Up Time: 1 week

## 2019-04-05 NOTE — PROGRESS NOTE ADULT - ATTENDING COMMENTS
Chart reviewed. No new issues overnight.  states that pain is well controlled with tramadol  Back incision clean  Family training today. Per OT recommend one more session   awaiting appeal process outcome    Continue full rehab program.   d/c wife at bedside Chart reviewed. No new issues overnight.  states that pain is well controlled with tramadol  Back incision clean  Family training today. Per OT recommend one more session   awaiting appeal process outcome    Continue full rehab program.   d/c wife at bedside    2. DM: Change metformin to 500mg BID ( Home dose ) and change accuchecks to bid

## 2019-04-05 NOTE — PROGRESS NOTE ADULT - SUBJECTIVE AND OBJECTIVE BOX
INTERVAL SUBJECTIVE & REVIEW OF SYMPTOMS:  Patient seen and examined.  No acute events overnight. Feeling well.  Wife is here for family training.  Pain is well controlled with Tramadol.  (+) BM.      REVIEW OF SYSTEMS  [ x  ] Constitutional WNL  [ x  ] Cardio WNL  [ x  ] Resp WNL  [ x  ] GI WNL  [ x  ]  WNL  [   ] Heme WNL  [   ] Endo WNL  [   ] Skin WNL  [   ] MSK WNL    OBJECTIVE  Vital Signs Last 24 Hrs  T(C): 36.7 (05 Apr 2019 09:16), Max: 36.7 (05 Apr 2019 09:16)  T(F): 98 (05 Apr 2019 09:16), Max: 98 (05 Apr 2019 09:16)  HR: 77 (05 Apr 2019 09:16) (77 - 77)  BP: 153/86 (05 Apr 2019 09:16) (153/86 - 153/86)  RR: 14 (05 Apr 2019 09:16) (14 - 14)  SpO2: 97% (05 Apr 2019 09:16) (97% - 97%)    PHYSICAL EXAM  General: NAD  Cardio: S1S2+  Resp: clear  Abdomen: soft, NT , distended  Extrem: no edema or calf tenderness, Motor UE 5/5, RLE 4/5, left HF 3/5, KE 4/5, ankle 4/5 , EHL left weaker that left  Sensory : impaired to light touch left > right   Skin: back incision with staples +( some removed on 4/2)  No erythema or edema noted    Functional Exam:   Transfers: CG/CS except sit/supine which is min assist  Bed Mobility: sit/supine: mod assist  Gait: ' with CG  ADLs: toileting transfer: min assist, upper body dressing: mod assist; lower body dressing: max assist    MEDICATIONS  (STANDING):  alfuzosin 10 milliGRAM(s) Oral at bedtime  ascorbic acid 500 milliGRAM(s) Oral two times a day  atorvastatin 20 milliGRAM(s) Oral at bedtime  dextrose 5%. 1000 milliLiter(s) (50 mL/Hr) IV Continuous <Continuous>  dextrose 50% Injectable 12.5 Gram(s) IV Push once  dextrose 50% Injectable 25 Gram(s) IV Push once  dextrose 50% Injectable 25 Gram(s) IV Push once  docusate sodium 100 milliGRAM(s) Oral three times a day  enoxaparin Injectable 40 milliGRAM(s) SubCutaneous <User Schedule>  gabapentin 600 milliGRAM(s) Oral three times a day  insulin lispro (HumaLOG) corrective regimen sliding scale   SubCutaneous two times a day before meals  lidocaine   Patch 2 Patch Transdermal <User Schedule>  losartan 100 milliGRAM(s) Oral daily  metFORMIN 250 milliGRAM(s) Oral three times a day  multivitamin 1 Tablet(s) Oral daily  NIFEdipine XL 60 milliGRAM(s) Oral <User Schedule>  pantoprazole    Tablet 40 milliGRAM(s) Oral before breakfast  polyethylene glycol 3350 17 Gram(s) Oral two times a day  senna 2 Tablet(s) Oral at bedtime    MEDICATIONS  (PRN):  acetaminophen   Tablet .. 650 milliGRAM(s) Oral every 4 hours PRN Mild Pain (1 - 3)  aluminum hydroxide/magnesium hydroxide/simethicone Suspension 30 milliLiter(s) Oral every 4 hours PRN Dyspepsia  bisacodyl Suppository 10 milliGRAM(s) Rectal daily PRN Constipation  dextrose 40% Gel 15 Gram(s) Oral once PRN Blood Glucose LESS THAN 70 milliGRAM(s)/deciliter  glucagon  Injectable 1 milliGRAM(s) IntraMuscular once PRN Glucose LESS THAN 70 milligrams/deciliter  melatonin 3 milliGRAM(s) Oral at bedtime PRN Insomnia  traMADol 25 milliGRAM(s) Oral every 6 hours PRN Severe Pain (7 - 10)      CAPILLARY BLOOD GLUCOSE      POCT Blood Glucose.: 167 mg/dL (05 Apr 2019 07:49)  POCT Blood Glucose.: 149 mg/dL (04 Apr 2019 21:34)  POCT Blood Glucose.: 150 mg/dL (04 Apr 2019 16:38)  POCT Blood Glucose.: 136 mg/dL (04 Apr 2019 11:47)      LABS:                        9.8    7.4   )-----------( 246      ( 05 Apr 2019 05:32 )             32.2     05 Apr 2019 05:32    140    |  104    |  17     ----------------------------<  147    3.8     |  25     |  0.78     Ca    9.5        05 Apr 2019 05:32        A/P:    58 year-old man with a PMH of HTN, HLD, DM presented with weakness, found to have 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    Continue full rehab program: PT/OT 3 hrs/day 5 days/week    DVT prophylaxis: on Lovenox    DM-II: Diet, SSI, metformin 250mg TID (Dose changed to TID 4/1).  BS have been well-controlled; decrease accuchecks/SSI to BID.     HTN: on losartan and Procardia. Procardia dose reduced to 60mg daily . May need further reduction     Hyperlipidemia: on statin    GI prophylaxis: on protonix    Pain management: on tylenol prn, gabapentin, lidoderm patch and tramadol PRN. Modalities prn     bowel program: Post op ileus improved, continue current bowel regimen- Miralax BID, bisacodyl supp and senna prn.     Bladder program: Toilet schedule prn. Continue alfuzosin.     Hospitalist janis noted

## 2019-04-06 LAB
FUNGUS SPEC QL CULT: SIGNIFICANT CHANGE UP
GLUCOSE BLDC GLUCOMTR-MCNC: 139 MG/DL — HIGH (ref 70–99)
GLUCOSE BLDC GLUCOMTR-MCNC: 183 MG/DL — HIGH (ref 70–99)

## 2019-04-06 PROCEDURE — 99232 SBSQ HOSP IP/OBS MODERATE 35: CPT

## 2019-04-06 PROCEDURE — 99233 SBSQ HOSP IP/OBS HIGH 50: CPT

## 2019-04-06 RX ADMIN — METFORMIN HYDROCHLORIDE 500 MILLIGRAM(S): 850 TABLET ORAL at 07:59

## 2019-04-06 RX ADMIN — ATORVASTATIN CALCIUM 20 MILLIGRAM(S): 80 TABLET, FILM COATED ORAL at 21:39

## 2019-04-06 RX ADMIN — ALFUZOSIN HYDROCHLORIDE 10 MILLIGRAM(S): 10 TABLET, EXTENDED RELEASE ORAL at 21:39

## 2019-04-06 RX ADMIN — GABAPENTIN 600 MILLIGRAM(S): 400 CAPSULE ORAL at 21:39

## 2019-04-06 RX ADMIN — PANTOPRAZOLE SODIUM 40 MILLIGRAM(S): 20 TABLET, DELAYED RELEASE ORAL at 06:26

## 2019-04-06 RX ADMIN — TRAMADOL HYDROCHLORIDE 25 MILLIGRAM(S): 50 TABLET ORAL at 22:10

## 2019-04-06 RX ADMIN — GABAPENTIN 600 MILLIGRAM(S): 400 CAPSULE ORAL at 13:19

## 2019-04-06 RX ADMIN — TRAMADOL HYDROCHLORIDE 25 MILLIGRAM(S): 50 TABLET ORAL at 10:45

## 2019-04-06 RX ADMIN — METFORMIN HYDROCHLORIDE 500 MILLIGRAM(S): 850 TABLET ORAL at 17:15

## 2019-04-06 RX ADMIN — LIDOCAINE 2 PATCH: 4 CREAM TOPICAL at 10:46

## 2019-04-06 RX ADMIN — Medication 1 TABLET(S): at 11:19

## 2019-04-06 RX ADMIN — Medication 1: at 17:14

## 2019-04-06 RX ADMIN — TRAMADOL HYDROCHLORIDE 25 MILLIGRAM(S): 50 TABLET ORAL at 21:39

## 2019-04-06 RX ADMIN — Medication 500 MILLIGRAM(S): at 17:15

## 2019-04-06 RX ADMIN — Medication 60 MILLIGRAM(S): at 11:19

## 2019-04-06 RX ADMIN — TRAMADOL HYDROCHLORIDE 25 MILLIGRAM(S): 50 TABLET ORAL at 14:44

## 2019-04-06 RX ADMIN — LOSARTAN POTASSIUM 100 MILLIGRAM(S): 100 TABLET, FILM COATED ORAL at 06:26

## 2019-04-06 RX ADMIN — GABAPENTIN 600 MILLIGRAM(S): 400 CAPSULE ORAL at 06:26

## 2019-04-06 RX ADMIN — LIDOCAINE 2 PATCH: 4 CREAM TOPICAL at 22:13

## 2019-04-06 RX ADMIN — Medication 500 MILLIGRAM(S): at 06:26

## 2019-04-06 RX ADMIN — LIDOCAINE 2 PATCH: 4 CREAM TOPICAL at 19:27

## 2019-04-06 RX ADMIN — TRAMADOL HYDROCHLORIDE 25 MILLIGRAM(S): 50 TABLET ORAL at 07:59

## 2019-04-06 RX ADMIN — ENOXAPARIN SODIUM 40 MILLIGRAM(S): 100 INJECTION SUBCUTANEOUS at 21:39

## 2019-04-06 NOTE — PROGRESS NOTE ADULT - SUBJECTIVE AND OBJECTIVE BOX
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 Prior to this fall, patient had first developed numbness in his toes with progressive weakness of both legs. After this fall about 1 month ago, he was initially admitted to [facility] on 3/4/19 for progressively worsening bilateral lower extremity weakness. 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 He was then referred for neurosurgical evaluation and treatment at [facility] on 3/10/19. Patient c/o lower back pain, decreased sensation in both legs, left leg weakness and difficulty ambulating unassisted due to weakness. Denied bowel or bladder dysfunction. 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 Patient was taken to OR on 3/18 by Krystle Willis and Kory s/p t4-t8 laminectomy for obliteration of t5 dural AVF and repeat Angio showed resolution of AV fistula. Post-op course complicated by ileus, which has now resolved.  Patient was also followed by cardiology for blood pressure management.     Patient is deemed medically stable for admission to [facility]'s acute IPR on 3/26/19. (26 Mar 2019 11:14)      PAST MEDICAL & SURGICAL HISTORY:  HLD (hyperlipidemia)  Essential hypertension  DM (diabetes mellitus)  No significant past surgical history      MEDICATIONS  (STANDING):  alfuzosin 10 milliGRAM(s) Oral at bedtime  ascorbic acid 500 milliGRAM(s) Oral two times a day  atorvastatin 20 milliGRAM(s) Oral at bedtime  dextrose 5%. 1000 milliLiter(s) (50 mL/Hr) IV Continuous <Continuous>  dextrose 50% Injectable 12.5 Gram(s) IV Push once  dextrose 50% Injectable 25 Gram(s) IV Push once  dextrose 50% Injectable 25 Gram(s) IV Push once  docusate sodium 100 milliGRAM(s) Oral three times a day  enoxaparin Injectable 40 milliGRAM(s) SubCutaneous <User Schedule>  gabapentin 600 milliGRAM(s) Oral three times a day  insulin lispro (HumaLOG) corrective regimen sliding scale   SubCutaneous two times a day before meals  lidocaine   Patch 2 Patch Transdermal <User Schedule>  losartan 100 milliGRAM(s) Oral daily  metFORMIN 500 milliGRAM(s) Oral two times a day  multivitamin 1 Tablet(s) Oral daily  NIFEdipine XL 60 milliGRAM(s) Oral <User Schedule>  pantoprazole    Tablet 40 milliGRAM(s) Oral before breakfast  polyethylene glycol 3350 17 Gram(s) Oral two times a day  senna 2 Tablet(s) Oral at bedtime    MEDICATIONS  (PRN):  acetaminophen   Tablet .. 650 milliGRAM(s) Oral every 4 hours PRN Mild Pain (1 - 3)  aluminum hydroxide/magnesium hydroxide/simethicone Suspension 30 milliLiter(s) Oral every 4 hours PRN Dyspepsia  bisacodyl Suppository 10 milliGRAM(s) Rectal daily PRN Constipation  dextrose 40% Gel 15 Gram(s) Oral once PRN Blood Glucose LESS THAN 70 milliGRAM(s)/deciliter  glucagon  Injectable 1 milliGRAM(s) IntraMuscular once PRN Glucose LESS THAN 70 milligrams/deciliter  melatonin 3 milliGRAM(s) Oral at bedtime PRN Insomnia  traMADol 25 milliGRAM(s) Oral every 6 hours PRN Severe Pain (7 - 10)      Allergies    No Known Allergies    Intolerances          VITALS  58y  Vital Signs Last 24 Hrs  T(C): 36.8 (05 Apr 2019 20:20), Max: 36.8 (05 Apr 2019 20:20)  T(F): 98.2 (05 Apr 2019 20:20), Max: 98.2 (05 Apr 2019 20:20)  HR: 78 (05 Apr 2019 20:20) (78 - 78)  BP: 136/80 (05 Apr 2019 20:20) (136/80 - 136/80)  BP(mean): --  RR: 14 (05 Apr 2019 20:20) (14 - 14)  SpO2: 100% (05 Apr 2019 20:20) (100% - 100%)  Daily     Daily         RECENT LABS:                          9.8    7.4   )-----------( 246      ( 05 Apr 2019 05:32 )             32.2     04-05    140  |  104  |  17  ----------------------------<  147<H>  3.8   |  25  |  0.78    Ca    9.5      05 Apr 2019 05:32                CAPILLARY BLOOD GLUCOSE      POCT Blood Glucose.: 139 mg/dL (06 Apr 2019 07:51)  POCT Blood Glucose.: 163 mg/dL (05 Apr 2019 16:29)

## 2019-04-06 NOTE — PROGRESS NOTE ADULT - SKIN
detailed exam incision intact, trace swelling no erythema no induration no warmth no TTP no oozing +dry

## 2019-04-06 NOTE — PROGRESS NOTE ADULT - SUBJECTIVE AND OBJECTIVE BOX
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 1000 milliLiter(s) (50 mL/Hr) IV Continuous <Continuous>  dextrose 50% Injectable 12.5 Gram(s) IV Push once  dextrose 50% Injectable 25 Gram(s) IV Push once  dextrose 50% Injectable 25 Gram(s) IV Push once  docusate sodium 100 milliGRAM(s) Oral three times a day  enoxaparin Injectable 40 milliGRAM(s) SubCutaneous <User Schedule>  gabapentin 600 milliGRAM(s) Oral three times a day  insulin lispro (HumaLOG) corrective regimen sliding scale   SubCutaneous two times a day before meals  lidocaine   Patch 2 Patch Transdermal <User Schedule>  losartan 100 milliGRAM(s) Oral daily  metFORMIN 500 milliGRAM(s) Oral two times a day  multivitamin 1 Tablet(s) Oral daily  NIFEdipine XL 60 milliGRAM(s) Oral <User Schedule>  pantoprazole    Tablet 40 milliGRAM(s) Oral before breakfast  polyethylene glycol 3350 17 Gram(s) Oral two times a day  senna 2 Tablet(s) Oral at bedtime    MEDICATIONS  (PRN):  acetaminophen   Tablet .. 650 milliGRAM(s) Oral every 4 hours PRN Mild Pain (1 - 3)  aluminum hydroxide/magnesium hydroxide/simethicone Suspension 30 milliLiter(s) Oral every 4 hours PRN Dyspepsia  bisacodyl Suppository 10 milliGRAM(s) Rectal daily PRN Constipation  dextrose 40% Gel 15 Gram(s) Oral once PRN Blood Glucose LESS THAN 70 milliGRAM(s)/deciliter  glucagon  Injectable 1 milliGRAM(s) IntraMuscular once PRN Glucose LESS THAN 70 milligrams/deciliter  melatonin 3 milliGRAM(s) Oral at bedtime PRN Insomnia  traMADol 25 milliGRAM(s) Oral every 6 hours PRN Severe Pain (7 - 10)      Vital Signs Last 24 Hrs  T(C): 36.6 (06 Apr 2019 10:32), Max: 36.8 (05 Apr 2019 20:20)  T(F): 97.9 (06 Apr 2019 10:32), Max: 98.2 (05 Apr 2019 20:20)  HR: 69 (06 Apr 2019 10:32) (69 - 78)  BP: 116/82 (06 Apr 2019 10:32) (116/82 - 136/80)  BP(mean): --  RR: 14 (06 Apr 2019 10:32) (14 - 14)  SpO2: 100% (06 Apr 2019 10:32) (100% - 100%)    PHYSICAL EXAM:  GENERAL: NAD  HEAD:  NC/AT  NERVOUS SYSTEM:  Alert & Oriented X3  CHEST/LUNG: Clear lungs b/l  HEART: S1S2, RRR   ABDOMEN: Soft, non-tender, non-distended, + bowel sounds    LABS:                          9.8    7.4   )-----------( 246      ( 05 Apr 2019 05:32 )             32.2       04-05    140  |  104  |  17  ----------------------------<  147<H>  3.8   |  25  |  0.78    Ca    9.5      05 Apr 2019 05:32                                      RADIOLOGY & ADDITIONAL TESTS:    Imaging Personally Reviewed:  [ ] YES  [ ] NO      HEALTH ISSUES - PROBLEM Dx:  Ileus, postoperative: Continue to take bowel medications and monitor for regular bowel movements.  Follow up with your primary care doctor within 1-2 weeks of discharge from rehab.  Hyperlipidemia, unspecified hyperlipidemia type: Continue medications as instructed.  Essential hypertension  DM (diabetes mellitus)  Injury of thoracic spinal cord, sequela: Injury of thoracic spinal cord, sequela          Care Discussed with Consultants/Other Providers [ x] YES  [ ] NO

## 2019-04-07 LAB
GLUCOSE BLDC GLUCOMTR-MCNC: 142 MG/DL — HIGH (ref 70–99)
GLUCOSE BLDC GLUCOMTR-MCNC: 218 MG/DL — HIGH (ref 70–99)

## 2019-04-07 PROCEDURE — 99232 SBSQ HOSP IP/OBS MODERATE 35: CPT

## 2019-04-07 PROCEDURE — 99233 SBSQ HOSP IP/OBS HIGH 50: CPT

## 2019-04-07 RX ADMIN — LOSARTAN POTASSIUM 100 MILLIGRAM(S): 100 TABLET, FILM COATED ORAL at 05:53

## 2019-04-07 RX ADMIN — METFORMIN HYDROCHLORIDE 500 MILLIGRAM(S): 850 TABLET ORAL at 17:23

## 2019-04-07 RX ADMIN — LIDOCAINE 2 PATCH: 4 CREAM TOPICAL at 07:28

## 2019-04-07 RX ADMIN — METFORMIN HYDROCHLORIDE 500 MILLIGRAM(S): 850 TABLET ORAL at 08:07

## 2019-04-07 RX ADMIN — GABAPENTIN 600 MILLIGRAM(S): 400 CAPSULE ORAL at 14:41

## 2019-04-07 RX ADMIN — TRAMADOL HYDROCHLORIDE 25 MILLIGRAM(S): 50 TABLET ORAL at 14:50

## 2019-04-07 RX ADMIN — SENNA PLUS 2 TABLET(S): 8.6 TABLET ORAL at 21:52

## 2019-04-07 RX ADMIN — Medication 100 MILLIGRAM(S): at 14:42

## 2019-04-07 RX ADMIN — TRAMADOL HYDROCHLORIDE 25 MILLIGRAM(S): 50 TABLET ORAL at 07:09

## 2019-04-07 RX ADMIN — Medication 500 MILLIGRAM(S): at 05:52

## 2019-04-07 RX ADMIN — LIDOCAINE 2 PATCH: 4 CREAM TOPICAL at 19:43

## 2019-04-07 RX ADMIN — Medication 100 MILLIGRAM(S): at 05:52

## 2019-04-07 RX ADMIN — TRAMADOL HYDROCHLORIDE 25 MILLIGRAM(S): 50 TABLET ORAL at 21:53

## 2019-04-07 RX ADMIN — ATORVASTATIN CALCIUM 20 MILLIGRAM(S): 80 TABLET, FILM COATED ORAL at 21:52

## 2019-04-07 RX ADMIN — TRAMADOL HYDROCHLORIDE 25 MILLIGRAM(S): 50 TABLET ORAL at 19:21

## 2019-04-07 RX ADMIN — Medication 500 MILLIGRAM(S): at 17:23

## 2019-04-07 RX ADMIN — Medication 2: at 17:23

## 2019-04-07 RX ADMIN — PANTOPRAZOLE SODIUM 40 MILLIGRAM(S): 20 TABLET, DELAYED RELEASE ORAL at 05:52

## 2019-04-07 RX ADMIN — Medication 100 MILLIGRAM(S): at 21:52

## 2019-04-07 RX ADMIN — Medication 1 TABLET(S): at 11:05

## 2019-04-07 RX ADMIN — TRAMADOL HYDROCHLORIDE 25 MILLIGRAM(S): 50 TABLET ORAL at 04:14

## 2019-04-07 RX ADMIN — GABAPENTIN 600 MILLIGRAM(S): 400 CAPSULE ORAL at 05:53

## 2019-04-07 RX ADMIN — TRAMADOL HYDROCHLORIDE 25 MILLIGRAM(S): 50 TABLET ORAL at 22:32

## 2019-04-07 RX ADMIN — GABAPENTIN 600 MILLIGRAM(S): 400 CAPSULE ORAL at 21:52

## 2019-04-07 RX ADMIN — ENOXAPARIN SODIUM 40 MILLIGRAM(S): 100 INJECTION SUBCUTANEOUS at 21:52

## 2019-04-07 RX ADMIN — ALFUZOSIN HYDROCHLORIDE 10 MILLIGRAM(S): 10 TABLET, EXTENDED RELEASE ORAL at 21:52

## 2019-04-07 NOTE — PROGRESS NOTE ADULT - EXTREMITIES COMMENTS
no calf swelling +soft, NT no erythema or warmth
no calf swelling or pedal edema  no erythema or warmth  +soft, NT bilaterally

## 2019-04-07 NOTE — PROGRESS NOTE ADULT - GENERAL COMMENTS
Patient stable, pain controlled. staples removed, no oozing from incision site. No b/b complaints
Patient stable. Had slight lightheadedness this morning after AM bathroom care, states was sitting on toilet for awhile but unable to have BM ?strain, relieved when bacjk in bed. no diaphoresis, palpitations, CP, SOB

## 2019-04-07 NOTE — PROGRESS NOTE ADULT - SUBJECTIVE AND OBJECTIVE BOX
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 Prior to this fall, patient had first developed numbness in his toes with progressive weakness of both legs. After this fall about 1 month ago, he was initially admitted to [facility] on 3/4/19 for progressively worsening bilateral lower extremity weakness. 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 He was then referred for neurosurgical evaluation and treatment at [facility] on 3/10/19. Patient c/o lower back pain, decreased sensation in both legs, left leg weakness and difficulty ambulating unassisted due to weakness. Denied bowel or bladder dysfunction. 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 Patient was taken to OR on 3/18 by Krystle Willis and Kory s/p t4-t8 laminectomy for obliteration of t5 dural AVF and repeat Angio showed resolution of AV fistula. Post-op course complicated by ileus, which has now resolved.  Patient was also followed by cardiology for blood pressure management.     Patient is deemed medically stable for admission to [facility]'s acute IPR on 3/26/19. (26 Mar 2019 11:14)      PAST MEDICAL & SURGICAL HISTORY:  HLD (hyperlipidemia)  Essential hypertension  DM (diabetes mellitus)  No significant past surgical history      MEDICATIONS  (STANDING):  alfuzosin 10 milliGRAM(s) Oral at bedtime  ascorbic acid 500 milliGRAM(s) Oral two times a day  atorvastatin 20 milliGRAM(s) Oral at bedtime  dextrose 5%. 1000 milliLiter(s) (50 mL/Hr) IV Continuous <Continuous>  dextrose 50% Injectable 12.5 Gram(s) IV Push once  dextrose 50% Injectable 25 Gram(s) IV Push once  dextrose 50% Injectable 25 Gram(s) IV Push once  docusate sodium 100 milliGRAM(s) Oral three times a day  enoxaparin Injectable 40 milliGRAM(s) SubCutaneous <User Schedule>  gabapentin 600 milliGRAM(s) Oral three times a day  insulin lispro (HumaLOG) corrective regimen sliding scale   SubCutaneous two times a day before meals  lidocaine   Patch 2 Patch Transdermal <User Schedule>  losartan 100 milliGRAM(s) Oral daily  metFORMIN 500 milliGRAM(s) Oral two times a day  multivitamin 1 Tablet(s) Oral daily  NIFEdipine XL 60 milliGRAM(s) Oral <User Schedule>  pantoprazole    Tablet 40 milliGRAM(s) Oral before breakfast  polyethylene glycol 3350 17 Gram(s) Oral two times a day  senna 2 Tablet(s) Oral at bedtime    MEDICATIONS  (PRN):  acetaminophen   Tablet .. 650 milliGRAM(s) Oral every 4 hours PRN Mild Pain (1 - 3)  aluminum hydroxide/magnesium hydroxide/simethicone Suspension 30 milliLiter(s) Oral every 4 hours PRN Dyspepsia  bisacodyl Suppository 10 milliGRAM(s) Rectal daily PRN Constipation  dextrose 40% Gel 15 Gram(s) Oral once PRN Blood Glucose LESS THAN 70 milliGRAM(s)/deciliter  glucagon  Injectable 1 milliGRAM(s) IntraMuscular once PRN Glucose LESS THAN 70 milligrams/deciliter  melatonin 3 milliGRAM(s) Oral at bedtime PRN Insomnia  traMADol 25 milliGRAM(s) Oral every 6 hours PRN Severe Pain (7 - 10)      Allergies    No Known Allergies    Intolerances          VITALS  58y  Vital Signs Last 24 Hrs  T(C): 36.6 (07 Apr 2019 08:44), Max: 36.9 (06 Apr 2019 21:20)  T(F): 97.9 (07 Apr 2019 08:44), Max: 98.5 (06 Apr 2019 21:20)  HR: 83 (07 Apr 2019 08:44) (69 - 84)  BP: 108/69 (07 Apr 2019 08:44) (108/69 - 141/85)  BP(mean): --  RR: 14 (07 Apr 2019 08:44) (14 - 14)  SpO2: 97% (07 Apr 2019 08:44) (96% - 100%)  Daily     Daily         RECENT LABS:                      CAPILLARY BLOOD GLUCOSE      POCT Blood Glucose.: 142 mg/dL (07 Apr 2019 07:24)  POCT Blood Glucose.: 183 mg/dL (06 Apr 2019 16:29)

## 2019-04-07 NOTE — PROGRESS NOTE ADULT - RS GEN PE MLT RESP DETAILS PC
clear to auscultation bilaterally/normal/respirations non-labored
clear to auscultation bilaterally/good air movement/respirations non-labored/breath sounds equal/good effort

## 2019-04-07 NOTE — PROGRESS NOTE ADULT - SUBJECTIVE AND OBJECTIVE BOX
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 1000 milliLiter(s) (50 mL/Hr) IV Continuous <Continuous>  dextrose 50% Injectable 12.5 Gram(s) IV Push once  dextrose 50% Injectable 25 Gram(s) IV Push once  dextrose 50% Injectable 25 Gram(s) IV Push once  docusate sodium 100 milliGRAM(s) Oral three times a day  enoxaparin Injectable 40 milliGRAM(s) SubCutaneous <User Schedule>  gabapentin 600 milliGRAM(s) Oral three times a day  insulin lispro (HumaLOG) corrective regimen sliding scale   SubCutaneous two times a day before meals  lidocaine   Patch 2 Patch Transdermal <User Schedule>  losartan 100 milliGRAM(s) Oral daily  metFORMIN 500 milliGRAM(s) Oral two times a day  multivitamin 1 Tablet(s) Oral daily  NIFEdipine XL 60 milliGRAM(s) Oral <User Schedule>  pantoprazole    Tablet 40 milliGRAM(s) Oral before breakfast  polyethylene glycol 3350 17 Gram(s) Oral two times a day  senna 2 Tablet(s) Oral at bedtime    MEDICATIONS  (PRN):  acetaminophen   Tablet .. 650 milliGRAM(s) Oral every 4 hours PRN Mild Pain (1 - 3)  aluminum hydroxide/magnesium hydroxide/simethicone Suspension 30 milliLiter(s) Oral every 4 hours PRN Dyspepsia  bisacodyl Suppository 10 milliGRAM(s) Rectal daily PRN Constipation  dextrose 40% Gel 15 Gram(s) Oral once PRN Blood Glucose LESS THAN 70 milliGRAM(s)/deciliter  glucagon  Injectable 1 milliGRAM(s) IntraMuscular once PRN Glucose LESS THAN 70 milligrams/deciliter  melatonin 3 milliGRAM(s) Oral at bedtime PRN Insomnia  traMADol 25 milliGRAM(s) Oral every 6 hours PRN Severe Pain (7 - 10)      Vital Signs Last 24 Hrs  T(C): 36.6 (07 Apr 2019 08:44), Max: 36.9 (06 Apr 2019 21:20)  T(F): 97.9 (07 Apr 2019 08:44), Max: 98.5 (06 Apr 2019 21:20)  HR: 83 (07 Apr 2019 08:44) (69 - 84)  BP: 108/69 (07 Apr 2019 08:44) (108/69 - 141/85)  BP(mean): --  RR: 14 (07 Apr 2019 08:44) (14 - 14)  SpO2: 97% (07 Apr 2019 08:44) (96% - 100%)    PHYSICAL EXAM:  GENERAL: NAD  HEAD:  NC/AT  NERVOUS SYSTEM:  Alert & Oriented X3  CHEST/LUNG: Clear lungs b/l  HEART: S1S2, RRR   ABDOMEN: Soft, non-tender, non-distended, + bowel sounds  EXTREMITIES:  2+ Peripheral Pulses, No clubbing, cyanosis, or edema      LABS:      reviewed                     RADIOLOGY & ADDITIONAL TESTS:    Imaging Personally Reviewed:  [x ] YES  [ ] NO      HEALTH ISSUES - PROBLEM Dx:  Ileus, postoperative: Continue to take bowel medications and monitor for regular bowel movements.  Follow up with your primary care doctor within 1-2 weeks of discharge from rehab.  Hyperlipidemia, unspecified hyperlipidemia type: Continue medications as instructed.  Essential hypertension  DM (diabetes mellitus)  Injury of thoracic spinal cord, sequela: Injury of thoracic spinal cord, sequela          Care Discussed with Consultants/Other Providers [ x] YES  [ ] NO

## 2019-04-08 ENCOUNTER — TRANSCRIPTION ENCOUNTER (OUTPATIENT)
Age: 59
End: 2019-04-08

## 2019-04-08 LAB
GLUCOSE BLDC GLUCOMTR-MCNC: 102 MG/DL — HIGH (ref 70–99)
GLUCOSE BLDC GLUCOMTR-MCNC: 133 MG/DL — HIGH (ref 70–99)

## 2019-04-08 PROCEDURE — 99232 SBSQ HOSP IP/OBS MODERATE 35: CPT | Mod: GC

## 2019-04-08 PROCEDURE — 99233 SBSQ HOSP IP/OBS HIGH 50: CPT

## 2019-04-08 RX ORDER — NIFEDIPINE 30 MG
30 TABLET, EXTENDED RELEASE 24 HR ORAL
Qty: 0 | Refills: 0 | Status: DISCONTINUED | OUTPATIENT
Start: 2019-04-08 | End: 2019-04-09

## 2019-04-08 RX ORDER — TRAMADOL HYDROCHLORIDE 50 MG/1
0.5 TABLET ORAL
Qty: 14 | Refills: 0
Start: 2019-04-08 | End: 2019-04-14

## 2019-04-08 RX ORDER — NIFEDIPINE 30 MG
1 TABLET, EXTENDED RELEASE 24 HR ORAL
Qty: 30 | Refills: 0
Start: 2019-04-08 | End: 2019-05-07

## 2019-04-08 RX ADMIN — Medication 1 TABLET(S): at 12:12

## 2019-04-08 RX ADMIN — PANTOPRAZOLE SODIUM 40 MILLIGRAM(S): 20 TABLET, DELAYED RELEASE ORAL at 05:52

## 2019-04-08 RX ADMIN — ALFUZOSIN HYDROCHLORIDE 10 MILLIGRAM(S): 10 TABLET, EXTENDED RELEASE ORAL at 21:07

## 2019-04-08 RX ADMIN — Medication 30 MILLIGRAM(S): at 12:27

## 2019-04-08 RX ADMIN — TRAMADOL HYDROCHLORIDE 25 MILLIGRAM(S): 50 TABLET ORAL at 21:08

## 2019-04-08 RX ADMIN — TRAMADOL HYDROCHLORIDE 25 MILLIGRAM(S): 50 TABLET ORAL at 22:00

## 2019-04-08 RX ADMIN — METFORMIN HYDROCHLORIDE 500 MILLIGRAM(S): 850 TABLET ORAL at 17:18

## 2019-04-08 RX ADMIN — Medication 100 MILLIGRAM(S): at 13:08

## 2019-04-08 RX ADMIN — GABAPENTIN 600 MILLIGRAM(S): 400 CAPSULE ORAL at 05:52

## 2019-04-08 RX ADMIN — LOSARTAN POTASSIUM 100 MILLIGRAM(S): 100 TABLET, FILM COATED ORAL at 05:52

## 2019-04-08 RX ADMIN — ATORVASTATIN CALCIUM 20 MILLIGRAM(S): 80 TABLET, FILM COATED ORAL at 21:07

## 2019-04-08 RX ADMIN — Medication 500 MILLIGRAM(S): at 17:18

## 2019-04-08 RX ADMIN — SENNA PLUS 2 TABLET(S): 8.6 TABLET ORAL at 21:07

## 2019-04-08 RX ADMIN — LIDOCAINE 2 PATCH: 4 CREAM TOPICAL at 19:30

## 2019-04-08 RX ADMIN — METFORMIN HYDROCHLORIDE 500 MILLIGRAM(S): 850 TABLET ORAL at 07:31

## 2019-04-08 RX ADMIN — LIDOCAINE 2 PATCH: 4 CREAM TOPICAL at 07:31

## 2019-04-08 RX ADMIN — Medication 100 MILLIGRAM(S): at 21:08

## 2019-04-08 RX ADMIN — TRAMADOL HYDROCHLORIDE 25 MILLIGRAM(S): 50 TABLET ORAL at 03:51

## 2019-04-08 RX ADMIN — TRAMADOL HYDROCHLORIDE 25 MILLIGRAM(S): 50 TABLET ORAL at 04:30

## 2019-04-08 RX ADMIN — Medication 500 MILLIGRAM(S): at 05:52

## 2019-04-08 RX ADMIN — GABAPENTIN 600 MILLIGRAM(S): 400 CAPSULE ORAL at 13:07

## 2019-04-08 RX ADMIN — TRAMADOL HYDROCHLORIDE 25 MILLIGRAM(S): 50 TABLET ORAL at 12:26

## 2019-04-08 RX ADMIN — ENOXAPARIN SODIUM 40 MILLIGRAM(S): 100 INJECTION SUBCUTANEOUS at 21:07

## 2019-04-08 RX ADMIN — Medication 100 MILLIGRAM(S): at 05:52

## 2019-04-08 RX ADMIN — GABAPENTIN 600 MILLIGRAM(S): 400 CAPSULE ORAL at 21:08

## 2019-04-08 NOTE — DISCHARGE NOTE NURSING/CASE MANAGEMENT/SOCIAL WORK - NSDCDPATPORTLINK_GEN_ALL_CORE
You can access the [facility], offered by [facility], by registering with the following website: [email or web address]

## 2019-04-08 NOTE — PROGRESS NOTE ADULT - SUBJECTIVE AND OBJECTIVE BOX
INTERVAL SUBJECTIVE & REVIEW OF SYMPTOMS:  Patient seen and examined.  No acute events.  feeling well.  Pain well controlled.  (+) BM.      REVIEW OF SYSTEMS  [ x  ] Constitutional WNL  [ x  ] Cardio WNL  [ x  ] Resp WNL  [ x  ] GI WNL  [ x  ]  WNL  [   ] Heme WNL  [   ] Endo WNL  [   ] Skin WNL  [   ] MSK WNL    OBJECTIVE  Vital Signs Last 24 Hrs  T(C): 36.8 (07 Apr 2019 21:40), Max: 36.8 (07 Apr 2019 21:40)  T(F): 98.3 (07 Apr 2019 21:40), Max: 98.3 (07 Apr 2019 21:40)  HR: 71 (07 Apr 2019 21:40) (71 - 88)  BP: 130/76 (07 Apr 2019 21:40) (112/70 - 130/76)  BP(mean): --  RR: 14 (07 Apr 2019 21:40) (14 - 14)  SpO2: 97% (07 Apr 2019 21:40) (97% - 97%)    PHYSICAL EXAM  General: NAD  Cardio: S1S2+  Resp: clear  Abdomen: soft, NT , distended  Extrem: no edema or calf tenderness, Motor UE 5/5, RLE 4/5, left HF 3/5, KE 4/5, ankle 4/5 , EHL left weaker that left  Sensory : impaired to light touch left > right   Skin: back incision with staples +( some removed on 4/2)  No erythema or edema noted    Functional Exam:   Transfers: CG/CS except sit/supine which is min assist  Bed Mobility: sit/supine: mod assist  Gait: ' with CG  ADLs: toileting transfer: min assist, upper body dressing: mod assist; lower body dressing: max assist    MEDICATIONS  (STANDING):  alfuzosin 10 milliGRAM(s) Oral at bedtime  ascorbic acid 500 milliGRAM(s) Oral two times a day  atorvastatin 20 milliGRAM(s) Oral at bedtime  dextrose 5%. 1000 milliLiter(s) (50 mL/Hr) IV Continuous <Continuous>  dextrose 50% Injectable 12.5 Gram(s) IV Push once  dextrose 50% Injectable 25 Gram(s) IV Push once  dextrose 50% Injectable 25 Gram(s) IV Push once  docusate sodium 100 milliGRAM(s) Oral three times a day  enoxaparin Injectable 40 milliGRAM(s) SubCutaneous <User Schedule>  gabapentin 600 milliGRAM(s) Oral three times a day  insulin lispro (HumaLOG) corrective regimen sliding scale   SubCutaneous two times a day before meals  lidocaine   Patch 2 Patch Transdermal <User Schedule>  losartan 100 milliGRAM(s) Oral daily  metFORMIN 500 milliGRAM(s) Oral two times a day  multivitamin 1 Tablet(s) Oral daily  NIFEdipine XL 60 milliGRAM(s) Oral <User Schedule>  pantoprazole    Tablet 40 milliGRAM(s) Oral before breakfast  polyethylene glycol 3350 17 Gram(s) Oral two times a day  senna 2 Tablet(s) Oral at bedtime    MEDICATIONS  (PRN):  acetaminophen   Tablet .. 650 milliGRAM(s) Oral every 4 hours PRN Mild Pain (1 - 3)  aluminum hydroxide/magnesium hydroxide/simethicone Suspension 30 milliLiter(s) Oral every 4 hours PRN Dyspepsia  bisacodyl Suppository 10 milliGRAM(s) Rectal daily PRN Constipation  dextrose 40% Gel 15 Gram(s) Oral once PRN Blood Glucose LESS THAN 70 milliGRAM(s)/deciliter  glucagon  Injectable 1 milliGRAM(s) IntraMuscular once PRN Glucose LESS THAN 70 milligrams/deciliter  melatonin 3 milliGRAM(s) Oral at bedtime PRN Insomnia  traMADol 25 milliGRAM(s) Oral every 6 hours PRN Severe Pain (7 - 10)      CAPILLARY BLOOD GLUCOSE  POCT Blood Glucose.: 133 mg/dL (08 Apr 2019 07:30)  POCT Blood Glucose.: 218 mg/dL (07 Apr 2019 16:36)        LABS:                        9.8    7.4   )-----------( 246      ( 05 Apr 2019 05:32 )             32.2     05 Apr 2019 05:32    140    |  104    |  17     ----------------------------<  147    3.8     |  25     |  0.78     Ca    9.5        05 Apr 2019 05:32        A/P:    58 year-old man with a PMH of HTN, HLD, DM presented with weakness, found to have 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    Continue full rehab program: PT/OT 3 hrs/day 5 days/week    DVT prophylaxis: on Lovenox    DM-II: Diet, SSI, metformin 500mg BID (Dose changed to TID 4/5).  BS have been well-controlled; decrease accuchecks/SSI to BID.     HTN: on losartan and Procardia. Procardia dose 60mg daily . May need further reduction     Hyperlipidemia: on statin    GI prophylaxis: on protonix    Pain management: on tylenol prn, gabapentin, lidoderm patch and tramadol PRN. Modalities prn     bowel program: Post op ileus improved, continue current bowel regimen- Miralax BID, bisacodyl supp and senna prn.     Bladder program: Toilet schedule prn. Continue alfuzosin.     Hospitalist janis noted

## 2019-04-08 NOTE — DISCHARGE NOTE NURSING/CASE MANAGEMENT/SOCIAL WORK - NSSCTYPOFSERV_GEN_ALL_CORE
Visiting RN, PT and OT. Your home visiting RN will call on 4/10 to open the case and schedule the first visits. You will also have a home health aide evaluation once home

## 2019-04-08 NOTE — PROGRESS NOTE ADULT - SUBJECTIVE AND OBJECTIVE BOX
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    ALLERGIES:  No Known Allergies    MEDICATIONS:  alfuzosin 10 milliGRAM(s) Oral at bedtime  insulin lispro (HumaLOG) corrective regimen sliding scale   SubCutaneous two times a day before meals  lidocaine   Patch 2 Patch Transdermal <User Schedule>  metFORMIN 500 milliGRAM(s) Oral two times a day  NIFEdipine XL 60 milliGRAM(s) Oral <User Schedule>  senna 2 Tablet(s) Oral at bedtime  traMADol 25 milliGRAM(s) Oral every 6 hours PRN    Vital Signs Last 24 Hrs  T(F): 98.3 (07 Apr 2019 21:40), Max: 98.3 (07 Apr 2019 21:40)  HR: 71 (07 Apr 2019 21:40) (71 - 88)  BP: 130/76 (07 Apr 2019 21:40) (108/69 - 130/76)  RR: 14 (07 Apr 2019 21:40) (14 - 14)  SpO2: 97% (07 Apr 2019 21:40) (97% - 97%)  I&O's Summary      PHYSICAL EXAM:  General: NAD, A/O x 3  ENT: MMM  Neck: Supple, No JVD  Lungs: Clear to auscultation bilaterally  Cardio: RRR, S1/S2, No murmurs  Abdomen: Soft, Nontender, Nondistended; Bowel sounds present  Extremities: No cyanosis, No edema    LABS:                    03-10 Chol 123 mg/dL LDL 67 mg/dL HDL 40 mg/dL Trig 79 mg/dL        CAPILLARY BLOOD GLUCOSE      POCT Blood Glucose.: 133 mg/dL (08 Apr 2019 07:30)  POCT Blood Glucose.: 218 mg/dL (07 Apr 2019 16:36)    03-11 [id number] 6.9  03-10 [id number] 6.9          RADIOLOGY & ADDITIONAL TESTS:    Care Discussed with Consultants/Other Providers:

## 2019-04-08 NOTE — PROGRESS NOTE ADULT - ATTENDING COMMENTS
Chart reviewed. Patient seen at bedside. No new issues overnight.   Pain well controlled with tramadol  Incision well healed.   Progressing well in therapy.   d/c home in am Chart reviewed. Patient seen at bedside. No new issues overnight.   Pain well controlled with tramadol  Incision well healed.   Progressing well in therapy.   d/c home in am    2. BP improved. Once daily > 140  systolic.   Will d/c home on Procardia xl 30 mg daily

## 2019-04-08 NOTE — DISCHARGE NOTE NURSING/CASE MANAGEMENT/SOCIAL WORK - NSPROEXTENSIONSOFSELF_GEN_A_NUR
Chief Complaint   Patient presents with    Neck Pain     Pt states that her 'neck pain is bad'. C/o pain when trying to turn her head to the left. She has been using марина jimenes, lidocaine, and Voltaren gel and has had no relief. 1. Have you been to the ER, urgent care clinic since your last visit? Hospitalized since your last visit? No    2. Have you seen or consulted any other health care providers outside of the [address] [place] [address] since your last visit? Include any pap smears or colon screening.  No eyeglasses

## 2019-04-08 NOTE — DISCHARGE NOTE NURSING/CASE MANAGEMENT/SOCIAL WORK - NSDCFUADDAPPT_GEN_ALL_CORE_FT
You have an appointment with your PCP Dr. Bernardo in the [place] office on Thursday, 4/11 at 3pm.

## 2019-04-09 VITALS — DIASTOLIC BLOOD PRESSURE: 75 MMHG | SYSTOLIC BLOOD PRESSURE: 125 MMHG | HEART RATE: 73 BPM

## 2019-04-09 LAB — GLUCOSE BLDC GLUCOMTR-MCNC: 124 MG/DL — HIGH (ref 70–99)

## 2019-04-09 PROCEDURE — 36415 COLL VENOUS BLD VENIPUNCTURE: CPT

## 2019-04-09 PROCEDURE — 85027 COMPLETE CBC AUTOMATED: CPT

## 2019-04-09 PROCEDURE — 97535 SELF CARE MNGMENT TRAINING: CPT

## 2019-04-09 PROCEDURE — 97116 GAIT TRAINING THERAPY: CPT

## 2019-04-09 PROCEDURE — 97163 PT EVAL HIGH COMPLEX 45 MIN: CPT

## 2019-04-09 PROCEDURE — 82962 GLUCOSE BLOOD TEST: CPT

## 2019-04-09 PROCEDURE — 97112 NEUROMUSCULAR REEDUCATION: CPT

## 2019-04-09 PROCEDURE — 99238 HOSP IP/OBS DSCHRG MGMT 30/<: CPT

## 2019-04-09 PROCEDURE — 83735 ASSAY OF MAGNESIUM: CPT

## 2019-04-09 PROCEDURE — 97530 THERAPEUTIC ACTIVITIES: CPT

## 2019-04-09 PROCEDURE — 97110 THERAPEUTIC EXERCISES: CPT

## 2019-04-09 PROCEDURE — 97167 OT EVAL HIGH COMPLEX 60 MIN: CPT

## 2019-04-09 PROCEDURE — 80048 BASIC METABOLIC PNL TOTAL CA: CPT

## 2019-04-09 PROCEDURE — 80053 COMPREHEN METABOLIC PANEL: CPT

## 2019-04-09 PROCEDURE — 84100 ASSAY OF PHOSPHORUS: CPT

## 2019-04-09 PROCEDURE — 99233 SBSQ HOSP IP/OBS HIGH 50: CPT

## 2019-04-09 RX ADMIN — LIDOCAINE 2 PATCH: 4 CREAM TOPICAL at 07:57

## 2019-04-09 RX ADMIN — Medication 100 MILLIGRAM(S): at 05:48

## 2019-04-09 RX ADMIN — Medication 30 MILLIGRAM(S): at 11:37

## 2019-04-09 RX ADMIN — TRAMADOL HYDROCHLORIDE 25 MILLIGRAM(S): 50 TABLET ORAL at 11:52

## 2019-04-09 RX ADMIN — Medication 500 MILLIGRAM(S): at 05:48

## 2019-04-09 RX ADMIN — TRAMADOL HYDROCHLORIDE 25 MILLIGRAM(S): 50 TABLET ORAL at 03:07

## 2019-04-09 RX ADMIN — METFORMIN HYDROCHLORIDE 500 MILLIGRAM(S): 850 TABLET ORAL at 05:47

## 2019-04-09 RX ADMIN — PANTOPRAZOLE SODIUM 40 MILLIGRAM(S): 20 TABLET, DELAYED RELEASE ORAL at 05:48

## 2019-04-09 RX ADMIN — LOSARTAN POTASSIUM 100 MILLIGRAM(S): 100 TABLET, FILM COATED ORAL at 05:48

## 2019-04-09 RX ADMIN — TRAMADOL HYDROCHLORIDE 25 MILLIGRAM(S): 50 TABLET ORAL at 04:00

## 2019-04-09 RX ADMIN — GABAPENTIN 600 MILLIGRAM(S): 400 CAPSULE ORAL at 05:47

## 2019-04-09 RX ADMIN — Medication 1 TABLET(S): at 11:37

## 2019-04-09 NOTE — PROGRESS NOTE ADULT - ASSESSMENT
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     HLD- STATIN    HTN- LOSARTAN, PROCARDIA    GERD- PROTONIX    BPH- URAXTROL    C/W CURRENT CARE.
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    Continue full rehab program: PT/OT 3 hrs/day 5 days/week    #DM-II:   Diet, SSI,  - metformin 250mg TID (Dose changed to TID 4/1)..     #HTN:  - on losartan and Procardia 60mg daily   -HR: 78 (05 Apr 2019 20:20) (78 - 78)  BP: 136/80 (05 Apr 2019 20:20) (136/80 - 136/80)    #Hyperlipidemia:   on statin    #GI prophylaxis:   on protonix    #Pain management: controlled  -on tylenol prn, gabapentin, lidoderm patch and tramadol PRN  -. Modalities prn     #bowel program: daily BMs  - Miralax BID, bisacodyl supp and senna prn.     3Bladder program:   -Toilet schedule prn. Continue alfuzosin.       #DVT prophylaxis:   on Lovenox
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    Continue full rehab program: PT/OT 3 hrs/day 5 days/week    #DM-II:   Diet, SSI,  - metformin 250mg TID (Dose changed to TID 4/1)..     #HTN:  - on losartan and Procardia 60mg daily   -monitor Bp on current medicatons, currently stable, low BP possibly due to Valsalva, mild lightheadedness, self limiting    #Hyperlipidemia:   on statin    #GI prophylaxis:   on protonix    #Pain management: controlled  -on tylenol prn, gabapentin, lidoderm patch and tramadol PRN  -. Modalities prn     #bowel program: daily BMs  - Miralax BID, bisacodyl supp and senna prn.   -toileting program    3Bladder program:   -Toilet schedule prn. Continue alfuzosin.       #DVT prophylaxis:   on Lovenox

## 2019-04-09 NOTE — PROGRESS NOTE ADULT - REASON FOR ADMISSION
functional deficits 2/2 SCI from dura AVM surgery

## 2019-04-09 NOTE — PROGRESS NOTE ADULT - SUBJECTIVE AND OBJECTIVE BOX
INTERVAL SUBJECTIVE & REVIEW OF SYMPTOMS:  Patient seen and examined.  No acute events.  feeling well.  Pain controlled.  Looking forward to discharge.     REVIEW OF SYSTEMS  [ x  ] Constitutional WNL  [ x  ] Cardio WNL  [ x  ] Resp WNL  [ x  ] GI WNL  [ x  ]  WNL  [   ] Heme WNL  [   ] Endo WNL  [   ] Skin WNL  [   ] MSK WNL    OBJECTIVE  Vital Signs Last 24 Hrs  T(C): 36.8 (09 Apr 2019 07:50), Max: 36.8 (09 Apr 2019 07:50)  T(F): 98.2 (09 Apr 2019 07:50), Max: 98.2 (09 Apr 2019 07:50)  HR: 69 (09 Apr 2019 07:50) (69 - 92)  BP: 125/77 (09 Apr 2019 07:50) (122/71 - 137/82)  BP(mean): --  RR: 14 (09 Apr 2019 07:50) (14 - 14)  SpO2: 98% (09 Apr 2019 07:50) (97% - 98%)    PHYSICAL EXAM  General: NAD  Cardio: S1S2+  Resp: clear  Abdomen: soft, NT , distended  Extrem: no edema or calf tenderness, Motor UE 5/5, RLE 4/5, left HF 3/5, KE 4/5, ankle 4/5 , EHL left weaker that left  Sensory : impaired to light touch left > right   Skin: back incision with staples +( some removed on 4/2)  No erythema or edema noted    Functional Exam:   Transfers: CG/CS except sit/supine which is min assist  Bed Mobility: sit/supine: mod assist  Gait: ' with CG  ADLs: toileting transfer: min assist, upper body dressing: mod assist; lower body dressing: max assist    MEDICATIONS  (STANDING):  alfuzosin 10 milliGRAM(s) Oral at bedtime  ascorbic acid 500 milliGRAM(s) Oral two times a day  atorvastatin 20 milliGRAM(s) Oral at bedtime  dextrose 5%. 1000 milliLiter(s) (50 mL/Hr) IV Continuous <Continuous>  dextrose 50% Injectable 12.5 Gram(s) IV Push once  dextrose 50% Injectable 25 Gram(s) IV Push once  dextrose 50% Injectable 25 Gram(s) IV Push once  docusate sodium 100 milliGRAM(s) Oral three times a day  enoxaparin Injectable 40 milliGRAM(s) SubCutaneous <User Schedule>  gabapentin 600 milliGRAM(s) Oral three times a day  insulin lispro (HumaLOG) corrective regimen sliding scale   SubCutaneous two times a day before meals  lidocaine   Patch 2 Patch Transdermal <User Schedule>  losartan 100 milliGRAM(s) Oral daily  metFORMIN 500 milliGRAM(s) Oral two times a day  multivitamin 1 Tablet(s) Oral daily  NIFEdipine XL 60 milliGRAM(s) Oral <User Schedule>  pantoprazole    Tablet 40 milliGRAM(s) Oral before breakfast  polyethylene glycol 3350 17 Gram(s) Oral two times a day  senna 2 Tablet(s) Oral at bedtime    MEDICATIONS  (PRN):  acetaminophen   Tablet .. 650 milliGRAM(s) Oral every 4 hours PRN Mild Pain (1 - 3)  aluminum hydroxide/magnesium hydroxide/simethicone Suspension 30 milliLiter(s) Oral every 4 hours PRN Dyspepsia  bisacodyl Suppository 10 milliGRAM(s) Rectal daily PRN Constipation  dextrose 40% Gel 15 Gram(s) Oral once PRN Blood Glucose LESS THAN 70 milliGRAM(s)/deciliter  glucagon  Injectable 1 milliGRAM(s) IntraMuscular once PRN Glucose LESS THAN 70 milligrams/deciliter  melatonin 3 milliGRAM(s) Oral at bedtime PRN Insomnia  traMADol 25 milliGRAM(s) Oral every 6 hours PRN Severe Pain (7 - 10)      CAPILLARY BLOOD GLUCOSE      POCT Blood Glucose.: 124 mg/dL (09 Apr 2019 07:52)  POCT Blood Glucose.: 102 mg/dL (08 Apr 2019 16:48)          LABS:                        9.8    7.4   )-----------( 246      ( 05 Apr 2019 05:32 )             32.2     05 Apr 2019 05:32    140    |  104    |  17     ----------------------------<  147    3.8     |  25     |  0.78     Ca    9.5        05 Apr 2019 05:32        A/P:    58 year-old man with a PMH of HTN, HLD, DM presented with weakness, found to have 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    Continue full rehab program: PT/OT 3 hrs/day 5 days/week    DVT prophylaxis: on Lovenox    DM-II: Diet, SSI, metformin 500mg BID (Dose changed to TID 4/5).  BS have been well-controlled; decrease accuchecks/SSI to BID.     HTN: on losartan and Procardia. Procardia dose 60mg daily . May need further reduction     Hyperlipidemia: on statin    GI prophylaxis: on protonix    Pain management: on tylenol prn, gabapentin, lidoderm patch and tramadol PRN. Modalities prn     bowel program: Post op ileus improved, continue current bowel regimen- Miralax BID, bisacodyl supp and senna prn.     Bladder program: Toilet schedule prn. Continue alfuzosin.     Hospitalist janis noted INTERVAL SUBJECTIVE & REVIEW OF SYMPTOMS:  Patient seen and examined.  No acute events.  feeling well.  Pain controlled.  Looking forward to discharge.     REVIEW OF SYSTEMS  [ x  ] Constitutional WNL  [ x  ] Cardio WNL  [ x  ] Resp WNL  [ x  ] GI WNL  [ x  ]  WNL  [   ] Heme WNL  [   ] Endo WNL  [   ] Skin WNL  [   ] MSK WNL    OBJECTIVE  Vital Signs Last 24 Hrs  T(C): 36.8 (09 Apr 2019 07:50), Max: 36.8 (09 Apr 2019 07:50)  T(F): 98.2 (09 Apr 2019 07:50), Max: 98.2 (09 Apr 2019 07:50)  HR: 69 (09 Apr 2019 07:50) (69 - 92)  BP: 125/77 (09 Apr 2019 07:50) (122/71 - 137/82)  BP(mean): --  RR: 14 (09 Apr 2019 07:50) (14 - 14)  SpO2: 98% (09 Apr 2019 07:50) (97% - 98%)    PHYSICAL EXAM  General: NAD  Cardio: S1S2+  Resp: clear  Abdomen: soft, NT , distended  Extrem: no edema or calf tenderness, Motor UE 5/5, RLE 4/5, left HF 3/5, KE 4/5, ankle 4/5 , EHL left weaker that left  Sensory : impaired to light touch left > right   Skin: back incision with staples +( some removed on 4/2)  No erythema or edema noted    Functional Exam:   Mod I with transfers, gait, s  Min assist/supervision  with ADLs        MEDICATIONS  (STANDING):  alfuzosin 10 milliGRAM(s) Oral at bedtime  ascorbic acid 500 milliGRAM(s) Oral two times a day  atorvastatin 20 milliGRAM(s) Oral at bedtime  dextrose 5%. 1000 milliLiter(s) (50 mL/Hr) IV Continuous <Continuous>  dextrose 50% Injectable 12.5 Gram(s) IV Push once  dextrose 50% Injectable 25 Gram(s) IV Push once  dextrose 50% Injectable 25 Gram(s) IV Push once  docusate sodium 100 milliGRAM(s) Oral three times a day  enoxaparin Injectable 40 milliGRAM(s) SubCutaneous <User Schedule>  gabapentin 600 milliGRAM(s) Oral three times a day  insulin lispro (HumaLOG) corrective regimen sliding scale   SubCutaneous two times a day before meals  lidocaine   Patch 2 Patch Transdermal <User Schedule>  losartan 100 milliGRAM(s) Oral daily  metFORMIN 500 milliGRAM(s) Oral two times a day  multivitamin 1 Tablet(s) Oral daily  NIFEdipine XL 60 milliGRAM(s) Oral <User Schedule>  pantoprazole    Tablet 40 milliGRAM(s) Oral before breakfast  polyethylene glycol 3350 17 Gram(s) Oral two times a day  senna 2 Tablet(s) Oral at bedtime    MEDICATIONS  (PRN):  acetaminophen   Tablet .. 650 milliGRAM(s) Oral every 4 hours PRN Mild Pain (1 - 3)  aluminum hydroxide/magnesium hydroxide/simethicone Suspension 30 milliLiter(s) Oral every 4 hours PRN Dyspepsia  bisacodyl Suppository 10 milliGRAM(s) Rectal daily PRN Constipation  dextrose 40% Gel 15 Gram(s) Oral once PRN Blood Glucose LESS THAN 70 milliGRAM(s)/deciliter  glucagon  Injectable 1 milliGRAM(s) IntraMuscular once PRN Glucose LESS THAN 70 milligrams/deciliter  melatonin 3 milliGRAM(s) Oral at bedtime PRN Insomnia  traMADol 25 milliGRAM(s) Oral every 6 hours PRN Severe Pain (7 - 10)      CAPILLARY BLOOD GLUCOSE      POCT Blood Glucose.: 124 mg/dL (09 Apr 2019 07:52)  POCT Blood Glucose.: 102 mg/dL (08 Apr 2019 16:48)          LABS:                        9.8    7.4   )-----------( 246      ( 05 Apr 2019 05:32 )             32.2     05 Apr 2019 05:32    140    |  104    |  17     ----------------------------<  147    3.8     |  25     |  0.78     Ca    9.5        05 Apr 2019 05:32        A/P:    58 year-old man with a PMH of HTN, HLD, DM presented with weakness, found to have 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    Continue full rehab program: PT/OT 3 hrs/day 5 days/week    DVT prophylaxis: on Lovenox    DM-II: Diet, SSI, metformin 500mg BID (Dose changed to TID 4/5).  BS have been well-controlled; decrease accuchecks/SSI to BID.     HTN: on losartan and Procardia. Procardia dose 60mg daily . May need further reduction - d/c home on 30mg xl     Hyperlipidemia: on statin    GI prophylaxis: on protonix    Pain management: on tylenol prn, gabapentin, lidoderm patch and tramadol PRN. Modalities prn     bowel program: Post op ileus improved, continue current bowel regimen- Miralax BID, bisacodyl supp and senna prn.     Bladder program: Toilet schedule prn. Continue alfuzosin.     Hospitalist janis noted

## 2019-04-09 NOTE — PROGRESS NOTE ADULT - ATTENDING COMMENTS
Chart reviewed. No new issues overnight  Back pain well controlled with pain meds  Incision well healed  Scheduled for dc home today with home care.  fu with PCP, surgery in 1 week  All questions answered.

## 2019-04-09 NOTE — PROGRESS NOTE ADULT - SUBJECTIVE AND OBJECTIVE BOX
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    ALLERGIES:  No Known Allergies    MEDICATIONS:  alfuzosin 10 milliGRAM(s) Oral at bedtime  insulin lispro (HumaLOG) corrective regimen sliding scale   SubCutaneous two times a day before meals  lidocaine   Patch 2 Patch Transdermal <User Schedule>  metFORMIN 500 milliGRAM(s) Oral two times a day  NIFEdipine XL 60 milliGRAM(s) Oral <User Schedule>  senna 2 Tablet(s) Oral at bedtime  traMADol 25 milliGRAM(s) Oral every 6 hours PRN    Vital Signs Last 24 Hrs  T(F): 98.3 (07 Apr 2019 21:40), Max: 98.3 (07 Apr 2019 21:40)  HR: 71 (07 Apr 2019 21:40) (71 - 88)  BP: 130/76 (07 Apr 2019 21:40) (108/69 - 130/76)  RR: 14 (07 Apr 2019 21:40) (14 - 14)  SpO2: 97% (07 Apr 2019 21:40) (97% - 97%)  I&O's Summary      PHYSICAL EXAM:  General: NAD, A/O x 3  ENT: MMM  Neck: Supple, No JVD  Lungs: Clear to auscultation bilaterally  Cardio: RRR, S1/S2, No murmurs  Abdomen: Soft, Nontender, Nondistended; Bowel sounds present  Extremities: No cyanosis, No edema    LABS:                    03-10 Chol 123 mg/dL LDL 67 mg/dL HDL 40 mg/dL Trig 79 mg/dL        CAPILLARY BLOOD GLUCOSE      POCT Blood Glucose.: 133 mg/dL (08 Apr 2019 07:30)  POCT Blood Glucose.: 218 mg/dL (07 Apr 2019 16:36)    03-11 [id number] 6.9  03-10 [id number] 6.9          RADIOLOGY & ADDITIONAL TESTS:    Care Discussed with Consultants/Other Providers:

## 2019-04-09 NOTE — PROGRESS NOTE ADULT - PROVIDER SPECIALTY LIST ADULT
Hospitalist
Physiatry

## 2019-04-17 ENCOUNTER — APPOINTMENT (OUTPATIENT)
Dept: NEUROLOGY | Facility: CLINIC | Age: 59
End: 2019-04-17

## 2019-04-18 LAB — ACID FAST STN FLD: SIGNIFICANT CHANGE UP

## 2019-05-02 ENCOUNTER — APPOINTMENT (OUTPATIENT)
Dept: NEUROSURGERY | Facility: CLINIC | Age: 59
End: 2019-05-02
Payer: COMMERCIAL

## 2019-05-02 VITALS
WEIGHT: 184 LBS | OXYGEN SATURATION: 96 % | SYSTOLIC BLOOD PRESSURE: 166 MMHG | DIASTOLIC BLOOD PRESSURE: 94 MMHG | TEMPERATURE: 98.5 F | HEART RATE: 72 BPM | HEIGHT: 70 IN | BODY MASS INDEX: 26.34 KG/M2 | RESPIRATION RATE: 16 BRPM

## 2019-05-02 PROCEDURE — 99024 POSTOP FOLLOW-UP VISIT: CPT

## 2019-05-03 NOTE — ASSESSMENT
[FreeTextEntry1] : Impression: 58yr old male s/p thoracic spinal cord avm resection 3/18/2019 \par \par Plan: \par Surgical incision healing well \par MRI thoracic spine with and without contrast in June 2019 then follow up with Dr. Horn in office after (they have a prescription from Dr. Willis already) follow up with us or Dr. Willis\par Length of time he had his symptoms is similar to the length of time it will take to recover if we did not treat the spinal avm it would have left him paraplegic and then progressed to quadraplegia. Left leg strength should continue to improve but we can not fix the damage that was done prior to the surgery from the spinal avm. \par Ok to drive \par Continue PT until left leg is normal

## 2019-05-03 NOTE — PHYSICAL EXAM
[General Appearance - Alert] : alert [General Appearance - In No Acute Distress] : in no acute distress [Clean] : clean [Dry] : dry [Healing Well] : healing well [No Drainage] : without drainage [Person] : oriented to person [Place] : oriented to place [Time] : oriented to time [4] : L4/5 heel walking 4/5 [5] : C5 Biceps 5/5 [No Visual Abnormalities] : no visible abnormailities [Intact] : all sensory within normal limits bilaterally [] : no respiratory distress [Exaggerated Use Of Accessory Muscles For Inspiration] : no accessory muscle use [Erythema] : not erythematous [Warm] : not warm [FreeTextEntry5] : left plantar flexion 4/5 left dorsiflexion 4/5 right plantar flexion 5/5 right dorsiflexion 5/5 left leg lift 4/5 right leg lift 5/5  [FreeTextEntry1] : walks with assistance of walker

## 2019-05-03 NOTE — HISTORY OF PRESENT ILLNESS
[FreeTextEntry1] : Kimmie Berrios is a 58yr old male here for his first post operative visit. He presented to [facility] with lower back pain, decreased sensation in both legs, left leg weakness, and difficulty walking due to weakness 3/10/2019. 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 ON 3/18/2019 he underwent resection of this thoracic spine avm. He tolerated the procedure well and had post op cerebral angiography done 3/19/2019 which confirmed resection of the avm. He recovered in the hospital post operatively and and was evaluated by cardiology for hypertension and GI for ileus. When he was stable for discharged he went to acute rehab 3/26/2019. Since he has been home he has been feeling slightly improved. He walks with the assistance of a walker. His left leg strength is improving and he is able to lift it now. When he walks for long periods of time he has to stop to take a break. Denies any new motor, sensory issues. Denies any bowel or bladder dysfunction. \par \par Neurologist: Dr. Lawson Nevarez \par PCP: Dr. Ramirez\par Neurosurgeon: Dr. Willis

## 2019-05-03 NOTE — REVIEW OF SYSTEMS
[Leg Weakness] : leg weakness [Negative] : Heme/Lymph [Difficulty Walking] : difficulty walking [de-identified] : weakness

## 2019-06-08 ENCOUNTER — OUTPATIENT (OUTPATIENT)
Dept: OUTPATIENT SERVICES | Facility: HOSPITAL | Age: 59
LOS: 1 days | End: 2019-06-08
Payer: COMMERCIAL

## 2019-06-08 ENCOUNTER — APPOINTMENT (OUTPATIENT)
Dept: MRI IMAGING | Facility: HOSPITAL | Age: 59
End: 2019-06-08

## 2019-06-08 DIAGNOSIS — Q28.8 OTHER SPECIFIED CONGENITAL MALFORMATIONS OF CIRCULATORY SYSTEM: ICD-10-CM

## 2019-06-08 PROCEDURE — 72158 MRI LUMBAR SPINE W/O & W/DYE: CPT | Mod: 26

## 2019-06-08 PROCEDURE — 72157 MRI CHEST SPINE W/O & W/DYE: CPT | Mod: 26

## 2019-06-08 PROCEDURE — 72158 MRI LUMBAR SPINE W/O & W/DYE: CPT

## 2019-06-08 PROCEDURE — 72157 MRI CHEST SPINE W/O & W/DYE: CPT

## 2019-06-08 PROCEDURE — A9585: CPT

## 2019-07-01 ENCOUNTER — APPOINTMENT (OUTPATIENT)
Dept: NEUROSURGERY | Facility: CLINIC | Age: 59
End: 2019-07-01
Payer: COMMERCIAL

## 2019-07-01 VITALS
SYSTOLIC BLOOD PRESSURE: 123 MMHG | HEIGHT: 70 IN | HEART RATE: 75 BPM | OXYGEN SATURATION: 97 % | BODY MASS INDEX: 26.34 KG/M2 | DIASTOLIC BLOOD PRESSURE: 78 MMHG | RESPIRATION RATE: 17 BRPM | TEMPERATURE: 98.1 F | WEIGHT: 184 LBS

## 2019-07-01 PROCEDURE — 99214 OFFICE O/P EST MOD 30 MIN: CPT

## 2019-07-01 NOTE — REVIEW OF SYSTEMS
[As Noted in HPI] : as noted in HPI [Numbness] : numbness [Tingling] : tingling [Abnormal Sensation] : an abnormal sensation [Difficulty Walking] : difficulty walking [Negative] : Heme/Lymph

## 2019-07-03 NOTE — ASSESSMENT
[FreeTextEntry1] : Impression: 58yr old male s/p thoracic spinal cord avm resection 3/18/2019 \par \par Plan: \par Surgical incision healed \par MRI of the thoracic and lumbar spine demonstrate no residual AVM no swelling of the spinal cord\par December 2019 repeat MRI thoracic and lumbar spine then follow up in the office after\par Repeat spinal angiography March 2020 \par

## 2019-07-03 NOTE — REASON FOR VISIT
[Follow-Up: _____] : a [unfilled] follow-up visit [Spouse] : spouse [FreeTextEntry1] : Kimmie Berrios is a 59yr old male here to follow up after having recent imaging done consisting of MRI of thoracic and lumbar spine. He presented to [facility] with lower back pain, decreased sensation in both legs, left leg weakness, and difficulty walking due to weakness 3/10/2019. 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 ON 3/18/2019 he underwent resection of this thoracic spine avm. He tolerated the procedure well and had post op cerebral angiography done 3/19/2019 which confirmed resection of the avm. His walking has improved he now ambulates with a with assistance of a cane. No bladder or bowel issues. Some residual numbness of the left foot.

## 2019-07-03 NOTE — RESULTS/DATA
[FreeTextEntry1] : EXAM: MR SPINE LUMBAR WAW IC \par \par EXAM: MR SPINE THORACIC WAW IC \par \par \par PROCEDURE DATE: 06/08/2019 \par \par \par \par \par INTERPRETATION: CLINICAL INFORMATION: Back pain. History of type I dural \par AVF with venous hypertension and long segment cord edema. Spine surgery on \par 3/18/2019. \par \par TECHNIQUE: Noncontrast MR of the thoracolumbar spine was performed. Sagittal \par and axial T1 and T2-weighted images thoracolumbar the lumbar spine were \par obtained. Postcontrast images were obtained of the lumbar spine \par \par COMPARISON: MRA of the lumbar spine 3/8/2019. MRI of the thoracic and lumbar \par spine dated 3/5/2019 and 3/6/2019. CT chest abdomen and pelvis dated \par 3/6/2019. \par \par FINDINGS: \par \par DISTAL CORD AND CONUS: When compared with prior MR imaging preoperatively \par there has been marked interval resolution of abnormal signal at the level of \par the distal aspect of the spinal cord extending into the thoracic region \par consistent with considerable resolution of venous congestion in association \par with patient's known type I dural fistula. There is some minimal residual \par signal abnormality and enhancement distally in the lower portion of the cord \par from the T11 level to the level of L1. Again this is significantly improved \par compared with the prior. \par BONES: Evidence of prior laminectomy T3-T9 when compared with the patient's \par prior 3/5/2019. \par SPINAL ALIGNMENT: Normal \par \par IMPRESSION: Postop changes at the level of the laminectomy. Again, marked \par regression of abnormal signal without residual prominent flow voids. \par Patient is status post laminectomy T3-T9 for treatment of a spinal dural \par fistula. There is significant regression to abnormal signal at the level of \par the spinal cord. No definite residual vascular abnormality. Fat packing \par identified in the region of the laminectomy. Significant regression of \par abnormal enhancement. \par \par \par \par \par \par \par \par YAHAIRA STEIN M.D., RADIOLOGY RESIDENT \par This document has been electronically signed. \par FREDY WOLDENBERG M.D., ATTENDING RADIOLOGIST \par This document has been electronically signed. Jun 14 2019 12:28PM \par

## 2019-07-03 NOTE — CONSULT LETTER
[Consult Letter:] : I had the pleasure of evaluating your patient, [unfilled]. [Consult Closing:] : Thank you very much for allowing me to participate in the care of this patient.  If you have any questions, please do not hesitate to contact me. [FreeTextEntry3] : \par Sincerely,\par \par Burton Horn MD\par Professor of Neurological Surgery and Radiology\par  Department of Neurosurgery\par Director Cerebrovascular Surgery\par [facility] [employer] [facility]\par  [FreeTextEntry1] : As you now he is a 59yr old male who presented to [facility] with lower back pain, decreased sensation in both legs, left leg weakness, and difficulty walking due to weakness 3/10/2019. 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 ON 3/18/2019 he underwent resection of this thoracic spine avm. He tolerated the procedure well and had post op cerebral angiography done 3/19/2019 which confirmed resection of the avm. His walking has improved he now ambulates with a with assistance of a cane. No bladder or bowel issues. Some residual numbness of the left foot.  I reviewed his most recent MRI of the thoracic and lumbar spine which demonstrate no residual AVM no swelling of the spinal cord.  His surgical incision is healed.  At this time I recommend December 2019 repeat MRI thoracic and lumbar spine then follow up in the office after and repeat spinal angiography March 2020.\par \par

## 2019-07-03 NOTE — PHYSICAL EXAM
[General Appearance - Alert] : alert [General Appearance - In No Acute Distress] : in no acute distress [Person] : oriented to person [Place] : oriented to place [Time] : oriented to time [4] : L4/5 ankle dorsiflexors 4/5 [Limited Balance] : the patient's balance was impaired [] : no respiratory distress [Clean] : clean [Dry] : dry [Well-Healed] : well-healed [No Drainage] : without drainage [Erythema] : not erythematous [Warm] : not warm [FreeTextEntry1] : thoracic spine [Able to toe walk] : the patient was not able to toe walk [Able to heel walk] : the patient was not able to heel walk

## 2019-10-08 NOTE — PATIENT PROFILE ADULT - BRADEN SCORE
20

## 2019-12-13 ENCOUNTER — APPOINTMENT (OUTPATIENT)
Dept: MRI IMAGING | Facility: CLINIC | Age: 59
End: 2019-12-13
Payer: COMMERCIAL

## 2019-12-13 ENCOUNTER — OUTPATIENT (OUTPATIENT)
Dept: OUTPATIENT SERVICES | Facility: HOSPITAL | Age: 59
LOS: 1 days | End: 2019-12-13
Payer: COMMERCIAL

## 2019-12-13 DIAGNOSIS — Q28.8 OTHER SPECIFIED CONGENITAL MALFORMATIONS OF CIRCULATORY SYSTEM: ICD-10-CM

## 2019-12-13 DIAGNOSIS — Z00.8 ENCOUNTER FOR OTHER GENERAL EXAMINATION: ICD-10-CM

## 2019-12-13 PROCEDURE — 72157 MRI CHEST SPINE W/O & W/DYE: CPT | Mod: 26

## 2019-12-13 PROCEDURE — 72158 MRI LUMBAR SPINE W/O & W/DYE: CPT | Mod: 26

## 2019-12-13 PROCEDURE — A9585: CPT

## 2019-12-13 PROCEDURE — 72158 MRI LUMBAR SPINE W/O & W/DYE: CPT

## 2019-12-13 PROCEDURE — 72157 MRI CHEST SPINE W/O & W/DYE: CPT

## 2019-12-19 ENCOUNTER — APPOINTMENT (OUTPATIENT)
Dept: NEUROSURGERY | Facility: CLINIC | Age: 59
End: 2019-12-19
Payer: COMMERCIAL

## 2019-12-19 VITALS
HEIGHT: 70 IN | SYSTOLIC BLOOD PRESSURE: 119 MMHG | HEART RATE: 64 BPM | DIASTOLIC BLOOD PRESSURE: 75 MMHG | OXYGEN SATURATION: 95 % | RESPIRATION RATE: 17 BRPM | BODY MASS INDEX: 26.34 KG/M2 | WEIGHT: 184 LBS

## 2019-12-19 PROCEDURE — 99213 OFFICE O/P EST LOW 20 MIN: CPT

## 2019-12-20 NOTE — PHYSICAL EXAM
[General Appearance - Alert] : alert [Person] : oriented to person [General Appearance - In No Acute Distress] : in no acute distress [Place] : oriented to place [Time] : oriented to time [5] : C7 triceps 5/5 [4] : L4/5 extensor hallucis longus 4/5 [Limited Balance] : the patient's balance was impaired [] : no respiratory distress [Able to toe walk] : the patient was not able to toe walk [Able to heel walk] : the patient was not able to heel walk

## 2019-12-20 NOTE — RESULTS/DATA
[FreeTextEntry1] : EXAM: MR SPINE LUMBAR WAW IC \par \par EXAM: MR SPINE THORACIC WAW IC \par \par \par PROCEDURE DATE: 12/13/2019 \par \par \par \par INTERPRETATION: HISTORY: Spinal arterial venous malformation status post \par treatment. Prior surgery. Lower back pain, stiffness. Diabetes and \par hypertension. \par \par Description: MRI of the thoracic and lumbar spine with and without \par gadolinium contrast was performed. \par \par Comparison: Thoracic and lumbar spine MRI from 06/08/2019.. \par \par 10 cc intravenous Gadovist gadolinium contrast was administered, 0 cc \par contrast was discarded. \par \par Sagittal T1/T2/STIR/T1 postcontrast fat saturation, and axial T1/T2/T1 \par postcontrast series were performed. \par \par T3-T9 laminectomy postsurgical change is again noted. A small amount of \par dorsal paraspinal fluid at the level of the laminectomy defects has \par decreased compared to the prior study, with small areas of residual fluid \par noted at the upper margins of the surgical bed. The dorsal epidural fluid \par has also decreased. \par \par No enlarged vascular flow-voids are noted within the spinal canal on the \par current study. The thoracic spinal cord is stable in appearance without \par evidence for new signal abnormality or new areas of abnormal enhancement. \par \par The axial T1 series of the lumbar spine and some of the axial T1 images of \par the lower thoracic spine demonstrate artifactual increased T1 signal \par involving the ventral aspect of the spinal canal, which is not verified on \par the sagittal series. \par \par A small disc bulge with an annular tear is noted at the L5-S1 level, grossly \par stable. A small disc bulge at L4-L5 is also unchanged. \par \par IMPRESSION: \par \par Evolving posttreatment changes for spinal vascular malformation are noted as \par described. \par \par \par \par \par \par \par \par \par HALLE LOTT M.D., ATTENDING RADIOLOGIST \par This document has been electronically signed. Dec 16 2019 12:30PM \par \par

## 2019-12-20 NOTE — ASSESSMENT
[FreeTextEntry1] : Impression: 58yr old male s/p thoracic spinal cord avm resection 3/18/2019 \par \par Plan: \par Reviewed the most recent MRI of the thoracic and lumbar spine demonstrate no recurrence of the arterial venous malformation, decreased/no swelling of the spinal cord present\par Continue to work with PT, there is still a chance he can improve gait strength and balance. \par Repeat spinal angiography in March 2020 to evaluate for recurrence of the avm.

## 2019-12-20 NOTE — REASON FOR VISIT
[Follow-Up: _____] : a [unfilled] follow-up visit [FreeTextEntry1] : Kimmie Berrios is a 59yr old male here to follow up after having recent imaging done consisting of MRI of thoracic and lumbar spine. He presented to [facility] with lower back pain, decreased sensation in both legs, left leg weakness, and difficulty walking due to weakness 3/10/2019. 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 ON 3/18/2019 he underwent resection of this thoracic spine avm. He tolerated the procedure well and had post op cerebral angiography done 3/19/2019 which confirmed resection of the avm. His walking has improved he now ambulates with a with assistance of a cane. No bladder or bowel issues. Continuing to work with PT. Has low back pain when sitting for more than 2hrs. Difficulty driving more than short distances. \par

## 2019-12-20 NOTE — REVIEW OF SYSTEMS
[Feeling Tired] : feeling tired [As Noted in HPI] : as noted in HPI [Numbness] : numbness [Tingling] : tingling [Difficulty Walking] : difficulty walking [Negative] : Heme/Lymph

## 2019-12-20 NOTE — CONSULT LETTER
[Dear  ___] : Dear  [unfilled], [Consult Letter:] : I had the pleasure of evaluating your patient, [unfilled]. [Consult Closing:] : Thank you very much for allowing me to participate in the care of this patient.  If you have any questions, please do not hesitate to contact me. [FreeTextEntry3] : \par Sincerely,\par \par Burton Hron MD\par Professor of Neurological Surgery and Radiology\par  Department of Neurosurgery\par Director Cerebrovascular Surgery\par [facility] [employer] [facility]\par  [FreeTextEntry1] : As you now he is a 59yr old male who presented to [facility] with lower back pain, decreased sensation in both legs, left leg weakness, and difficulty walking due to weakness 3/10/2019. 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 ON 3/18/2019 he underwent resection of this thoracic spine avm. He tolerated the procedure well and had post op cerebral angiography done 3/19/2019 which confirmed resection of the avm. On physical exam his lower extremity strength as well gait has improved he now ambulates with a with assistance of a cane.  He has no bladder or bowel issues. I reviewed his most recent MRI of the thoracic and lumbar spine which demonstrates no recurrence of the arterial venous malformation, decreased/no swelling of the spinal cord present.  His surgical incision is healed.  At this time I recommend spinal angiography in March 2020. \par \par

## 2020-06-19 ENCOUNTER — OUTPATIENT (OUTPATIENT)
Dept: OUTPATIENT SERVICES | Facility: HOSPITAL | Age: 60
LOS: 1 days | End: 2020-06-19
Payer: COMMERCIAL

## 2020-06-19 ENCOUNTER — APPOINTMENT (OUTPATIENT)
Dept: MRI IMAGING | Facility: CLINIC | Age: 60
End: 2020-06-19
Payer: COMMERCIAL

## 2020-06-19 DIAGNOSIS — Z00.8 ENCOUNTER FOR OTHER GENERAL EXAMINATION: ICD-10-CM

## 2020-06-19 PROCEDURE — 72157 MRI CHEST SPINE W/O & W/DYE: CPT

## 2020-06-19 PROCEDURE — 72157 MRI CHEST SPINE W/O & W/DYE: CPT | Mod: 26

## 2020-06-19 PROCEDURE — 72158 MRI LUMBAR SPINE W/O & W/DYE: CPT

## 2020-06-19 PROCEDURE — 72158 MRI LUMBAR SPINE W/O & W/DYE: CPT | Mod: 26

## 2020-06-19 PROCEDURE — A9585: CPT

## 2020-07-02 ENCOUNTER — OUTPATIENT (OUTPATIENT)
Dept: OUTPATIENT SERVICES | Facility: HOSPITAL | Age: 60
LOS: 1 days | End: 2020-07-02
Payer: COMMERCIAL

## 2020-07-02 VITALS
OXYGEN SATURATION: 97 % | RESPIRATION RATE: 16 BRPM | DIASTOLIC BLOOD PRESSURE: 78 MMHG | TEMPERATURE: 98 F | WEIGHT: 190.48 LBS | HEIGHT: 69 IN | SYSTOLIC BLOOD PRESSURE: 118 MMHG | HEART RATE: 65 BPM

## 2020-07-02 DIAGNOSIS — Z98.890 OTHER SPECIFIED POSTPROCEDURAL STATES: Chronic | ICD-10-CM

## 2020-07-02 DIAGNOSIS — Q28.8 OTHER SPECIFIED CONGENITAL MALFORMATIONS OF CIRCULATORY SYSTEM: ICD-10-CM

## 2020-07-02 DIAGNOSIS — I10 ESSENTIAL (PRIMARY) HYPERTENSION: ICD-10-CM

## 2020-07-02 DIAGNOSIS — Z01.818 ENCOUNTER FOR OTHER PREPROCEDURAL EXAMINATION: ICD-10-CM

## 2020-07-02 DIAGNOSIS — Z29.9 ENCOUNTER FOR PROPHYLACTIC MEASURES, UNSPECIFIED: ICD-10-CM

## 2020-07-02 DIAGNOSIS — E11.9 TYPE 2 DIABETES MELLITUS WITHOUT COMPLICATIONS: ICD-10-CM

## 2020-07-02 LAB
A1C WITH ESTIMATED AVERAGE GLUCOSE RESULT: 6.6 % — HIGH (ref 4–5.6)
ANION GAP SERPL CALC-SCNC: 13 MMOL/L — SIGNIFICANT CHANGE UP (ref 5–17)
BLD GP AB SCN SERPL QL: NEGATIVE — SIGNIFICANT CHANGE UP
BUN SERPL-MCNC: 16 MG/DL — SIGNIFICANT CHANGE UP (ref 7–23)
CALCIUM SERPL-MCNC: 10.1 MG/DL — SIGNIFICANT CHANGE UP (ref 8.4–10.5)
CHLORIDE SERPL-SCNC: 102 MMOL/L — SIGNIFICANT CHANGE UP (ref 96–108)
CO2 SERPL-SCNC: 23 MMOL/L — SIGNIFICANT CHANGE UP (ref 22–31)
CREAT SERPL-MCNC: 0.9 MG/DL — SIGNIFICANT CHANGE UP (ref 0.5–1.3)
ESTIMATED AVERAGE GLUCOSE: 143 MG/DL — HIGH (ref 68–114)
GLUCOSE SERPL-MCNC: 118 MG/DL — HIGH (ref 70–99)
HCT VFR BLD CALC: 39.5 % — SIGNIFICANT CHANGE UP (ref 39–50)
HGB BLD-MCNC: 12.7 G/DL — LOW (ref 13–17)
MCHC RBC-ENTMCNC: 26.8 PG — LOW (ref 27–34)
MCHC RBC-ENTMCNC: 32.2 GM/DL — SIGNIFICANT CHANGE UP (ref 32–36)
MCV RBC AUTO: 83.5 FL — SIGNIFICANT CHANGE UP (ref 80–100)
NRBC # BLD: 0 /100 WBCS — SIGNIFICANT CHANGE UP (ref 0–0)
PLATELET # BLD AUTO: 223 K/UL — SIGNIFICANT CHANGE UP (ref 150–400)
POTASSIUM SERPL-MCNC: 4.3 MMOL/L — SIGNIFICANT CHANGE UP (ref 3.5–5.3)
POTASSIUM SERPL-SCNC: 4.3 MMOL/L — SIGNIFICANT CHANGE UP (ref 3.5–5.3)
RBC # BLD: 4.73 M/UL — SIGNIFICANT CHANGE UP (ref 4.2–5.8)
RBC # FLD: 14.6 % — HIGH (ref 10.3–14.5)
RH IG SCN BLD-IMP: NEGATIVE — SIGNIFICANT CHANGE UP
SODIUM SERPL-SCNC: 138 MMOL/L — SIGNIFICANT CHANGE UP (ref 135–145)
WBC # BLD: 7.4 K/UL — SIGNIFICANT CHANGE UP (ref 3.8–10.5)
WBC # FLD AUTO: 7.4 K/UL — SIGNIFICANT CHANGE UP (ref 3.8–10.5)

## 2020-07-02 PROCEDURE — 83036 HEMOGLOBIN GLYCOSYLATED A1C: CPT

## 2020-07-02 PROCEDURE — 86850 RBC ANTIBODY SCREEN: CPT

## 2020-07-02 PROCEDURE — 80048 BASIC METABOLIC PNL TOTAL CA: CPT

## 2020-07-02 PROCEDURE — 86901 BLOOD TYPING SEROLOGIC RH(D): CPT

## 2020-07-02 PROCEDURE — 85027 COMPLETE CBC AUTOMATED: CPT

## 2020-07-02 PROCEDURE — 86900 BLOOD TYPING SEROLOGIC ABO: CPT

## 2020-07-02 PROCEDURE — G0463: CPT

## 2020-07-02 RX ORDER — GLUCOSAMINE/MSM/CHONDROITIN A 750-375MG
1500 TABLET ORAL
Qty: 0 | Refills: 0 | DISCHARGE

## 2020-07-02 RX ORDER — UBIDECARENONE 100 MG
300 CAPSULE ORAL
Qty: 0 | Refills: 0 | DISCHARGE

## 2020-07-02 NOTE — H&P PST ADULT - HISTORY OF PRESENT ILLNESS
57 YO male developed lower back pain following a fall approximately 1 month ago. 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 Patient c/o lower back pain, decreased sensation in both legs, left leg weakness and difficulty ambulating unassisted due to weakness. Denied bowel or bladder dysfunction    AVF on spine, surgery March 2019. sx falling down and difficulty walking (was using cane/walker, but now can walk w/o),numbness in feet (still there). Now has occasional back pain post op mid-lower, takes tylenol and lying down helps. Balance issues still there. Left leg still weaker than right. Did MRI 2 weeks ago, INSERT, now presents for more imaging. 59 YO male PMH HTN, HLD, DM, lower back pain, decreased sensation in both legs, left leg weakness > R leg weakness, difficulty ambulating due to weakness, falls, symptoms for which he underwent resection of thoracic spine AVM March 2019 (pt reports symptomatic improvement), now presents for SPINAL ANGIO 7/7/2020. MRI done 6/19,2020 notes resolving postsurgical changes of the thoracic spine. Denies any bowel or bladder dysfunction, palpitations, SOB, N/V, fever or chills.     ***Gave number to Pt for patient access services to call and schedule COVID swab test for 7/4/2020 59 YO male PMH HTN, HLD, DM, lower back pain, decreased sensation in both legs, left leg weakness > R leg weakness, difficulty ambulating due to weakness, falls, all symptoms for which he underwent resection of thoracic spine AVM March 2019 (pt reports symptomatic improvement), now presents for SPINAL ANGIO 7/7/2020. MRI done 6/19/2020 notes resolving postsurgical changes of the thoracic spine. Denies any bowel or bladder dysfunction, palpitations, SOB, N/V, fever or chills.     ***Gave number to Pt for patient access services to call and schedule COVID swab test for 7/4/2020

## 2020-07-02 NOTE — H&P PST ADULT - NEUROLOGICAL DETAILS
sensation intact/no spontaneous movement/responds to pain/alert and oriented x 3/responds to verbal commands

## 2020-07-02 NOTE — H&P PST ADULT - ASSESSMENT
ZENAIDAI VTE 2.0 SCORE [CLOT updated 2019]    AGE RELATED RISK FACTORS                                                       MOBILITY RELATED FACTORS  [ ] Age 41-60 years                                            (1 Point)                    [ ] Bed rest                                                        (1 Point)  [2 ] Age: 61-74 years                                           (2 Points)                  [ ] Plaster cast                                                   (2 Points)  [ ] Age= 75 years                                              (3 Points)                    [ ] Bed bound for more than 72 hours                 (2 Points)    DISEASE RELATED RISK FACTORS                                               GENDER SPECIFIC FACTORS  [ ] Edema in the lower extremities                       (1 Point)              [ ] Pregnancy                                                     (1 Point)  [ ] Varicose veins                                               (1 Point)                     [ ] Post-partum < 6 weeks                                   (1 Point)             [1 ] BMI > 25 Kg/m2                                            (1 Point)                     [ ] Hormonal therapy  or oral contraception          (1 Point)                 [ ] Sepsis (in the previous month)                        (1 Point)               [ ] History of pregnancy complications                 (1 point)  [ ] Pneumonia or serious lung disease                                               [ ] Unexplained or recurrent                     (1 Point)           (in the previous month)                               (1 Point)  [ ] Abnormal pulmonary function test                     (1 Point)                 SURGERY RELATED RISK FACTORS  [ ] Acute myocardial infarction                              (1 Point)               [ ]  Section                                             (1 Point)  [ ] Congestive heart failure (in the previous month)  (1 Point)      [ ] Minor surgery                                                  (1 Point)   [ ] Inflammatory bowel disease                             (1 Point)               [ ] Arthroscopic surgery                                        (2 Points)  [ ] Central venous access                                      (2 Points)                [2 ] General surgery lasting more than 45 minutes (2 points)  [ ] Malignancy- Present or previous                   (2 Points)                [ ] Elective arthroplasty                                         (5 points)    [ ] Stroke (in the previous month)                          (5 Points)                                                                                                                                                           HEMATOLOGY RELATED FACTORS                                                 TRAUMA RELATED RISK FACTORS  [ ] Prior episodes of VTE                                     (3 Points)                [ ] Fracture of the hip, pelvis, or leg                       (5 Points)  [ ] Positive family history for VTE                         (3 Points)             [ ] Acute spinal cord injury (in the previous month)  (5 Points)  [ ] Prothrombin 30166 A                                     (3 Points)               [ ] Paralysis  (less than 1 month)                             (5 Points)  [ ] Factor V Leiden                                             (3 Points)                  [ ] Multiple Trauma within 1 month                        (5 Points)  [ ] Lupus anticoagulants                                     (3 Points)                                                           [ ] Anticardiolipin antibodies                               (3 Points)                                                       [ ] High homocysteine in the blood                      (3 Points)                                             [ ] Other congenital or acquired thrombophilia      (3 Points)                                                [ ] Heparin induced thrombocytopenia                  (3 Points)                                     Total Score [    5      ]

## 2020-07-02 NOTE — H&P PST ADULT - NSICDXPASTSURGICALHX_GEN_ALL_CORE_FT
PAST SURGICAL HISTORY:  H/O colonoscopy     H/O endoscopy     History of spinal surgery March 2019 for AVF    S/P surgery on nasal septum 30 years ago

## 2020-07-02 NOTE — H&P PST ADULT - NSICDXPROBLEM_GEN_ALL_CORE_FT
PROBLEM DIAGNOSES  Problem: Other specified congenital malformations of circulatory system  Assessment and Plan: SPINAL ANGIO 7/7/2020  Pre-op education provided - all questions answered   Chlorhex soap & instructions given  CBC, HA1C, BMP, T&S sent in PST    Problem: Need for prophylactic measure  Assessment and Plan: The Caprini score indicates this patient is at risk for a VTE event (score 3-5).  Most surgical patients in this group would benefit from pharmacologic prophylaxis.  The surgical team will determine the balance between VTE risk and bleeding risk     Problem: DM (diabetes mellitus)  Assessment and Plan: Pt instructed to take metformin AM dose 7/6/2020 and hold PM dose    Problem: Essential hypertension  Assessment and Plan: Continue BP meds

## 2020-07-02 NOTE — H&P PST ADULT - NSANTHBPHIGHRD_ENT_A_CORE
----- Message from Griselda Lime sent at 3/19/2019  9:10 AM EDT -----  Regarding: Tan Rodriguezr: [phone]  Mom called says she is still waiting a month now for lab sheet for patient to get labs drawn. It was to have been sent to address on file. Please advise mom [phone]. Yes

## 2020-07-02 NOTE — H&P PST ADULT - NSICDXPASTMEDICALHX_GEN_ALL_CORE_FT
PAST MEDICAL HISTORY:  Acid reflux     AVF (arteriovenous fistula)     DM (diabetes mellitus)     Essential hypertension     HLD (hyperlipidemia) PAST MEDICAL HISTORY:  Acid reflux     Arthritis knees    AVF (arteriovenous fistula)     DM (diabetes mellitus)     Essential hypertension     HLD (hyperlipidemia)

## 2020-07-03 DIAGNOSIS — Z01.818 ENCOUNTER FOR OTHER PREPROCEDURAL EXAMINATION: ICD-10-CM

## 2020-07-04 ENCOUNTER — APPOINTMENT (OUTPATIENT)
Dept: DISASTER EMERGENCY | Facility: CLINIC | Age: 60
End: 2020-07-04

## 2020-07-05 LAB — SARS-COV-2 N GENE NPH QL NAA+PROBE: NOT DETECTED

## 2020-07-07 ENCOUNTER — RESULT REVIEW (OUTPATIENT)
Age: 60
End: 2020-07-07

## 2020-07-07 ENCOUNTER — APPOINTMENT (OUTPATIENT)
Dept: NEUROSURGERY | Facility: HOSPITAL | Age: 60
End: 2020-07-07
Payer: COMMERCIAL

## 2020-07-07 ENCOUNTER — OUTPATIENT (OUTPATIENT)
Dept: OUTPATIENT SERVICES | Facility: HOSPITAL | Age: 60
LOS: 1 days | End: 2020-07-07
Payer: COMMERCIAL

## 2020-07-07 VITALS
DIASTOLIC BLOOD PRESSURE: 72 MMHG | RESPIRATION RATE: 14 BRPM | OXYGEN SATURATION: 98 % | TEMPERATURE: 97 F | SYSTOLIC BLOOD PRESSURE: 121 MMHG | HEART RATE: 58 BPM

## 2020-07-07 VITALS
RESPIRATION RATE: 14 BRPM | OXYGEN SATURATION: 99 % | DIASTOLIC BLOOD PRESSURE: 75 MMHG | HEART RATE: 73 BPM | SYSTOLIC BLOOD PRESSURE: 128 MMHG | TEMPERATURE: 97 F

## 2020-07-07 DIAGNOSIS — Z98.890 OTHER SPECIFIED POSTPROCEDURAL STATES: Chronic | ICD-10-CM

## 2020-07-07 DIAGNOSIS — Z01.818 ENCOUNTER FOR OTHER PREPROCEDURAL EXAMINATION: ICD-10-CM

## 2020-07-07 DIAGNOSIS — Q28.8 OTHER SPECIFIED CONGENITAL MALFORMATIONS OF CIRCULATORY SYSTEM: ICD-10-CM

## 2020-07-07 PROCEDURE — C1769: CPT

## 2020-07-07 PROCEDURE — C1894: CPT

## 2020-07-07 PROCEDURE — C1887: CPT

## 2020-07-07 PROCEDURE — 82962 GLUCOSE BLOOD TEST: CPT

## 2020-07-07 PROCEDURE — 36215 PLACE CATHETER IN ARTERY: CPT

## 2020-07-07 PROCEDURE — 75705 ARTERY X-RAYS SPINE: CPT | Mod: 26,59

## 2020-07-07 PROCEDURE — 76377 3D RENDER W/INTRP POSTPROCES: CPT | Mod: 26

## 2020-07-07 PROCEDURE — 75705 ARTERY X-RAYS SPINE: CPT

## 2020-07-07 PROCEDURE — 36215 PLACE CATHETER IN ARTERY: CPT | Mod: 59

## 2020-07-07 RX ORDER — SODIUM CHLORIDE 9 MG/ML
1000 INJECTION INTRAMUSCULAR; INTRAVENOUS; SUBCUTANEOUS
Refills: 0 | Status: DISCONTINUED | OUTPATIENT
Start: 2020-07-07 | End: 2020-07-22

## 2020-07-07 RX ADMIN — SODIUM CHLORIDE 70 MILLILITER(S): 9 INJECTION INTRAMUSCULAR; INTRAVENOUS; SUBCUTANEOUS at 10:47

## 2020-07-07 NOTE — PROGRESS NOTE ADULT - SUBJECTIVE AND OBJECTIVE BOX
Interventional Neuro- Radiology   Procedure Note PA-C    Procedure: Selective Spinal Angiography   Pre- Procedure Diagnosis:  spinal AVM, status post excision   Post- Procedure Diagnosis:    : Dr Burton Horn  Fellow:     Dr Errol Sykes  Physician Assistant: Shayla Mahoney PA-C    Nurse:                    Norma Potter RN  Radiologic Tech:  Anesthesiologist:  Sheath:      I/Os: EBL less than 10cc  IV fluids:     cc     Urine output     cc    Contrast Omnipaque 240               Vitals: BP         HR      Spo2          Preliminary Report:  Using a 5 Chinese short sheath to the right groin under general sedation via left vertebral artery, left internal carotid artery, left external carotid artery, right vertebral artery, right internal carotid artery, right external carotid artery a selective cerebral angiography was performed and demonstrated                       Official note to follow.  Patient tolerated procedure well, hemodynamically stable, no change in neurological status compared to baseline. Results discussed with neuro ICU team, patient and patient's family. Right groin sheath was removed, manual compression held to hemostasis for 20 minutes, no active bleeding, no hematoma, quick clot and safeguard balloon dressing applied at Interventional Neuro- Radiology   Procedure Note PA-C    Procedure: Selective Spinal Angiography   Pre- Procedure Diagnosis:  spinal AVM, status post excision   Post- Procedure Diagnosis:    : Dr Burton Horn  Fellow:     Dr Errol Sykes  Physician Assistant: Shayla Mahoney PA-C    Nurse:                    Norma Potter RN  Radiologic Tech:    Uvaldo Ramirez LRT  Anesthesiologist:    Dr Hyun Robbins  Sheath:                   5 Portuguese short sheath       I/Os: EBL less than 10cc  IV fluids:     cc     Urine output     cc    Contrast Omnipaque 240               Vitals: BP         HR      Spo2          Preliminary Report:  Using a 5 Portuguese short sheath to the right groin under general sedation via left vertebral artery, left internal carotid artery, left external carotid artery, right vertebral artery, right internal carotid artery, right external carotid artery a selective cerebral angiography was performed and demonstrated                       Official note to follow.  Patient tolerated procedure well, hemodynamically stable, no change in neurological status compared to baseline. Results discussed with neuro ICU team, patient and patient's family. Right groin sheath was removed, manual compression held to hemostasis for 20 minutes, no active bleeding, no hematoma, quick clot and safeguard balloon dressing applied at Interventional Neuro- Radiology   Procedure Note PA-C    Procedure: Selective Spinal Angiography   Pre- Procedure Diagnosis:  spinal AVM, status post excision   Post- Procedure Diagnosis:    : Dr Burton Horn  Fellow:     Dr Agustín Gonzalez   Physician Assistant: Shayla Mahoney PA-C    Nurse:                    Norma Potter RN  Radiologic Tech:    Uvaldo Ramirez LRT  Anesthesiologist:    Dr Hyun Robbins  Sheath:                  5 Czech short sheath       I/Os: EBL less than 10cc  IV fluids:     cc     Urine output     cc    Contrast Omnipaque 240               Vitals: BP  101/64       HR  69    Spo2 99%         Preliminary Report:  Using a 5 Czech short sheath to the right groin under general sedation via bilateral T4, T5, a selective spinal angiography was performed and demonstrated                       Official note to follow.  Patient tolerated procedure well, hemodynamically stable, no change in neurological status compared to baseline. Results discussed with neuro ICU team, patient and patient's family. Right groin sheath was removed, manual compression held to hemostasis for 20 minutes, no active bleeding, no hematoma, quick clot and safeguard balloon dressing applied at Interventional Neuro- Radiology   Procedure Note PA-C    Procedure: Selective Spinal Angiography   Pre- Procedure Diagnosis:  spinal AVM, status post excision   Post- Procedure Diagnosis:    : Dr Burton Horn  Fellow:     Dr Agustín Gonzalez   Physician Assistant: Shayla Mahoney PA-C    Nurse:                    Norma Potter RN  Radiologic Tech:    Uvaldo Ramirez LRT  Anesthesiologist:   Dr Hyun Robbins  Sheath:                  5 Mosotho short sheath   ACT                       136      I/Os: EBL less than 10cc  IV fluids:     cc     Urine output     cc    Contrast Omnipaque 240               Vitals: BP  101/64       HR  69    Spo2 99%         Preliminary Report:  Using a 5 Mosotho short sheath to the right groin under general sedation via bilateral T4, T5, a selective spinal angiography was performed and demonstrated                       Official note to follow.  Patient tolerated procedure well, hemodynamically stable, no change in neurological status compared to baseline. Results discussed with neuro ICU team, patient and patient's family. Right groin sheath was removed, manual compression held to hemostasis for 20 minutes, no active bleeding, no hematoma, quick clot and safeguard balloon dressing applied at Interventional Neuro- Radiology   Procedure Note PA-C    Procedure: Selective Spinal Angiography   Pre- Procedure Diagnosis:  spinal AVM, status post excision   Post- Procedure Diagnosis: no residual AVM    : Dr Burton Horn  Fellow:     Dr Agustín Gonzalez   Physician Assistant: Shayla Mahoney PA-C    Nurse:                    Norma Potter RN  Radiologic Tech:    Uvaldo Ramirez LRT  Anesthesiologist:   Dr Hyun Robbins  Sheath:                  5 Bahraini short sheath   ACT                       136      I/Os: EBL less than 10cc  IV fluids: 300cc output 100cc Omnipaque 240 49cc          Vitals: BP  101/64       HR  69    Spo2 99%         Preliminary Report:  Using a 5 Bahraini short sheath to the right groin under general sedation via bilateral T4, T5,T6, T7, T8, T9, a spinal angiography was performed and demonstrated no residual AVM.  Patient tolerated procedure well, hemodynamically stable, no change in neurological status compared to baseline. Results discussed with neuro ICU team, patient and patient's family. Right groin sheath was removed, manual compression held to hemostasis for 20 minutes, no active bleeding, no hematoma, quick clot and safeguard balloon dressing applied at 10am. Disposition recovery room 2nd floor.

## 2020-07-07 NOTE — PROGRESS NOTE ADULT - PROVIDER SPECIALTY LIST ADULT
Intervent Radiology Presbyopia OUDiscussed refractive status in detail with patient. New glasses Rx given today. Continue to monitor.

## 2020-07-07 NOTE — PROGRESS NOTE ADULT - SUBJECTIVE AND OBJECTIVE BOX
Interventional Neuro Radiology  Pre-Procedure Note PA-C    This is a 60 year old right hand dominant male            Allergies: No Known Allergies  PMHX: Arthritis  Acid reflux  AVF (arteriovenous fistula)  HLD (hyperlipidemia)  Essential hypertension  DM (diabetes mellitus)  H/O colonoscopy  H/O endoscopy  S/P surgery on nasal septum: 30 years ago  History of spinal surgery: March 2019 for AVF      Social History:  non-smoker   FAMILY HISTORY:      Current Medications:     Labs:                   Blood Bank:       Assessment/Plan:     This is a 60y  year old right  hand dominant Male  presents with   Patient presents to neuro-IR for selective cerebral angiography.   Procedure, goals, risks, benefits and alternatives  were discussed with patient and patient's family.  All questions were answered.  Risks include but are not limited to stroke, vessel injury, hemorrhage, and or right  groin hematoma.  Patient demonstrates understanding  of all risks involved with this procedure and wishes to continue.   Appropriate  content was obtained from patient and consent is in the patient's chart. Interventional Neuro Radiology  Pre-Procedure Note PA-C    This is a 60 year old right hand dominant male with a past medical history significant for thoracic AVM resection, who returns to Neuro IR for a selective spinal angiography to monitor spinal AVM.    Allergies: No Known Allergies  PMHX: Arthritis, Acid reflux, arteriovenous fistula, hyperlipidemia, hypertension, diabetes mellitus  PSHX: colonoscopy, endoscopy, surgery on nasal septum: 30 years ago, spinal surgery: March 2019 for AVF  Social History:  non-smoker   FAMILY HISTORY: non-contributory     Complete Blood Count     WBC Count: 12.7 K/uL    RBC Count: 3.94 M/uL    Hemoglobin: 11.0 g/dL    Hematocrit: 32.6 %    Platelet Count - Automated: 230 K/uL    Basic Metabolic Panel (03.14.19 @ 10:48)    Sodium, Serum: 137 mmol/L    Potassium, Serum: 3.8 mmol/L    Chloride, Serum: 99 mmol/L    Carbon Dioxide, Serum: 25 mmol/L    Anion Gap, Serum: 13 mmol/L    Blood Urea Nitrogen, Serum: 13 mg/dL    Creatinine, Serum: 0.72 mg/dL    Glucose, Serum: 182 mg/dL    Calcium, Total Serum: 9.9 mg    Blood Bank: ABO Interpretation: A negative (03.17.19 @ 06:28)      Assessment/Plan:   This is a 60 year old right hand dominant male with a thoracic spinal AVM s/post resection of AVM. Patient presents to Neuro-IR for selective cerebral angiography. Procedure, goals, risks, benefits and alternatives  were discussed with patient. All questions were answered. Risks include but are not limited to stroke, vessel injury, hemorrhage, and or right groin hematoma. Patient demonstrates understanding of all risks involved with this procedure and wishes to continue. Appropriate content was obtained from patient and consent is in the patient's chart.

## 2020-07-10 DIAGNOSIS — I77.0 ARTERIOVENOUS FISTULA, ACQUIRED: ICD-10-CM

## 2020-07-23 ENCOUNTER — APPOINTMENT (OUTPATIENT)
Dept: NEUROSURGERY | Facility: CLINIC | Age: 60
End: 2020-07-23
Payer: COMMERCIAL

## 2020-07-23 DIAGNOSIS — Q28.8 OTHER SPECIFIED CONGENITAL MALFORMATIONS OF CIRCULATORY SYSTEM: ICD-10-CM

## 2020-07-23 PROCEDURE — 99442: CPT

## 2020-07-24 PROBLEM — Q28.8 SPINAL ARTERIOVENOUS MALFORMATION: Status: ACTIVE | Noted: 2019-05-02

## 2020-07-24 NOTE — ASSESSMENT
[FreeTextEntry1] : Impression: 60yr old male s/p thoracic spinal cord avm resection 3/18/2019 \par \par Plan: \par Patient neurologically has recovered since initial presentation in March 2019, still has residual effects such as imbalance left foot weakness bladder incontinence. Discussed with patient he may continue to recover but this may also be his new baseline. \par Follow up spinal angiogram 7/7/2020 showed no recurrence of spinal avm \par At this time no need to follow up unless new symptoms arise/worsen

## 2020-07-24 NOTE — CONSULT LETTER
[Consult Letter:] : I had the pleasure of evaluating your patient, [unfilled]. [Dear  ___] : Dear  [unfilled], [Consult Closing:] : Thank you very much for allowing me to participate in the care of this patient.  If you have any questions, please do not hesitate to contact me. [FreeTextEntry1] : As you now he is a 59yr old male who presented to [facility] with lower back pain, decreased sensation in both legs, left leg weakness, and difficulty walking due to weakness 3/10/2019. 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 ON 3/18/2019 he underwent resection of this thoracic spine av fistula. He tolerated the procedure well and has made neurological improvements. On July 7, 2020 he underwent follow up spinal angiography which demonstrated no recurrence of the spinal av fistula. At this time he is able to ambulate without assistance, has some imbalance and residual left foot weakness but continues to work with physical therapy. At this time patient will follow up should new symptoms arise and or current symptoms worsen. \par \par  [FreeTextEntry3] : \par Sincerely,\par \par uBrton Horn MD\par Professor of Neurological Surgery and Radiology\par  Department of Neurosurgery\par Director Cerebrovascular Surgery\par [facility] [employer] [facility]\par

## 2020-07-24 NOTE — REASON FOR VISIT
[Home] : at home, [unfilled] , at the time of the visit. [Medical Office: ([facility])___] : at the medical office located in  [Verbal consent obtained from patient] : the patient, [unfilled] [Follow-Up: _____] : a [unfilled] follow-up visit [FreeTextEntry1] : Kimmie Berrios is a 60yr old male here to follow up after having recent imaging done consisting of one year follow up spinal angiography. He presented to [facility] with lower back pain, decreased sensation in both legs, left leg weakness, and difficulty walking due to weakness 3/10/2019. 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 ON 3/18/2019 he underwent resection of this thoracic spine avm.  This one year follow up spinal angiogram done 7/7/2020 showed no recurrence of the fistula.  Today his walking has improved he now ambulates without assistance but states his balance is off.  He also has residual left foot weakness.  Bladder issues continue to persist but have improved since last year. He is Continuing to work with PT.\par

## 2020-07-24 NOTE — REVIEW OF SYSTEMS
[Difficulty Walking] : difficulty walking [Leg Weakness] : leg weakness [As Noted in HPI] : as noted in HPI [Negative] : Heme/Lymph [de-identified] : imbalance

## 2020-08-21 PROBLEM — M19.90 UNSPECIFIED OSTEOARTHRITIS, UNSPECIFIED SITE: Chronic | Status: ACTIVE | Noted: 2020-07-02

## 2020-08-21 PROBLEM — K21.9 GASTRO-ESOPHAGEAL REFLUX DISEASE WITHOUT ESOPHAGITIS: Chronic | Status: ACTIVE | Noted: 2020-07-02

## 2020-08-21 PROBLEM — I77.0 ARTERIOVENOUS FISTULA, ACQUIRED: Chronic | Status: ACTIVE | Noted: 2020-07-02

## 2020-08-24 ENCOUNTER — APPOINTMENT (OUTPATIENT)
Dept: ORTHOPEDIC SURGERY | Facility: CLINIC | Age: 60
End: 2020-08-24
Payer: COMMERCIAL

## 2020-08-24 VITALS
HEART RATE: 54 BPM | BODY MASS INDEX: 27.2 KG/M2 | HEIGHT: 70 IN | DIASTOLIC BLOOD PRESSURE: 84 MMHG | WEIGHT: 190 LBS | SYSTOLIC BLOOD PRESSURE: 153 MMHG

## 2020-08-24 VITALS — TEMPERATURE: 97.1 F

## 2020-08-24 DIAGNOSIS — M25.512 PAIN IN LEFT SHOULDER: ICD-10-CM

## 2020-08-24 PROCEDURE — 73030 X-RAY EXAM OF SHOULDER: CPT | Mod: LT

## 2020-08-24 PROCEDURE — 99203 OFFICE O/P NEW LOW 30 MIN: CPT

## 2020-08-26 PROBLEM — M25.512 LEFT SHOULDER PAIN: Status: ACTIVE | Noted: 2020-08-26

## 2020-08-26 NOTE — PHYSICAL EXAM
[de-identified] : Oriented to time, place, person\par Mood: Normal\par Affect: Normal\par Appearance: Healthy, well appearing, no acute distress.\par Gait: Normal\par Assistive Devices: None\par \par Left exam\par \par Inspection: No malalignment, No defects, No atrophy\par Skin: No masses, No lesions\par Neck: Negative Spurlings, full ROM, no pain with ROM\par AROM: FF to 160, abduction to 80, ER to 60, IR to low lumbar\par Painful arc ROM: Pain with internal rotation/forward flexion\par Tenderness: Positive bicipital tenderness, positive tenderness to the greater tuberosity/RTC insertion, no anterior shoulder/lesser tuberosity tenderness\par Strength: 5/5 ER, 5/5 IR in adduction, 4/5 supraspinatus testing, positive O'Briens test\par AC Joint: No ttp/pain with cross arm testing\par Biceps: Speed Negative, Yergusons Negative\par Impingement test: Positive Gutiérrez, Positive Neer \par Stability: Stable\par Vasc: 2+ radial pulse\par Neuro: AIN, PIN, Ulnar nerve in tact to motor\par Sensation: In tact to light touch throughout\par  [de-identified] : \par The following radiographs were ordered and read by me during this patients visit. I reviewed each radiograph in detail with the patient and discussed the findings as highlighted below. \par \par 3 views of the left shoulder were obtained today that show no acute fracture or dislocation. There is no glenohumeral and moderate AC joint degenerative change seen. Type II acromion. There is no significant malalignment. No significant other obvious osseous abnormality, otherwise unremarkable.

## 2020-08-26 NOTE — HISTORY OF PRESENT ILLNESS
[de-identified] : 60 year old RHD male presents today with left shoulder pain x 1 month. No injury reported. The pain is constant worse when laying on it, reaching overhead and internal rotation. C/O limited ROM. He has hard time getting with ADLs. Heat is helpful. He is not taking pain medication. Denies numbness or tingling. \par \par The patient's past medical history, past surgical history, medications and allergies were reviewed by me today with the patient and documented accordingly. In addition, the patient's family and social history, which were noncontributory to this visit, were reviewed also.

## 2020-08-26 NOTE — DISCUSSION/SUMMARY
[de-identified] : 60-year-old male with left shoulder pain\par \par A discussion was had with the patient regarding potential sources of inflammatory shoulder discomfort; including rotator cuff tendon dysfunction (including tendonitis vs. internal structural damage), subdeltoid/subacromial bursitis, or impingement of the rotator cuff at the acromion. Other contributing sources of pain may be the acromioclavicular joint or long head of the biceps tendon. Irritation is typically caused either by overhead repetitive activity or as a result of minor injury.\par \par Discussed short-term and long-term outcomes as well as the goal of treatment to reduce pain and restore function. Nonsurgical treatment is typically first-line therapy that may take weeks to months to resolve symptoms; includes rest from overhead activities, NSAIDs, home exercise program versus physical therapy to restore normal strength/ROM/function of the shoulder, and possible corticosteroid injection. Also discussed role of arthroscopic surgical intervention when nonsurgical treatment does not adequately relieve pain/inflammation.\par \par Recommendations: Conservative modalities as above (overhead activity rest/activity avoidance until less symptommatic, ice, NSAIDs if tolerated, home exercise strengthening and stretching program). \par \par Physical therapy Rx given for shoulder/RTC strengthening and conditioning program\par \par Followup: 6-8wks as needed if symptoms persist or worsen.

## 2020-10-02 NOTE — PHYSICAL THERAPY INITIAL EVALUATION ADULT - FINE MOTOR COORDINATION, RIGHT HAND, FINGER TO NOSE, OT EVAL
1. Continue current medications.  2. Follow up as scheduled.  
normal performance
normal performance

## 2021-02-02 NOTE — PATIENT PROFILE ADULT - NSTRANSFERBELONGINGSDISPO_GEN_A_NUR
given to family/with patient
range of motion is not limited +swelling over lateral wrist, TTP over ulnar aspect of wrist and hand, full ROM with pain, 2+ distal pulse, good capillary refill

## 2021-09-02 NOTE — PATIENT PROFILE ADULT - INDICATE TYPE
Anticoagulated on Xarelto, continue beta-blocker for rate control.  No changes today.  
Continue medical management, no changes today  
Mild AS seen on recent echo, observation  
Mild normocytic anemia, likely of chronic disease, observation  
No changes to regimen today  
Renal function has remained stable, avoid nephrotoxins, periodic BMP  
Status post surgery, she followed up with orthopedics at [facility].  Her therapy had been delayed because of quarantine because of recent ED visit.  She is now off quarantine and participating in PT/OT.  Discharge planning pending.    
Status post surgery.  Continue PT/OT.  Slowly improving.  Continue Tylenol as needed  
Well-controlled, no changes today  
Health Care Proxy (HCP)

## 2021-10-06 NOTE — ED PROVIDER NOTE - NSCAREINITIATED _GEN_ER
Kerry Schuster(Attending)
Detail Level: Zone
Otc Regimen: Sunscreen daily
Plan: Pt will start Clindamycin gel belonging to his wife BID

## 2022-07-11 NOTE — CONSULT NOTE ADULT - SUBJECTIVE AND OBJECTIVE BOX
Section of Cardiology                  Cardiac Clinic Note    Chief Complaint/Reason for consultation: hypertension, palpitations       HPI:   Xin Taylor is a 69 y.o. female with h/o HTN, ASIF, HLD who comes in as a referral to cardiology clinic by PCP Dr. Carlson for evaluation.     7/11/22  About 2 weeks ago,had palpitations, started when she was going to the bathroom  symptoms   Would go away after resting, drinking water, raising her feet  Reports left ear tinnitus, has seen ENT, normal workup   Does not exercise regularly, no limitations with this   Worked in healthcare for many years, currently a     Denies tobacco or ETOH abuse  Family hx: mother- DM; brother- arteriosclerosis     Denies DM, CVA    Denies chest pain, SOB, dizziness, syncope           EKG 7/11/22 NSR, possible LAE, minimal LVH criteria     ECHO      STRESS TEST    LHC      ROS: All 10 systems reviewed. Please refer to the HPI for pertinent positives. All other systems negative.     Past Medical History  Past Medical History:   Diagnosis Date    Anemia     Colon polyp     Edema     HLD (hyperlipidemia)     HTN (hypertension)     Palpitation     Tinnitus        Surgical History  Past Surgical History:   Procedure Laterality Date    COLONOSCOPY N/A 4/22/2022    Procedure: COLONOSCOPY;  Surgeon: Priyanka Winkler MD;  Location: [facility];  Service: Endoscopy;  Laterality: N/A;    HYSTERECTOMY            Allergies:   Review of patient's allergies indicates:   Allergen Reactions    No known drug allergies        Social History:  Social History     Socioeconomic History    Marital status: Legally    Occupational History    Occupation: retired   Tobacco Use    Smoking status: Never Smoker    Smokeless tobacco: Never Used   Substance and Sexual Activity    Alcohol use: No    Drug use: No    Sexual activity: Not Currently     Partners: Male     Social Determinants of Health     Financial Resource  Strain: Low Risk     Difficulty of Paying Living Expenses: Not hard at all   Food Insecurity: No Food Insecurity    Worried About Running Out of Food in the Last Year: Never true    Ran Out of Food in the Last Year: Never true   Transportation Needs: No Transportation Needs    Lack of Transportation (Medical): No    Lack of Transportation (Non-Medical): No   Physical Activity: Inactive    Days of Exercise per Week: 0 days    Minutes of Exercise per Session: 0 min   Stress: Stress Concern Present    Feeling of Stress : To some extent   Social Connections: Unknown    Frequency of Communication with Friends and Family: Twice a week    Frequency of Social Gatherings with Friends and Family: Twice a week    Active Member of Clubs or Organizations: Yes    Attends Club or Organization Meetings: More than 4 times per year    Marital Status:    Housing Stability: Low Risk     Unable to Pay for Housing in the Last Year: No    Number of Places Lived in the Last Year: 1    Unstable Housing in the Last Year: No       Family History:  family history includes Breast cancer in her paternal aunt; Cancer in her mother and unknown relative; Cataracts in her mother; Heart defect in her brother; Hypertension in her mother; Thyroid disease in her mother.    Home Medications:  Current Outpatient Medications on File Prior to Visit   Medication Sig Dispense Refill    amLODIPine (NORVASC) 2.5 MG tablet TAKE 1 TABLET(2.5 MG) BY MOUTH EVERY DAY 90 tablet 3    aspirin 81 mg Tab Take 1 tablet by mouth every other day.      atorvastatin (LIPITOR) 10 MG tablet TAKE 1/2 TABLET BY MOUTH ONCE DAILY 90 tablet 3    b complex vitamins tablet Take 1 tablet by mouth once daily.      co-enzyme Q-10 30 mg capsule Take 30 mg by mouth 3 (three) times daily.      ferrous sulfate 325 mg (65 mg iron) Tab tablet Take 1 tablet (325 mg total) by mouth once daily. Take with citrus 100 tablet 3    fish oil-omega-3 fatty acids 300-1,000  "mg capsule Take 2 g by mouth once daily.      fluticasone propionate (FLONASE) 50 mcg/actuation nasal spray SHAKE LIQUID AND USE 2 SPRAYS(100 MCG) IN EACH NOSTRIL EVERY DAY 48 g 0    meloxicam (MOBIC) 15 MG tablet Take 1 tablet (15 mg total) by mouth once daily. 90 tablet 1    multivitamin (THERAGRAN) per tablet Take 1 tablet by mouth once daily.      turmeric root extract 500 mg Cap Take 500 mg by mouth 2 (two) times daily. 100 capsule 4    valsartan-hydrochlorothiazide (DIOVAN-HCT) 160-25 mg per tablet TAKE 1 TABLET BY MOUTH EVERY DAY 90 tablet 3    vitamin D 1000 units Tab Take 185 mg by mouth once daily.       Current Facility-Administered Medications on File Prior to Visit   Medication Dose Route Frequency Provider Last Rate Last Admin    lactated ringers infusion   Intravenous Continuous Priyanka Winkler MD   Stopped at 04/22/22 1336       Physical exam:  BP (!) 140/80 (BP Location: Right arm, Patient Position: Sitting, BP Method: Large (Manual))   Pulse 82   Ht 5' 5" (1.651 m)   Wt 75.6 kg (166 lb 10.7 oz)   LMP  (LMP Unknown)   SpO2 98%   BMI 27.73 kg/m²         General: Pt is a 69 y.o. year old female who is AAOx3, in NAD, is pleasant, well nourished, looks stated age  HEENT: PERRL, EOMI, Oral mucosa pink & moist  CVS  No abnormal cardiac pulsations noted on inspection. JVP not raised. The apical impulse is normal on palpation, and is located in the left 5th intercostal space in the mid - clavicular line. No palpable thrills or abnormal pulsations noted. RR, S1 - S2 heard, no murmurs, rubs or gallops appreciated.   PUL : CTA B/L. No wheezes/crackles heard   ABD : BS +, soft. No tenderness elicited   LE : No C/C/E. 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     PAST MEDICAL & SURGICAL HISTORY:  HLD (hyperlipidemia)  Essential hypertension  DM (diabetes mellitus)          MEDICATIONS:  amLODIPine   Tablet 5 milliGRAM(s) Oral daily  losartan 100 milliGRAM(s) Oral daily        acetaminophen   Tablet .. 650 milliGRAM(s) Oral every 6 hours PRN  gabapentin 600 milliGRAM(s) Oral three times a day  melatonin 3 milliGRAM(s) Oral at bedtime PRN  oxyCODONE    IR 5 milliGRAM(s) Oral every 4 hours PRN  oxyCODONE    IR 10 milliGRAM(s) Oral every 4 hours PRN    docusate sodium 100 milliGRAM(s) Oral three times a day  senna 2 Tablet(s) Oral at bedtime    atorvastatin 20 milliGRAM(s) Oral at bedtime  dextrose 40% Gel 15 Gram(s) Oral once PRN  dextrose 50% Injectable 12.5 Gram(s) IV Push once  dextrose 50% Injectable 25 Gram(s) IV Push once  dextrose 50% Injectable 25 Gram(s) IV Push once  glucagon  Injectable 1 milliGRAM(s) IntraMuscular once PRN  insulin lispro (HumaLOG) corrective regimen sliding scale   SubCutaneous three times a day before meals  insulin lispro (HumaLOG) corrective regimen sliding scale   SubCutaneous at bedtime    dextrose 5%. 1000 milliLiter(s) IV Continuous <Continuous>  sodium chloride 0.9% with potassium chloride 20 mEq/L 1000 milliLiter(s) IV Continuous <Continuous>      FAMILY HISTORY:      Non-contributory    SOCIAL HISTORY:    No tobacco, drugs or etoh    Allergies    No Known Allergies    Intolerances    	    REVIEW OF SYSTEMS:  as above  The rest of the 14 points ROS reviewed and except above they are unremarkable.        PHYSICAL EXAM:  T(C): 36.9 (03-14-19 @ 16:02), Max: 36.9 (03-14-19 @ 04:40)  HR: 71 (03-14-19 @ 16:02) (68 - 81)  BP: 117/71 (03-14-19 @ 16:02) (117/71 - 152/73)  RR: 18 (03-14-19 @ 16:02) (18 - 18)  SpO2: 99% (03-14-19 @ 16:02) (97% - 100%)  Wt(kg): --  I&O's Summary    13 Mar 2019 07:01  -  14 Mar 2019 07:00  --------------------------------------------------------  IN: 240 mL / OUT: 1575 mL / NET: -1335 mL    14 Mar 2019 07:01  -  14 Mar 2019 16:54  --------------------------------------------------------  IN: 972 mL / OUT: 800 mL / NET: 172 mL        Appearance: Normal	  HEENT:   no gross abnormality   Cardiovascular: Normal S1 S2,    Murmur:   Neck: JVP normal  Respiratory: Lungs clear   Gastrointestinal:  Soft, Non-tender  Skin: normal   Neuro: No gross deficits.   Psychiatry:  Mood & affect flat  Ext: No edema    LABS/DATA:    TELEMETRY: 	    ECG:  	   	  CARDIAC MARKERS:                                      12.0   7.83  )-----------( 248      ( 14 Mar 2019 14:49 )             37.7     03-14    137  |  99  |  13  ----------------------------<  182<H>  3.8   |  25  |  0.72    Ca    9.9      14 Mar 2019 10:48      proBNP:   Lipid Profile:   HgA1c:   TSH: found for: CKTOTAL, CKMB, CKMBINDEX, TROPONINI  Cardiac Markers (Last 3): No results found for: CKTOTAL, CKMB, CKMBINDEX, TROPONINI  CBC:   Lab Results   Component Value Date    WBC 4.87 04/05/2022    HGB 11.3 (L) 04/05/2022    HCT 35.9 (L) 04/05/2022    MCV 96 04/05/2022     04/05/2022     Lipids:   Lab Results   Component Value Date    CHOL 200 (H) 04/05/2022    TRIG 50 04/05/2022    HDL 84 (H) 04/05/2022     Coagulation: No results found for: PT, INR, APTT        Assessment      1. Primary hypertension    2. Mixed hyperlipidemia    3. ASIF on CPAP    4. Prediabetes    5. Palpitations    6. Nonspecific abnormal electrocardiogram (ECG) (EKG)         Plan:    Palpitations  Ongoing for the last 2 weeks  Obtain 14 day cardiac monitor  Obtain echocardiogram- abnormal EKG    ASIF  Compliant with CPAP    HTN  Elevated, home BP log with essentially normotensive BP   Keep blood pressure log  Continue Diovan to hydrochlorothiazide in a.m., amlodipine in p.m.  Low-salt, low-fat diet  Exercise as tolerated, at least 30 minutes daily    Pre diabetes  A1c 5.8  Continue therapy    HLD   as of 4/22  Continue statin        This note was prepared using voice recognition system and is likely to have sound alike errors that may have been overlooked even after proofreading.     I have reviewed all pertinent chart information.  Plans and recommendations have been formulated under my direct supervision. All questions answered and patient voiced understanding.   If symptoms persist go to the ED.    RTC in 6 weeks         Naz Martinez MD  Cardiology

## 2022-12-06 ENCOUNTER — INPATIENT (INPATIENT)
Facility: HOSPITAL | Age: 62
LOS: 2 days | Discharge: HOME | DRG: 287 | End: 2022-12-09
Attending: INTERNAL MEDICINE | Admitting: EMERGENCY MEDICINE
Payer: COMMERCIAL

## 2022-12-06 VITALS
HEART RATE: 80 BPM | DIASTOLIC BLOOD PRESSURE: 84 MMHG | WEIGHT: 192.02 LBS | TEMPERATURE: 98 F | OXYGEN SATURATION: 95 % | HEIGHT: 69 IN | SYSTOLIC BLOOD PRESSURE: 146 MMHG | RESPIRATION RATE: 16 BRPM

## 2022-12-06 DIAGNOSIS — Z98.890 OTHER SPECIFIED POSTPROCEDURAL STATES: Chronic | ICD-10-CM

## 2022-12-06 PROCEDURE — 93010 ELECTROCARDIOGRAM REPORT: CPT

## 2022-12-06 PROCEDURE — 99236 HOSP IP/OBS SAME DATE HI 85: CPT

## 2022-12-06 NOTE — ED PROVIDER NOTE - OBJECTIVE STATEMENT
62-year-old male past medical history of diabetes, hypertension, hyperlipidemia presents ED complaining of worsening dyspnea on exertion over the last 2 months, patient had a syncopal episode 2 days ago which was preceded by diaphoresis with unclear head strike.  Patient saw his PMD earlier today and was sent to the ED for abnormal EKG, new T wave inversion in V2.  Patient was due to have a stress/echo this week by his cardiologist was in [place], last stress/echo several years ago.  Patient denies recent illness, fever, chest pain, abdominal pain, nausea, vomiting, dysuria, diarrhea, hematochezia, melena.

## 2022-12-06 NOTE — ED PROVIDER NOTE - PHYSICAL EXAMINATION
GEN: Patient awake alert NAD.   HEENT: normocephalic, atraumatic, EOMI, no scleral icterus, moist MM  CARDIAC: RRR, S1, S2, no murmur.   PULM: CTA B/L no wheeze, rhonchi, rales.   ABD: soft NT, ND, no rebound no guarding,  MSK: Moving all extremities, no edema.   NEURO: A&Ox3, no focal neurological deficits  SKIN: warm, dry, no rash.

## 2022-12-06 NOTE — ED PROVIDER NOTE - NS ED ROS FT
GENERAL: No fever, chills  EYES: no vision changes, no discharge.   ENT: no difficulty swallowing or speaking   CARDIAC: no chest pain/pressure, lower extremity swelling  PULMONARY: no cough, +SOB  GI: no abdominal pain, n/v/d  : no dysuria, no hematuria  SKIN: no rashes, no ecchymosis  NEURO: no headache, +lightheadedness  MSK: No joint pain, myalgia, weakness.

## 2022-12-06 NOTE — ED PROVIDER NOTE - PROGRESS NOTE DETAILS
Raghav Simms, DO PGY-2: Work-up nonactionable at this point.  We will trend troponin.  Due to concerning story, will place patient in CDU for cardiology and stress/echo evaluation.

## 2022-12-06 NOTE — ED PROVIDER NOTE - CLINICAL SUMMARY MEDICAL DECISION MAKING FREE TEXT BOX
Dr. Hannon's Note: Patient presents with dyspnea on exertion progressively worsening over a few months, along with a syncopal episode 2 days ago preceded by diaphoresis and dizziness.  Patient has endorsed significant fatigue since this episode.  He denies any active chest pain.  On exam he is well-appearing, no acute distress, unremarkable vital signs, breathing comfortably with clear breath sounds, no signs of peripheral edema.  EKG is nonischemic and normal sinus rhythm.  Workup will be done to rule out acute infectious, metabolic, cardiac condition and patient will be admitted for further evaluation and likely echo versus stress.

## 2022-12-06 NOTE — ED PROVIDER NOTE - NSICDXPASTMEDICALHX_GEN_ALL_CORE_FT
PAST MEDICAL HISTORY:  Acid reflux     Arthritis knees    AVF (arteriovenous fistula)     DM (diabetes mellitus)     Essential hypertension     HLD (hyperlipidemia)

## 2022-12-07 DIAGNOSIS — R06.09 OTHER FORMS OF DYSPNEA: ICD-10-CM

## 2022-12-07 LAB
ALBUMIN SERPL ELPH-MCNC: 4.2 G/DL — SIGNIFICANT CHANGE UP (ref 3.3–5)
ALP SERPL-CCNC: 86 U/L — SIGNIFICANT CHANGE UP (ref 40–120)
ALT FLD-CCNC: 24 U/L — SIGNIFICANT CHANGE UP (ref 10–45)
ANION GAP SERPL CALC-SCNC: 11 MMOL/L — SIGNIFICANT CHANGE UP (ref 5–17)
APTT BLD: 27.3 SEC — LOW (ref 27.5–35.5)
AST SERPL-CCNC: 23 U/L — SIGNIFICANT CHANGE UP (ref 10–40)
BASOPHILS # BLD AUTO: 0.06 K/UL — SIGNIFICANT CHANGE UP (ref 0–0.2)
BASOPHILS NFR BLD AUTO: 0.6 % — SIGNIFICANT CHANGE UP (ref 0–2)
BILIRUB SERPL-MCNC: 0.4 MG/DL — SIGNIFICANT CHANGE UP (ref 0.2–1.2)
BUN SERPL-MCNC: 18 MG/DL — SIGNIFICANT CHANGE UP (ref 7–23)
CALCIUM SERPL-MCNC: 9.8 MG/DL — SIGNIFICANT CHANGE UP (ref 8.4–10.5)
CHLORIDE SERPL-SCNC: 103 MMOL/L — SIGNIFICANT CHANGE UP (ref 96–108)
CO2 SERPL-SCNC: 25 MMOL/L — SIGNIFICANT CHANGE UP (ref 22–31)
CREAT SERPL-MCNC: 0.92 MG/DL — SIGNIFICANT CHANGE UP (ref 0.5–1.3)
D DIMER BLD IA.RAPID-MCNC: <150 NG/ML DDU — SIGNIFICANT CHANGE UP
EGFR: 93 ML/MIN/1.73M2 — SIGNIFICANT CHANGE UP
EOSINOPHIL # BLD AUTO: 0.17 K/UL — SIGNIFICANT CHANGE UP (ref 0–0.5)
EOSINOPHIL NFR BLD AUTO: 1.6 % — SIGNIFICANT CHANGE UP (ref 0–6)
FLUAV AG NPH QL: SIGNIFICANT CHANGE UP
FLUBV AG NPH QL: SIGNIFICANT CHANGE UP
GLUCOSE BLDC GLUCOMTR-MCNC: 106 MG/DL — HIGH (ref 70–99)
GLUCOSE BLDC GLUCOMTR-MCNC: 191 MG/DL — HIGH (ref 70–99)
GLUCOSE SERPL-MCNC: 114 MG/DL — HIGH (ref 70–99)
HCT VFR BLD CALC: 38.2 % — LOW (ref 39–50)
HGB BLD-MCNC: 12.3 G/DL — LOW (ref 13–17)
IMM GRANULOCYTES NFR BLD AUTO: 0.2 % — SIGNIFICANT CHANGE UP (ref 0–0.9)
INR BLD: 1.18 RATIO — HIGH (ref 0.88–1.16)
LYMPHOCYTES # BLD AUTO: 2.49 K/UL — SIGNIFICANT CHANGE UP (ref 1–3.3)
LYMPHOCYTES # BLD AUTO: 23.9 % — SIGNIFICANT CHANGE UP (ref 13–44)
MAGNESIUM SERPL-MCNC: 2.1 MG/DL — SIGNIFICANT CHANGE UP (ref 1.6–2.6)
MCHC RBC-ENTMCNC: 27.1 PG — SIGNIFICANT CHANGE UP (ref 27–34)
MCHC RBC-ENTMCNC: 32.2 GM/DL — SIGNIFICANT CHANGE UP (ref 32–36)
MCV RBC AUTO: 84.1 FL — SIGNIFICANT CHANGE UP (ref 80–100)
MONOCYTES # BLD AUTO: 0.97 K/UL — HIGH (ref 0–0.9)
MONOCYTES NFR BLD AUTO: 9.3 % — SIGNIFICANT CHANGE UP (ref 2–14)
NEUTROPHILS # BLD AUTO: 6.69 K/UL — SIGNIFICANT CHANGE UP (ref 1.8–7.4)
NEUTROPHILS NFR BLD AUTO: 64.4 % — SIGNIFICANT CHANGE UP (ref 43–77)
NRBC # BLD: 0 /100 WBCS — SIGNIFICANT CHANGE UP (ref 0–0)
NT-PROBNP SERPL-SCNC: 19 PG/ML — SIGNIFICANT CHANGE UP (ref 0–300)
PLATELET # BLD AUTO: 206 K/UL — SIGNIFICANT CHANGE UP (ref 150–400)
POTASSIUM SERPL-MCNC: 4.1 MMOL/L — SIGNIFICANT CHANGE UP (ref 3.5–5.3)
POTASSIUM SERPL-SCNC: 4.1 MMOL/L — SIGNIFICANT CHANGE UP (ref 3.5–5.3)
PROT SERPL-MCNC: 7.4 G/DL — SIGNIFICANT CHANGE UP (ref 6–8.3)
PROTHROM AB SERPL-ACNC: 13.7 SEC — HIGH (ref 10.5–13.4)
RBC # BLD: 4.54 M/UL — SIGNIFICANT CHANGE UP (ref 4.2–5.8)
RBC # FLD: 14.5 % — SIGNIFICANT CHANGE UP (ref 10.3–14.5)
RSV RNA NPH QL NAA+NON-PROBE: SIGNIFICANT CHANGE UP
SARS-COV-2 RNA SPEC QL NAA+PROBE: SIGNIFICANT CHANGE UP
SODIUM SERPL-SCNC: 139 MMOL/L — SIGNIFICANT CHANGE UP (ref 135–145)
TROPONIN T, HIGH SENSITIVITY RESULT: 8 NG/L — SIGNIFICANT CHANGE UP (ref 0–51)
TROPONIN T, HIGH SENSITIVITY RESULT: 9 NG/L — SIGNIFICANT CHANGE UP (ref 0–51)
WBC # BLD: 10.4 K/UL — SIGNIFICANT CHANGE UP (ref 3.8–10.5)
WBC # FLD AUTO: 10.4 K/UL — SIGNIFICANT CHANGE UP (ref 3.8–10.5)

## 2022-12-07 PROCEDURE — 71046 X-RAY EXAM CHEST 2 VIEWS: CPT | Mod: 26

## 2022-12-07 RX ORDER — LOSARTAN POTASSIUM 100 MG/1
100 TABLET, FILM COATED ORAL DAILY
Refills: 0 | Status: DISCONTINUED | OUTPATIENT
Start: 2022-12-07 | End: 2022-12-09

## 2022-12-07 RX ORDER — SODIUM CHLORIDE 9 MG/ML
1000 INJECTION, SOLUTION INTRAVENOUS
Refills: 0 | Status: DISCONTINUED | OUTPATIENT
Start: 2022-12-07 | End: 2022-12-09

## 2022-12-07 RX ORDER — DEXTROSE 50 % IN WATER 50 %
25 SYRINGE (ML) INTRAVENOUS ONCE
Refills: 0 | Status: DISCONTINUED | OUTPATIENT
Start: 2022-12-07 | End: 2022-12-09

## 2022-12-07 RX ORDER — ASPIRIN/CALCIUM CARB/MAGNESIUM 324 MG
81 TABLET ORAL DAILY
Refills: 0 | Status: DISCONTINUED | OUTPATIENT
Start: 2022-12-07 | End: 2022-12-09

## 2022-12-07 RX ORDER — ATORVASTATIN CALCIUM 80 MG/1
40 TABLET, FILM COATED ORAL AT BEDTIME
Refills: 0 | Status: DISCONTINUED | OUTPATIENT
Start: 2022-12-07 | End: 2022-12-09

## 2022-12-07 RX ORDER — DEXTROSE 50 % IN WATER 50 %
15 SYRINGE (ML) INTRAVENOUS ONCE
Refills: 0 | Status: DISCONTINUED | OUTPATIENT
Start: 2022-12-07 | End: 2022-12-09

## 2022-12-07 RX ORDER — INSULIN LISPRO 100/ML
VIAL (ML) SUBCUTANEOUS
Refills: 0 | Status: DISCONTINUED | OUTPATIENT
Start: 2022-12-07 | End: 2022-12-09

## 2022-12-07 RX ORDER — DEXTROSE 50 % IN WATER 50 %
12.5 SYRINGE (ML) INTRAVENOUS ONCE
Refills: 0 | Status: DISCONTINUED | OUTPATIENT
Start: 2022-12-07 | End: 2022-12-09

## 2022-12-07 RX ORDER — ATORVASTATIN CALCIUM 80 MG/1
20 TABLET, FILM COATED ORAL AT BEDTIME
Refills: 0 | Status: DISCONTINUED | OUTPATIENT
Start: 2022-12-07 | End: 2022-12-07

## 2022-12-07 RX ORDER — GLUCAGON INJECTION, SOLUTION 0.5 MG/.1ML
1 INJECTION, SOLUTION SUBCUTANEOUS ONCE
Refills: 0 | Status: DISCONTINUED | OUTPATIENT
Start: 2022-12-07 | End: 2022-12-09

## 2022-12-07 RX ADMIN — Medication 81 MILLIGRAM(S): at 21:58

## 2022-12-07 RX ADMIN — LOSARTAN POTASSIUM 100 MILLIGRAM(S): 100 TABLET, FILM COATED ORAL at 22:01

## 2022-12-07 RX ADMIN — ATORVASTATIN CALCIUM 40 MILLIGRAM(S): 80 TABLET, FILM COATED ORAL at 21:57

## 2022-12-07 NOTE — ED CDU PROVIDER INITIAL DAY NOTE - OBJECTIVE STATEMENT
62-year-old male past medical history of diabetes, hypertension, hyperlipidemia presents ED complaining of worsening dyspnea on exertion over the last 2 months, patient had a syncopal episode 2 days ago which was preceded by diaphoresis with unclear head strike.  Patient saw his PMD earlier today and was sent to the ED for abnormal EKG, new T wave inversion in V2.  Patient was due to have a stress/echo this week by his cardiologist was in [place], last stress/echo several years ago.  Patient denies recent illness, fever, chest pain, abdominal pain, nausea, vomiting, dysuria, diarrhea, hematochezia, melena.  In ED, patient had non actionable labs including HsT 8--9, Chest x ray no emergent findings, EKG no acute ischemic changes. Pt sent to CDU for frequent reeval, vitals q 4hrs, tele, TTE, Stress.

## 2022-12-07 NOTE — CONSULT NOTE ADULT - SUBJECTIVE AND OBJECTIVE BOX
DATE OF SERVICE: 12-07-22 @ 23:39    CHIEF COMPLAINT:Patient is a 62y old  Male who presents with a chief complaint of Chest Pain (07 Dec 2022 18:33)      HISTORY OF PRESENT ILLNESS:HPI:  Patient is a 62-year-old male past medical history of diabetes, hypertension, hyperlipidemia presents ED complaining of worsening dyspnea on exertion over the last 2 months, patient had a syncopal episode 2 days ago which was preceded by diaphoresis with unclear head strike.  Patient was sent to the ED for abnormal EKG, new T wave inversion in V2.  Patient was due to have a stress/echo this week by his cardiologist was in [place], last stress/echo several years ago.  Patient denies recent illness, fever, chest pain, abdominal pain, nausea, vomiting, dysuria, diarrhea, hematochezia, melena. found to have abnormal stress test  (07 Dec 2022 18:33)      PAST MEDICAL & SURGICAL HISTORY:  DM (diabetes mellitus)      Essential hypertension      HLD (hyperlipidemia)      AVF (arteriovenous fistula)      Acid reflux      Arthritis  knees      History of spinal surgery  March 2019 for AVF      S/P surgery on nasal septum  30 years ago      H/O endoscopy      H/O colonoscopy              MEDICATIONS:  aspirin  chewable 81 milliGRAM(s) Oral daily  hydrochlorothiazide 12.5 milliGRAM(s) Oral daily  losartan 100 milliGRAM(s) Oral daily            atorvastatin 40 milliGRAM(s) Oral at bedtime  dextrose 50% Injectable 25 Gram(s) IV Push once  dextrose 50% Injectable 12.5 Gram(s) IV Push once  dextrose 50% Injectable 25 Gram(s) IV Push once  dextrose Oral Gel 15 Gram(s) Oral once PRN  glucagon  Injectable 1 milliGRAM(s) IntraMuscular once  insulin lispro (ADMELOG) corrective regimen sliding scale   SubCutaneous three times a day before meals    dextrose 5%. 1000 milliLiter(s) IV Continuous <Continuous>  dextrose 5%. 1000 milliLiter(s) IV Continuous <Continuous>      FAMILY HISTORY:  No pertinent family history in first degree relatives        Non-contributory    SOCIAL HISTORY:    [ ] mot a smoker    Allergies    No Known Allergies    Intolerances    	    REVIEW OF SYSTEMS:  CONSTITUTIONAL: No fever  EYES: No eye pain, visual disturbances, or discharge  ENMT:  No difficulty hearing, tinnitus  NECK: No pain or stiffness  RESPIRATORY: No cough, wheezing,  CARDIOVASCULAR: + chest pain, no palpitations, + passing out, no dizziness, or leg swelling  GASTROINTESTINAL:  No nausea, vomiting, diarrhea or constipation. No melena.  GENITOURINARY: No dysuria, hematuria  NEUROLOGICAL: No stroke like symptoms  SKIN: No burning or lesions   ENDOCRINE: No heat or cold intolerance  MUSCULOSKELETAL: No joint pain or swelling  PSYCHIATRIC: No  anxiety, mood swings  HEME/LYMPH: No bleeding gums  ALLERGY AND IMMUNOLOGIC: No hives or eczema	    All other ROS negative    PHYSICAL EXAM:  T(C): 36.9 (12-07-22 @ 19:41), Max: 37 (12-07-22 @ 06:45)  HR: 56 (12-07-22 @ 22:14) (56 - 70)  BP: 146/87 (12-07-22 @ 22:14) (125/79 - 153/94)  RR: 16 (12-07-22 @ 22:14) (16 - 19)  SpO2: 98% (12-07-22 @ 19:41) (96% - 98%)  Wt(kg): --  I&O's Summary      Appearance: Normal	  HEENT:   Normal oral mucosa, EOMI	  Cardiovascular:  S1 S2, No JVD,    Respiratory: Lungs clear to auscultation	  Psychiatry: Alert  Gastrointestinal:  Soft, Non-tender, + BS	  Skin: No rashes   Neurologic: Non-focal  Extremities:  No edema  Vascular: Peripheral pulses palpable    	    	  	  CARDIAC MARKERS:  Labs personally reviewed by me                                  12.3   10.40 )-----------( 206      ( 07 Dec 2022 00:13 )             38.2     12-07    139  |  103  |  18  ----------------------------<  114<H>  4.1   |  25  |  0.92    Ca    9.8      07 Dec 2022 00:13  Mg     2.1     12-07    TPro  7.4  /  Alb  4.2  /  TBili  0.4  /  DBili  x   /  AST  23  /  ALT  24  /  AlkPhos  86  12-07          EKG: Personally reviewed by me - NSR, early repol changes, biphasic TWI in V2  Radiology: Personally reviewed by me - CXR clear lungs      Assessment and Plan:   Assessment:  · Assessment	  Patient is a 62-year-old male past medical history of diabetes, hypertension, hyperlipidemia presents ED complaining of worsening dyspnea on exertion over the last 2 months, patient had a syncopal episode 2 days ago which was preceded by diaphoresis with unclear head strike.  Patient was sent to the ED for abnormal EKG, new T wave inversion in V2.  Patient was due to have a stress/echo this week by his cardiologist was in [place], last stress/echo several years ago.  Patient denies recent illness, fever, chest pain, abdominal pain, nausea, vomiting, dysuria, diarrhea, hematochezia, melena. found to have abnormal stress test       1. chest discomfort  Abnormal stress test   ASA and statin  Check Echo   Risks and benefits of cath discussed with pt and his sister at his request  Inquired regarding further noninasive testing, we discussed CT coronary   He wishes to discuss with his other sister as well who is a cardiologist in [facility] and let me known how he wishes to proceed     2. Syncope  Four days ago after receiving COVID booster vaccine  Appears vasovagal  He was diaphoretic, took his usual dose diuretic, woke up to go to bathroom and syncopized   CHeck Echo  tele monitoring     3.  HTN  resume home BP meds  adjust as tolerated     4. HLD   Lipid panel  statin     5.  DM  sliding scale  diab diet     6. DVT and GI PPX             Differential diagnosis and plan of care discussed with patient after the evaluation. Counseling on diet, nutritional counseling, weight management, exercise and medication compliance was done.   Advanced care planning/advanced directives discussed with patient/family. DNR status including forceful chest compressions to attempt to restart the heart, ventilator support/artificial breathing, electric shock, artificial nutrition, health care proxy, Molst form all discussed with pt. Pt wishes to consider. More than fifteen minutes spent on discussing advanced directives.  One hundred ten minutes spent on encounter, of which more than fifty percent of the encounter was spent counseling and/or coordinating care by the attending physician        Nacho Bonner DO [facility]  Cardiovascular Medicine  [address], [address]  Office [phone]  Available via call/text on [employer] Teams

## 2022-12-07 NOTE — H&P ADULT - NSHPPHYSICALEXAM_GEN_ALL_CORE
Vital Signs Last 24 Hrs  T(C): 36.9 (07 Dec 2022 08:13), Max: 37 (07 Dec 2022 06:45)  T(F): 98.4 (07 Dec 2022 08:13), Max: 98.6 (07 Dec 2022 06:45)  HR: 58 (07 Dec 2022 08:13) (58 - 85)  BP: 153/94 (07 Dec 2022 08:13) (125/79 - 153/94)  BP(mean): --  RR: 19 (07 Dec 2022 08:13) (16 - 19)  SpO2: 98% (07 Dec 2022 08:13) (95% - 98%)    Appearance: Normal	  HEENT:   Normal oral mucosa, PERRL, EOMI	  Lymphatic: No lymphadenopathy , no edema  Cardiovascular: Normal S1 S2, No JVD, No murmurs   Respiratory: Lungs clear to auscultation, normal effort 	  Gastrointestinal:  Soft, Non-tender, + BS	  Skin: No rashes, No ecchymoses, No cyanosis, warm to touch  Musculoskeletal: Normal range of motion, normal strength  Psychiatry:  Mood & affect appropriate  Ext: No edema

## 2022-12-07 NOTE — ED CDU PROVIDER DISPOSITION NOTE - ATTENDING APP SHARED VISIT CONTRIBUTION OF CARE
Ricci Parkinson MD:   I personally saw the patient and performed a substantive portion of the visit including all aspects of the medical decision making. Ricci Parkinson MD:  I personally saw the patient and performed a substantive portion of the visit including all aspects of the medical decision making.    MDM: 62-year-old male with history of diabetes, hypertension, hyperlipidemia who presents with worsening dyspnea exertion, and syncopal episode.  Patient with abnormal EKG with new T wave inversion.  In the ED patient with troponin  9, chest x-ray with no emergent findings.  Patient was placed in CDU for frequent evaluation, vital signs every 4 hours, telemetry, echo, stress test.  On examination patient cardiovascular examination shows normal perfusion, lungs are CTA B.  In the CDU, patient had stress test that showed abnormalities, cardiology was consulted and recommended for admission.  The patient will need to be admitted to the hospital for continued evaluation and management, as well as to optimize medical management and to provide outpatient needs assessment.  Discussed with the accepting physician regarding the initial presentation, diagnostic studies, treatments given in the ED, and current plan of care.   The patient was accepted by and endorsed to the medicine team.

## 2022-12-07 NOTE — H&P ADULT - ASSESSMENT
Patient is a 62-year-old male past medical history of diabetes, hypertension, hyperlipidemia presents ED complaining of worsening dyspnea on exertion over the last 2 months, patient had a syncopal episode 2 days ago which was preceded by diaphoresis with unclear head strike.  Patient was sent to the ED for abnormal EKG, new T wave inversion in V2.  Patient was due to have a stress/echo this week by his cardiologist was in [place], last stress/echo several years ago.  Patient denies recent illness, fever, chest pain, abdominal pain, nausea, vomiting, dysuria, diarrhea, hematochezia, melena. found to have abnormal stress test       # DOS , chest discomfort  R/O ACS  serial CE and EKG   Stress test positive  Card follow up   ASA and statin  BP control   CheckEcho   Ischemic W/U per cardiology, CT coronary VS cath, defer to cardiology     # Syncope  2-3 days ago  check Orthostatics  CHeck Echo  card follow up   tele monitoring     # # HTN  resume home BP meds  adjust as tolerated     # HLD   Lipid panel  statin     # DM  sliding scale  diab diet     DVT and GI PPX

## 2022-12-07 NOTE — ED ADULT NURSE NOTE - OBJECTIVE STATEMENT
63 y/o male with PMH of HTN, DM, HLD, and chronic back pain, present to ED c/o cp and epigastric pain. Pt states he received a COVID booster on Saturday, then had a syncopal episode on Sunday. Pt states he was experiencing chest tightness on and off upon exertion, epigastric pain, and weakness ever since Sunday, currently denies these sx, went to primary care office on 12/6/22 and had an abnormal EKG, came to ED for further evaluation. He takes ASA daily, and per EMS was given 1 dose of nitroglycerin. Pt is A&Ox4, follows commands, breathing spontaneous and unlabored, abdomen non-tender, able to move all extremities, skin warm and dry. Pt currently denies SOB, cp, headache, recent fevers and chills, abd pain, urinary symptoms and lightheadedness. Placed on cardiac monitor, NSR. Comfort and safety measures in place, bed in lowest position, wheels locked, and side rails up.

## 2022-12-07 NOTE — ED CDU PROVIDER INITIAL DAY NOTE - NS ED ATTENDING STATEMENT MOD
This was a shared visit with the HERON. I reviewed and verified the documentation and independently performed the documented:

## 2022-12-07 NOTE — ED CDU PROVIDER INITIAL DAY NOTE - DETAILS
62-year-old male past medical history of diabetes, hypertension, hyperlipidemia presents ED complaining of worsening dyspnea on exertion and recent syncopal episode 2 days ago  Plan: frequent reeval, vitals q 4hrs, tele, TTE, Stress

## 2022-12-07 NOTE — ED ADULT NURSE REASSESSMENT NOTE - NS ED NURSE REASSESS COMMENT FT1
Pt resting comfortably in stretcher. No new complaints endorsed by pt.
Report received from AMAURY [place] . Pt AAOx4,  resp nonlabored, skin warm/dry, resting comfortably in bed, no signs of acute distress noted. Pt is ambulatory with steady gait noted  Pt is on a cardiac monitor reading NSR. VSS.  Pt denies headache, dizziness, chest pain, palpitations, SOB, weakness at this time. Pt awaiting for a cardiologist consult. Patient aware of being admitted to the hospital.  Comfort care & safety measures continued  IV site looks clean & dry no signs of infiltration noted pt denies  pain IV site . Pt is advised to call for help  call bell with in the reach pt verbalized the understanding. Continue to monitor
received pt in assigned area a&ox3 initially ad chest pain, currently has no complaints, denies any sob or palps, skin intact, resps even and non labored, IVL intact, repeat labs sent, awaiting acceptance from CDU, pt in nad
07.00 Am Received the Pt from  AMAURY Maher  Pt is Observed for SOB for Nuclear stress & echo  . Received the Pt A&OX 4 obeys commands Carire N/V/D fever chills cp SOB   Comfort care & safety measures continued  IV site looks clean & dry no signs of infiltration noted pt denies  pain IV site .  Pt is advised to call for help  call bell with in the reach pt verbalized the understanding .  pending CDU  MD ny . GCS 15/15 A&OX 4 PERRLA  size 3 Strong upper & lower extremities steady gait   No facial droop  No Hand Leg drop denies numbness tingling  Pt is Sinus Yoav on the monitor Continue to monitor

## 2022-12-07 NOTE — H&P ADULT - HISTORY OF PRESENT ILLNESS
Patient is a 62-year-old male past medical history of diabetes, hypertension, hyperlipidemia presents ED complaining of worsening dyspnea on exertion over the last 2 months, patient had a syncopal episode 2 days ago which was preceded by diaphoresis with unclear head strike.  Patient was sent to the ED for abnormal EKG, new T wave inversion in V2.  Patient was due to have a stress/echo this week by his cardiologist was in [place], last stress/echo several years ago.  Patient denies recent illness, fever, chest pain, abdominal pain, nausea, vomiting, dysuria, diarrhea, hematochezia, melena. found to have abnormal stress test

## 2022-12-08 ENCOUNTER — TRANSCRIPTION ENCOUNTER (OUTPATIENT)
Age: 62
End: 2022-12-08

## 2022-12-08 LAB
A1C WITH ESTIMATED AVERAGE GLUCOSE RESULT: 7.2 % — HIGH (ref 4–5.6)
ALBUMIN SERPL ELPH-MCNC: 3.8 G/DL — SIGNIFICANT CHANGE UP (ref 3.3–5)
ALP SERPL-CCNC: 80 U/L — SIGNIFICANT CHANGE UP (ref 40–120)
ALT FLD-CCNC: 24 U/L — SIGNIFICANT CHANGE UP (ref 10–45)
ANION GAP SERPL CALC-SCNC: 17 MMOL/L — SIGNIFICANT CHANGE UP (ref 5–17)
AST SERPL-CCNC: 29 U/L — SIGNIFICANT CHANGE UP (ref 10–40)
BASOPHILS # BLD AUTO: 0.06 K/UL — SIGNIFICANT CHANGE UP (ref 0–0.2)
BASOPHILS NFR BLD AUTO: 0.7 % — SIGNIFICANT CHANGE UP (ref 0–2)
BILIRUB SERPL-MCNC: 0.4 MG/DL — SIGNIFICANT CHANGE UP (ref 0.2–1.2)
BUN SERPL-MCNC: 19 MG/DL — SIGNIFICANT CHANGE UP (ref 7–23)
CALCIUM SERPL-MCNC: 9.5 MG/DL — SIGNIFICANT CHANGE UP (ref 8.4–10.5)
CHLORIDE SERPL-SCNC: 104 MMOL/L — SIGNIFICANT CHANGE UP (ref 96–108)
CHOLEST SERPL-MCNC: 119 MG/DL — SIGNIFICANT CHANGE UP
CO2 SERPL-SCNC: 17 MMOL/L — LOW (ref 22–31)
CREAT SERPL-MCNC: 0.89 MG/DL — SIGNIFICANT CHANGE UP (ref 0.5–1.3)
EGFR: 96 ML/MIN/1.73M2 — SIGNIFICANT CHANGE UP
EOSINOPHIL # BLD AUTO: 0.27 K/UL — SIGNIFICANT CHANGE UP (ref 0–0.5)
EOSINOPHIL NFR BLD AUTO: 3 % — SIGNIFICANT CHANGE UP (ref 0–6)
ESTIMATED AVERAGE GLUCOSE: 160 MG/DL — HIGH (ref 68–114)
GLUCOSE BLDC GLUCOMTR-MCNC: 124 MG/DL — HIGH (ref 70–99)
GLUCOSE BLDC GLUCOMTR-MCNC: 127 MG/DL — HIGH (ref 70–99)
GLUCOSE BLDC GLUCOMTR-MCNC: 133 MG/DL — HIGH (ref 70–99)
GLUCOSE BLDC GLUCOMTR-MCNC: 189 MG/DL — HIGH (ref 70–99)
GLUCOSE SERPL-MCNC: 118 MG/DL — HIGH (ref 70–99)
HCT VFR BLD CALC: 36.6 % — LOW (ref 39–50)
HDLC SERPL-MCNC: 37 MG/DL — LOW
HGB BLD-MCNC: 11.8 G/DL — LOW (ref 13–17)
IMM GRANULOCYTES NFR BLD AUTO: 0.4 % — SIGNIFICANT CHANGE UP (ref 0–0.9)
LIPID PNL WITH DIRECT LDL SERPL: 66 MG/DL — SIGNIFICANT CHANGE UP
LYMPHOCYTES # BLD AUTO: 2.63 K/UL — SIGNIFICANT CHANGE UP (ref 1–3.3)
LYMPHOCYTES # BLD AUTO: 29.5 % — SIGNIFICANT CHANGE UP (ref 13–44)
MCHC RBC-ENTMCNC: 27 PG — SIGNIFICANT CHANGE UP (ref 27–34)
MCHC RBC-ENTMCNC: 32.2 GM/DL — SIGNIFICANT CHANGE UP (ref 32–36)
MCV RBC AUTO: 83.8 FL — SIGNIFICANT CHANGE UP (ref 80–100)
MONOCYTES # BLD AUTO: 0.87 K/UL — SIGNIFICANT CHANGE UP (ref 0–0.9)
MONOCYTES NFR BLD AUTO: 9.8 % — SIGNIFICANT CHANGE UP (ref 2–14)
NEUTROPHILS # BLD AUTO: 5.05 K/UL — SIGNIFICANT CHANGE UP (ref 1.8–7.4)
NEUTROPHILS NFR BLD AUTO: 56.6 % — SIGNIFICANT CHANGE UP (ref 43–77)
NON HDL CHOLESTEROL: 82 MG/DL — SIGNIFICANT CHANGE UP
NRBC # BLD: 0 /100 WBCS — SIGNIFICANT CHANGE UP (ref 0–0)
PLATELET # BLD AUTO: 213 K/UL — SIGNIFICANT CHANGE UP (ref 150–400)
POTASSIUM SERPL-MCNC: 4.3 MMOL/L — SIGNIFICANT CHANGE UP (ref 3.5–5.3)
POTASSIUM SERPL-SCNC: 4.3 MMOL/L — SIGNIFICANT CHANGE UP (ref 3.5–5.3)
PROT SERPL-MCNC: 6.9 G/DL — SIGNIFICANT CHANGE UP (ref 6–8.3)
RBC # BLD: 4.37 M/UL — SIGNIFICANT CHANGE UP (ref 4.2–5.8)
RBC # FLD: 14.6 % — HIGH (ref 10.3–14.5)
SODIUM SERPL-SCNC: 138 MMOL/L — SIGNIFICANT CHANGE UP (ref 135–145)
TRIGL SERPL-MCNC: 79 MG/DL — SIGNIFICANT CHANGE UP
TSH SERPL-MCNC: 1.99 UIU/ML — SIGNIFICANT CHANGE UP (ref 0.27–4.2)
WBC # BLD: 8.92 K/UL — SIGNIFICANT CHANGE UP (ref 3.8–10.5)
WBC # FLD AUTO: 8.92 K/UL — SIGNIFICANT CHANGE UP (ref 3.8–10.5)

## 2022-12-08 PROCEDURE — 93571 IV DOP VEL&/PRESS C FLO 1ST: CPT | Mod: 26,LD

## 2022-12-08 PROCEDURE — 93010 ELECTROCARDIOGRAM REPORT: CPT

## 2022-12-08 PROCEDURE — 93458 L HRT ARTERY/VENTRICLE ANGIO: CPT | Mod: 26

## 2022-12-08 PROCEDURE — 70450 CT HEAD/BRAIN W/O DYE: CPT | Mod: 26

## 2022-12-08 PROCEDURE — 99152 MOD SED SAME PHYS/QHP 5/>YRS: CPT

## 2022-12-08 RX ORDER — SODIUM CHLORIDE 9 MG/ML
250 INJECTION INTRAMUSCULAR; INTRAVENOUS; SUBCUTANEOUS ONCE
Refills: 0 | Status: DISCONTINUED | OUTPATIENT
Start: 2022-12-08 | End: 2022-12-09

## 2022-12-08 RX ORDER — SODIUM CHLORIDE 9 MG/ML
1000 INJECTION INTRAMUSCULAR; INTRAVENOUS; SUBCUTANEOUS
Refills: 0 | Status: DISCONTINUED | OUTPATIENT
Start: 2022-12-08 | End: 2022-12-09

## 2022-12-08 RX ORDER — ACETAMINOPHEN 500 MG
650 TABLET ORAL EVERY 6 HOURS
Refills: 0 | Status: DISCONTINUED | OUTPATIENT
Start: 2022-12-08 | End: 2022-12-09

## 2022-12-08 RX ADMIN — Medication 1: at 17:56

## 2022-12-08 RX ADMIN — Medication 650 MILLIGRAM(S): at 08:43

## 2022-12-08 RX ADMIN — ATORVASTATIN CALCIUM 40 MILLIGRAM(S): 80 TABLET, FILM COATED ORAL at 22:04

## 2022-12-08 RX ADMIN — Medication 650 MILLIGRAM(S): at 14:54

## 2022-12-08 RX ADMIN — Medication 650 MILLIGRAM(S): at 22:04

## 2022-12-08 RX ADMIN — Medication 650 MILLIGRAM(S): at 09:41

## 2022-12-08 RX ADMIN — Medication 81 MILLIGRAM(S): at 11:53

## 2022-12-08 RX ADMIN — Medication 650 MILLIGRAM(S): at 22:34

## 2022-12-08 RX ADMIN — Medication 650 MILLIGRAM(S): at 17:51

## 2022-12-08 RX ADMIN — LOSARTAN POTASSIUM 100 MILLIGRAM(S): 100 TABLET, FILM COATED ORAL at 05:25

## 2022-12-08 NOTE — PROGRESS NOTE ADULT - NS ATTEND AMEND GEN_ALL_CORE FT
Patient care and plan discussed and reviewed with Advanced Care Provider. Plan as outlined above edited by me to reflect our discussion. I had a prolonged conversation with the patient/family regarding hospital course, differential diagnosis and results of diagnostic tests.  Plan of care discussed with patient/family after the evaluation. Patient/family express clear understanding and satisfaction with the plan of care.  Sixty five minutes spent on encounter, of which more than fifty percent of the encounter was spent on counseling and/or coordinating care by the attending physician.

## 2022-12-08 NOTE — PROGRESS NOTE ADULT - SUBJECTIVE AND OBJECTIVE BOX
Name of Patient : TASH FARIA  MRN: [id number]  Date of visit: 12-08-22 @ 11:27      Subjective: Patient seen and examined. No new events except as noted.   Patient seen earlier this AM. Sitting OOB TC.  Complaining of headache. Denies lightheadedness or dizziness. Denies visual changes.       REVIEW OF SYSTEMS:    CONSTITUTIONAL: + Headache   EYES/ENT: No visual changes;  No vertigo or throat pain   NECK: No pain or stiffness  RESPIRATORY: No cough, wheezing, hemoptysis; No shortness of breath  CARDIOVASCULAR: No chest pain or palpitations  GASTROINTESTINAL: No abdominal or epigastric pain. No nausea, vomiting, or hematemesis; No diarrhea or constipation. No melena or hematochezia.  GENITOURINARY: No dysuria, frequency or hematuria  NEUROLOGICAL: No numbness or weakness  SKIN: No itching, burning, rashes, or lesions   All other review of systems is negative unless indicated above.    MEDICATIONS:  MEDICATIONS  (STANDING):  aspirin  chewable 81 milliGRAM(s) Oral daily  atorvastatin 40 milliGRAM(s) Oral at bedtime  dextrose 5%. 1000 milliLiter(s) (50 mL/Hr) IV Continuous <Continuous>  dextrose 5%. 1000 milliLiter(s) (100 mL/Hr) IV Continuous <Continuous>  dextrose 50% Injectable 25 Gram(s) IV Push once  dextrose 50% Injectable 12.5 Gram(s) IV Push once  dextrose 50% Injectable 25 Gram(s) IV Push once  glucagon  Injectable 1 milliGRAM(s) IntraMuscular once  hydrochlorothiazide 12.5 milliGRAM(s) Oral daily  insulin lispro (ADMELOG) corrective regimen sliding scale   SubCutaneous three times a day before meals  losartan 100 milliGRAM(s) Oral daily      PHYSICAL EXAM:  T(C): 36.6 (12-08-22 @ 11:12), Max: 36.9 (12-07-22 @ 19:41)  HR: 53 (12-08-22 @ 11:12) (53 - 62)  BP: 131/76 (12-08-22 @ 11:12) (131/76 - 152/81)  RR: 18 (12-08-22 @ 11:12) (16 - 18)  SpO2: 95% (12-08-22 @ 11:12) (94% - 98%)  Wt(kg): --  I&O's Summary        Appearance: Normal	  HEENT:  PERRLA   Lymphatic: No lymphadenopathy   Cardiovascular: Normal S1 S2, no JVD  Respiratory: normal effort , clear  Gastrointestinal:  Soft, Non-tender  Skin: No rashes,  warm to touch  Psychiatry:  Mood & affect appropriate  Musculuskeletal: No edema      All labs, Imaging and EKGs personally reviewed                        Name of Patient : TASH FARIA  MRN: [id number]  Date of visit: 12-08-22 @ 11:27      Subjective: Patient seen and examined. No new events except as noted.   Patient seen earlier this AM. Sitting OOB TC.  Complaining of headache. Denies lightheadedness or dizziness. Denies visual changes.   Denies CP, palpitations     REVIEW OF SYSTEMS:    CONSTITUTIONAL: + Headache   EYES/ENT: No visual changes;  No vertigo or throat pain   NECK: No pain or stiffness  RESPIRATORY: No cough, wheezing, hemoptysis; No shortness of breath  CARDIOVASCULAR: No chest pain or palpitations  GASTROINTESTINAL: No abdominal or epigastric pain. No nausea, vomiting, or hematemesis; No diarrhea or constipation. No melena or hematochezia.  GENITOURINARY: No dysuria, frequency or hematuria  NEUROLOGICAL: No numbness or weakness  SKIN: No itching, burning, rashes, or lesions   All other review of systems is negative unless indicated above.    MEDICATIONS:  MEDICATIONS  (STANDING):  aspirin  chewable 81 milliGRAM(s) Oral daily  atorvastatin 40 milliGRAM(s) Oral at bedtime  dextrose 5%. 1000 milliLiter(s) (50 mL/Hr) IV Continuous <Continuous>  dextrose 5%. 1000 milliLiter(s) (100 mL/Hr) IV Continuous <Continuous>  dextrose 50% Injectable 25 Gram(s) IV Push once  dextrose 50% Injectable 12.5 Gram(s) IV Push once  dextrose 50% Injectable 25 Gram(s) IV Push once  glucagon  Injectable 1 milliGRAM(s) IntraMuscular once  hydrochlorothiazide 12.5 milliGRAM(s) Oral daily  insulin lispro (ADMELOG) corrective regimen sliding scale   SubCutaneous three times a day before meals  losartan 100 milliGRAM(s) Oral daily      PHYSICAL EXAM:  T(C): 36.6 (12-08-22 @ 11:12), Max: 36.9 (12-07-22 @ 19:41)  HR: 53 (12-08-22 @ 11:12) (53 - 62)  BP: 131/76 (12-08-22 @ 11:12) (131/76 - 152/81)  RR: 18 (12-08-22 @ 11:12) (16 - 18)  SpO2: 95% (12-08-22 @ 11:12) (94% - 98%)  Wt(kg): --  I&O's Summary        Appearance: Normal, OOB TC	  HEENT:  Eyes are open; Glasses   Lymphatic: No lymphadenopathy   Cardiovascular: Normal S1 S2, no JVD  Respiratory: normal effort , clear  Gastrointestinal:  Soft, Non-tender  Skin: No rashes,  warm to touch  Psychiatry:  Mood & affect appropriate  Musculoskeletal: No edema                              11.8   8.92  )-----------( 213      ( 08 Dec 2022 07:14 )             36.6               12-08    138  |  104  |  19  ----------------------------<  118<H>  4.3   |  17<L>  |  0.89    Ca    9.5      08 Dec 2022 07:14  Mg     2.1     12-07    TPro  6.9  /  Alb  3.8  /  TBili  0.4  /  DBili  x   /  AST  29  /  ALT  24  /  AlkPhos  80  12-08    PT/INR - ( 07 Dec 2022 08:30 )   PT: 13.7 sec;   INR: 1.18 ratio         PTT - ( 07 Dec 2022 08:30 )  PTT:27.3 sec             < from: TTE with Doppler (w/Cont) (12.07.22 @ 09:19) >    Patient name: TASH FARIA  YOB: 1960   Age: 62 (M)   MR#: [id number]  Study Date: 12/7/2022  Location: O/PSonographer: [facility]  Study quality: Technically fair  Referring Physician: Daniel Luis MD  Blood Pressure: 146/84 mmHg  Height: 175 cm  Weight: 87 kg  BSA: 2 m2  Heart Rate: 66 mmHg  ------------------------------------------------------------------------  PROCEDURE: Transthoracic echocardiogram with 2-D, M-Mode  and complete spectral and color flow Doppler. Verbal  consent was obtained for injection of  Ultrasonic Enhancing  Agent following a discussion of risks and benefits.  Following intravenous injection of Ultrasonic Enhancing  Agent, harmonic imaging was performed.  INDICATION: Syncope and collapse (R55)  ------------------------------------------------------------------------  Dimensions:    Normal Values:  LA:     4.2    2.0 - 4.0 cm  Ao:     2.8    2.0 - 3.8 cm  SEPTUM: 0.7    0.6 - 1.2 cm  PWT:    0.8    0.6 - 1.1 cm  LVIDd:  4.9    3.0 - 5.6 cm  LVIDs:  3.0    1.8 - 4.0 cm  Derived variables:  LVMI: 60 g/m2  RWT: 0.32  Fractional short: 39 %  EF (Modified Garner Rule): 65 %Doppler Peak Velocity  (m/sec): AoV=1.6  ------------------------------------------------------------------------  Observations:  Mitral Valve: Normal mitral valve. Mild mitral  regurgitation.  Aortic Valve/Aorta: Thickened trileaflet aortic valve with  normal opening. Peak transaortic valve gradient equals 10  mm Hg. Mild aortic regurgitation.  Peak left ventricular  outflow tract gradient equals 2 mm Hg, mean gradient is  equal to 1 mm Hg, LVOT velocity time integral equals 16 cm.  Aortic Root: 2.8 cm.  Ascending Aorta: 3.3 cm.  LVOT diameter: 2 cm.  Left Atrium: Normal left atrium.  Left Ventricle: Endocardial visualization enhanced with  intravenous injection of Ultrasonic Enhancing Agent  (Definity). Normal left ventricular systolic function. No  segmental wall motion abnormalities. No left ventricular  thrombus. Normal left ventricular internal dimensions and  wall thicknesses. Normal diastolic function.  Right Heart: Normal right atrium. Normal right ventricular  size and function. Normal tricuspid valve. Mild tricuspid  regurgitation. Normal pulmonic valve. Minimal pulmonic  regurgitation.  Pericardium/Pleura: Normal pericardium with no pericardial  effusion.  Hemodynamic: Estimated right atrial pressure is 8 mm Hg.  Estimated right ventricular systolic pressure equals 31 mm  Hg, assuming right atrial pressure equals 8 mm Hg,  consistent with normal pulmonary pressures. Color Doppler  demonstrates no evidence of a patent foramen ovale.  ------------------------------------------------------------------------  Conclusions:  1. Normal mitral valve. Mild mitral regurgitation.  2. Thickened trileaflet aortic valve with normal opening.  Mild aortic regurgitation.  3. Endocardial visualization enhanced with intravenous  injection of Ultrasonic Enhancing Agent (Definity). Normal  left ventricular systolic function. No segmental wall  motion abnormalities. No left ventricular thrombus.  4. Normal diastolic function.  5. Normal right ventricular size and function.  *** Compared with echocardiogram of 3/14/2019, no  significant changes noted.  There is improved endocardial visualization on the current  study.  ------------------------------------------------------------------------  Confirmed on  12/7/2022 - 15:50:24 by Simón Cantu M.D.  ------------------------------------------------------------------------    < end of copied text >      < from: CT Head No Cont (12.08.22 @ 10:23) >  IMPRESSION:  No hydrocephalus, acute intracranial hemorrhage, mass effect, or brain   edema.    < end of copied text >

## 2022-12-08 NOTE — PATIENT PROFILE ADULT - FALL HARM RISK - HARM RISK INTERVENTIONS

## 2022-12-08 NOTE — PROGRESS NOTE ADULT - SUBJECTIVE AND OBJECTIVE BOX
DATE OF SERVICE: 12-08-22 @ 11:52    Patient is a 62y old  Male who presents with a chief complaint of Chest Pain (08 Dec 2022 11:26)      INTERVAL HISTORY: Feels ok.     REVIEW OF SYSTEMS:  CONSTITUTIONAL: No weakness  EYES/ENT: No visual changes;  No throat pain   NECK: No pain or stiffness  RESPIRATORY: No cough, wheezing; No shortness of breath  CARDIOVASCULAR: No chest pain or palpitations  GASTROINTESTINAL: No abdominal  pain. No nausea, vomiting, or hematemesis  GENITOURINARY: No dysuria, frequency or hematuria  NEUROLOGICAL: No stroke like symptoms  SKIN: No rashes    TELEMETRY Personally reviewed: SR 60-70  	  MEDICATIONS:  hydrochlorothiazide 12.5 milliGRAM(s) Oral daily  losartan 100 milliGRAM(s) Oral daily        PHYSICAL EXAM:  T(C): 36.6 (12-08-22 @ 11:12), Max: 36.9 (12-07-22 @ 19:41)  HR: 53 (12-08-22 @ 11:12) (53 - 62)  BP: 131/76 (12-08-22 @ 11:12) (131/76 - 152/81)  RR: 18 (12-08-22 @ 11:12) (16 - 18)  SpO2: 95% (12-08-22 @ 11:12) (94% - 98%)  Wt(kg): --  I&O's Summary        Appearance: In no distress	  HEENT:    PERRL, EOMI	  Cardiovascular:  S1 S2, No JVD  Respiratory: Lungs clear to auscultation	  Gastrointestinal:  Soft, Non-tender, + BS	  Vascularature:  No edema of LE  Psychiatric: Appropriate affect   Neuro: no acute focal deficits                               11.8   8.92  )-----------( 213      ( 08 Dec 2022 07:14 )             36.6     12-08    138  |  104  |  19  ----------------------------<  118<H>  4.3   |  17<L>  |  0.89    Ca    9.5      08 Dec 2022 07:14  Mg     2.1     12-07    TPro  6.9  /  Alb  3.8  /  TBili  0.4  /  DBili  x   /  AST  29  /  ALT  24  /  AlkPhos  80  12-08        Labs personally reviewed    < from: TTE with Doppler (w/Cont) (12.07.22 @ 09:19) >  EF (Modified Garner Rule): 65 %Doppler Peak Velocity  (m/sec): AoV=1.6  ------------------------------------------------------------------------  Observations:  Mitral Valve: Normal mitral valve. Mild mitral  regurgitation.  Aortic Valve/Aorta: Thickened trileaflet aortic valve with  normal opening. Peak transaortic valve gradient equals 10  mm Hg. Mild aortic regurgitation.  Peak left ventricular  outflow tract gradient equals 2 mm Hg, mean gradient is  equal to 1 mm Hg, LVOT velocity time integral equals 16 cm.  Aortic Root: 2.8 cm.  Ascending Aorta: 3.3 cm.  LVOT diameter: 2 cm.  Left Atrium: Normal left atrium.  Left Ventricle: Endocardial visualization enhanced with  intravenous injection of Ultrasonic Enhancing Agent  (Definity). Normal left ventricular systolic function. No  segmental wall motion abnormalities. No left ventricular  thrombus. Normal left ventricular internal dimensions and  wall thicknesses. Normal diastolic function.  Right Heart: Normal right atrium. Normal right ventricular  size and function. Normal tricuspid valve. Mild tricuspid  regurgitation. Normal pulmonic valve. Minimal pulmonic  regurgitation.  Pericardium/Pleura: Normal pericardium with no pericardial  effusion.  Hemodynamic: Estimated right atrial pressure is 8 mm Hg.  Estimated right ventricular systolic pressure equals 31 mm  Hg, assuming right atrial pressure equals 8 mm Hg,  consistent with normal pulmonary pressures. Color Doppler  demonstrates no evidence of a patent foramen ovale.  ------------------------------------------------------------------------  Conclusions:  1. Normal mitral valve. Mild mitral regurgitation.  2. Thickened trileaflet aortic valve with normal opening.  Mild aortic regurgitation.  3. Endocardial visualization enhanced with intravenous  injection of Ultrasonic Enhancing Agent (Definity). Normal  left ventricular systolic function. No segmental wall  motion abnormalities. No left ventricular thrombus.  4. Normal diastolic function.  5. Normal right ventricular size and function.  *** Compared with echocardiogram of 3/14/2019, no  significant changes noted.  There is improved endocardial visualization on the current  study.    < end of copied text >    ASSESSMENT/PLAN: 	    Patient is a 62-year-old male past medical history of diabetes, hypertension, hyperlipidemia presents ED complaining of worsening dyspnea on exertion over the last 2 months, patient had a syncopal episode 2 days ago which was preceded by diaphoresis with unclear head strike.  Patient was sent to the ED for abnormal EKG, new T wave inversion in V2.  Patient was due to have a stress/echo this week by his cardiologist was in [place], last stress/echo several years ago.  Patient denies recent illness, fever, chest pain, abdominal pain, nausea, vomiting, dysuria, diarrhea, hematochezia, melena. found to have abnormal stress test       1. chest discomfort  Abnormal stress test   ASA and statin  Echo shows preserved EF, no WMA, normal RV  Risks and benefits of cath discussed with pt and his sister at his request  Plan for cath likely today pending CT Head     2. Syncope  Four days ago after receiving COVID booster vaccine  Appears vasovagal  He was diaphoretic, took his usual dose diuretic, woke up to go to bathroom and syncopized   Echo shows preserved EF, no WMA, normal RV  tele monitoring - currently SR    3.  HTN  resume home BP meds  adjust as tolerated     4. HLD   LDL 66  statin     5.  DM  HgbA1c 7.2  sliding scale  diab diet     6. DVT and GI PPX               Nika Pittman, AG-NP   Nacho Bonner DO [facility]  Cardiovascular Medicine  [address] [place] [address], [address]  Available through call or text on [employer]  Office: [phone]   DATE OF SERVICE: 12-08-22 @ 11:52    Patient is a 62y old  Male who presents with a chief complaint of Chest Pain (08 Dec 2022 11:26)      INTERVAL HISTORY: Feels ok.     REVIEW OF SYSTEMS:  CONSTITUTIONAL: No weakness  EYES/ENT: No visual changes;  No throat pain   NECK: No pain or stiffness  RESPIRATORY: No cough, wheezing; No shortness of breath  CARDIOVASCULAR: No chest pain or palpitations  GASTROINTESTINAL: No abdominal  pain. No nausea, vomiting, or hematemesis  GENITOURINARY: No dysuria, frequency or hematuria  NEUROLOGICAL: No stroke like symptoms  SKIN: No rashes    TELEMETRY Personally reviewed: SR 60-70  	  MEDICATIONS:  hydrochlorothiazide 12.5 milliGRAM(s) Oral daily  losartan 100 milliGRAM(s) Oral daily        PHYSICAL EXAM:  T(C): 36.6 (12-08-22 @ 11:12), Max: 36.9 (12-07-22 @ 19:41)  HR: 53 (12-08-22 @ 11:12) (53 - 62)  BP: 131/76 (12-08-22 @ 11:12) (131/76 - 152/81)  RR: 18 (12-08-22 @ 11:12) (16 - 18)  SpO2: 95% (12-08-22 @ 11:12) (94% - 98%)  Wt(kg): --  I&O's Summary        Appearance: In no distress	  HEENT:    PERRL, EOMI	  Cardiovascular:  S1 S2, No JVD  Respiratory: Lungs clear to auscultation	  Gastrointestinal:  Soft, Non-tender, + BS	  Vascularature:  No edema of LE  Psychiatric: Appropriate affect   Neuro: no acute focal deficits                               11.8   8.92  )-----------( 213      ( 08 Dec 2022 07:14 )             36.6     12-08    138  |  104  |  19  ----------------------------<  118<H>  4.3   |  17<L>  |  0.89    Ca    9.5      08 Dec 2022 07:14  Mg     2.1     12-07    TPro  6.9  /  Alb  3.8  /  TBili  0.4  /  DBili  x   /  AST  29  /  ALT  24  /  AlkPhos  80  12-08        Labs personally reviewed    < from: TTE with Doppler (w/Cont) (12.07.22 @ 09:19) >  EF (Modified Garner Rule): 65 %Doppler Peak Velocity  (m/sec): AoV=1.6  ------------------------------------------------------------------------  Observations:  Mitral Valve: Normal mitral valve. Mild mitral  regurgitation.  Aortic Valve/Aorta: Thickened trileaflet aortic valve with  normal opening. Peak transaortic valve gradient equals 10  mm Hg. Mild aortic regurgitation.  Peak left ventricular  outflow tract gradient equals 2 mm Hg, mean gradient is  equal to 1 mm Hg, LVOT velocity time integral equals 16 cm.  Aortic Root: 2.8 cm.  Ascending Aorta: 3.3 cm.  LVOT diameter: 2 cm.  Left Atrium: Normal left atrium.  Left Ventricle: Endocardial visualization enhanced with  intravenous injection of Ultrasonic Enhancing Agent  (Definity). Normal left ventricular systolic function. No  segmental wall motion abnormalities. No left ventricular  thrombus. Normal left ventricular internal dimensions and  wall thicknesses. Normal diastolic function.  Right Heart: Normal right atrium. Normal right ventricular  size and function. Normal tricuspid valve. Mild tricuspid  regurgitation. Normal pulmonic valve. Minimal pulmonic  regurgitation.  Pericardium/Pleura: Normal pericardium with no pericardial  effusion.  Hemodynamic: Estimated right atrial pressure is 8 mm Hg.  Estimated right ventricular systolic pressure equals 31 mm  Hg, assuming right atrial pressure equals 8 mm Hg,  consistent with normal pulmonary pressures. Color Doppler  demonstrates no evidence of a patent foramen ovale.  ------------------------------------------------------------------------  Conclusions:  1. Normal mitral valve. Mild mitral regurgitation.  2. Thickened trileaflet aortic valve with normal opening.  Mild aortic regurgitation.  3. Endocardial visualization enhanced with intravenous  injection of Ultrasonic Enhancing Agent (Definity). Normal  left ventricular systolic function. No segmental wall  motion abnormalities. No left ventricular thrombus.  4. Normal diastolic function.  5. Normal right ventricular size and function.  *** Compared with echocardiogram of 3/14/2019, no  significant changes noted.  There is improved endocardial visualization on the current  study.    < end of copied text >    ASSESSMENT/PLAN: 	    Patient is a 62-year-old male past medical history of diabetes, hypertension, hyperlipidemia presents ED complaining of worsening dyspnea on exertion over the last 2 months, patient had a syncopal episode 2 days ago which was preceded by diaphoresis with unclear head strike.  Patient was sent to the ED for abnormal EKG, new T wave inversion in V2.  Patient was due to have a stress/echo this week by his cardiologist was in [place], last stress/echo several years ago.  Patient denies recent illness, fever, chest pain, abdominal pain, nausea, vomiting, dysuria, diarrhea, hematochezia, melena. found to have abnormal stress test       1. chest discomfort  Abnormal stress test   ASA and statin  Echo shows preserved EF, no WMA, normal RV  Risks and benefits of cath discussed with pt and his sister at his request  s/p cath with moderate LCx disease, FFR 1, will manage medically   OP follow up      2. Syncope  Four days ago after receiving COVID booster vaccine  Appears vasovagal  He was diaphoretic, took his usual dose diuretic, woke up to go to bathroom and syncopized   Echo shows preserved EF, no WMA, normal RV  tele monitoring - currently SR    3.  HTN  C/W Losartan, HCTZ  adjust as tolerated     4. HLD   LDL 66  statin     5.  DM  HgbA1c 7.2  sliding scale  diab diet     6. DVT and GI PPX            Nika Pittman, AG-NP   Nacho Bonner DO [facility]  Cardiovascular Medicine  [address], [address]  Available through call or text on [employer]  Office: [phone]

## 2022-12-08 NOTE — DISCHARGE NOTE PROVIDER - NSDCCPCAREPLAN_GEN_ALL_CORE_FT
PRINCIPAL DISCHARGE DIAGNOSIS  Diagnosis: FERRIS (dyspnea on exertion)  Assessment and Plan of Treatment: You presented with shortness of breath on exertion  You had serial cardiac ezymes tests and an EKG which were negative  You had a stress test which was abnormal; cardiology was consulted  Continue aspirin and statin as prescribed  You had an echo performed which revealed an ejection fraction of 65%, Mild mitral regurgitation, Mild aortic regurgitation, Mild tricuspid regurgitation, Minimal pulmoary reguritation, No wall motion abnormalities, and a Preserved ejection fraction   You had a cardiac cath on 12/8  Follow up with your cardiologist on discharge      SECONDARY DISCHARGE DIAGNOSES  Diagnosis: Syncope  Assessment and Plan of Treatment: You presented after an episode of fainting   You were complaining of a headache during admission   A CT scan of your head was performed and was negative    Diagnosis: Abnormal stress test  Assessment and Plan of Treatment: You had a stress test done during admission which resulted as abnormal   Cardiology was consulted  You underwent a cardiac catheterization on 12/8  Follow up with your cardiologist on discharge    Diagnosis: HTN (hypertension)  Assessment and Plan of Treatment: Continue to follow a low salt/sodium diet.  Perform physical activities as tolerated in consultation with your Primary Care Provider and physical therapist.  Take all medications as prescribed.  Follow up with your medical doctor for routine blood pressure monitoring at your next visit.  Notify your doctor if you have any of the following symptoms:  Dizziness, lightheadedness, blurry vision, headache, chest pain, or shortness of breath.    Diagnosis: HLD (hyperlipidemia)  Assessment and Plan of Treatment: Low salt, low fat, low cholesterol, diabetic diet if appropriate  Continue medication as prescribed  Exercise, increase your activity level  Pt. verbalized an understanding of all instructions.    Diagnosis: Diabetes mellitus, type 2  Assessment and Plan of Treatment: Make sure you get your HgA1c checked every three months.  If you take oral diabetes medications, check your blood glucose at least two times a day.  If you take short-acting insulin, check your blood glucose before meals and at bedtime.  It's important not to skip any meals.  Keep a log of your blood glucose results and always take it with you to your doctor appointments.  Keep a list of your current medications including over the counter medications and bring this medication list with you to all your doctor appointments.  If you have not seen your ophthalmologist this year, call for appointment.  Check your feet daily for redness, sores, or openings.  Do not self treat.  If there is no improvement in two days, call your primary care physician for an appointment.  HgA1c this admission was 7.2     PRINCIPAL DISCHARGE DIAGNOSIS  Diagnosis: Atypical chest pain  Assessment and Plan of Treatment: HOME CARE INSTRUCTIONS  For the next few days, avoid physical activities that bring on chest pain. Continue physical activities as directed.  Do not smoke.  Avoid drinking alcohol.   Only take over-the-counter or prescription medicine for pain, discomfort, or fever as directed by your caregiver.  Follow your caregiver's suggestions for further testing if your chest pain does not go away.  Keep any follow-up appointments you made. If you do not go to an appointment, you could develop lasting (chronic) problems with pain. If there is any problem keeping an appointment, you must call to reschedule.   SEEK MEDICAL CARE IF:  You think you are having problems from the medicine you are taking. Read your medicine instructions carefully.  Your chest pain does not go away, even after treatment.  You develop a rash with blisters on your chest.  SEEK IMMEDIATE MEDICAL CARE IF:  You have increased chest pain or pain that spreads to your arm, neck, jaw, back, or abdomen.   You develop shortness of breath, an increasing cough, or you are coughing up blood.  You have severe back or abdominal pain, feel nauseous, or vomit.  You develop severe weakness, fainting, or chills.  You have a fever.  THIS IS AN EMERGENCY. Do not wait to see if the pain will go away. Get medical help at once. Call your local emergency services (_____________________). Do not drive yourself to the hospital.        SECONDARY DISCHARGE DIAGNOSES  Diagnosis: Syncope  Assessment and Plan of Treatment: You presented after an episode of fainting   You were complaining of a headache during admission   A CT scan of your head was performed and was negative    Diagnosis: HTN (hypertension)  Assessment and Plan of Treatment: Continue to follow a low salt/sodium diet.  Perform physical activities as tolerated in consultation with your Primary Care Provider and physical therapist.  Take all medications as prescribed.  Follow up with your medical doctor for routine blood pressure monitoring at your next visit.  Notify your doctor if you have any of the following symptoms:  Dizziness, lightheadedness, blurry vision, headache, chest pain, or shortness of breath.    Diagnosis: HLD (hyperlipidemia)  Assessment and Plan of Treatment: Low salt, low fat, low cholesterol, diabetic diet if appropriate  Continue medication as prescribed  Exercise, increase your activity level  Pt. verbalized an understanding of all instructions.    Diagnosis: Diabetes mellitus, type 2  Assessment and Plan of Treatment: Make sure you get your HgA1c checked every three months.  If you take oral diabetes medications, check your blood glucose at least two times a day.  If you take short-acting insulin, check your blood glucose before meals and at bedtime.  It's important not to skip any meals.  Keep a log of your blood glucose results and always take it with you to your doctor appointments.  Keep a list of your current medications including over the counter medications and bring this medication list with you to all your doctor appointments.  If you have not seen your ophthalmologist this year, call for appointment.  Check your feet daily for redness, sores, or openings.  Do not self treat.  If there is no improvement in two days, call your primary care physician for an appointment.  HgA1c this admission was 7.2

## 2022-12-08 NOTE — DISCHARGE NOTE PROVIDER - CARE PROVIDER_API CALL
JOHN PAUL KIRBY  Internal Medicine  [address] [facility] [address] [facility], [address] [address]  Phone: [phone]  Fax: [phone]  Follow Up Time:     Nacho Bonner (DO)  Cardiovascular Disease; Internal Medicine; Nuclear Cardiology  [address] [facility], [address] [address]  Phone: [phone]  Fax: [phone]  Follow Up Time:

## 2022-12-08 NOTE — PROGRESS NOTE ADULT - ASSESSMENT
Full note to follow    Checking CT Head  Patient is a 62-year-old male past medical history of diabetes, hypertension, hyperlipidemia presents ED complaining of worsening dyspnea on exertion over the last 2 months, patient had a syncopal episode 2 days ago which was preceded by diaphoresis with unclear head strike.  Patient was sent to the ED for abnormal EKG, new T wave inversion in V2.  Patient was due to have a stress/echo this week by his cardiologist was in [place], last stress/echo several years ago.  Patient denies recent illness, fever, chest pain, abdominal pain, nausea, vomiting, dysuria, diarrhea, hematochezia, melena. found to have abnormal stress test       # FERRIS , chest discomfort  - R/O ACS  - Serial CE and EKG -- Trop X 2 negative   - Stress test positive, abnormal   - Card follow up appreciated   - C/w ASA and statin  - BP control   - TTE with -- EF of 65%, Mild MR, Mild AR, Mild TR, Minimal CA, No WMA, Preserved EF  - Ischemic W/U per cardiology, CT coronary VS cath, defer to cardiology --> Planned for Cath     # Syncope  - 3-4 days ago, S/P Covid Booster   - Check Orthostatics  - Echo as above, no WMA, preserved EF   - CT Head negative   - Cont to monitor on tele   - Cardio follow up     # HTN  - Resume home BP meds  - Monitor and adjust as tolerated     # HLD   LDL of 66; C/w Lipitor 40     # DM  sliding scale  diab diet   A1C of 7.2, outpatient follow up on DC     DVT and GI PPX       Discussed with Wife Ping via telephone at the patient's bedside.

## 2022-12-08 NOTE — DISCHARGE NOTE PROVIDER - HOSPITAL COURSE
Patient is a 62-year-old male past medical history of diabetes, hypertension, hyperlipidemia presents ED complaining of worsening dyspnea on exertion over the last 2 months, patient had a syncopal episode 2 days ago which was preceded by diaphoresis with unclear head strike.  Patient was sent to the ED for abnormal EKG, new T wave inversion in V2.  Patient was due to have a stress/echo this week by his cardiologist was in [place], last stress/echo several years ago.  Patient denies recent illness, fever, chest pain, abdominal pain, nausea, vomiting, dysuria, diarrhea, hematochezia, melena. found to have abnormal stress test       Problem#1: FERRIS, chest discomfort  - R/O ACS  - Serial CE and EKG -- Trop X 2 negative   - Stress test positive, abnormal   - Card follow up appreciated   - C/w ASA and statin  - BP control   - TTE with -- EF of 65%, Mild MR, Mild AR, Mild TR, Minimal VA, No WMA, Preserved EF  - Ischemic W/U per cardiology, CT coronary VS cath, defer to cardiology --> Planned for Cath     Problem #2: Syncope  - 3-4 days ago, S/P Covid Booster   - Check Orthostatics  - Echo as above, no WMA, preserved EF   - CT Head negative   - Cont to monitor on tele   - Cardio follow up     Problem #3: HTN  - Resume home BP meds  - Monitor and adjust as tolerated     Problem #4: HLD   - LDL of 66; C/w Lipitor 40     Problem #5: DM  - Sliding scale  - Diabetic diet   - A1C of 7.2, outpatient follow up on DC     62-year-old male past medical history of diabetes, hypertension, hyperlipidemia presents ED complaining of worsening dyspnea on exertion over the last 2 months, patient had a syncopal episode 2 days ago which was preceded by diaphoresis with unclear head strike.  Patient was sent to the ED for abnormal EKG, new T wave inversion in V2.  Patient was due to have a stress/echo this week by his cardiologist was in [place], last stress/echo several years ago.  Patient denies recent illness, fever, chest pain, abdominal pain, nausea, vomiting, dysuria, diarrhea, hematochezia, melena. found to have abnormal stress test        FERRIS, chest discomfort  - R/O ACS  - Serial CE and EKG -- Trop X 2 negative   - Stress test positive, abnormal   - Card follow up appreciated   - C/w ASA and statin  - BP control   - TTE with -- EF of 65%, Mild MR, Mild AR, Mild TR, Minimal [place], No WMA, Preserved EF  - Ischemic W/U per cardiology,  cath diagnostic        Syncope  - 3-4 days ago, S/P Covid Booster   - Check Orthostatics  - Echo as above, no WMA, preserved EF   - CT Head negative   Likely vasovagal     : HTN  - Resume home BP meds  - Monitor and adjust as tolerated     : HLD   - LDL of 66; C/w Lipitor 40      DM  - Sliding scale  - Diabetic diet   - A1C of 7.2, outpatient follow up on DC     62-year-old male past medical history of diabetes, hypertension, hyperlipidemia presents ED complaining of worsening dyspnea on exertion over the last 2 months, patient had a syncopal episode 2 days ago which was preceded by diaphoresis with unclear head strike.  Patient was sent to the ED for abnormal EKG, new T wave inversion in V2.  Patient was due to have a stress/echo this week by his cardiologist was in [place], last stress/echo several years ago.  Patient denies recent illness, fever, chest pain, abdominal pain, nausea, vomiting, dysuria, diarrhea, hematochezia, melena. found to have abnormal stress test        FERRIS, chest discomfort  - R/O ACS  - Serial CE and EKG -- Trop X 2 negative   - Stress test positive, abnormal   - Card follow up appreciated   - C/w ASA and statin  - BP control   - TTE with -- EF of 65%, Mild MR, Mild AR, Mild TR, Minimal PA, No WMA, Preserved EF  - Ischemic W/U per cardiology,  cath : diagnostic        Syncope  - 3-4 days ago, S/P Covid Booster   -  - Echo  no WMA, preserved EF   - CT Head negative    symptoms Likely vasovagal     : HTN  - Resume home BP meds  - Monitor and adjust as tolerated     : HLD   - LDL of 66; C/w Lipitor 40      DM  - Sliding scale  - Diabetic diet   - A1C of 7.2, outpatient follow up on DC     62-year-old male past medical history of diabetes, hypertension, hyperlipidemia presents ED complaining of worsening dyspnea on exertion over the last 2 months, patient had a syncopal episode 2 days ago which was preceded by diaphoresis with unclear head strike.  Patient was sent to the ED for abnormal EKG, new T wave inversion in V2.  Patient was due to have a stress/echo this week by his cardiologist was in [place], last stress/echo several years ago.  Patient denies recent illness, fever, chest pain, abdominal pain, nausea, vomiting, dysuria, diarrhea, hematochezia, melena. found to have abnormal stress test. s/p cath with nonobstructive disease with normal FFR. Aggressive medical therapy and close OP follow up.        FERRIS, chest discomfort  - R/O ACS  - Serial CE and EKG -- Trop X 2 negative   - Stress test positive, abnormal   - Card follow up appreciated   - C/w ASA and statin  - BP control   - TTE with -- EF of 65%, Mild MR, Mild AR, Mild TR, Minimal [place], No WMA, Preserved EF  - Ischemic W/U per cardiology,  cath : diagnostic        Syncope  - 3-4 days ago, S/P Covid Booster   -  - Echo  no WMA, preserved EF   - CT Head negative    symptoms Likely vasovagal     : HTN  - Resume home BP meds  - Monitor and adjust as tolerated     : HLD   - LDL of 66; C/w Lipitor 40      DM  - Sliding scale  - Diabetic diet   - A1C of 7.2, outpatient follow up on DC

## 2022-12-08 NOTE — DISCHARGE NOTE PROVIDER - NSDCMRMEDTOKEN_GEN_ALL_CORE_FT
acetaminophen 325 mg oral tablet: 2 tab(s) orally every 4 hours, As needed, Mild Pain (1 - 3)  alfuzosin 10 mg oral tablet, extended release: 1 tab(s) orally once a day (at bedtime)  atorvastatin 20 mg oral tablet: 1 tab(s) orally once a day (at bedtime)  calcium carbonate 500 mg (200 mg elemental calcium) oral tablet, chewable: 1 tab(s) orally every 4 hours, As needed, Heartburn  docusate sodium 100 mg oral capsule: 1 cap(s) orally 3 times a day, As Needed  metFORMIN 500 mg oral tablet: 1 tab(s) orally 2 times a day  NIFEdipine (Eqv-Adalat CC) 60 mg oral tablet, extended release: 1 tab(s) orally once a day  polyethylene glycol 3350 oral powder for reconstitution: 17 gram(s) orally 2 times a day, As Needed  senna oral tablet: 2 tab(s) orally once a day (at bedtime)   acetaminophen 325 mg oral tablet: 2 tab(s) orally every 4 hours, As needed, Mild Pain (1 - 3)  alfuzosin 10 mg oral tablet, extended release: 1 tab(s) orally once a day (at bedtime)  aspirin 81 mg oral tablet, chewable: 1 tab(s) orally once a day  atorvastatin 40 mg oral tablet: 1 tab(s) orally once a day (at bedtime)  calcium carbonate 500 mg (200 mg elemental calcium) oral tablet, chewable: 1 tab(s) orally every 4 hours, As needed, Heartburn  docusate sodium 100 mg oral capsule: 1 cap(s) orally 3 times a day, As Needed  hydroCHLOROthiazide 12.5 mg oral capsule: 1 cap(s) orally once a day  losartan 100 mg oral tablet: 1 tab(s) orally once a day  polyethylene glycol 3350 oral powder for reconstitution: 17 gram(s) orally 2 times a day, As Needed  senna oral tablet: 2 tab(s) orally once a day (at bedtime)

## 2022-12-09 ENCOUNTER — TRANSCRIPTION ENCOUNTER (OUTPATIENT)
Age: 62
End: 2022-12-09

## 2022-12-09 VITALS
HEART RATE: 72 BPM | OXYGEN SATURATION: 96 % | DIASTOLIC BLOOD PRESSURE: 76 MMHG | RESPIRATION RATE: 18 BRPM | SYSTOLIC BLOOD PRESSURE: 126 MMHG | TEMPERATURE: 98 F

## 2022-12-09 LAB — GLUCOSE BLDC GLUCOMTR-MCNC: 134 MG/DL — HIGH (ref 70–99)

## 2022-12-09 PROCEDURE — 71046 X-RAY EXAM CHEST 2 VIEWS: CPT

## 2022-12-09 PROCEDURE — 82962 GLUCOSE BLOOD TEST: CPT

## 2022-12-09 PROCEDURE — 85730 THROMBOPLASTIN TIME PARTIAL: CPT

## 2022-12-09 PROCEDURE — 83735 ASSAY OF MAGNESIUM: CPT

## 2022-12-09 PROCEDURE — 85025 COMPLETE CBC W/AUTO DIFF WBC: CPT

## 2022-12-09 PROCEDURE — 93005 ELECTROCARDIOGRAM TRACING: CPT

## 2022-12-09 PROCEDURE — C1769: CPT

## 2022-12-09 PROCEDURE — 99285 EMERGENCY DEPT VISIT HI MDM: CPT

## 2022-12-09 PROCEDURE — 78452 HT MUSCLE IMAGE SPECT MULT: CPT | Mod: MA

## 2022-12-09 PROCEDURE — 36415 COLL VENOUS BLD VENIPUNCTURE: CPT

## 2022-12-09 PROCEDURE — 93017 CV STRESS TEST TRACING ONLY: CPT

## 2022-12-09 PROCEDURE — C1894: CPT

## 2022-12-09 PROCEDURE — 80053 COMPREHEN METABOLIC PANEL: CPT

## 2022-12-09 PROCEDURE — 84484 ASSAY OF TROPONIN QUANT: CPT

## 2022-12-09 PROCEDURE — 85379 FIBRIN DEGRADATION QUANT: CPT

## 2022-12-09 PROCEDURE — 84443 ASSAY THYROID STIM HORMONE: CPT

## 2022-12-09 PROCEDURE — 80061 LIPID PANEL: CPT

## 2022-12-09 PROCEDURE — A9500: CPT

## 2022-12-09 PROCEDURE — 85610 PROTHROMBIN TIME: CPT

## 2022-12-09 PROCEDURE — C1887: CPT

## 2022-12-09 PROCEDURE — 93458 L HRT ARTERY/VENTRICLE ANGIO: CPT

## 2022-12-09 PROCEDURE — 83036 HEMOGLOBIN GLYCOSYLATED A1C: CPT

## 2022-12-09 PROCEDURE — C8929: CPT

## 2022-12-09 PROCEDURE — 83880 ASSAY OF NATRIURETIC PEPTIDE: CPT

## 2022-12-09 PROCEDURE — 70450 CT HEAD/BRAIN W/O DYE: CPT

## 2022-12-09 PROCEDURE — G0378: CPT

## 2022-12-09 PROCEDURE — 87637 SARSCOV2&INF A&B&RSV AMP PRB: CPT

## 2022-12-09 PROCEDURE — 93571 IV DOP VEL&/PRESS C FLO 1ST: CPT | Mod: LD

## 2022-12-09 RX ORDER — NIFEDIPINE 30 MG
1 TABLET, EXTENDED RELEASE 24 HR ORAL
Qty: 0 | Refills: 0 | DISCHARGE

## 2022-12-09 RX ORDER — ASPIRIN/CALCIUM CARB/MAGNESIUM 324 MG
1 TABLET ORAL
Qty: 30 | Refills: 0
Start: 2022-12-09 | End: 2023-01-07

## 2022-12-09 RX ORDER — ATORVASTATIN CALCIUM 80 MG/1
1 TABLET, FILM COATED ORAL
Qty: 30 | Refills: 0
Start: 2022-12-09 | End: 2023-01-07

## 2022-12-09 RX ORDER — ATORVASTATIN CALCIUM 80 MG/1
1 TABLET, FILM COATED ORAL
Qty: 0 | Refills: 0 | DISCHARGE
Start: 2022-12-09

## 2022-12-09 RX ORDER — ASPIRIN/CALCIUM CARB/MAGNESIUM 324 MG
1 TABLET ORAL
Qty: 0 | Refills: 0 | DISCHARGE
Start: 2022-12-09

## 2022-12-09 RX ORDER — LOSARTAN POTASSIUM 100 MG/1
1 TABLET, FILM COATED ORAL
Qty: 0 | Refills: 0 | DISCHARGE
Start: 2022-12-09

## 2022-12-09 RX ORDER — HYDROCHLOROTHIAZIDE 25 MG
1 TABLET ORAL
Qty: 0 | Refills: 0 | DISCHARGE
Start: 2022-12-09

## 2022-12-09 RX ADMIN — LOSARTAN POTASSIUM 100 MILLIGRAM(S): 100 TABLET, FILM COATED ORAL at 05:32

## 2022-12-09 RX ADMIN — Medication 81 MILLIGRAM(S): at 11:12

## 2022-12-09 NOTE — PROGRESS NOTE ADULT - SUBJECTIVE AND OBJECTIVE BOX
DATE OF SERVICE: 12-09-22 @ 07:43    Patient is a 62y old  Male who presents with a chief complaint of Chest Pain (08 Dec 2022 16:37)      INTERVAL HISTORY: no complaints     REVIEW OF SYSTEMS:  CONSTITUTIONAL: No weakness  EYES/ENT: No visual changes;  No throat pain   NECK: No pain or stiffness  RESPIRATORY: No cough, wheezing; No shortness of breath  CARDIOVASCULAR: No chest pain or palpitations  GASTROINTESTINAL: No abdominal  pain. No nausea, vomiting, or hematemesis  GENITOURINARY: No dysuria, frequency or hematuria  NEUROLOGICAL: No stroke like symptoms  SKIN: No rashes    TELEMETRY Personally reviewed: Sinus reva   	  MEDICATIONS:  hydrochlorothiazide 12.5 milliGRAM(s) Oral daily  losartan 100 milliGRAM(s) Oral daily        PHYSICAL EXAM:  T(C): 36.8 (12-09-22 @ 04:31), Max: 36.8 (12-09-22 @ 00:09)  HR: 54 (12-09-22 @ 04:31) (52 - 75)  BP: 146/87 (12-09-22 @ 04:31) (122/69 - 179/84)  RR: 18 (12-09-22 @ 04:31) (16 - 22)  SpO2: 96% (12-09-22 @ 04:31) (95% - 98%)  Wt(kg): --  I&O's Summary    08 Dec 2022 07:01  -  09 Dec 2022 07:00  --------------------------------------------------------  IN: 0 mL / OUT: 350 mL / NET: -350 mL      Height (cm): 175.3 (12-08 @ 13:05)  Weight (kg): 87.1 (12-08 @ 13:05)  BMI (kg/m2): 28.3 (12-08 @ 13:05)  BSA (m2): 2.03 (12-08 @ 13:05)    Appearance: In no distress	  HEENT:    PERRL, EOMI	  Cardiovascular:  S1 S2, No JVD  Respiratory: Lungs clear to auscultation	  Gastrointestinal:  Soft, Non-tender, + BS	  Vascularature:  No edema of LE  Psychiatric: Appropriate affect   Neuro: no acute focal deficits                               11.8   8.92  )-----------( 213      ( 08 Dec 2022 07:14 )             36.6     12-08    138  |  104  |  19  ----------------------------<  118<H>  4.3   |  17<L>  |  0.89    Ca    9.5      08 Dec 2022 07:14    TPro  6.9  /  Alb  3.8  /  TBili  0.4  /  DBili  x   /  AST  29  /  ALT  24  /  AlkPhos  80  12-08        Labs personally reviewed      ASSESSMENT/PLAN: 	  Patient is a 62-year-old male past medical history of diabetes, hypertension, hyperlipidemia presents ED complaining of worsening dyspnea on exertion over the last 2 months, patient had a syncopal episode 2 days ago which was preceded by diaphoresis with unclear head strike.  Patient was sent to the ED for abnormal EKG, new T wave inversion in V2.  Patient was due to have a stress/echo this week by his cardiologist was in [place], last stress/echo several years ago.  Patient denies recent illness, fever, chest pain, abdominal pain, nausea, vomiting, dysuria, diarrhea, hematochezia, melena. found to have abnormal stress test       1. chest discomfort  Abnormal stress test   ASA and statin  Echo shows preserved EF, no WMA, normal RV  Risks and benefits of cath discussed with pt and his sister at his request  s/p cath with moderate LCx disease, FFR 1, will manage medically   OP follow up      2. Syncope  Four days ago after receiving COVID booster vaccine  Appears vasovagal  He was diaphoretic, took his usual dose diuretic, woke up to go to bathroom and syncopized   Echo shows preserved EF, no WMA, normal RV  tele monitoring - currently SR  12/9 tele sinus reva 54 (52-75 bpm), asymptomatic     3.  HTN  C/W Losartan, HCTZ  adjust as tolerated     4. HLD   LDL 66  statin     5.  DM  HgbA1c 7.2  sliding scale  diab diet     6. DVT and GI PPX              DARINEL aHrmon DO [facility]  Cardiovascular Medicine  [address], [address]  Office: [phone]  Available via call/text on Microsoft Teams

## 2022-12-09 NOTE — PROGRESS NOTE ADULT - SUBJECTIVE AND OBJECTIVE BOX
Name of Patient : TASH FARIA  MRN: [id number]  Date of visit: 12-09-22        Subjective: Patient seen and examined. No new events except as noted.   Patient seen earlier this AM. Sitting OOB TC  Wife, Ping at the bedside  S/P Cardiac cath yesterday  Denies CP, palpitations, SOB or dyspnea  Denies lightheadedness or dizziness. Denies gait unsteadiness    REVIEW OF SYSTEMS:    CONSTITUTIONAL: No weakness, fevers or chills  EYES/ENT: No visual changes;  No vertigo or throat pain   NECK: No pain or stiffness  RESPIRATORY: No cough, wheezing, hemoptysis; No shortness of breath  CARDIOVASCULAR: No chest pain or palpitations  GASTROINTESTINAL: No abdominal or epigastric pain. No nausea, vomiting, or hematemesis; No diarrhea or constipation. No melena or hematochezia.  GENITOURINARY: No dysuria, frequency or hematuria  NEUROLOGICAL: No numbness or weakness  SKIN: No itching, burning, rashes, or lesions   All other review of systems is negative unless indicated above.    MEDICATIONS:  MEDICATIONS  (STANDING):  aspirin  chewable 81 milliGRAM(s) Oral daily  atorvastatin 40 milliGRAM(s) Oral at bedtime  dextrose 5%. 1000 milliLiter(s) (50 mL/Hr) IV Continuous <Continuous>  dextrose 5%. 1000 milliLiter(s) (100 mL/Hr) IV Continuous <Continuous>  dextrose 50% Injectable 25 Gram(s) IV Push once  dextrose 50% Injectable 12.5 Gram(s) IV Push once  dextrose 50% Injectable 25 Gram(s) IV Push once  glucagon  Injectable 1 milliGRAM(s) IntraMuscular once  hydrochlorothiazide 12.5 milliGRAM(s) Oral daily  insulin lispro (ADMELOG) corrective regimen sliding scale   SubCutaneous three times a day before meals  losartan 100 milliGRAM(s) Oral daily  sodium chloride 0.9% Bolus 250 milliLiter(s) IV Bolus once  sodium chloride 0.9%. 1000 milliLiter(s) (75 mL/Hr) IV Continuous <Continuous>      PHYSICAL EXAM:  T(C): 36.6 (12-09-22 @ 11:41), Max: 37 (12-09-22 @ 09:15)  HR: 72 (12-09-22 @ 11:41) (54 - 75)  BP: 126/76 (12-09-22 @ 11:41) (122/69 - 161/81)  RR: 18 (12-09-22 @ 11:41) (18 - 22)  SpO2: 96% (12-09-22 @ 11:41) (96% - 97%)  Wt(kg): --  I&O's Summary    08 Dec 2022 07:01  -  09 Dec 2022 07:00  --------------------------------------------------------  IN: 0 mL / OUT: 350 mL / NET: -350 mL        Appearance: Normal, OOB TC	  HEENT:  Eyes are open; Glasses   Lymphatic: No lymphadenopathy   Cardiovascular: Normal S1 S2, no JVD  Respiratory: normal effort , clear  Gastrointestinal:  Soft, Non-tender  Skin: No rashes,  warm to touch  Psychiatry:  Mood & affect appropriate  Musculoskeletal: No edema        12-08-22 @ 07:01  -  12-09-22 @ 07:00  --------------------------------------------------------  IN: 0 mL / OUT: 350 mL / NET: -350 mL                              11.8   8.92  )-----------( 213      ( 08 Dec 2022 07:14 )             36.6               12-08    138  |  104  |  19  ----------------------------<  118<H>  4.3   |  17<L>  |  0.89    Ca    9.5      08 Dec 2022 07:14    TPro  6.9  /  Alb  3.8  /  TBili  0.4  /  DBili  x   /  AST  29  /  ALT  24  /  AlkPhos  80  12-08                       < from: CT Head No Cont (12.08.22 @ 10:23) >    IMPRESSION:  No hydrocephalus, acute intracranial hemorrhage, mass effect, or brain   edema.    < end of copied text >

## 2022-12-09 NOTE — PROGRESS NOTE ADULT - NS ATTEND OPT1 GEN_ALL_CORE
I independently performed the documented:
I attest my time as attending is greater than 50% of the total combined time spent on qualifying patient care activities by the PA/NP and attending.
I independently performed the documented:
I attest my time as attending is greater than 50% of the total combined time spent on qualifying patient care activities by the PA/NP and attending.

## 2022-12-09 NOTE — PROGRESS NOTE ADULT - ASSESSMENT
Patient is a 62-year-old male past medical history of diabetes, hypertension, hyperlipidemia presents ED complaining of worsening dyspnea on exertion over the last 2 months, patient had a syncopal episode 2 days ago which was preceded by diaphoresis with unclear head strike.  Patient was sent to the ED for abnormal EKG, new T wave inversion in V2.  Patient was due to have a stress/echo this week by his cardiologist was in [place], last stress/echo several years ago.  Patient denies recent illness, fever, chest pain, abdominal pain, nausea, vomiting, dysuria, diarrhea, hematochezia, melena. found to have abnormal stress test       # FERRIS , chest discomfort  - R/O ACS  - Serial CE and EKG -- Trop X 2 negative   - Stress test positive, abnormal   - Card follow up appreciated   - C/w ASA and statin  - BP control   - TTE with -- EF of 65%, Mild MR, Mild AR, Mild TR, Minimal [place], No WMA, Preserved EF  - Ischemic W/U per cardiology, Planned for Cath --> moderate LCx disease, FFR 1, medical management per cardio   - Outpatient cardio follow up    # Syncope  - 3-4 days ago, S/P Covid Booster   - Check Orthostatics  - Echo as above, no WMA, preserved EF   - CT Head negative   - Cont to monitor on tele   - Cardio follow up     # HTN  - Resume home BP meds  - Monitor and adjust as tolerated     # HLD   LDL of 66; C/w Lipitor 40     # DM  sliding scale  diab diet   A1C of 7.2, outpatient follow up on DC     DVT and GI PPX       Discussed with Wife Ping  at the patient's bedside.

## 2022-12-09 NOTE — DISCHARGE NOTE NURSING/CASE MANAGEMENT/SOCIAL WORK - PATIENT PORTAL LINK FT
You can access the FollowMyHealth Patient Portal offered by [facility] by registering at the following website: [email or web address]. By joining [facility]’s FollowMyHealth portal, you will also be able to view your health information using other applications (apps) compatible with our system.

## 2023-04-27 NOTE — CONSULT NOTE ADULT - REASON FOR ADMISSION
Chest Pain Opioid Counseling: I discussed with the patient the potential side effects of opioids including but not limited to addiction, altered mental status, and depression. I stressed avoiding alcohol, benzodiazepines, muscle relaxants and sleep aids unless specifically okayed by a physician. The patient verbalized understanding of the proper use and possible adverse effects of opioids. All of the patient's questions and concerns were addressed. They were instructed to flush the remaining pills down the toilet if they did not need them for pain.

## 2023-07-18 NOTE — PATIENT PROFILE ADULT - FALL HARM RISK - FALLEN IN PAST
Ambulatory Care Coordination Note  2023    Patient Current Location: [place]     ACM contacted the patient by telephone. Verified name and  with patient as identifiers. Provided introduction to self, and explanation of the ACM role. Challenges to be reviewed by the provider   Additional needs identified to be addressed with provider: No  none               Method of communication with provider: none. ACM: Esa Abarca RN    ACM call to pt for CC outreach. Spoke to [place]. States she is \"ok\". States she is waiting to start her new job, d/t drug test being delayed. States the next training session is in August and she will start then. She continues to apply and look for other jobs. States she has not been in contact with [facility] yet re: utility or rent assistance. 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 States she has not contacted any grief counseling resources. Confirmed she did receive resources sent by [facility] and PATTI Waller. Explained SW had next outreach planned next week . Pt has PCP appt . ACM discussed ending outreach. Pt reports having resources, just not having time to complete things. ACM voiced understanding and provided emotional support. Pt agreeable to close CC episode. Pt has ACM and PCP office contact for any future needs. Offered patient enrollment in the Remote Patient Monitoring (RPM) program for in-home monitoring: Patient is not eligible for RPM program.    General Assessment    Do you have any symptoms that are causing concern?: No           Lab Results       None            Care Coordination Interventions    Referral from Primary Care Provider: Yes  Suggested Interventions and Community Resources  Grief Counselor:  In Process  Home Health Services: Declined  Medication Assistance Program: Declined  Social Work: In Process (Comment: Community)  Transportation Support: Declined          Goals Addressed Unanticipated physiological fall

## 2023-08-16 NOTE — PATIENT PROFILE ADULT - NSPROMEDSADMININFO_GEN_A_NUR
For information on Fall & Injury Prevention, visit: [email or web address] OR  [email or web address] OR  https://www.cdc.gov/steadi/patient.html
no concerns

## 2023-11-06 NOTE — DISCHARGE NOTE NURSING/CASE MANAGEMENT/SOCIAL WORK - NSFLUVACAGEDISCH_IMM_ALL_CORE
Patient ready for discharge, met all goals. This RN went through all discharge instructions with patient and wife who verbalized understanding. No questions asked. PIV removed. Patient and wife ambulated off unit with all belongings.   
Adult
Mother is RH Positive

## 2024-03-19 NOTE — PROGRESS NOTE ADULT - ASSESSMENT
-- DO NOT REPLY / DO NOT REPLY ALL --  -- Message is from Engagement Center Operations (ECO) --    General Patient Message: is calling because they are needing a diagnosis code for the leg braces order     Caller Information         Type Contact Phone/Fax    03/19/2024 10:35 AM CDT Phone (Incoming) [facility] [place] (Other) [phone]          Alternative phone number: na    Can a detailed message be left? Yes    Message Turnaround:                HPI:  Patient is a 58 year old male that developed back pain after a previous fall.  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  Recently complained of low back pain with decreased sensation to b/l LE and LLE weakness.  Transferred to [facility] for spinal angiogram and further management.    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           PLAN:  -d/c narcotics given no BM/constipation/ post op ileus- IV tylenol ordered and instructed patient to increase movement/ambulation  -cont valium increased for spasms  --bowel regimen in place, appreciate GI consult/recs for ileus  -continue protonix  -lovenox and SCDS for DVT prophylaxis  -losartan and norvasc for bp control, norvasc was increased due to elevated bp, procardia XL added - appreciate Dr Kramer consult  -HISS for glucose control; consistent carbohydrate diet for type 2 DM  -out of bed with assistance  -incentive spirometer for lung expansion  -PT/OT/PMR - acute SCI rehab upon discharge  -hypokalemia resolved after supplementation  -leukocytosis resolved; likely due to intraop decadron, as pt afebrile, will monitor  -increase OOB  -f/u am labs    will discuss with Dr Willis  Spectra #[id number]    Assessment:  Please Check When Present   []  GCS  E   V  M     Heart Failure: []Acute, [] acute on chronic , []chronic  Heart Failure:  [] Diastolic (HFpEF), [] Systolic (HFrEF), []Combined (HFpEF and HFrEF), [] RHF, [] Pulm HTN, [] Other    [] TREVER, [] ATN, [] AIN, [] other  [] CKD1, [] CKD2, [] CKD 3, [] CKD 4, [] CKD 5, []ESRD    Encephalopathy: [] Metabolic, [] Hepatic, [] toxic, [] Neurological, [] Other    Abnormal Nurtitional Status: [] malnurtition (see nutrition note), [ ]underweight: BMI < 19, [] morbid obesity: BMI >40, [] Cachexia    [] Sepsis  [] hypovolemic shock,[] cardiogenic shock, [] hemorrhagic shock, [] neuogenic shock  [] Acute Respiratory Failure  []Cerebral edema, [] Brain compression/ herniation,   [] Functional quadriplegia  [x] Acute blood loss anemia

## 2024-04-02 ENCOUNTER — APPOINTMENT (OUTPATIENT)
Dept: DERMATOLOGY | Facility: CLINIC | Age: 64
End: 2024-04-02

## 2025-03-17 ENCOUNTER — APPOINTMENT (OUTPATIENT)
Dept: ENDOCRINOLOGY | Facility: CLINIC | Age: 65
End: 2025-03-17
Payer: MEDICARE

## 2025-03-17 VITALS
OXYGEN SATURATION: 97 % | HEART RATE: 62 BPM | DIASTOLIC BLOOD PRESSURE: 70 MMHG | HEIGHT: 70 IN | WEIGHT: 197 LBS | SYSTOLIC BLOOD PRESSURE: 115 MMHG | BODY MASS INDEX: 28.2 KG/M2

## 2025-03-17 DIAGNOSIS — I10 ESSENTIAL (PRIMARY) HYPERTENSION: ICD-10-CM

## 2025-03-17 DIAGNOSIS — E78.5 HYPERLIPIDEMIA, UNSPECIFIED: ICD-10-CM

## 2025-03-17 PROCEDURE — G2211 COMPLEX E/M VISIT ADD ON: CPT

## 2025-03-17 PROCEDURE — 99204 OFFICE O/P NEW MOD 45 MIN: CPT

## 2025-03-17 PROCEDURE — 82962 GLUCOSE BLOOD TEST: CPT

## 2025-03-25 ENCOUNTER — APPOINTMENT (OUTPATIENT)
Dept: ENDOCRINOLOGY | Facility: CLINIC | Age: 65
End: 2025-03-25
Payer: MEDICARE

## 2025-03-25 DIAGNOSIS — E11.9 TYPE 2 DIABETES MELLITUS W/OUT COMPLICATIONS: ICD-10-CM

## 2025-03-25 LAB
ALBUMIN SERPL ELPH-MCNC: 4.5 G/DL
ALP BLD-CCNC: 98 U/L
ALT SERPL-CCNC: 27 U/L
ANION GAP SERPL CALC-SCNC: 14 MMOL/L
AST SERPL-CCNC: 31 U/L
BILIRUB SERPL-MCNC: 0.5 MG/DL
BUN SERPL-MCNC: 16 MG/DL
CALCIUM SERPL-MCNC: 10 MG/DL
CHLORIDE SERPL-SCNC: 101 MMOL/L
CHOLEST SERPL-MCNC: 110 MG/DL
CO2 SERPL-SCNC: 23 MMOL/L
CREAT SERPL-MCNC: 0.96 MG/DL
CREAT SPEC-SCNC: 82 MG/DL
EGFRCR SERPLBLD CKD-EPI 2021: 87 ML/MIN/1.73M2
ESTIMATED AVERAGE GLUCOSE: 171 MG/DL
GLUCOSE BLDC GLUCOMTR-MCNC: 180
GLUCOSE SERPL-MCNC: 143 MG/DL
HBA1C MFR BLD HPLC: 7.6 %
HDLC SERPL-MCNC: 38 MG/DL
LDLC SERPL-MCNC: 52 MG/DL
MICROALBUMIN 24H UR DL<=1MG/L-MCNC: <1.2 MG/DL
MICROALBUMIN/CREAT 24H UR-RTO: NORMAL MG/G
NONHDLC SERPL-MCNC: 72 MG/DL
POTASSIUM SERPL-SCNC: 4.5 MMOL/L
PROT SERPL-MCNC: 8.4 G/DL
SODIUM SERPL-SCNC: 139 MMOL/L
TRIGL SERPL-MCNC: 102 MG/DL
TSH SERPL-ACNC: 2.94 UIU/ML

## 2025-03-25 PROCEDURE — G0108 DIAB MANAGE TRN  PER INDIV: CPT

## 2025-03-25 RX ORDER — LANCETS
EACH MISCELLANEOUS
Qty: 1 | Refills: 3 | Status: ACTIVE | COMMUNITY
Start: 2025-03-25 | End: 1900-01-01

## 2025-03-25 RX ORDER — BLOOD-GLUCOSE METER
W/DEVICE KIT MISCELLANEOUS
Qty: 1 | Refills: 0 | Status: ACTIVE | COMMUNITY
Start: 2025-03-25 | End: 1900-01-01

## 2025-03-25 RX ORDER — BLOOD SUGAR DIAGNOSTIC
STRIP MISCELLANEOUS
Qty: 1 | Refills: 3 | Status: ACTIVE | COMMUNITY
Start: 2025-03-25 | End: 1900-01-01

## 2025-03-26 PROBLEM — E11.9 TYPE 2 DIABETES MELLITUS: Status: ACTIVE | Noted: 2025-03-17

## 2025-03-31 RX ORDER — EMPAGLIFLOZIN 10 MG/1
10 TABLET, FILM COATED ORAL DAILY
Qty: 1 | Refills: 2 | Status: ACTIVE | COMMUNITY
Start: 2025-03-31 | End: 1900-01-01

## 2025-05-20 ENCOUNTER — APPOINTMENT (OUTPATIENT)
Dept: ENDOCRINOLOGY | Facility: CLINIC | Age: 65
End: 2025-05-20
Payer: MEDICARE

## 2025-05-20 DIAGNOSIS — E11.9 TYPE 2 DIABETES MELLITUS W/OUT COMPLICATIONS: ICD-10-CM

## 2025-05-20 PROCEDURE — G0108 DIAB MANAGE TRN  PER INDIV: CPT

## 2025-05-21 NOTE — PHYSICAL THERAPY INITIAL EVALUATION ADULT - ADL SKILLS, REHAB EVAL
Unable to wean oxygen. SpO2 was in the 70s-80s on room air. Still on 4L nasal cannula.   
independent
independent

## 2025-07-11 ENCOUNTER — APPOINTMENT (OUTPATIENT)
Dept: ENDOCRINOLOGY | Facility: CLINIC | Age: 65
End: 2025-07-11
Payer: MEDICARE

## 2025-07-11 VITALS — HEIGHT: 70 IN | WEIGHT: 196 LBS | BODY MASS INDEX: 28.06 KG/M2

## 2025-07-11 LAB — HBA1C MFR BLD HPLC: 6.7

## 2025-07-11 PROCEDURE — G0108 DIAB MANAGE TRN  PER INDIV: CPT
